# Patient Record
Sex: MALE | URBAN - METROPOLITAN AREA
[De-identification: names, ages, dates, MRNs, and addresses within clinical notes are randomized per-mention and may not be internally consistent; named-entity substitution may affect disease eponyms.]

---

## 2024-09-28 ENCOUNTER — NURSING HOME VISIT (OUTPATIENT)
Dept: POST ACUTE CARE | Facility: EXTERNAL LOCATION | Age: 79
End: 2024-09-28

## 2024-09-28 VITALS
WEIGHT: 163 LBS | HEIGHT: 71 IN | HEART RATE: 73 BPM | DIASTOLIC BLOOD PRESSURE: 84 MMHG | BODY MASS INDEX: 22.82 KG/M2 | RESPIRATION RATE: 18 BRPM | TEMPERATURE: 98.1 F | SYSTOLIC BLOOD PRESSURE: 148 MMHG

## 2024-09-28 DIAGNOSIS — R44.0 AUDITORY HALLUCINATIONS: ICD-10-CM

## 2024-09-28 DIAGNOSIS — I25.10 CORONARY ARTERY DISEASE INVOLVING NATIVE CORONARY ARTERY OF NATIVE HEART WITHOUT ANGINA PECTORIS: ICD-10-CM

## 2024-09-28 DIAGNOSIS — I25.5 ISCHEMIC CARDIOMYOPATHY: ICD-10-CM

## 2024-09-28 DIAGNOSIS — F01.B4 MODERATE VASCULAR DEMENTIA WITH ANXIETY: Primary | ICD-10-CM

## 2024-09-28 DIAGNOSIS — F44.9 CONVERSION REACTION: ICD-10-CM

## 2024-09-28 DIAGNOSIS — R04.0 EPISTAXIS, RECURRENT: ICD-10-CM

## 2024-09-28 RX ORDER — ISOSORBIDE MONONITRATE 30 MG/1
30 TABLET, EXTENDED RELEASE ORAL DAILY
COMMUNITY

## 2024-09-28 RX ORDER — METOPROLOL SUCCINATE 25 MG/1
25 TABLET, EXTENDED RELEASE ORAL DAILY
COMMUNITY

## 2024-09-28 RX ORDER — ASPIRIN 81 MG/1
81 TABLET ORAL DAILY
COMMUNITY

## 2024-09-28 RX ORDER — PANTOPRAZOLE SODIUM 40 MG/1
40 TABLET, DELAYED RELEASE ORAL
COMMUNITY

## 2024-09-28 RX ORDER — FERROUS SULFATE 325(65) MG
65 TABLET, DELAYED RELEASE (ENTERIC COATED) ORAL
COMMUNITY

## 2024-09-28 RX ORDER — DONEPEZIL HYDROCHLORIDE 5 MG/1
5 TABLET, FILM COATED ORAL NIGHTLY
COMMUNITY

## 2024-09-28 RX ORDER — LEVETIRACETAM 1000 MG/1
1000 TABLET ORAL 2 TIMES DAILY
COMMUNITY

## 2024-09-28 RX ORDER — PRASUGREL 10 MG/1
10 TABLET, FILM COATED ORAL DAILY
COMMUNITY

## 2024-09-28 RX ORDER — BRIMONIDINE TARTRATE 2 MG/ML
1 SOLUTION/ DROPS OPHTHALMIC 2 TIMES DAILY
COMMUNITY

## 2024-09-28 RX ORDER — PRAVASTATIN SODIUM 80 MG/1
80 TABLET ORAL DAILY
COMMUNITY

## 2024-09-29 NOTE — PROGRESS NOTES
Admission H&P - [facility]    78 y.o. male admitted 9/27/24  HPI    No medical history in EPIC.  Never .  No children.  DPOA is listed as a friend.  Per staff, patient is preoccupied with aliens  [employer] for brief time per patient.    Ambulatory without assistive device    Additional History provided by SARAH Cleaning, DPOA/HC and surrogate/Proxy   - patient was living alone in [place].  Moved to [place]   - prior to being admitted to [facility], patient was hospitalized for 1 week at [facility].  No records of this hospitalization are in [facility] Everywhere.     - h/o pacemaker and TIMBO   - no h/o cancer      Past Medical History:   Diagnosis Date    Auditory hallucinations 09/28/2024    Conversion reaction 09/28/2024    Coronary artery disease involving native coronary artery of native heart without angina pectoris 09/28/2024    Epistaxis, recurrent 09/28/2024    Ischemic cardiomyopathy 09/28/2024    Moderate vascular dementia with anxiety (Multi) 09/28/2024      Past Surgical History:   Procedure Laterality Date    CORONARY ANGIOPLASTY WITH STENT PLACEMENT      states he has 16 stents after the CABG    CORONARY ARTERY BYPASS GRAFT  2010     Family History   Family history unknown: Yes      Social History     Socioeconomic History    Marital status: Single     Spouse name: Not on file    Number of children: Not on file    Years of education: Not on file    Highest education level: Not on file   Occupational History    Not on file   Tobacco Use    Smoking status: Former     Types: Cigarettes    Smokeless tobacco: Never   Substance and Sexual Activity    Alcohol use: Not Currently    Drug use: Not Currently    Sexual activity: Not on file   Other Topics Concern    Not on file   Social History Narrative    Not on file     Social Determinants of Health     Financial Resource Strain: Not on file   Food Insecurity: Not on file   Transportation Needs: Not on file   Physical Activity: Not on file   Stress: Not  "on file   Social Connections: Not on file   Intimate Partner Violence: Not on file   Housing Stability: Not on file       Current Outpatient Medications on File Prior to Visit   Medication Sig Dispense Refill    aspirin 81 mg EC tablet Take 1 tablet (81 mg) by mouth once daily.      brimonidine (AlphaGAN) 0.2 % ophthalmic solution Administer 1 drop into both eyes 2 times a day. Alphagan P - glaucoma      donepezil (Aricept) 5 mg tablet Take 1 tablet (5 mg) by mouth once daily at bedtime.      ferrous sulfate 325 (65 Fe) MG EC tablet Take 65 mg by mouth once daily with breakfast. Do not crush, chew, or split.      isosorbide mononitrate ER (Imdur) 30 mg 24 hr tablet Take 1 tablet (30 mg) by mouth once daily. Do not crush or chew.      levETIRAcetam (Keppra) 1,000 mg tablet Take 1 tablet (1,000 mg) by mouth 2 times a day.      metoprolol succinate XL (Toprol-XL) 25 mg 24 hr tablet Take 1 tablet (25 mg) by mouth once daily. Do not crush or chew.      pantoprazole (ProtoNix) 40 mg EC tablet Take 1 tablet (40 mg) by mouth once daily in the morning. Take before meals. Do not crush, chew, or split.      prasugrel (Effient) 10 mg tablet Take 1 tablet (10 mg) by mouth once daily.      pravastatin (Pravachol) 80 mg tablet Take 1 tablet (80 mg) by mouth once daily.       No current facility-administered medications on file prior to visit.       No Known Allergies      ROS: Denies chest pain, SOB, Headache, GI problems     Visit Vitals  /84   Pulse 73   Temp 36.7 °C (98.1 °F)   Resp 18   Ht 1.803 m (5' 11\")   Wt 73.9 kg (163 lb)   BMI 22.73 kg/m²   Smoking Status Former   BSA 1.92 m²        PHYSICAL EXAM:  Alert and oriented to self.  Eyes: EOM grossly intact  Neck supple without lymph adenopathy or carotid bruit.  No masses or thyromegaly  Heart regular rate and rhythm without murmur.  Lungs clear to auscultation.  Abdomen soft, nontender with bowel sounds  Legs without edema.  Gait is non-antalgic  Speech clear.  " Hearing adequate.          DIAGNOSIS/PLAN:  Problem List Items Addressed This Visit       Ischemic cardiomyopathy    Relevant Medications    isosorbide mononitrate ER (Imdur) 30 mg 24 hr tablet    metoprolol succinate XL (Toprol-XL) 25 mg 24 hr tablet    prasugrel (Effient) 10 mg tablet    Moderate vascular dementia with anxiety (Multi) - Primary    Conversion reaction    Auditory hallucinations    Coronary artery disease involving native coronary artery of native heart without angina pectoris    Relevant Medications    isosorbide mononitrate ER (Imdur) 30 mg 24 hr tablet    metoprolol succinate XL (Toprol-XL) 25 mg 24 hr tablet    prasugrel (Effient) 10 mg tablet    Epistaxis, recurrent     Will order lab:  CMP, CBC, TSH, Lipid panel, PSA        Mikhail Galaviz DO, FACOFP

## 2024-09-30 ENCOUNTER — NURSING HOME VISIT (OUTPATIENT)
Dept: PRIMARY CARE | Facility: CLINIC | Age: 79
End: 2024-09-30
Payer: MEDICARE

## 2024-09-30 DIAGNOSIS — F01.B4 MODERATE VASCULAR DEMENTIA WITH ANXIETY: Primary | ICD-10-CM

## 2024-10-01 NOTE — PROGRESS NOTES
Recent hospitalization records from [facility]    Discharge Summaries  - documented in this encounter   Rick Torres MD - 09/27/2024 11:33 AM EDT  Formatting of this note is different from the original.  Images from the original note were not included.    DISCHARGE SUMMARY    PATIENT NAME: Wayne A Burkitt ADMISSION DATE: 9/21/2024  MRN: [id number] DISCHARGE DATE:  09/27/2024      ATTENDING PHYSICIAN: Rick Torres MD Code Status: Not on file  PCP: No primary care provider on file.    Highest Readmission Risk Score: 22  The 30 day readmissions risk score is derived from an internally validated risk model which evaluates patient level characteristics, utilization history, medication orders and lab results up until the day of discharge. Patients with a score of 40 or above are considered highest risk for readmission. Specific patient level drivers will be listed at the bottom of the summary.    TRANSITIONS OF CARE CRITICAL ISSUES:    KEY MEDICATION CHANGES:  Ranolazine discontinue  Lamotrigine discontinued (patient was not taking)  Imdur dose reduced to 30 mg daily  Seroquel dose increased to 50 mg nightly  Started on Aricept  Trazodone as needed    FOLLOW UP APPOINTMENTS: Primary care physician    LABS & PROCEDURES PENDING AT DISCHARGE:      REASON FOR HOSPITALIZATION/PRINCIPAL DIAGNOSES: Dementia with psychotic disturbance    HOSPITAL PROBLEMS:  Principal Problem:  Dementia with psychotic disturbance (HCC) (POA: Yes)  Active Problems:  Seizure disorder (HCC) (POA: Yes)  Hypertensive disorder (POA: Yes)  Coronary atherosclerosis (POA: Yes)  Chronic ischemic heart disease, unspecified (POA: Yes)  Angina pectoris (HCC) (POA: Yes)  Episodes of formed visual hallucinations (POA: Yes)  Frequent falls (POA: Yes)  Visual hallucinations (POA: Yes)  Auditory hallucination (POA: Yes)  Resolved Problems:  * No resolved hospital problems. *            HOSPITAL COURSE:  78 year old male with a past medical history CAD,  "seizures, HTN presented with visual and auditory hallucinations, recurrent falls, unable to take care of himself. This has been ongoing for 1 year and got worse after sister passed away as they lived together and helped each other with basic and instrumental activities.  After cognitive and mental evaluation diagnoses included:  Dementia with psychosis with associated: visual hallucinations, auditory hallucinations and Paranoid ideas  Improved notably with increased dose of seroquel to 50 mg qhs.  Started in donepezil and needs to be titrated up later over next 3 weeks.  Capacity testing done and not able to make decisions on his own benefit including health and financial related. POA in place.  Medical reconciliation done and all medications in current list are accurate.  Patient has been on Keppra and Lamictal for his seizures but both levels were low. Last seizure was about 4 years ago. During hospital stay both were discontinued but I resumed Keppra on discharge since I do not feel that is safe to discontinue both seizure medications abruptly even though the patient was not taking them regularly and both levels were subtherapeutic.  Patient discharged in stable condition to follow-up with his primary care physician on an outpatient basis that he needs to establish.    OPERATIONS/PROCEDURE DURING THIS HOSPITALIZATION:  * No surgery found * None  CT Scan    CONSULTS DURING HOSPITALIZATION:  Treatment Team:  Attending Provider: Rick Torres MD    PATIENT CONDITION AT DISCHARGE: Stable    DISCHARGE DISPOSITION:  Memory care unit    Discharge Physical Exam:  VITAL SIGNS: /68  Pulse 63  Temp 36.7 °C (98.1 °F) (Oral)  Resp 18  Ht 180.3 cm (5' 11\")  Wt 72.3 kg (159 lb 6.3 oz)  SpO2 95%  BMI 22.23 kg/m²    Physical Exam Performed:  General appearance: Well appearing, alert and orientedx3, in no acute distress.  Skin: color, texture, turgor normal  Head: Normocephalic, no masses, lesions  Eyes: " PERRLA.  Neck: Supple, no bruits  Lungs: Clear to auscultation. No wheezing, rhonchi, rales.  Cardiac: RRR, no murmur, gallop, or rubs  Abdomen: soft, non-tender. Bowel sounds normal  Extremities: No deformities, edema, skin discoloration  Musculoskeletal: No joint swelling, deformity, or tenderness  Neuro: No focal neurologic deficit    WOUND/SURGICAL SITE CARE:  None    SUPPLIES OR EQUIPMENT:  None    DIET:  Resume pre-hospital diet    ACTIVITY AND EXERCISE:  Resume pre-hospital activity    FOLLOW UP APPOINTMENTS: No future appointments.  Discharge Information    Row Name ED to Hosp-Admission (Current) from 9/21/2024 in [address] [facility]  [facility]  Phone# [phone]    Home Health Care  [facility]  Phone# [phone]              ALLERGIES  Allergen Reactions  Alprazolam Anaphylaxis  Amlodipine Hives  Atorvastatin Hives  Bepotastine Besilate Swelling  Cephalexin Hives  Ciprofloxacin Itching  Clopidogrel Swelling, Angioedema  Diphenhydramine Hcl Hives  Doxazosin Swelling  Guaifenesin Hives  Hydrochlorothiazide Intolerance  Hydrocodone Hives  Lisinopril Swelling  Methylprednisolone Intolerance  Metronidazole Swelling  Phenytoin Unknown  Sertraline Unknown  Telmisartan Hives  Topiramate Rash    DISCHARGE MEDICATION:    Medication List      START taking these medications    donepezil 5 mg tablet  Commonly known as: ARICEPT  Take 1 tablet by mouth once daily.  Start taking on: September 28, 2024    traZODone 50 mg tablet  Commonly known as: DESYREL  Take 0.5 tablets by mouth at bedtime as needed.          CHANGE how you take these medications    isosorbide mononitrate ER 30 mg 24 hr tablet  Commonly known as: IMDUR  Take 1 tablet by mouth once daily.  What changed:  medication strength  how much to take    QUEtiapine 50 mg tablet  Commonly known as: SEROquel  Take 1 tablet by mouth daily at bedtime.  What changed:  medication strength  how  much to take          CONTINUE taking these medications    aspirin 81 mg Cap    brimonidine 0.2 % ophthalmic solution  Commonly known as: ALPHAGAN    ferrous sulfate 325 mg (65 mg iron) EC tablet    levETIRAcetam 1,000 mg tablet  Commonly known as: KEPPRA    metoprolol succinate ER 25 mg 24 hr tablet  Commonly known as: TOPROL XL    pantoprazole DR 40 mg tablet  Commonly known as: PROTONIX    prasugrel 10 mg Tab  Commonly known as: EFFIENT    pravastatin 80 mg tablet  Commonly known as: PRAVACHOL    spironolactone 25 mg tablet  Commonly known as: ALDACTONE          STOP taking these medications    famotidine 40 mg tablet  Commonly known as: PEPCID    fexofenadine 180 mg tablet  Commonly known as: ALLEGRA    lamoTRIgine 25 mg tablet  Commonly known as: LaMICtal    methocarbamol 500 mg tablet  Commonly known as: ROBAXIN    ranolazine  mg 12 hr tablet  Commonly known as: RANEXA            Where to Get Your Medications      These medications were sent to e- [facility] [id number] [place] [address] - [phone] [address] [place], [place] [id number]    Phone: [phone]  donepezil 5 mg tablet  isosorbide mononitrate ER 30 mg 24 hr tablet  QUEtiapine 50 mg tablet  traZODone 50 mg tablet        The patient's risk for 30-day readmission is determined using the following contributing factors:  Pt variables contributing to increased readmission risk:    18 Most Recent BUN Result  14 Active Medication Orders  8.8 First Resulted Calcium During Admission  2 Number of Previous ED Visits (6 mos.)  1 Previous ED Visit (6 mos.)?  1 Insurance - Medicare  1 Barriers to Health Literacy Identified      Plan of care discussed with Provider, RN, Patient and Care Management    I have performed the face-to-face and relevant services for a total of >30 minutes.    SIGNATURE: Rick Torres MD  DATE: September 27, 2024  TIME: 11:33 AM          Electronically signed by Rick Torres MD at 09/27/2024 11:37 AM EDT    Medications at Time of Discharge  - documented as of this encounter   Medications at Time of Discharge  Medication Sig Dispensed Refills Start Date End Date   brimonidine (ALPHAGAN) 0.2 % ophthalmic solution  Use 1 Drop in both eyes two times a day.     07/11/2024     QUEtiapine (SEROQUEL) 50 mg tablet  Take 1 tablet by mouth daily at bedtime. 30 tablet    09/27/2024 10/27/2024   donepezil (ARICEPT) 5 mg tablet  Take 1 tablet by mouth once daily. 30 tablet    09/28/2024 10/28/2024   traZODone (DESYREL) 50 mg tablet  Take 0.5 tablets by mouth at bedtime as needed. 15 tablet    09/27/2024 10/27/2024   isosorbide mononitrate ER (IMDUR) 30 mg 24 hr tablet  Take 1 tablet by mouth once daily. 30 tablet    09/27/2024 10/27/2024   pravastatin (PRAVACHOL) 80 mg tablet  Take 80 mg by mouth once daily.           levETIRAcetam (KEPPRA) 1,000 mg tablet  Take 1,000 mg by mouth two times a day.           aspirin 81 mg cap  Take 81 mg by mouth once daily.           ferrous sulfate 325 mg (65 mg iron) EC tablet  Take 325 mg by mouth once daily.           prasugrel (EFFIENT) 10 mg tab  Take 10 mg by mouth once daily.           pantoprazole DR (PROTONIX) 40 mg tablet  Take 40 mg by mouth once daily.           spironolactone (ALDACTONE) 25 mg tablet  Take 25 mg by mouth once daily.           metoprolol succinate ER (TOPROL XL) 25 mg 24 hr tablet  Take 25 mg by mouth once daily.             Ordered Prescriptions  - documented in this encounter

## 2024-10-01 NOTE — PROGRESS NOTES
CEID from [facility]:  [id number]     Attempting to abstract records from recent hospitalization at [facility] Cathy.      Hospitalized 9/21/24    DISCHARGE SUMMARY    PATIENT NAME: Wayne A Burkitt ADMISSION DATE: 9/21/2024  MRN: [id number] DISCHARGE DATE:  09/27/2024      ATTENDING PHYSICIAN: Rick Torres MD Code Status: Not on file  PCP: No primary care provider on file.    Highest Readmission Risk Score: 22  The 30 day readmissions risk score is derived from an internally validated risk model which evaluates patient level characteristics, utilization history, medication orders and lab results up until the day of discharge. Patients with a score of 40 or above are considered highest risk for readmission. Specific patient level drivers will be listed at the bottom of the summary.    TRANSITIONS OF CARE CRITICAL ISSUES:    KEY MEDICATION CHANGES:  Ranolazine discontinue  Lamotrigine discontinued (patient was not taking)  Imdur dose reduced to 30 mg daily  Seroquel dose increased to 50 mg nightly  Started on Aricept  Trazodone as needed    FOLLOW UP APPOINTMENTS: Primary care physician    LABS & PROCEDURES PENDING AT DISCHARGE:      REASON FOR HOSPITALIZATION/PRINCIPAL DIAGNOSES: Dementia with psychotic disturbance    HOSPITAL PROBLEMS:  Principal Problem:  Dementia with psychotic disturbance (HCC) (POA: Yes)  Active Problems:  Seizure disorder (HCC) (POA: Yes)  Hypertensive disorder (POA: Yes)  Coronary atherosclerosis (POA: Yes)  Chronic ischemic heart disease, unspecified (POA: Yes)  Angina pectoris (HCC) (POA: Yes)  Episodes of formed visual hallucinations (POA: Yes)  Frequent falls (POA: Yes)  Visual hallucinations (POA: Yes)  Auditory hallucination (POA: Yes)  Resolved Problems:  * No resolved hospital problems. *            HOSPITAL COURSE:  78 year old male with a past medical history CAD, seizures, HTN presented with visual and auditory hallucinations, recurrent falls, unable to take care of himself. This has been  "ongoing for 1 year and got worse after sister passed away as they lived together and helped each other with basic and instrumental activities.  After cognitive and mental evaluation diagnoses included:  Dementia with psychosis with associated: visual hallucinations, auditory hallucinations and Paranoid ideas  Improved notably with increased dose of seroquel to 50 mg qhs.  Started in donepezil and needs to be titrated up later over next 3 weeks.  Capacity testing done and not able to make decisions on his own benefit including health and financial related. POA in place.  Medical reconciliation done and all medications in current list are accurate.  Patient has been on Keppra and Lamictal for his seizures but both levels were low. Last seizure was about 4 years ago. During hospital stay both were discontinued but I resumed Keppra on discharge since I do not feel that is safe to discontinue both seizure medications abruptly even though the patient was not taking them regularly and both levels were subtherapeutic.  Patient discharged in stable condition to follow-up with his primary care physician on an outpatient basis that he needs to establish.    OPERATIONS/PROCEDURE DURING THIS HOSPITALIZATION:  * No surgery found * None  CT Scan    CONSULTS DURING HOSPITALIZATION:  Treatment Team:  Attending Provider: Rick Torres MD    PATIENT CONDITION AT DISCHARGE: Stable    DISCHARGE DISPOSITION:  Memory care unit    Discharge Physical Exam:  VITAL SIGNS: /68  Pulse 63  Temp 36.7 °C (98.1 °F) (Oral)  Resp 18  Ht 180.3 cm (5' 11\")  Wt 72.3 kg (159 lb 6.3 oz)  SpO2 95%  BMI 22.23 kg/m²    Physical Exam Performed:  General appearance: Well appearing, alert and orientedx3, in no acute distress.  Skin: color, texture, turgor normal  Head: Normocephalic, no masses, lesions  Eyes: PERRLA.  Neck: Supple, no bruits  Lungs: Clear to auscultation. No wheezing, rhonchi, rales.  Cardiac: RRR, no murmur, gallop, or " rubs  Abdomen: soft, non-tender. Bowel sounds normal  Extremities: No deformities, edema, skin discoloration  Musculoskeletal: No joint swelling, deformity, or tenderness  Neuro: No focal neurologic deficit    WOUND/SURGICAL SITE CARE:  None    SUPPLIES OR EQUIPMENT:  None    DIET:  Resume pre-hospital diet    ACTIVITY AND EXERCISE:  Resume pre-hospital activity    FOLLOW UP APPOINTMENTS: No future appointments.  Discharge Information    Row Name ED to Hosp-Admission (Current) from 9/21/2024 in [address] [facility]  [facility]  Phone# [phone]    Home Health Care  [facility]  Phone# [phone]              ALLERGIES  Allergen Reactions  Alprazolam Anaphylaxis  Amlodipine Hives  Atorvastatin Hives  Bepotastine Besilate Swelling  Cephalexin Hives  Ciprofloxacin Itching  Clopidogrel Swelling, Angioedema  Diphenhydramine Hcl Hives  Doxazosin Swelling  Guaifenesin Hives  Hydrochlorothiazide Intolerance  Hydrocodone Hives  Lisinopril Swelling  Methylprednisolone Intolerance  Metronidazole Swelling  Phenytoin Unknown  Sertraline Unknown  Telmisartan Hives  Topiramate Rash    DISCHARGE MEDICATION:    Medication List      START taking these medications    donepezil 5 mg tablet  Commonly known as: ARICEPT  Take 1 tablet by mouth once daily.  Start taking on: September 28, 2024    traZODone 50 mg tablet  Commonly known as: DESYREL  Take 0.5 tablets by mouth at bedtime as needed.          CHANGE how you take these medications    isosorbide mononitrate ER 30 mg 24 hr tablet  Commonly known as: IMDUR  Take 1 tablet by mouth once daily.  What changed:  medication strength  how much to take    QUEtiapine 50 mg tablet  Commonly known as: SEROquel  Take 1 tablet by mouth daily at bedtime.  What changed:  medication strength  how much to take          CONTINUE taking these medications    aspirin 81 mg Cap    brimonidine 0.2 % ophthalmic solution  Commonly  known as: ALPHAGAN    ferrous sulfate 325 mg (65 mg iron) EC tablet    levETIRAcetam 1,000 mg tablet  Commonly known as: KEPPRA    metoprolol succinate ER 25 mg 24 hr tablet  Commonly known as: TOPROL XL    pantoprazole DR 40 mg tablet  Commonly known as: PROTONIX    prasugrel 10 mg Tab  Commonly known as: EFFIENT    pravastatin 80 mg tablet  Commonly known as: PRAVACHOL    spironolactone 25 mg tablet  Commonly known as: ALDACTONE          STOP taking these medications    famotidine 40 mg tablet  Commonly known as: PEPCID    fexofenadine 180 mg tablet  Commonly known as: ALLEGRA    lamoTRIgine 25 mg tablet  Commonly known as: LaMICtal    methocarbamol 500 mg tablet  Commonly known as: ROBAXIN    ranolazine  mg 12 hr tablet  Commonly known as: RANEXA            Where to Get Your Medications      These medications were sent to [place] [address] - [address] [facility] - [phone] [address] [facility] [address]    Phone: [phone]  donepezil 5 mg tablet  isosorbide mononitrate ER 30 mg 24 hr tablet  QUEtiapine 50 mg tablet  traZODone 50 mg tablet        The patient's risk for 30-day readmission is determined using the following contributing factors:  Pt variables contributing to increased readmission risk:    18 Most Recent BUN Result  14 Active Medication Orders  8.8 First Resulted Calcium During Admission  2 Number of Previous ED Visits (6 mos.)  1 Previous ED Visit (6 mos.)?  1 Insurance - Medicare  1 Barriers to Health Literacy Identified      Plan of care discussed with Provider, RN, Patient and Care Management    I have performed the face-to-face and relevant services for a total of >30 minutes.    SIGNATURE: Rick Torres MD  DATE: September 27, 2024  TIME: 11:33 AM          Electronically signed by Rick Torres MD at 09/27/2024 11:37 AM EDT   Medications at Time of Discharge  - documented as of this encounter   Medications at Time of Discharge  Medication Sig  Dispensed Refills Start Date End Date   brimonidine (ALPHAGAN) 0.2 % ophthalmic solution  Use 1 Drop in both eyes two times a day.     07/11/2024     QUEtiapine (SEROQUEL) 50 mg tablet  Take 1 tablet by mouth daily at bedtime. 30 tablet    09/27/2024 10/27/2024   donepezil (ARICEPT) 5 mg tablet  Take 1 tablet by mouth once daily. 30 tablet    09/28/2024 10/28/2024   traZODone (DESYREL) 50 mg tablet  Take 0.5 tablets by mouth at bedtime as needed. 15 tablet    09/27/2024 10/27/2024   isosorbide mononitrate ER (IMDUR) 30 mg 24 hr tablet  Take 1 tablet by mouth once daily. 30 tablet    09/27/2024 10/27/2024   pravastatin (PRAVACHOL) 80 mg tablet  Take 80 mg by mouth once daily.           levETIRAcetam (KEPPRA) 1,000 mg tablet  Take 1,000 mg by mouth two times a day.           aspirin 81 mg cap  Take 81 mg by mouth once daily.           ferrous sulfate 325 mg (65 mg iron) EC tablet  Take 325 mg by mouth once daily.           prasugrel (EFFIENT) 10 mg tab  Take 10 mg by mouth once daily.           pantoprazole DR (PROTONIX) 40 mg tablet  Take 40 mg by mouth once daily.           spironolactone (ALDACTONE) 25 mg tablet  Take 25 mg by mouth once daily.           metoprolol succinate ER (TOPROL XL) 25 mg 24 hr tablet  Take 25 mg by mouth once daily.             Ordered Prescriptions  - documented in this encounter

## 2024-10-22 DIAGNOSIS — R04.0 RECURRENT EPISTAXIS: Primary | ICD-10-CM

## 2024-11-15 PROBLEM — G40.909 SEIZURE DISORDER (MULTI): Status: ACTIVE | Noted: 2023-07-13

## 2024-11-15 PROBLEM — I95.1 ORTHOSTATIC HYPOTENSION: Status: ACTIVE | Noted: 2024-09-21

## 2024-11-15 PROBLEM — I20.9 ANGINA PECTORIS: Status: ACTIVE | Noted: 2023-06-07

## 2024-11-15 PROBLEM — F03.92 DEMENTIA WITH PSYCHOTIC DISTURBANCE: Status: ACTIVE | Noted: 2024-09-22

## 2024-11-15 PROBLEM — R29.6 FREQUENT FALLS: Status: ACTIVE | Noted: 2024-09-21

## 2024-11-15 PROBLEM — G47.33 OBSTRUCTIVE SLEEP APNEA SYNDROME: Status: ACTIVE | Noted: 2024-09-21

## 2024-11-15 PROBLEM — F41.0 PANIC DISORDER WITHOUT AGORAPHOBIA: Status: ACTIVE | Noted: 2024-09-21

## 2024-11-15 PROBLEM — I10 HYPERTENSIVE DISORDER: Status: ACTIVE | Noted: 2023-06-07

## 2024-11-15 PROBLEM — R44.1 EPISODES OF FORMED VISUAL HALLUCINATIONS: Status: ACTIVE | Noted: 2024-09-21

## 2024-11-15 RX ORDER — SPIRONOLACTONE 25 MG/1
25 TABLET ORAL
COMMUNITY

## 2024-11-19 ENCOUNTER — NURSING HOME VISIT (OUTPATIENT)
Dept: POST ACUTE CARE | Facility: EXTERNAL LOCATION | Age: 79
End: 2024-11-19
Payer: MEDICARE

## 2024-11-19 DIAGNOSIS — I25.5 ISCHEMIC CARDIOMYOPATHY: ICD-10-CM

## 2024-11-19 DIAGNOSIS — I10 HYPERTENSION, UNSPECIFIED TYPE: ICD-10-CM

## 2024-11-19 DIAGNOSIS — F01.B4 MODERATE VASCULAR DEMENTIA WITH ANXIETY: ICD-10-CM

## 2024-11-19 DIAGNOSIS — R04.0 EPISTAXIS, RECURRENT: Primary | ICD-10-CM

## 2024-11-19 PROCEDURE — 99347 HOME/RES VST EST SF MDM 20: CPT | Performed by: FAMILY MEDICINE

## 2024-11-25 ENCOUNTER — APPOINTMENT (OUTPATIENT)
Dept: CARDIOLOGY | Facility: CLINIC | Age: 79
End: 2024-11-25
Payer: MEDICARE

## 2024-11-25 VITALS
BODY MASS INDEX: 24.08 KG/M2 | SYSTOLIC BLOOD PRESSURE: 100 MMHG | HEIGHT: 71 IN | DIASTOLIC BLOOD PRESSURE: 60 MMHG | OXYGEN SATURATION: 99 % | HEART RATE: 60 BPM | WEIGHT: 172 LBS

## 2024-11-25 DIAGNOSIS — I20.0 ANGINA PECTORIS, UNSTABLE (MULTI): ICD-10-CM

## 2024-11-25 DIAGNOSIS — I25.10 CORONARY ARTERY DISEASE INVOLVING NATIVE CORONARY ARTERY OF NATIVE HEART WITHOUT ANGINA PECTORIS: Primary | ICD-10-CM

## 2024-11-25 PROCEDURE — 3074F SYST BP LT 130 MM HG: CPT | Performed by: INTERNAL MEDICINE

## 2024-11-25 PROCEDURE — 99205 OFFICE O/P NEW HI 60 MIN: CPT | Performed by: INTERNAL MEDICINE

## 2024-11-25 PROCEDURE — 1159F MED LIST DOCD IN RCRD: CPT | Performed by: INTERNAL MEDICINE

## 2024-11-25 PROCEDURE — 93000 ELECTROCARDIOGRAM COMPLETE: CPT | Performed by: INTERNAL MEDICINE

## 2024-11-25 PROCEDURE — 3078F DIAST BP <80 MM HG: CPT | Performed by: INTERNAL MEDICINE

## 2024-11-25 RX ORDER — RANOLAZINE 500 MG/1
500 TABLET, EXTENDED RELEASE ORAL 2 TIMES DAILY
Qty: 60 TABLET | Refills: 11 | Status: SHIPPED | OUTPATIENT
Start: 2024-11-25 | End: 2025-11-25

## 2024-11-25 RX ORDER — REGADENOSON 0.08 MG/ML
0.4 INJECTION, SOLUTION INTRAVENOUS
OUTPATIENT
Start: 2024-11-25

## 2024-11-25 RX ORDER — AMINOPHYLLINE 25 MG/ML
125 INJECTION, SOLUTION INTRAVENOUS ONCE AS NEEDED
OUTPATIENT
Start: 2024-11-25

## 2024-11-25 RX ORDER — ISOSORBIDE MONONITRATE 30 MG/1
60 TABLET, EXTENDED RELEASE ORAL DAILY
Qty: 30 TABLET | Refills: 3 | Status: SHIPPED | OUTPATIENT
Start: 2024-11-25

## 2024-11-25 NOTE — PROGRESS NOTES
[facility]  Problem List Items Addressed This Visit       Ischemic cardiomyopathy    Moderate vascular dementia with anxiety    Epistaxis, recurrent - Primary    Hypertensive disorder     ENT appointment  Sees cardiologist regularly  Walking with walker

## 2024-11-25 NOTE — PROGRESS NOTES
Patient presents for cardiovascular evaluation in the [place] office at the request of Mikhail Galaviz DO for the following:    Chief Complaint:  Chest Pain     HISTORY OF PRESENT ILLNESS:      Wayne Burkitt is a 79 y.o. male with prior cardiac history of CAD, CABG, HTN, ICM, angina.  Other pertinent medical history includes dementia (vascular).  Patient of Dr. Asencio.  Follows with a DrTamika In [place].    Patient reports symptoms of chest discomfort.    Patient decribes 6 months central and left sided sided chest pain with left arm radiation, lasting for minutes, aggravating factors are exertion, alleviating factors are rest.  The chest pain is associated with mild shortness of breath.  Took nitroglycerin a couple of time.    Patient reports experiencing no weight gain, with no change over the past year.  The patient also reports no dyspnea on exertion, no orthopnea, no nocturnal dyspnea, and no lower extremity edema.     No palpitations, syncope, or claudication.  No family history of CAD.  No tobacco smoking.    Past Medical History:  Past Medical History:   Diagnosis Date    Auditory hallucinations 09/28/2024    Conversion reaction 09/28/2024    Coronary artery disease involving native coronary artery of native heart without angina pectoris 09/28/2024    Epistaxis, recurrent 09/28/2024    Ischemic cardiomyopathy 09/28/2024    Moderate vascular dementia with anxiety (Multi) 09/28/2024        Past Surgical History:  Recent Surgeries in Cardiology            No cases to display              Social History:   reports that he has quit smoking. His smoking use included cigarettes. He has never used smokeless tobacco. He reports that he does not currently use alcohol. He reports that he does not currently use drugs.     Family History:  Family history is unknown by patient.     Allergies:  Alprazolam, Amlodipine, Atorvastatin, Bepotastine besilate, Cephalexin, Ciprofloxacin, Clopidogrel, Diphenhydramine hcl, Doxazosin,  "Guaifenesin, Hydrochlorothiazide, Hydrocodone, Lisinopril, Methylprednisolone, Metronidazole, Phenytoin, Sertraline, Telmisartan, and Topiramate    Outpatient Medications:  Current Outpatient Medications   Medication Instructions    aspirin 81 mg, Daily    brimonidine (AlphaGAN) 0.2 % ophthalmic solution 1 drop, 2 times daily    donepezil (ARICEPT) 5 mg, Nightly    ferrous sulfate 65 mg, Daily with breakfast    isosorbide mononitrate ER (IMDUR) 30 mg, Daily    levETIRAcetam (KEPPRA) 1,000 mg, 2 times daily    metoprolol succinate XL (TOPROL-XL) 25 mg, Daily    pantoprazole (PROTONIX) 40 mg, Daily before breakfast    prasugrel (EFFIENT) 10 mg, Daily    pravastatin (PRAVACHOL) 80 mg, Daily    spironolactone (ALDACTONE) 25 mg, Daily RT       ROS: 12 review of systems negative other than chief complaint    PHYSICAL EXAM    Vitals:    11/25/24 1047   BP: 100/60   BP Location: Right arm   Patient Position: Sitting   Pulse: 60   SpO2: 99%   Weight: 78 kg (172 lb)   Height: 1.803 m (5' 11\")     General: No acute distress, appears comfortable  Cardiac: Regular rate and rhythm with no murmurs rubs or gallops, normal S1-S2  Pulmonary: Clear to auscultation bilaterally with no rales or rhonchi, normal respiratory effort  Gastrointestinal: Soft abdomen with no tenderness or guarding, no rebound  Musculoskeletal: No lower extremity edema, normal  Neurological: Alert and oriented x 4, appropriate, moving all extremities well, normal affect  Psychiatric: Normal affect, mood appropriate to occasion  Skin: No rashes, hives or jaundice  HEENT: Normocephalic, atraumatic.  Pupils equal round and reactive.    Last Labs:    CBC -  No results found for: \"WBC\", \"HGB\", \"HCT\", \"MCV\", \"PLT\"    CMP -  No results found for: \"CALCIUM\", \"PHOS\", \"PROT\", \"ALBUMIN\", \"AST\", \"ALT\", \"ALKPHOS\", \"BILITOT\"    LIPID PANEL -   No results found for: \"CHOL\", \"HDL\", \"CHHDL\", \"LDL\", \"VLDL\", \"TRIG\", \"NHDL\"    RENAL FUNCTION PANEL -   No results found for: \"GLU\", " "\"K\", \"CHLOR\", \"PHOS\"    No results found for: \"BNP\", \"HGBA1C\"    Last Cardiology Tests: EKG: Atrial paced, LVH with repol, inferior Q waves      Assessment/Plan   Diagnoses and all orders for this visit:  Coronary artery disease involving native coronary artery of native heart without angina pectoris  -     ECG 12 lead (Clinic Performed)  -     Follow Up In Cardiology; Future  -     Nuclear Stress Test; Future  -     Transthoracic echo (TTE) complete; Future  -     isosorbide mononitrate ER (Imdur) 30 mg 24 hr tablet; Take 2 tablets (60 mg) by mouth once daily. Do not crush or chew.  -     ranolazine (Ranexa) 500 mg 12 hr tablet; Take 1 tablet (500 mg) by mouth 2 times a day. Do not crush, chew, or split.  Angina pectoris, unstable (Multi)  -     Follow Up In Cardiology; Future  -     Nuclear Stress Test; Future  -     Transthoracic echo (TTE) complete; Future  -     isosorbide mononitrate ER (Imdur) 30 mg 24 hr tablet; Take 2 tablets (60 mg) by mouth once daily. Do not crush or chew.  -     ranolazine (Ranexa) 500 mg 12 hr tablet; Take 1 tablet (500 mg) by mouth 2 times a day. Do not crush, chew, or split.      Assessment and plan (narrative):    Wayne Burkitt is a 79 y.o. male with CAD, CABG and chest pain, possibly angina, will check testing and followup    Followup: after testing    Gabino Giron MD  "

## 2024-11-25 NOTE — H&P (VIEW-ONLY)
Patient presents for cardiovascular evaluation in the [place] office at the request of Mikhail Galaviz DO for the following:    Chief Complaint:  Chest Pain     HISTORY OF PRESENT ILLNESS:      Wayne Burkitt is a 79 y.o. male with prior cardiac history of CAD, CABG, HTN, ICM, angina.  Other pertinent medical history includes dementia (vascular).  Patient of Dr. Asencio.  Follows with a DrTamika In [place].    Patient reports symptoms of chest discomfort.    Patient decribes 6 months central and left sided sided chest pain with left arm radiation, lasting for minutes, aggravating factors are exertion, alleviating factors are rest.  The chest pain is associated with mild shortness of breath.  Took nitroglycerin a couple of time.    Patient reports experiencing no weight gain, with no change over the past year.  The patient also reports no dyspnea on exertion, no orthopnea, no nocturnal dyspnea, and no lower extremity edema.     No palpitations, syncope, or claudication.  No family history of CAD.  No tobacco smoking.    Past Medical History:  Past Medical History:   Diagnosis Date    Auditory hallucinations 09/28/2024    Conversion reaction 09/28/2024    Coronary artery disease involving native coronary artery of native heart without angina pectoris 09/28/2024    Epistaxis, recurrent 09/28/2024    Ischemic cardiomyopathy 09/28/2024    Moderate vascular dementia with anxiety (Multi) 09/28/2024        Past Surgical History:  Recent Surgeries in Cardiology            No cases to display              Social History:   reports that he has quit smoking. His smoking use included cigarettes. He has never used smokeless tobacco. He reports that he does not currently use alcohol. He reports that he does not currently use drugs.     Family History:  Family history is unknown by patient.     Allergies:  Alprazolam, Amlodipine, Atorvastatin, Bepotastine besilate, Cephalexin, Ciprofloxacin, Clopidogrel, Diphenhydramine hcl, Doxazosin,  "Guaifenesin, Hydrochlorothiazide, Hydrocodone, Lisinopril, Methylprednisolone, Metronidazole, Phenytoin, Sertraline, Telmisartan, and Topiramate    Outpatient Medications:  Current Outpatient Medications   Medication Instructions    aspirin 81 mg, Daily    brimonidine (AlphaGAN) 0.2 % ophthalmic solution 1 drop, 2 times daily    donepezil (ARICEPT) 5 mg, Nightly    ferrous sulfate 65 mg, Daily with breakfast    isosorbide mononitrate ER (IMDUR) 30 mg, Daily    levETIRAcetam (KEPPRA) 1,000 mg, 2 times daily    metoprolol succinate XL (TOPROL-XL) 25 mg, Daily    pantoprazole (PROTONIX) 40 mg, Daily before breakfast    prasugrel (EFFIENT) 10 mg, Daily    pravastatin (PRAVACHOL) 80 mg, Daily    spironolactone (ALDACTONE) 25 mg, Daily RT       ROS: 12 review of systems negative other than chief complaint    PHYSICAL EXAM    Vitals:    11/25/24 1047   BP: 100/60   BP Location: Right arm   Patient Position: Sitting   Pulse: 60   SpO2: 99%   Weight: 78 kg (172 lb)   Height: 1.803 m (5' 11\")     General: No acute distress, appears comfortable  Cardiac: Regular rate and rhythm with no murmurs rubs or gallops, normal S1-S2  Pulmonary: Clear to auscultation bilaterally with no rales or rhonchi, normal respiratory effort  Gastrointestinal: Soft abdomen with no tenderness or guarding, no rebound  Musculoskeletal: No lower extremity edema, normal  Neurological: Alert and oriented x 4, appropriate, moving all extremities well, normal affect  Psychiatric: Normal affect, mood appropriate to occasion  Skin: No rashes, hives or jaundice  HEENT: Normocephalic, atraumatic.  Pupils equal round and reactive.    Last Labs:    CBC -  No results found for: \"WBC\", \"HGB\", \"HCT\", \"MCV\", \"PLT\"    CMP -  No results found for: \"CALCIUM\", \"PHOS\", \"PROT\", \"ALBUMIN\", \"AST\", \"ALT\", \"ALKPHOS\", \"BILITOT\"    LIPID PANEL -   No results found for: \"CHOL\", \"HDL\", \"CHHDL\", \"LDL\", \"VLDL\", \"TRIG\", \"NHDL\"    RENAL FUNCTION PANEL -   No results found for: \"GLU\", " "\"K\", \"CHLOR\", \"PHOS\"    No results found for: \"BNP\", \"HGBA1C\"    Last Cardiology Tests: EKG: Atrial paced, LVH with repol, inferior Q waves      Assessment/Plan   Diagnoses and all orders for this visit:  Coronary artery disease involving native coronary artery of native heart without angina pectoris  -     ECG 12 lead (Clinic Performed)  -     Follow Up In Cardiology; Future  -     Nuclear Stress Test; Future  -     Transthoracic echo (TTE) complete; Future  -     isosorbide mononitrate ER (Imdur) 30 mg 24 hr tablet; Take 2 tablets (60 mg) by mouth once daily. Do not crush or chew.  -     ranolazine (Ranexa) 500 mg 12 hr tablet; Take 1 tablet (500 mg) by mouth 2 times a day. Do not crush, chew, or split.  Angina pectoris, unstable (Multi)  -     Follow Up In Cardiology; Future  -     Nuclear Stress Test; Future  -     Transthoracic echo (TTE) complete; Future  -     isosorbide mononitrate ER (Imdur) 30 mg 24 hr tablet; Take 2 tablets (60 mg) by mouth once daily. Do not crush or chew.  -     ranolazine (Ranexa) 500 mg 12 hr tablet; Take 1 tablet (500 mg) by mouth 2 times a day. Do not crush, chew, or split.      Assessment and plan (narrative):    Wayne Burkitt is a 79 y.o. male with CAD, CABG and chest pain, possibly angina, will check testing and followup    Followup: after testing    Gabino Giron MD  "

## 2024-11-26 ENCOUNTER — APPOINTMENT (OUTPATIENT)
Dept: OTOLARYNGOLOGY | Facility: CLINIC | Age: 79
End: 2024-11-26
Payer: MEDICARE

## 2024-11-26 VITALS
DIASTOLIC BLOOD PRESSURE: 78 MMHG | HEIGHT: 71 IN | BODY MASS INDEX: 23.99 KG/M2 | TEMPERATURE: 97.2 F | SYSTOLIC BLOOD PRESSURE: 132 MMHG

## 2024-11-26 DIAGNOSIS — R04.0 RECURRENT EPISTAXIS: ICD-10-CM

## 2024-11-26 DIAGNOSIS — J34.89 NASAL MUCOSA DRY: Primary | ICD-10-CM

## 2024-11-26 PROCEDURE — 3075F SYST BP GE 130 - 139MM HG: CPT | Performed by: OTOLARYNGOLOGY

## 2024-11-26 PROCEDURE — 1036F TOBACCO NON-USER: CPT | Performed by: OTOLARYNGOLOGY

## 2024-11-26 PROCEDURE — 1159F MED LIST DOCD IN RCRD: CPT | Performed by: OTOLARYNGOLOGY

## 2024-11-26 PROCEDURE — 1160F RVW MEDS BY RX/DR IN RCRD: CPT | Performed by: OTOLARYNGOLOGY

## 2024-11-26 PROCEDURE — 3078F DIAST BP <80 MM HG: CPT | Performed by: OTOLARYNGOLOGY

## 2024-11-26 PROCEDURE — 99204 OFFICE O/P NEW MOD 45 MIN: CPT | Performed by: OTOLARYNGOLOGY

## 2024-11-26 NOTE — PROGRESS NOTES
Impression:  1. Nasal mucosa dry        2. Recurrent epistaxis  Referral to ENT           RECOMMENDATIONS/PLAN :  I explained to the patient that the lining of his nose is extremely dry.  He must start using saline spray in the nose several times daily and apply Vaseline along the anterior septum in the evenings.  I gave him a handout on how to stop bleeding with a cottonball and Afrin nasal spray.  I advised him not to use Afrin nasal spray every single day.  I also reassured the patient there is no evidence of any prominent blood vessels to cauterize today.      **This electronic medical record note was created with the use of voice recognition software.  Despite proofreading, typographical or grammatical errors may be present that could affect meaning of content **    Subjective   Patient ID:     Wayne Burkitt is a 79 y.o. male who presents to the office today with a history of recurrent epistaxis.  This seems to occur out of both nostrils.  He denies any recent active bleeding.  No trauma to the nose.  He has not been using any saline or ointment in the nose.  No infectious drainage fever or chills.    ROS:  A detailed 12 system review of systems is noted on the intake form has been reviewed with the patient with details noted in the HPI and scanned into the patient's medical record.    Objective     Past Medical History:   Diagnosis Date    Auditory hallucinations 09/28/2024    Conversion reaction 09/28/2024    Coronary artery disease involving native coronary artery of native heart without angina pectoris 09/28/2024    Epistaxis, recurrent 09/28/2024    Ischemic cardiomyopathy 09/28/2024    Moderate vascular dementia with anxiety 09/28/2024        Past Surgical History:   Procedure Laterality Date    CORONARY ANGIOPLASTY WITH STENT PLACEMENT      states he has 16 stents after the CABG    CORONARY ARTERY BYPASS GRAFT  2010    PACEMAKER PLACEMENT      TOTAL HIP ARTHROPLASTY          Allergies   Allergen  Reactions    Alprazolam Anaphylaxis    Amlodipine Hives    Atorvastatin Hives    Bepotastine Besilate Swelling    Cephalexin Hives    Ciprofloxacin Itching    Clopidogrel Swelling    Diphenhydramine Hcl Hives    Doxazosin Swelling    Guaifenesin Hives    Hydrochlorothiazide Unknown    Hydrocodone Hives    Lisinopril Swelling    Methylprednisolone Unknown    Metronidazole Swelling    Phenytoin Other    Sertraline Unknown    Telmisartan Hives    Topiramate Rash          Current Outpatient Medications:     brimonidine (AlphaGAN) 0.2 % ophthalmic solution, Administer 1 drop into both eyes 2 times a day. Alphagan P - glaucoma, Disp: , Rfl:     donepezil (Aricept) 5 mg tablet, Take 1 tablet (5 mg) by mouth once daily at bedtime., Disp: , Rfl:     ferrous sulfate 325 (65 Fe) MG EC tablet, Take 65 mg by mouth once daily with breakfast. Do not crush, chew, or split., Disp: , Rfl:     isosorbide mononitrate ER (Imdur) 30 mg 24 hr tablet, Take 2 tablets (60 mg) by mouth once daily. Do not crush or chew., Disp: 30 tablet, Rfl: 3    levETIRAcetam (Keppra) 1,000 mg tablet, Take 1 tablet (1,000 mg) by mouth 2 times a day., Disp: , Rfl:     metoprolol succinate XL (Toprol-XL) 25 mg 24 hr tablet, Take 1 tablet (25 mg) by mouth once daily. Do not crush or chew., Disp: , Rfl:     pantoprazole (ProtoNix) 40 mg EC tablet, Take 1 tablet (40 mg) by mouth once daily in the morning. Take before meals. Do not crush, chew, or split., Disp: , Rfl:     prasugrel (Effient) 10 mg tablet, Take 1 tablet (10 mg) by mouth once daily., Disp: , Rfl:     pravastatin (Pravachol) 80 mg tablet, Take 1 tablet (80 mg) by mouth once daily., Disp: , Rfl:     spironolactone (Aldactone) 25 mg tablet, Take 1 tablet (25 mg) by mouth once daily., Disp: , Rfl:     aspirin 81 mg EC tablet, Take 1 tablet (81 mg) by mouth once daily. (Patient not taking: Reported on 11/26/2024), Disp: , Rfl:     ranolazine (Ranexa) 500 mg 12 hr tablet, Take 1 tablet (500 mg) by mouth 2  "times a day. Do not crush, chew, or split., Disp: 60 tablet, Rfl: 11     Tobacco Use: Medium Risk (11/26/2024)    Patient History     Smoking Tobacco Use: Former     Smokeless Tobacco Use: Never     Passive Exposure: Not on file        Alcohol Use: Not on file        Social History     Substance and Sexual Activity   Drug Use Not Currently        Physical Exam:  Visit Vitals  /78   Temp 36.2 °C (97.2 °F) (Temporal)   Ht 1.803 m (5' 11\")   BMI 23.99 kg/m²   Smoking Status Former   BSA 1.98 m²      General: Patient is alert, oriented, cooperative in no apparent distress.  Head: Normocephalic, atraumatic.  Eyes: PERRL, EOMI, Conjunctiva is clear. No nystagmus.  Ears: Right Ear-- Pinna is normal.  External auditory canal is patent. Tympanic membrane is [intact, translucent and has good mobility with my pneumatic otoscope. No effusion].  Mastoid is nontender.  Left ear-- Pinna is normal.  External auditory canal is patent. Tympanic membrane is [intact, translucent and has good mobility with my pneumatic otoscope.  No effusion].  Mastoid is nontender.  Nose: Septum is relatively straight and the septum is extremely dry.  He does have old blood in the nose.  No prominent blood vessels to cauterize today.  Turbinates appear normal..  No septal perforation or lesions. No septal hematoma/ seroma.   No evidence of intranasal polyps.  No infectious drainage.  Throat:  Floor of mouth is clear, no masses.  Tongue appears normal, no lesions or masses. Gums, gingiva, buccal mucosa appear pink and moist, no lesions. Teeth are in fair repair.  No obvious dental infections.  Peritonsillar regions appear symmetric without swelling.  Hard and soft palate appear normal, no obvious cleft. Uvula is midline.  Oropharynx: No lesions. Retropharyngeal wall is flat.  No active postnasal drip.  Neck: Supple,  no lymphadenopathy.  No masses.  Salivary Glands: Symmetric bilaterally.  No palpable masses.  No evidence of acute infection or " salivary stones  Neurologic: Cranial Nerves 2-12 are grossly intact without focal deficits. Cerebellar function testing is normal.     Results:   []    Procedure:   []    Ashkan Garcia, DO

## 2024-12-05 ENCOUNTER — HOSPITAL ENCOUNTER (OUTPATIENT)
Dept: CARDIOLOGY | Facility: HOSPITAL | Age: 79
Discharge: HOME | End: 2024-12-05
Payer: MEDICARE

## 2024-12-05 ENCOUNTER — HOSPITAL ENCOUNTER (OUTPATIENT)
Dept: RADIOLOGY | Facility: HOSPITAL | Age: 79
Discharge: HOME | End: 2024-12-05
Payer: MEDICARE

## 2024-12-05 DIAGNOSIS — I25.10 CORONARY ARTERY DISEASE INVOLVING NATIVE CORONARY ARTERY OF NATIVE HEART WITHOUT ANGINA PECTORIS: ICD-10-CM

## 2024-12-05 DIAGNOSIS — I20.0 ANGINA PECTORIS, UNSTABLE (MULTI): ICD-10-CM

## 2024-12-05 PROCEDURE — 3430000001 HC RX 343 DIAGNOSTIC RADIOPHARMACEUTICALS: Performed by: INTERNAL MEDICINE

## 2024-12-05 PROCEDURE — 93017 CV STRESS TEST TRACING ONLY: CPT

## 2024-12-05 PROCEDURE — 93306 TTE W/DOPPLER COMPLETE: CPT

## 2024-12-05 PROCEDURE — 78452 HT MUSCLE IMAGE SPECT MULT: CPT

## 2024-12-05 PROCEDURE — 2500000004 HC RX 250 GENERAL PHARMACY W/ HCPCS (ALT 636 FOR OP/ED): Performed by: INTERNAL MEDICINE

## 2024-12-05 PROCEDURE — A9502 TC99M TETROFOSMIN: HCPCS | Performed by: INTERNAL MEDICINE

## 2024-12-05 RX ORDER — REGADENOSON 0.08 MG/ML
0.4 INJECTION, SOLUTION INTRAVENOUS
Status: COMPLETED | OUTPATIENT
Start: 2024-12-05 | End: 2024-12-05

## 2024-12-05 RX ORDER — AMINOPHYLLINE 25 MG/ML
125 INJECTION, SOLUTION INTRAVENOUS ONCE AS NEEDED
Status: DISCONTINUED | OUTPATIENT
Start: 2024-12-05 | End: 2024-12-06 | Stop reason: HOSPADM

## 2024-12-10 LAB
AORTIC VALVE MEAN GRADIENT: 6 MMHG
AORTIC VALVE PEAK VELOCITY: 1.54 M/S
AV PEAK GRADIENT: 9 MMHG
AVA (PEAK VEL): 2.18 CM2
AVA (VTI): 2.11 CM2
EJECTION FRACTION APICAL 4 CHAMBER: 52.3
EJECTION FRACTION: 58 %
LEFT ATRIUM VOLUME AREA LENGTH INDEX BSA: 24.7 ML/M2
LEFT VENTRICLE INTERNAL DIMENSION DIASTOLE: 4.2 CM (ref 3.5–6)
LEFT VENTRICULAR OUTFLOW TRACT DIAMETER: 1.8 CM
LV EJECTION FRACTION BIPLANE: 60 %
MITRAL VALVE E/A RATIO: 1.07
RIGHT VENTRICLE FREE WALL PEAK S': 7.18 CM/S
RIGHT VENTRICLE PEAK SYSTOLIC PRESSURE: 15 MMHG
TRICUSPID ANNULAR PLANE SYSTOLIC EXCURSION: 2 CM

## 2024-12-12 ENCOUNTER — APPOINTMENT (OUTPATIENT)
Dept: CARDIOLOGY | Facility: CLINIC | Age: 79
End: 2024-12-12
Payer: MEDICARE

## 2024-12-12 ENCOUNTER — LAB (OUTPATIENT)
Dept: LAB | Facility: LAB | Age: 79
End: 2024-12-12
Payer: MEDICARE

## 2024-12-12 VITALS
SYSTOLIC BLOOD PRESSURE: 140 MMHG | HEART RATE: 64 BPM | WEIGHT: 170 LBS | DIASTOLIC BLOOD PRESSURE: 72 MMHG | BODY MASS INDEX: 23.8 KG/M2 | HEIGHT: 71 IN

## 2024-12-12 DIAGNOSIS — I20.0 ANGINA PECTORIS, UNSTABLE (MULTI): ICD-10-CM

## 2024-12-12 DIAGNOSIS — I25.10 CORONARY ARTERY DISEASE INVOLVING NATIVE CORONARY ARTERY OF NATIVE HEART WITHOUT ANGINA PECTORIS: ICD-10-CM

## 2024-12-12 DIAGNOSIS — I50.30 DIASTOLIC CONGESTIVE HEART FAILURE, UNSPECIFIED HF CHRONICITY: Primary | ICD-10-CM

## 2024-12-12 DIAGNOSIS — I50.30 DIASTOLIC CONGESTIVE HEART FAILURE, UNSPECIFIED HF CHRONICITY: ICD-10-CM

## 2024-12-12 LAB
ANION GAP SERPL CALC-SCNC: 11 MMOL/L (ref 10–20)
BNP SERPL-MCNC: 157 PG/ML (ref 0–99)
BUN SERPL-MCNC: 25 MG/DL (ref 6–23)
CALCIUM SERPL-MCNC: 9.2 MG/DL (ref 8.6–10.3)
CHLORIDE SERPL-SCNC: 108 MMOL/L (ref 98–107)
CO2 SERPL-SCNC: 27 MMOL/L (ref 21–32)
CREAT SERPL-MCNC: 1.18 MG/DL (ref 0.5–1.3)
EGFRCR SERPLBLD CKD-EPI 2021: 64 ML/MIN/1.73M*2
ERYTHROCYTE [DISTWIDTH] IN BLOOD BY AUTOMATED COUNT: 14.4 % (ref 11.5–14.5)
GLUCOSE SERPL-MCNC: 112 MG/DL (ref 74–99)
HCT VFR BLD AUTO: 27.3 % (ref 41–52)
HGB BLD-MCNC: 8.4 G/DL (ref 13.5–17.5)
INR PPP: 1.1 (ref 0.9–1.1)
MCH RBC QN AUTO: 33.3 PG (ref 26–34)
MCHC RBC AUTO-ENTMCNC: 30.8 G/DL (ref 32–36)
MCV RBC AUTO: 108 FL (ref 80–100)
NRBC BLD-RTO: 0 /100 WBCS (ref 0–0)
PLATELET # BLD AUTO: 303 X10*3/UL (ref 150–450)
POTASSIUM SERPL-SCNC: 4.6 MMOL/L (ref 3.5–5.3)
PROTHROMBIN TIME: 12.9 SECONDS (ref 9.8–12.8)
RBC # BLD AUTO: 2.52 X10*6/UL (ref 4.5–5.9)
SODIUM SERPL-SCNC: 141 MMOL/L (ref 136–145)
WBC # BLD AUTO: 4.4 X10*3/UL (ref 4.4–11.3)

## 2024-12-12 PROCEDURE — 3078F DIAST BP <80 MM HG: CPT | Performed by: INTERNAL MEDICINE

## 2024-12-12 PROCEDURE — 1159F MED LIST DOCD IN RCRD: CPT | Performed by: INTERNAL MEDICINE

## 2024-12-12 PROCEDURE — 85610 PROTHROMBIN TIME: CPT

## 2024-12-12 PROCEDURE — 3077F SYST BP >= 140 MM HG: CPT | Performed by: INTERNAL MEDICINE

## 2024-12-12 PROCEDURE — 36415 COLL VENOUS BLD VENIPUNCTURE: CPT

## 2024-12-12 PROCEDURE — 1036F TOBACCO NON-USER: CPT | Performed by: INTERNAL MEDICINE

## 2024-12-12 PROCEDURE — 83880 ASSAY OF NATRIURETIC PEPTIDE: CPT

## 2024-12-12 PROCEDURE — 99214 OFFICE O/P EST MOD 30 MIN: CPT | Performed by: INTERNAL MEDICINE

## 2024-12-12 PROCEDURE — 85027 COMPLETE CBC AUTOMATED: CPT

## 2024-12-12 PROCEDURE — 80048 BASIC METABOLIC PNL TOTAL CA: CPT

## 2024-12-12 NOTE — PROGRESS NOTES
"[facility]/[place]     Cardiology Office Follow-up: Follow up from cardiac testing.    Patient previously seen for CAD, CABG, ICM, angina.    At the previous encounter, the patient was seen and evaluated for chest discomfort.    After the encounter the patient completed the following testing: stress test negative for ischemia, with fixed inferior defect possible infarct    There were no interval changes in medical history before this appointment.    Today the patient reports: chest pain after eating if works out.    Additional recent non-cardiac testing includes: none    ROS: Remainder of 12 review of systems is negative aside from chief complaint.    PHYSICAL EXAM  Vitals:    12/12/24 0900   BP: 140/72   BP Location: Left arm   Patient Position: Sitting   Pulse: 64   Weight: 77.1 kg (170 lb)   Height: 1.803 m (5' 11\")     General: No acute distress, appears comfortable  Cardiac: Regular rate and rhythm with no murmurs rubs or gallops, normal S1-S2  Pulmonary: Clear to auscultation bilaterally with no rales or rhonchi, normal respiratory effort  Gastrointestinal: Soft abdomen with no tenderness or guarding, no rebound  Musculoskeletal: No lower extremity edema, normal  Neurological: Alert and oriented x 4, appropriate, moving all extremities well, normal affect  Psychiatric: Normal affect, mood appropriate to occasion  Skin: No rashes, hives or jaundice  HEENT: Normocephalic, atraumatic.  Pupils equal round and reactive.    Assessment/Plan   Diagnoses and all orders for this visit:  Diastolic congestive heart failure, unspecified HF chronicity  -     Case Request Cath Lab: Left Heart Cath, With LV  -     Basic Metabolic Panel; Future  -     CBC; Future  -     Protime-INR; Future  -     B-Type Natriuretic Peptide; Future  Coronary artery disease involving native coronary artery of native heart without angina pectoris  -     Follow Up In Cardiology  -     Case Request Cath Lab: Left Heart Cath, With " LV  Angina pectoris, unstable (Multi)  -     Follow Up In Cardiology  -     Case Request Cath Lab: Left Heart Cath, With LV      Assessment and plan (narrative):    Wayne Burkitt is a 79 y.o. male with CAD, CABG, ICM and persistent symptoms despite maximal medical therapy.  Stress equivocal.  Patient desired definitive diagnosis.  Will schedule for cath poss PCI.    Followup: after cath.    Gabino Giron MD    Director of Interventional Cardiology  [facility] at [facility]

## 2024-12-20 ENCOUNTER — APPOINTMENT (OUTPATIENT)
Dept: RADIOLOGY | Facility: HOSPITAL | Age: 79
DRG: 322 | End: 2024-12-20
Payer: MEDICARE

## 2024-12-20 ENCOUNTER — HOSPITAL ENCOUNTER (INPATIENT)
Facility: HOSPITAL | Age: 79
Discharge: HOME | DRG: 322 | End: 2024-12-20
Attending: INTERNAL MEDICINE | Admitting: INTERNAL MEDICINE
Payer: MEDICARE

## 2024-12-20 DIAGNOSIS — R44.1 EPISODES OF FORMED VISUAL HALLUCINATIONS: ICD-10-CM

## 2024-12-20 DIAGNOSIS — I25.10 CORONARY ARTERY DISEASE INVOLVING NATIVE CORONARY ARTERY OF NATIVE HEART WITHOUT ANGINA PECTORIS: Primary | ICD-10-CM

## 2024-12-20 DIAGNOSIS — I20.9 ANGINA PECTORIS, UNSPECIFIED: ICD-10-CM

## 2024-12-20 DIAGNOSIS — I25.10 3-VESSEL CORONARY ARTERY DISEASE: ICD-10-CM

## 2024-12-20 DIAGNOSIS — I72.4 ANEURYSM OF ARTERY OF LOWER EXTREMITY (CMS-HCC): ICD-10-CM

## 2024-12-20 DIAGNOSIS — R29.6 FREQUENT FALLS: ICD-10-CM

## 2024-12-20 DIAGNOSIS — I50.30 DIASTOLIC CONGESTIVE HEART FAILURE, UNSPECIFIED HF CHRONICITY: ICD-10-CM

## 2024-12-20 DIAGNOSIS — H59.113: ICD-10-CM

## 2024-12-20 DIAGNOSIS — I20.0 ANGINA PECTORIS, UNSTABLE (MULTI): ICD-10-CM

## 2024-12-20 DIAGNOSIS — I25.728 ATHEROSCLEROSIS OF AUTOLOGOUS ARTERY CORONARY ARTERY BYPASS GRAFT(S) WITH OTHER FORMS OF ANGINA PECTORIS: ICD-10-CM

## 2024-12-20 LAB
ABO GROUP (TYPE) IN BLOOD: NORMAL
ACT BLD: 173 SEC (ref 83–199)
ACT BLD: 207 SEC (ref 83–199)
ACT BLD: 224 SEC (ref 83–199)
ACT BLD: 236 SEC (ref 83–199)
ACT BLD: 284 SEC (ref 83–199)
ACT BLD: NORMAL S
ALBUMIN SERPL BCP-MCNC: 3.2 G/DL (ref 3.4–5)
ALP SERPL-CCNC: 50 U/L (ref 33–136)
ALT SERPL W P-5'-P-CCNC: 17 U/L (ref 10–52)
ANION GAP SERPL CALC-SCNC: 13 MMOL/L (ref 10–20)
ANTIBODY SCREEN: NORMAL
APTT PPP: 35 SECONDS (ref 27–38)
AST SERPL W P-5'-P-CCNC: 35 U/L (ref 9–39)
BASOPHILS # BLD AUTO: 0.04 X10*3/UL (ref 0–0.1)
BASOPHILS NFR BLD AUTO: 0.5 %
BILIRUB SERPL-MCNC: 0.4 MG/DL (ref 0–1.2)
BUN SERPL-MCNC: 23 MG/DL (ref 6–23)
CALCIUM SERPL-MCNC: 7.7 MG/DL (ref 8.6–10.3)
CHLORIDE SERPL-SCNC: 110 MMOL/L (ref 98–107)
CO2 SERPL-SCNC: 20 MMOL/L (ref 21–32)
CREAT SERPL-MCNC: 1 MG/DL (ref 0.5–1.3)
D DIMER PPP FEU-MCNC: 1246 NG/ML FEU
EGFRCR SERPLBLD CKD-EPI 2021: 78 ML/MIN/1.73M*2
EOSINOPHIL # BLD AUTO: 0.02 X10*3/UL (ref 0–0.4)
EOSINOPHIL NFR BLD AUTO: 0.2 %
ERYTHROCYTE [DISTWIDTH] IN BLOOD BY AUTOMATED COUNT: 13.7 % (ref 11.5–14.5)
FIBRINOGEN PPP-MCNC: 270 MG/DL (ref 200–400)
GLUCOSE SERPL-MCNC: 168 MG/DL (ref 74–99)
HCT VFR BLD AUTO: 22.9 % (ref 41–52)
HGB BLD-MCNC: 6.8 G/DL (ref 13.5–17.5)
HOLD SPECIMEN: NORMAL
HOLD SPECIMEN: NORMAL
IMM GRANULOCYTES # BLD AUTO: 0.03 X10*3/UL (ref 0–0.5)
IMM GRANULOCYTES NFR BLD AUTO: 0.3 % (ref 0–0.9)
INR PPP: 1.2 (ref 0.9–1.1)
LACTATE SERPL-SCNC: 1.9 MMOL/L (ref 0.4–2)
LYMPHOCYTES # BLD AUTO: 0.5 X10*3/UL (ref 0.8–3)
LYMPHOCYTES NFR BLD AUTO: 5.8 %
MAGNESIUM SERPL-MCNC: 1.76 MG/DL (ref 1.6–2.4)
MCH RBC QN AUTO: 33 PG (ref 26–34)
MCHC RBC AUTO-ENTMCNC: 29.7 G/DL (ref 32–36)
MCV RBC AUTO: 111 FL (ref 80–100)
MONOCYTES # BLD AUTO: 0.4 X10*3/UL (ref 0.05–0.8)
MONOCYTES NFR BLD AUTO: 4.7 %
NEUTROPHILS # BLD AUTO: 7.61 X10*3/UL (ref 1.6–5.5)
NEUTROPHILS NFR BLD AUTO: 88.5 %
NRBC BLD-RTO: 0 /100 WBCS (ref 0–0)
PLATELET # BLD AUTO: 300 X10*3/UL (ref 150–450)
POTASSIUM SERPL-SCNC: 4.8 MMOL/L (ref 3.5–5.3)
PROT SERPL-MCNC: 5.5 G/DL (ref 6.4–8.2)
PROTHROMBIN TIME: 13.7 SECONDS (ref 9.8–12.8)
RBC # BLD AUTO: 2.06 X10*6/UL (ref 4.5–5.9)
RH FACTOR (ANTIGEN D): NORMAL
SODIUM SERPL-SCNC: 138 MMOL/L (ref 136–145)
WBC # BLD AUTO: 8.6 X10*3/UL (ref 4.4–11.3)

## 2024-12-20 PROCEDURE — 93458 L HRT ARTERY/VENTRICLE ANGIO: CPT | Performed by: INTERNAL MEDICINE

## 2024-12-20 PROCEDURE — C1769 GUIDE WIRE: HCPCS | Performed by: INTERNAL MEDICINE

## 2024-12-20 PROCEDURE — 92928 PRQ TCAT PLMT NTRAC ST 1 LES: CPT | Performed by: INTERNAL MEDICINE

## 2024-12-20 PROCEDURE — 99153 MOD SED SAME PHYS/QHP EA: CPT | Performed by: INTERNAL MEDICINE

## 2024-12-20 PROCEDURE — B2111ZZ FLUOROSCOPY OF MULTIPLE CORONARY ARTERIES USING LOW OSMOLAR CONTRAST: ICD-10-PCS | Performed by: INTERNAL MEDICINE

## 2024-12-20 PROCEDURE — 2500000004 HC RX 250 GENERAL PHARMACY W/ HCPCS (ALT 636 FOR OP/ED)

## 2024-12-20 PROCEDURE — 85347 COAGULATION TIME ACTIVATED: CPT

## 2024-12-20 PROCEDURE — 80053 COMPREHEN METABOLIC PANEL: CPT

## 2024-12-20 PROCEDURE — 85027 COMPLETE CBC AUTOMATED: CPT

## 2024-12-20 PROCEDURE — C1725 CATH, TRANSLUMIN NON-LASER: HCPCS | Performed by: INTERNAL MEDICINE

## 2024-12-20 PROCEDURE — 36415 COLL VENOUS BLD VENIPUNCTURE: CPT

## 2024-12-20 PROCEDURE — C1874 STENT, COATED/COV W/DEL SYS: HCPCS | Performed by: INTERNAL MEDICINE

## 2024-12-20 PROCEDURE — 85347 COAGULATION TIME ACTIVATED: CPT | Performed by: INTERNAL MEDICINE

## 2024-12-20 PROCEDURE — 027034Z DILATION OF CORONARY ARTERY, ONE ARTERY WITH DRUG-ELUTING INTRALUMINAL DEVICE, PERCUTANEOUS APPROACH: ICD-10-PCS | Performed by: INTERNAL MEDICINE

## 2024-12-20 PROCEDURE — 7100000009 HC PHASE TWO TIME - INITIAL BASE CHARGE: Performed by: INTERNAL MEDICINE

## 2024-12-20 PROCEDURE — 2720000007 HC OR 272 NO HCPCS: Performed by: INTERNAL MEDICINE

## 2024-12-20 PROCEDURE — B2181ZZ FLUOROSCOPY OF LEFT INTERNAL MAMMARY BYPASS GRAFT USING LOW OSMOLAR CONTRAST: ICD-10-PCS | Performed by: INTERNAL MEDICINE

## 2024-12-20 PROCEDURE — 2550000001 HC RX 255 CONTRASTS: Performed by: INTERNAL MEDICINE

## 2024-12-20 PROCEDURE — 85610 PROTHROMBIN TIME: CPT

## 2024-12-20 PROCEDURE — 83605 ASSAY OF LACTIC ACID: CPT

## 2024-12-20 PROCEDURE — B2151ZZ FLUOROSCOPY OF LEFT HEART USING LOW OSMOLAR CONTRAST: ICD-10-PCS | Performed by: INTERNAL MEDICINE

## 2024-12-20 PROCEDURE — 85384 FIBRINOGEN ACTIVITY: CPT

## 2024-12-20 PROCEDURE — 85379 FIBRIN DEGRADATION QUANT: CPT

## 2024-12-20 PROCEDURE — 74177 CT ABD & PELVIS W/CONTRAST: CPT

## 2024-12-20 PROCEDURE — 86923 COMPATIBILITY TEST ELECTRIC: CPT

## 2024-12-20 PROCEDURE — 2020000001 HC ICU ROOM DAILY

## 2024-12-20 PROCEDURE — 2500000004 HC RX 250 GENERAL PHARMACY W/ HCPCS (ALT 636 FOR OP/ED): Performed by: INTERNAL MEDICINE

## 2024-12-20 PROCEDURE — 86901 BLOOD TYPING SEROLOGIC RH(D): CPT

## 2024-12-20 PROCEDURE — C1887 CATHETER, GUIDING: HCPCS | Performed by: INTERNAL MEDICINE

## 2024-12-20 PROCEDURE — 99152 MOD SED SAME PHYS/QHP 5/>YRS: CPT | Performed by: INTERNAL MEDICINE

## 2024-12-20 PROCEDURE — C9600 PERC DRUG-EL COR STENT SING: HCPCS | Mod: RC | Performed by: INTERNAL MEDICINE

## 2024-12-20 PROCEDURE — 93459 L HRT ART/GRFT ANGIO: CPT | Performed by: INTERNAL MEDICINE

## 2024-12-20 PROCEDURE — C9602 PERC D-E COR STENT ATHER S: HCPCS | Mod: RC | Performed by: INTERNAL MEDICINE

## 2024-12-20 PROCEDURE — 83735 ASSAY OF MAGNESIUM: CPT

## 2024-12-20 PROCEDURE — 74177 CT ABD & PELVIS W/CONTRAST: CPT | Performed by: RADIOLOGY

## 2024-12-20 PROCEDURE — 2780000003 HC OR 278 NO HCPCS: Performed by: INTERNAL MEDICINE

## 2024-12-20 PROCEDURE — 7100000010 HC PHASE TWO TIME - EACH INCREMENTAL 1 MINUTE: Performed by: INTERNAL MEDICINE

## 2024-12-20 PROCEDURE — B2121ZZ FLUOROSCOPY OF SINGLE CORONARY ARTERY BYPASS GRAFT USING LOW OSMOLAR CONTRAST: ICD-10-PCS | Performed by: INTERNAL MEDICINE

## 2024-12-20 PROCEDURE — C1894 INTRO/SHEATH, NON-LASER: HCPCS | Performed by: INTERNAL MEDICINE

## 2024-12-20 PROCEDURE — 4A023N7 MEASUREMENT OF CARDIAC SAMPLING AND PRESSURE, LEFT HEART, PERCUTANEOUS APPROACH: ICD-10-PCS | Performed by: INTERNAL MEDICINE

## 2024-12-20 PROCEDURE — 85025 COMPLETE CBC W/AUTO DIFF WBC: CPT

## 2024-12-20 DEVICE — STENT ONYXNG25026UX ONYX 2.50X26RX
Type: IMPLANTABLE DEVICE | Site: CORONARY | Status: FUNCTIONAL
Brand: ONYX FRONTIER™

## 2024-12-20 RX ORDER — TRAZODONE HYDROCHLORIDE 50 MG/1
25 TABLET ORAL NIGHTLY PRN
Status: DISCONTINUED | OUTPATIENT
Start: 2024-12-20 | End: 2024-12-25 | Stop reason: HOSPADM

## 2024-12-20 RX ORDER — TRAZODONE HYDROCHLORIDE 50 MG/1
25 TABLET ORAL NIGHTLY PRN
COMMUNITY

## 2024-12-20 RX ORDER — ACETAMINOPHEN 325 MG/1
650 TABLET ORAL EVERY 4 HOURS PRN
COMMUNITY

## 2024-12-20 RX ORDER — QUETIAPINE FUMARATE 50 MG/1
50 TABLET, FILM COATED ORAL NIGHTLY
COMMUNITY

## 2024-12-20 RX ORDER — LIDOCAINE HYDROCHLORIDE 20 MG/ML
INJECTION, SOLUTION INFILTRATION; PERINEURAL AS NEEDED
Status: DISCONTINUED | OUTPATIENT
Start: 2024-12-20 | End: 2024-12-20 | Stop reason: HOSPADM

## 2024-12-20 RX ORDER — QUETIAPINE FUMARATE 50 MG/1
50 TABLET, FILM COATED ORAL NIGHTLY
Status: DISCONTINUED | OUTPATIENT
Start: 2024-12-21 | End: 2024-12-25 | Stop reason: HOSPADM

## 2024-12-20 RX ORDER — FENTANYL CITRATE 50 UG/ML
50 INJECTION, SOLUTION INTRAMUSCULAR; INTRAVENOUS ONCE
Status: DISCONTINUED | OUTPATIENT
Start: 2024-12-20 | End: 2024-12-20

## 2024-12-20 RX ORDER — HEPARIN SODIUM 1000 [USP'U]/ML
INJECTION, SOLUTION INTRAVENOUS; SUBCUTANEOUS AS NEEDED
Status: DISCONTINUED | OUTPATIENT
Start: 2024-12-20 | End: 2024-12-20 | Stop reason: HOSPADM

## 2024-12-20 RX ORDER — FENTANYL CITRATE 50 UG/ML
INJECTION, SOLUTION INTRAMUSCULAR; INTRAVENOUS AS NEEDED
Status: DISCONTINUED | OUTPATIENT
Start: 2024-12-20 | End: 2024-12-20 | Stop reason: HOSPADM

## 2024-12-20 RX ADMIN — SODIUM CHLORIDE, POTASSIUM CHLORIDE, SODIUM LACTATE AND CALCIUM CHLORIDE 1000 ML: 600; 310; 30; 20 INJECTION, SOLUTION INTRAVENOUS at 17:46

## 2024-12-20 RX ADMIN — IOHEXOL 75 ML: 350 INJECTION, SOLUTION INTRAVENOUS at 20:43

## 2024-12-20 SDOH — SOCIAL STABILITY: SOCIAL INSECURITY: HAVE YOU HAD THOUGHTS OF HARMING ANYONE ELSE?: NO

## 2024-12-20 SDOH — SOCIAL STABILITY: SOCIAL INSECURITY: WERE YOU ABLE TO COMPLETE ALL THE BEHAVIORAL HEALTH SCREENINGS?: YES

## 2024-12-20 SDOH — SOCIAL STABILITY: SOCIAL INSECURITY: ARE YOU OR HAVE YOU BEEN THREATENED OR ABUSED PHYSICALLY, EMOTIONALLY, OR SEXUALLY BY ANYONE?: NO

## 2024-12-20 SDOH — SOCIAL STABILITY: SOCIAL INSECURITY: ARE THERE ANY APPARENT SIGNS OF INJURIES/BEHAVIORS THAT COULD BE RELATED TO ABUSE/NEGLECT?: NO

## 2024-12-20 SDOH — SOCIAL STABILITY: SOCIAL INSECURITY: HAVE YOU HAD ANY THOUGHTS OF HARMING ANYONE ELSE?: NO

## 2024-12-20 SDOH — SOCIAL STABILITY: SOCIAL INSECURITY: DO YOU FEEL ANYONE HAS EXPLOITED OR TAKEN ADVANTAGE OF YOU FINANCIALLY OR OF YOUR PERSONAL PROPERTY?: NO

## 2024-12-20 SDOH — SOCIAL STABILITY: SOCIAL INSECURITY: DO YOU FEEL UNSAFE GOING BACK TO THE PLACE WHERE YOU ARE LIVING?: NO

## 2024-12-20 SDOH — SOCIAL STABILITY: SOCIAL INSECURITY: ABUSE: ADULT

## 2024-12-20 SDOH — SOCIAL STABILITY: SOCIAL INSECURITY: DOES ANYONE TRY TO KEEP YOU FROM HAVING/CONTACTING OTHER FRIENDS OR DOING THINGS OUTSIDE YOUR HOME?: NO

## 2024-12-20 SDOH — SOCIAL STABILITY: SOCIAL INSECURITY: HAS ANYONE EVER THREATENED TO HURT YOUR FAMILY OR YOUR PETS?: NO

## 2024-12-20 ASSESSMENT — ACTIVITIES OF DAILY LIVING (ADL)
PATIENT'S MEMORY ADEQUATE TO SAFELY COMPLETE DAILY ACTIVITIES?: YES
FEEDING YOURSELF: INDEPENDENT
HEARING - RIGHT EAR: FUNCTIONAL
WALKS IN HOME: INDEPENDENT
HEARING - LEFT EAR: FUNCTIONAL
DRESSING YOURSELF: INDEPENDENT
JUDGMENT_ADEQUATE_SAFELY_COMPLETE_DAILY_ACTIVITIES: YES
ASSISTIVE_DEVICE: EYEGLASSES
TOILETING: INDEPENDENT
GROOMING: INDEPENDENT
BATHING: INDEPENDENT
ADEQUATE_TO_COMPLETE_ADL: YES

## 2024-12-20 ASSESSMENT — COGNITIVE AND FUNCTIONAL STATUS - GENERAL
STANDING UP FROM CHAIR USING ARMS: A LITTLE
PATIENT BASELINE BEDBOUND: NO
WALKING IN HOSPITAL ROOM: A LITTLE
CLIMB 3 TO 5 STEPS WITH RAILING: A LITTLE
DAILY ACTIVITIY SCORE: 24
MOBILITY SCORE: 21

## 2024-12-20 ASSESSMENT — LIFESTYLE VARIABLES
HOW MANY STANDARD DRINKS CONTAINING ALCOHOL DO YOU HAVE ON A TYPICAL DAY: PATIENT DOES NOT DRINK
AUDIT-C TOTAL SCORE: 0
SKIP TO QUESTIONS 9-10: 1
AUDIT-C TOTAL SCORE: 0
HOW OFTEN DO YOU HAVE A DRINK CONTAINING ALCOHOL: NEVER
HOW OFTEN DO YOU HAVE 6 OR MORE DRINKS ON ONE OCCASION: NEVER

## 2024-12-20 ASSESSMENT — COLUMBIA-SUICIDE SEVERITY RATING SCALE - C-SSRS
1. IN THE PAST MONTH, HAVE YOU WISHED YOU WERE DEAD OR WISHED YOU COULD GO TO SLEEP AND NOT WAKE UP?: NO
6. HAVE YOU EVER DONE ANYTHING, STARTED TO DO ANYTHING, OR PREPARED TO DO ANYTHING TO END YOUR LIFE?: NO
2. HAVE YOU ACTUALLY HAD ANY THOUGHTS OF KILLING YOURSELF?: NO

## 2024-12-20 ASSESSMENT — PATIENT HEALTH QUESTIONNAIRE - PHQ9
2. FEELING DOWN, DEPRESSED OR HOPELESS: NOT AT ALL
1. LITTLE INTEREST OR PLEASURE IN DOING THINGS: NOT AT ALL
SUM OF ALL RESPONSES TO PHQ9 QUESTIONS 1 & 2: 0

## 2024-12-20 ASSESSMENT — PAIN - FUNCTIONAL ASSESSMENT: PAIN_FUNCTIONAL_ASSESSMENT: 0-10

## 2024-12-20 ASSESSMENT — PAIN SCALES - GENERAL: PAINLEVEL_OUTOF10: 0 - NO PAIN

## 2024-12-20 NOTE — NURSING NOTE
1510- 6 fr sheath pulled after .    1530  scrotum bruising and  swelling noted. Continued manual pressure held.   1535- Secure chat sent to Dr Giron.  Manager   1540- Marline Ortiz then called Dr Giron on phone, made aware of patients changes.    1545- Dr Giron bedside holding manual pressure, Vascular U/S bedside. 10 Norwegian douglass cath inserted, patient history of urethral strictures.  Draining clear yellow urine.    1600- Became nauseated and hypotensive, L NS up wide open and 2 nd IV est RFA #20.    1615- Dr Bailey continues to hold pressure  1625- Report called to [facility].

## 2024-12-20 NOTE — POST-PROCEDURE NOTE
Physician Transition of Care Summary  Invasive Cardiovascular Lab    Procedure Date: 12/20/2024  Attending:    * Gabino Giron - Primary  Resident/Fellow/Other Assistant: Surgeons and Role:  * No surgeons found with a matching role *    Indications:   Pre-op Diagnosis      * Coronary artery disease involving native coronary artery of native heart without angina pectoris [I25.10]     * Angina pectoris, unstable (Multi) [I20.0]     * Diastolic congestive heart failure, unspecified HF chronicity [I50.30]    Post-procedure diagnosis:   Post-op Diagnosis     * Coronary artery disease involving native coronary artery of native heart without angina pectoris [I25.10]     * Angina pectoris, unstable (Multi) [I20.0]     * Diastolic congestive heart failure, unspecified HF chronicity [I50.30]    Procedure(s):     * Left Heart Cath, With LV    * PCI YOGESH Stent- Coronary      Procedure Findings:   LMT mild, % mid, fills via patent LIMA to LAD.  Lcx 100% mid, fills via SVG to OM with 70% stenosis.  RCA 80% mid, PDA is  with collaterals R-L and L-L.  No other grafts patent.      Successful PCI RCA 80% mid with YOGESH X 1.  Eventual staged PCI SVG to OM.    Description of the Procedure:   R femoral 5 Fr upsized to 6 Fr, heparin.  JL4 for LMT, AR mod for RCA, SVG, IM for LIMA.  3DRC guide for intervention. Stent resolute kaya 2.5/26 post 2.5 NC.     Complications:   None    Stents/Implants:   Implants       Stent    Stent, Kaya [place] Yogesh, 2.50 X 26rx - [id number] - Implanted        Inventory item: STENT, KAYA FRONTIER YOGESH, 2.50 X 26RX Model/Cat number: [id number]    : [employer] Lot number: [id number]    Device identifier: [id number]        GUDID Information       Request status Successful        Brand name: Kaya [place]™ Version/Model: [id number]    Company name: [employer] MRI safety info as of 12/20/24: MR Conditional    Contains dry or latex rubber: No      GMDN P.T. name:  Drug-eluting coronary artery stent, non-bioabsorbable-polymer-coated                As of 12/20/2024       Status: Implanted                              Anticoagulation/Antiplatelet Plan:   ASA/effient    Estimated Blood Loss:   * No values recorded between 12/20/2024 10:09 AM and 12/20/2024 11:34 AM *    Anesthesia: Moderate Sedation Anesthesia Staff: No anesthesia staff entered.    Any Specimen(s) Removed:   No specimens collected during this procedure.    Disposition:   Probable discharge after bedrest today      Electronically signed by: Gabino Giron MD, 12/20/2024 11:34 AM

## 2024-12-20 NOTE — Clinical Note
Angioplasty of the proximal right coronary artery lesion. Inflation 1: Pressure = 16 bob; Duration = 10 sec. Inflation 2: Pressure = 18 bob; Duration = 10 sec. Inflation 3: Pressure = 20 bob; Duration = 10 sec. Inflation 4: Pressure = 20 bob; Duration = 8 sec.

## 2024-12-20 NOTE — Clinical Note
The ECG shows a paced rhythm, a sinus rhythm and the rhythm is unchanged. The patient has permanent pacemaker. ECG rate  = 62 bpm.

## 2024-12-20 NOTE — H&P
Critical Care Medicine History and Physical        Subjective   Patient is a 79 y.o. male admitted on 12/20/2024  8:05 AM to the ICU for postprocedure active bleeding into the scrotum.    HPI:  The patient is a 79-year-old male with a prior cardiac history of CAD, CABG, hypertension, ICM, exertional angina, vascular dementia, s/p 16 stents, s/p 3 R inguinal hernia repairs, and seizures. He came to the hospital for a left heart cath with LV.  Patient has been complaining of central and L sided chest pain for the past 6 months. He reports SOB on exertion and feels better after rest. He denies any recent URI or UTI. He complains of some abdominal pain that he attributes to gas. The patient denies constipation or diarrhea. He has  a history of 16 stents and easy bleeding. He was not diagnosed with hemophilia. His family history is significant for maternal history of CAD.    Past Medical History:   Diagnosis Date    Auditory hallucinations 09/28/2024    Conversion reaction 09/28/2024    Coronary artery disease involving native coronary artery of native heart without angina pectoris 09/28/2024    Epistaxis, recurrent 09/28/2024    Ischemic cardiomyopathy 09/28/2024    Moderate vascular dementia with anxiety 09/28/2024     Past Surgical History:   Procedure Laterality Date    CORONARY ANGIOPLASTY WITH STENT PLACEMENT      states he has 16 stents after the CABG    CORONARY ARTERY BYPASS GRAFT  2010    PACEMAKER PLACEMENT      TOTAL HIP ARTHROPLASTY       Medications Prior to Admission   Medication Sig Dispense Refill Last Dose/Taking    aspirin 81 mg EC tablet Take 1 tablet (81 mg) by mouth once daily.   12/20/2024 Morning    donepezil (Aricept) 5 mg tablet Take 1 tablet (5 mg) by mouth once daily at bedtime.   12/19/2024    ferrous sulfate 325 (65 Fe) MG EC tablet Take 65 mg by mouth once daily with breakfast. Do not crush, chew, or split.   12/19/2024    levETIRAcetam (Keppra) 1,000 mg tablet Take 1 tablet (1,000 mg) by  mouth 2 times a day.   2024 Morning    metoprolol succinate XL (Toprol-XL) 25 mg 24 hr tablet Take 1 tablet (25 mg) by mouth once daily. Do not crush or chew.   2024 Morning    prasugrel (Effient) 10 mg tablet Take 1 tablet (10 mg) by mouth once daily.   2024 Morning    brimonidine (AlphaGAN) 0.2 % ophthalmic solution Administer 1 drop into both eyes 2 times a day. Alphagan P - glaucoma       isosorbide mononitrate ER (Imdur) 30 mg 24 hr tablet Take 2 tablets (60 mg) by mouth once daily. Do not crush or chew. 30 tablet 3     pantoprazole (ProtoNix) 40 mg EC tablet Take 1 tablet (40 mg) by mouth once daily in the morning. Take before meals. Do not crush, chew, or split.       pravastatin (Pravachol) 80 mg tablet Take 1 tablet (80 mg) by mouth once daily.       ranolazine (Ranexa) 500 mg 12 hr tablet Take 1 tablet (500 mg) by mouth 2 times a day. Do not crush, chew, or split. 60 tablet 11     spironolactone (Aldactone) 25 mg tablet Take 1 tablet (25 mg) by mouth once daily.        Alprazolam, Amlodipine, Atorvastatin, Bepotastine besilate, Cephalexin, Ciprofloxacin, Clopidogrel, Diphenhydramine hcl, Doxazosin, Guaifenesin, Hydrochlorothiazide, Hydrocodone, Lisinopril, Methylprednisolone, Metronidazole, Phenytoin, Sertraline, Telmisartan, and Topiramate  Social History     Tobacco Use    Smoking status: Former     Current packs/day: 0.00     Types: Cigarettes     Quit date:      Years since quittin.9    Smokeless tobacco: Never   Substance Use Topics    Alcohol use: Not Currently     Comment: Stopped into .    Drug use: Never     Family History   Family history unknown: Yes       Scheduled Medications:   lactated Ringer's, 1,000 mL, intravenous, Once         Continuous Medications:    81 mg, Daily aspirin     brimonidine (AlphaGAN) 0.2 % ophthalmic solution 1 drop, 2 times daily    donepezil (ARICEPT) 5 mg, Nightly    ferrous sulfate 65 mg, Daily with breakfast    isosorbide mononitrate  ER (IMDUR) 30 mg, Daily    levETIRAcetam (KEPPRA) 1,000 mg, 2 times daily    metoprolol succinate XL (TOPROL-XL) 25 mg, Daily    pantoprazole (PROTONIX) 40 mg, Daily before breakfast    prasugrel (EFFIENT) 10 mg, Daily    pravastatin (PRAVACHOL) 80 mg, Daily    spironolactone (ALDACTONE)                Objective   Vitals:  24hr Min/Max:  Temp  Min: 36 °C (96.8 °F)  Max: 36 °C (96.8 °F)  Pulse  Min: 60  Max: 99  BP  Min: 77/40  Max: 174/77  Resp  Min: 14  Max: 16  SpO2  Min: 96 %  Max: 100 %    Physical exam:    Physical Exam  Constitutional:       Appearance: Normal appearance. He is normal weight.   HENT:      Head: Normocephalic and atraumatic.      Right Ear: External ear normal.      Left Ear: External ear normal.      Nose: Nose normal. No congestion.      Mouth/Throat:      Mouth: Mucous membranes are moist.   Eyes:      Comments: Pale conjuctiva   Cardiovascular:      Rate and Rhythm: Normal rate and regular rhythm.      Pulses:           Radial pulses are 1+ on the right side and 1+ on the left side.        Posterior tibial pulses are 1+ on the right side and 1+ on the left side.      Heart sounds: Murmur heard.   Abdominal:      General: Abdomen is flat.      Palpations: Abdomen is soft.      Tenderness: There is abdominal tenderness. There is guarding.   Genitourinary:     Penis: Normal.       Testes:         Right: Swelling present.      Comments: Hematoma measuring 18 cm in diameter in R testicle.   Femostop in place.   Scant blood at the tip of the meatus  Musculoskeletal:      Cervical back: Neck supple. No rigidity or tenderness.      Right lower leg: No edema.      Left lower leg: No edema.   Skin:     Capillary Refill: Capillary refill takes less than 2 seconds.      Coloration: Skin is pale.   Neurological:      General: No focal deficit present.      Mental Status: He is alert.         Assessment/Plan   Assessment/Plan   Overall assessment:  # acute anemia s/p cath  # hemorrhage into scrotum  #  "chronic anemia     Neuro: no acute issues   - History: seizures  - Home meds: Keppra  - Pain: abdominal pain  - Sedation: none  - Interventions: none  - CAM ICU  -q 1 hour neurochecks for the next 15 hours to look for limb ischemia or stroke    Cardiovascular:   - History: CAD, CABG, hypertension, ICM, angina   - Goals:  [MAP> 65, HR ]  - Home meds: Imdur, Ranexa, aspirin, metoprolol, Effient, pravastatin  - Last echo: 12/05/24  1. Left ventricular ejection fraction is normal, by visual estimate at 55-60%.   2. There is no evidence of left ventricular hypertrophy.   3. There is normal right ventricular global systolic function.   4. No signficant valve disease.   5. Normal estimated PA pressure.   6. Normal diastolic filling pattern.  - Interventions: heart catheterization      Pulmonary:       - History: none  - Home meds: none  Continuous pulse oximetry   O2 PRN to maintain SpO2 > 94%, wean as tolerate  - Imaging: none  - Interventions: none    Gastrointestinal:   No acute issues        No lab exists for component: \"BILIRUBIN\"    -History: none  -Home meds: none  - Diet: Cardiac  - Supplementation: None  - Prophylaxis: not indicated  - Bowel regimen: miralax  - Last BM:  unknown  - Additional interventions: none    Renal:         Net IO Since Admission: -5 mL [12/20/24 1743]  - Daily CMP,Mg,Phos  - History: none  - Home meds: none  - Strange with strict I/O   - Fluids: 1 L LR   - Electrolytes: Replete per protocol, goal K>4 Phos >3 Mg >2    Endocrine:   Not diabetic        No lab exists for component: \"HBA1C\", \"HDLCHOL\", \"NHDLCHOL\", \"TOTALCHOL\"   -History: none  -Home meds: none      Hematology:       - Daily CBC  -History: easy bleeding, no diagnosis of hemophilia, had blood transfusions before during his stent placements  -Home meds: none  -pending coag labs, CBC, dimer, type and screen  -transfuse if hemodynamically unstable  -CT ab and pelvis with contrast to look for retroperitoneal bleeding  -femostop in " place   - DVT Prophylaxis: none, actively bleeding    Infectious Disease:       Temp (24hrs), Av °C (96.8 °F), Min:36 °C (96.8 °F), Max:36 °C (96.8 °F)     -History: none  -Home meds: none      Musculoskeletal:   -History: Right hip replacement   -Home meds: none  - PT to see   - OT to see     Integumentary:   -History: no external wounds  -Home meds: none      -scrotal hematoma measuring 18 cm in diameter on right  -urology consulted, reached out via Epic chat, reassured that the testicle is a distensible compartment, unlikely to cause testicular ischemia  -pending US of the scrotum with doppler   -douglass in place, some blood at the meatus    LDA:    Peripheral IV in place    CODE STATUS: Full Code    Disposition: 1-2 days in the ICU        ABCDEF Checklist  Analgesia: Spontaneous awakening trial to be pursued if clinically appropriate. RASS goal reviewed  Breathing: Spontaneous breathing trial to be pursued if clinically appropriate. Mechanical power of assisted ventilation reviewed  Choice of analgesia/sedation: Analgesic and sedative agents adjusted per clinical context.   Delirium assessed by CAM, will avoid exacerbating factors  Early mobility and exercise: Physical and occupational therapy engaged  Family: Plan of care, overall trajectory of patient shared with family. Questions elicited and answered as appropriate.     Due to the high probability of life threatening clinical decompensation, the patient required critical care time evaluating and managing this patient.  Critical care time included obtaining a history, examining the patient, ordering and reviewing studies, discussing, developing, and implementing a management plan, evaluating the patient's response to treatment, and discussion with other care team providers. I saw and evaluated the patient myself.  Critical care time was performed exclusive of billable procedures.        Case discussed with attending , Dr. Shelley Lopez MD PGY-1  Emergency medicine     Attending Addendum:    I saw and evaluated the patient. I personally obtained the key and critical portions of the history and physical exam or was physically present for key and critical portions performed by the resident/fellow. I reviewed the resident/fellow's documentation and discussed the patient with the resident/fellow. I agree with the resident/fellow's medical decision making as documented in the note.

## 2024-12-20 NOTE — NURSING NOTE
Cardiac Rehab: Patient seen for qualifying diagnosis to cardiac rehab program. Described and discussed Phase II Cardiac Rehab program with patient, gave [facility] Handbook and pamphlet, and discussed enrollment into Phase II. Reviewed cardiac lifestyle modifications necessary for improved heart health. Encouraged cardiology follow up post hospitalization. Discussed the importance of medication compliance. Patient has done Cardiac Rehab in the past in [place]. Patient instructed to complete cardiac rehab again.

## 2024-12-20 NOTE — Clinical Note
Angioplasty of the right coronary artery lesion. Inflation 1: Pressure = 8 bob; Duration = 10 sec. Inflation 2: Pressure = 8 bob; Duration = 10 sec.

## 2024-12-21 ENCOUNTER — APPOINTMENT (OUTPATIENT)
Dept: CARDIOLOGY | Facility: HOSPITAL | Age: 79
DRG: 322 | End: 2024-12-21
Payer: MEDICARE

## 2024-12-21 ENCOUNTER — APPOINTMENT (OUTPATIENT)
Dept: RADIOLOGY | Facility: HOSPITAL | Age: 79
DRG: 322 | End: 2024-12-21
Payer: MEDICARE

## 2024-12-21 LAB
ALBUMIN SERPL BCP-MCNC: 3.1 G/DL (ref 3.4–5)
ANION GAP SERPL CALC-SCNC: 10 MMOL/L (ref 10–20)
ATRIAL RATE: 60 BPM
BUN SERPL-MCNC: 22 MG/DL (ref 6–23)
CALCIUM SERPL-MCNC: 8 MG/DL (ref 8.6–10.3)
CHLORIDE SERPL-SCNC: 110 MMOL/L (ref 98–107)
CO2 SERPL-SCNC: 24 MMOL/L (ref 21–32)
CREAT SERPL-MCNC: 0.94 MG/DL (ref 0.5–1.3)
EGFRCR SERPLBLD CKD-EPI 2021: 83 ML/MIN/1.73M*2
ERYTHROCYTE [DISTWIDTH] IN BLOOD BY AUTOMATED COUNT: 13.8 % (ref 11.5–14.5)
ERYTHROCYTE [DISTWIDTH] IN BLOOD BY AUTOMATED COUNT: 13.8 % (ref 11.5–14.5)
ERYTHROCYTE [DISTWIDTH] IN BLOOD BY AUTOMATED COUNT: 13.9 % (ref 11.5–14.5)
ERYTHROCYTE [DISTWIDTH] IN BLOOD BY AUTOMATED COUNT: 14 % (ref 11.5–14.5)
ERYTHROCYTE [DISTWIDTH] IN BLOOD BY AUTOMATED COUNT: 14.1 % (ref 11.5–14.5)
ERYTHROCYTE [DISTWIDTH] IN BLOOD BY AUTOMATED COUNT: 14.1 % (ref 11.5–14.5)
GLUCOSE BLD MANUAL STRIP-MCNC: 112 MG/DL (ref 74–99)
GLUCOSE BLD MANUAL STRIP-MCNC: 116 MG/DL (ref 74–99)
GLUCOSE BLD MANUAL STRIP-MCNC: 120 MG/DL (ref 74–99)
GLUCOSE BLD MANUAL STRIP-MCNC: 98 MG/DL (ref 74–99)
GLUCOSE SERPL-MCNC: 109 MG/DL (ref 74–99)
HCT VFR BLD AUTO: 19.1 % (ref 41–52)
HCT VFR BLD AUTO: 20.1 % (ref 41–52)
HCT VFR BLD AUTO: 20.1 % (ref 41–52)
HCT VFR BLD AUTO: 20.9 % (ref 41–52)
HCT VFR BLD AUTO: 21.9 % (ref 41–52)
HCT VFR BLD AUTO: 22.4 % (ref 41–52)
HGB BLD-MCNC: 5.8 G/DL (ref 13.5–17.5)
HGB BLD-MCNC: 6 G/DL (ref 13.5–17.5)
HGB BLD-MCNC: 6.2 G/DL (ref 13.5–17.5)
HGB BLD-MCNC: 6.4 G/DL (ref 13.5–17.5)
HGB BLD-MCNC: 6.6 G/DL (ref 13.5–17.5)
HGB BLD-MCNC: 6.7 G/DL (ref 13.5–17.5)
MAGNESIUM SERPL-MCNC: 1.66 MG/DL (ref 1.6–2.4)
MAGNESIUM SERPL-MCNC: 2.43 MG/DL (ref 1.6–2.4)
MCH RBC QN AUTO: 32.2 PG (ref 26–34)
MCH RBC QN AUTO: 32.6 PG (ref 26–34)
MCH RBC QN AUTO: 33 PG (ref 26–34)
MCHC RBC AUTO-ENTMCNC: 29.9 G/DL (ref 32–36)
MCHC RBC AUTO-ENTMCNC: 29.9 G/DL (ref 32–36)
MCHC RBC AUTO-ENTMCNC: 30.1 G/DL (ref 32–36)
MCHC RBC AUTO-ENTMCNC: 30.4 G/DL (ref 32–36)
MCHC RBC AUTO-ENTMCNC: 30.6 G/DL (ref 32–36)
MCHC RBC AUTO-ENTMCNC: 30.8 G/DL (ref 32–36)
MCV RBC AUTO: 107 FL (ref 80–100)
MCV RBC AUTO: 107 FL (ref 80–100)
MCV RBC AUTO: 108 FL (ref 80–100)
MCV RBC AUTO: 108 FL (ref 80–100)
MCV RBC AUTO: 110 FL (ref 80–100)
MCV RBC AUTO: 110 FL (ref 80–100)
NRBC BLD-RTO: 0 /100 WBCS (ref 0–0)
PHOSPHATE SERPL-MCNC: 4 MG/DL (ref 2.5–4.9)
PLATELET # BLD AUTO: 225 X10*3/UL (ref 150–450)
PLATELET # BLD AUTO: 236 X10*3/UL (ref 150–450)
PLATELET # BLD AUTO: 241 X10*3/UL (ref 150–450)
PLATELET # BLD AUTO: 264 X10*3/UL (ref 150–450)
PLATELET # BLD AUTO: 269 X10*3/UL (ref 150–450)
PLATELET # BLD AUTO: 297 X10*3/UL (ref 150–450)
POTASSIUM SERPL-SCNC: 4.1 MMOL/L (ref 3.5–5.3)
PR INTERVAL: 298 MS
Q ONSET: 197 MS
QRS COUNT: 10 BEATS
QRS DURATION: 166 MS
QT INTERVAL: 508 MS
QTC CALCULATION(BAZETT): 508 MS
QTC FREDERICIA: 508 MS
R AXIS: -82 DEGREES
RBC # BLD AUTO: 1.78 X10*6/UL (ref 4.5–5.9)
RBC # BLD AUTO: 1.82 X10*6/UL (ref 4.5–5.9)
RBC # BLD AUTO: 1.88 X10*6/UL (ref 4.5–5.9)
RBC # BLD AUTO: 1.94 X10*6/UL (ref 4.5–5.9)
RBC # BLD AUTO: 2 X10*6/UL (ref 4.5–5.9)
RBC # BLD AUTO: 2.08 X10*6/UL (ref 4.5–5.9)
SODIUM SERPL-SCNC: 140 MMOL/L (ref 136–145)
T AXIS: 90 DEGREES
T OFFSET: 451 MS
VENTRICULAR RATE: 60 BPM
WBC # BLD AUTO: 6.7 X10*3/UL (ref 4.4–11.3)
WBC # BLD AUTO: 7.1 X10*3/UL (ref 4.4–11.3)
WBC # BLD AUTO: 7.1 X10*3/UL (ref 4.4–11.3)
WBC # BLD AUTO: 7.9 X10*3/UL (ref 4.4–11.3)
WBC # BLD AUTO: 8.1 X10*3/UL (ref 4.4–11.3)
WBC # BLD AUTO: 8.2 X10*3/UL (ref 4.4–11.3)

## 2024-12-21 PROCEDURE — 93926 LOWER EXTREMITY STUDY: CPT | Performed by: INTERNAL MEDICINE

## 2024-12-21 PROCEDURE — 85027 COMPLETE CBC AUTOMATED: CPT

## 2024-12-21 PROCEDURE — 1200000002 HC GENERAL ROOM WITH TELEMETRY DAILY

## 2024-12-21 PROCEDURE — 76870 US EXAM SCROTUM: CPT | Performed by: RADIOLOGY

## 2024-12-21 PROCEDURE — 36415 COLL VENOUS BLD VENIPUNCTURE: CPT

## 2024-12-21 PROCEDURE — 76870 US EXAM SCROTUM: CPT

## 2024-12-21 PROCEDURE — 2500000001 HC RX 250 WO HCPCS SELF ADMINISTERED DRUGS (ALT 637 FOR MEDICARE OP)

## 2024-12-21 PROCEDURE — 2500000004 HC RX 250 GENERAL PHARMACY W/ HCPCS (ALT 636 FOR OP/ED)

## 2024-12-21 PROCEDURE — 93926 LOWER EXTREMITY STUDY: CPT

## 2024-12-21 PROCEDURE — 83735 ASSAY OF MAGNESIUM: CPT

## 2024-12-21 PROCEDURE — 99291 CRITICAL CARE FIRST HOUR: CPT | Performed by: INTERNAL MEDICINE

## 2024-12-21 PROCEDURE — 2500000002 HC RX 250 W HCPCS SELF ADMINISTERED DRUGS (ALT 637 FOR MEDICARE OP, ALT 636 FOR OP/ED)

## 2024-12-21 PROCEDURE — 82947 ASSAY GLUCOSE BLOOD QUANT: CPT

## 2024-12-21 PROCEDURE — 99232 SBSQ HOSP IP/OBS MODERATE 35: CPT | Performed by: INTERNAL MEDICINE

## 2024-12-21 PROCEDURE — 82565 ASSAY OF CREATININE: CPT

## 2024-12-21 RX ORDER — MAGNESIUM SULFATE HEPTAHYDRATE 40 MG/ML
2 INJECTION, SOLUTION INTRAVENOUS ONCE
Status: COMPLETED | OUTPATIENT
Start: 2024-12-21 | End: 2024-12-21

## 2024-12-21 RX ORDER — PRASUGREL 10 MG/1
10 TABLET, FILM COATED ORAL DAILY
Status: DISCONTINUED | OUTPATIENT
Start: 2024-12-21 | End: 2024-12-25 | Stop reason: HOSPADM

## 2024-12-21 RX ORDER — BRIMONIDINE TARTRATE 2 MG/ML
1 SOLUTION/ DROPS OPHTHALMIC 2 TIMES DAILY
Status: DISCONTINUED | OUTPATIENT
Start: 2024-12-21 | End: 2024-12-25 | Stop reason: HOSPADM

## 2024-12-21 RX ORDER — LEVETIRACETAM 500 MG/1
1000 TABLET ORAL 2 TIMES DAILY
Status: DISCONTINUED | OUTPATIENT
Start: 2024-12-21 | End: 2024-12-25 | Stop reason: HOSPADM

## 2024-12-21 RX ORDER — ACETAMINOPHEN 325 MG/1
650 TABLET ORAL EVERY 6 HOURS PRN
Status: DISCONTINUED | OUTPATIENT
Start: 2024-12-21 | End: 2024-12-25 | Stop reason: HOSPADM

## 2024-12-21 RX ORDER — PANTOPRAZOLE SODIUM 40 MG/1
40 TABLET, DELAYED RELEASE ORAL
Status: DISCONTINUED | OUTPATIENT
Start: 2024-12-21 | End: 2024-12-25 | Stop reason: HOSPADM

## 2024-12-21 RX ADMIN — BRIMONIDINE TARTRATE 1 DROP: 2 SOLUTION/ DROPS OPHTHALMIC at 09:27

## 2024-12-21 RX ADMIN — ACETAMINOPHEN 325MG 650 MG: 325 TABLET ORAL at 06:32

## 2024-12-21 RX ADMIN — PANTOPRAZOLE SODIUM 40 MG: 40 TABLET, DELAYED RELEASE ORAL at 06:07

## 2024-12-21 RX ADMIN — LEVETIRACETAM 1000 MG: 500 TABLET, FILM COATED ORAL at 09:27

## 2024-12-21 RX ADMIN — SODIUM CHLORIDE, POTASSIUM CHLORIDE, SODIUM LACTATE AND CALCIUM CHLORIDE 500 ML: 600; 310; 30; 20 INJECTION, SOLUTION INTRAVENOUS at 03:26

## 2024-12-21 RX ADMIN — PRASUGREL 10 MG: 10 TABLET, FILM COATED ORAL at 13:51

## 2024-12-21 RX ADMIN — BRIMONIDINE TARTRATE 1 DROP: 2 SOLUTION/ DROPS OPHTHALMIC at 20:30

## 2024-12-21 RX ADMIN — QUETIAPINE FUMARATE 50 MG: 50 TABLET ORAL at 20:30

## 2024-12-21 RX ADMIN — LEVETIRACETAM 1000 MG: 500 TABLET, FILM COATED ORAL at 20:30

## 2024-12-21 RX ADMIN — MAGNESIUM SULFATE HEPTAHYDRATE 2 G: 40 INJECTION, SOLUTION INTRAVENOUS at 06:07

## 2024-12-21 RX ADMIN — BRIMONIDINE TARTRATE 1 DROP: 2 SOLUTION/ DROPS OPHTHALMIC at 02:03

## 2024-12-21 SDOH — ECONOMIC STABILITY: FOOD INSECURITY: WITHIN THE PAST 12 MONTHS, THE FOOD YOU BOUGHT JUST DIDN'T LAST AND YOU DIDN'T HAVE MONEY TO GET MORE.: NEVER TRUE

## 2024-12-21 SDOH — SOCIAL STABILITY: SOCIAL INSECURITY
WITHIN THE LAST YEAR, HAVE YOU BEEN KICKED, HIT, SLAPPED, OR OTHERWISE PHYSICALLY HURT BY YOUR PARTNER OR EX-PARTNER?: NO

## 2024-12-21 SDOH — ECONOMIC STABILITY: FOOD INSECURITY: WITHIN THE PAST 12 MONTHS, YOU WORRIED THAT YOUR FOOD WOULD RUN OUT BEFORE YOU GOT THE MONEY TO BUY MORE.: NEVER TRUE

## 2024-12-21 SDOH — SOCIAL STABILITY: SOCIAL INSECURITY: WITHIN THE LAST YEAR, HAVE YOU BEEN AFRAID OF YOUR PARTNER OR EX-PARTNER?: NO

## 2024-12-21 SDOH — SOCIAL STABILITY: SOCIAL INSECURITY
WITHIN THE LAST YEAR, HAVE YOU BEEN RAPED OR FORCED TO HAVE ANY KIND OF SEXUAL ACTIVITY BY YOUR PARTNER OR EX-PARTNER?: NO

## 2024-12-21 SDOH — ECONOMIC STABILITY: INCOME INSECURITY: IN THE PAST 12 MONTHS HAS THE ELECTRIC, GAS, OIL, OR WATER COMPANY THREATENED TO SHUT OFF SERVICES IN YOUR HOME?: NO

## 2024-12-21 SDOH — SOCIAL STABILITY: SOCIAL INSECURITY: WITHIN THE LAST YEAR, HAVE YOU BEEN HUMILIATED OR EMOTIONALLY ABUSED IN OTHER WAYS BY YOUR PARTNER OR EX-PARTNER?: NO

## 2024-12-21 ASSESSMENT — PAIN SCALES - GENERAL
PAINLEVEL_OUTOF10: 0 - NO PAIN
PAINLEVEL_OUTOF10: 0 - NO PAIN
PAINLEVEL_OUTOF10: 8
PAINLEVEL_OUTOF10: 0 - NO PAIN
PAINLEVEL_OUTOF10: 0 - NO PAIN
PAINLEVEL_OUTOF10: 3

## 2024-12-21 ASSESSMENT — PAIN - FUNCTIONAL ASSESSMENT
PAIN_FUNCTIONAL_ASSESSMENT: 0-10

## 2024-12-21 ASSESSMENT — PAIN DESCRIPTION - LOCATION: LOCATION: HIP

## 2024-12-21 ASSESSMENT — ACTIVITIES OF DAILY LIVING (ADL): LACK_OF_TRANSPORTATION: NO

## 2024-12-21 ASSESSMENT — PAIN DESCRIPTION - ORIENTATION: ORIENTATION: RIGHT

## 2024-12-21 NOTE — PROGRESS NOTES
Subjective Data:  Does not report any chest discomfort pressure tightness heaviness lightheadedness presyncope or syncope.  Does not report any groin pain.  Has not gotten out of bed.  Hungry.    Overnight Events:    Hemodynamically stable.  Electrically stable.  Telemetry overnight AV sequential paced rhythm intermittent sinus with ventricular pacing  Objective Data:  Pallor  Alert and oriented x 3  Lungs are clear heart sounds are regular without murmur rub or gallop abdomen is soft and nontender right groin soft without hematoma bruit or evidence of AV fistula  Scrotum is soft, ecchymotic.  Nontender.  Neurologically grossly nonfocal.  Last Recorded Vitals:  Last Labs:  CBC - 2024:  8:11 AM  7.1 6.2 225    20.1      CMP - 2024:  3:16 AM  8.0 5.5 35 --- 0.4   4.0 3.1 17 50      PTT - 2024:  5:38 PM  1.2   13.7 35     BNP   Date/Time Value Ref Range Status   2024 10:18  0 - 99 pg/mL Final      Last I/O:  I/O last 3 completed shifts:  In: 1020 (13.1 mL/kg) [I.V.:20 (0.3 mL/kg); IV Piggyback:1000]  Out: 805 (10.3 mL/kg) [Urine:800 (0.3 mL/kg/hr); Blood:5]  Weight: 78.1 kg     Past Cardiology Tests (Last 3 Years):  [id number] sequential paced rhythm QTc about 500 ms  ECG 12 lead (Clinic Performed) 2024    Echo:  Transthoracic echo (TTE) complete 2024    Ejection Fractions:  EF   Date/Time Value Ref Range Status   2024 11:29 AM 58 %      Cath:  Cardiac Catheterization Procedure 2024  1. Left Main Coronary Artery: This artery is mildly diseased.   2. Left Anterior Descending Artery: is significantly obstructed and contains patent previously placed stents.   3. Mid LAD Lesion: The percent stenosis is 100%.   4. 1st Diag LAD Lesion: The percent stenosis is 50-70%.   5. Circumflex Coronary Artery: significantly obstructed and contains patent previously placed stents.   6. Mid CX Lesion: The percent stenosis is 100%.   7. Right Coronary Artery: significantly  obstructed and contains patent previously placed stents.   8. Mid RCA Lesion: The percent stenosis is 90%.   9. Mid RCA Lesion: pre-dilation, Resolute Carlos 2.5x26 post-dilation: <10% residual stenosis. RCA: pre-procedure XENA flow was 3(complete perfusion), post-procedure XENA flow was 3(complete perfusion) and the lesion risk category Non-high/Non-C.  10. PDA RCA Lesion: The percent stenosis is 100%.  11. LIMA to the Mid LAD: Percent stenosis is 0%.  12. SVG to the 2nd Marginal: Percent stenosis is 70%.  13. The Left Ventricular Ejection Fraction is 55%.  14. Left Ventricular End Diastolic Pressure: 15 mm Hg.  15. Aortic Stenosis: None.  16. Left Ventricular End Diastolic Pressure Dysfunction: None.  17. LV: No RWMA.  18. LV: normal left ventricular systolic function.  Stress Test:  Nuclear Stress Test 12/05/2024    Cardiac Imaging: ECHO 12/5/24:   1. Left ventricular ejection fraction is normal, by visual estimate at 55-60%.   2. There is no evidence of left ventricular hypertrophy.   3. There is normal right ventricular global systolic function.   4. No signficant valve disease.   5. Normal estimated PA pressure.   6. Normal diastolic filling pattern..  Right lower extremity vascular US 12/21/24 :  Right Lower Arterial: There is >50% stenosis documented at the external iliac artery. No evidence of hemodynamically significant stenosis found in the right lower extremity. There is a small right groin hematoma ~ .88 x .7 x 1.1 cm.  Pseudo Aneurysm: No evidence of pseudo aneurysm noted in the right groin.  Right Pop Aneurysm: There is no evidence of aneurysm noted in the right lower extremity.  Inpatient Medications:  Scheduled medications   Medication Dose Route Frequency    brimonidine  1 drop Both Eyes BID    lactated Ringer's  1,000 mL intravenous Once    lactated Ringer's  1,000 mL intravenous Once    levETIRAcetam  1,000 mg oral BID    pantoprazole  40 mg oral Daily before breakfast    QUEtiapine  50 mg oral  Nightly     PRN medications   Medication    acetaminophen    traZODone     Continuous Medications   Medication Dose Last Rate       Physical Exam:  See initial portion of the note     Assessment/Plan   1.  Patient status post PCI and drug-eluting stent to the mid RCA for crescendo angina pectoris  2.  Anemia at baseline with acute on chronic anemia postprocedure related to right groin bleed.  No ongoing bleed.    3.  Preserved LV systolic function  4.  Hemodynamically stable  5.  No angina or CHF symptoms at rest  6.  Antiplatelet therapy on hold  7.  Patient can be mobilized gradually,  8.  Initiate antiplatelet therapy-discussed with ICU attending  9.  Right groin ultrasound does not show pseudoaneurysm or AV fistula.    Peripheral IV 12/20/24 Left;Proximal Forearm (Active)   Site Assessment Clean;Dry;Intact 12/20/24 2000   Dressing Type Transparent 12/20/24 2000   Line Status Flushed 12/20/24 2000   Dressing Status Clean;Dry;Occlusive 12/20/24 2000   Number of days: 1       Peripheral IV 12/20/24 20 G Proximal;Right Forearm (Active)   Site Assessment Clean;Dry;Intact 12/20/24 2000   Dressing Type Transparent 12/20/24 2000   Line Status Flushed 12/20/24 2000   Dressing Status Dry;Clean;Occlusive 12/20/24 2000   Number of days: 1       Peripheral IV 12/20/24 18 G Distal;Right;Upper;Anterior Arm (Active)   Site Assessment Clean;Dry;Intact 12/20/24 2000   Dressing Type Transparent 12/20/24 2000   Line Status Flushed 12/20/24 2000   Dressing Status Clean;Dry;Occlusive 12/20/24 2000   Number of days: 1       Urethral Catheter 10 Fr. (Active)   Site Assessment Clean;Skin intact 12/21/24 0625   Number of days: 1       Code Status:  Full Code    I spent 30 minutes in the professional and overall care of this patient.        Ashley Roque MD

## 2024-12-21 NOTE — SIGNIFICANT EVENT
Wayne Burkitt is a 79-year-old male with a prior cardiac history of CAD, CABG, hypertension, ICM, exertional angina, vascular dementia, s/p 16 stents, s/p 3 R inguinal hernia repairs, and seizures. He came to the hospital for a left heart cath. After his cath, he had a large scrotal hematoma and his hg dropped to 5.8. He was admitted to the ICU with a femostop on and for us to do q15 min neurochecks for limb ischemia. Urology was consulted and said ntd emergently, low suspicion for testicular ischemia just from fluid. Cardiology did not want to transfuse immediately because the patient wasn't symptomatic. Dr. Giron requested to give the patient IVF to increase his blood pressure and keep him hemodynamically stable.  He did not want the patient to be transfused blood unless the patient was hemodynamically unstable.  We gave him IVF and his BP remained stable for the most part in the high 90s/40s. When we repeated his CBC in the morning, his anemia seemed more like dilutional because all of the cell lines were decreased. He felt better as well, the patient denies any dizziness on ambulation or pain. CT did not show any fluid extravasation or retroperitoneal bleeds. I did a fast exam last night and didn't see any free fluid. Currently we're pending a scrotal US to be done and read to look for any testicular injury or compartment syndrome to be definitive. His most recent Hg was 6.7 so he is improving. He's making urine appropriately.  The patient was downgraded to the floor for further observation.

## 2024-12-21 NOTE — PROGRESS NOTES
INTERNAL MEDICINE PROGRESS NOTE  TEACHING SERVICE - BLUE TEAM    Subjective   Wayne Burkitt is a 79 y.o. male CAD, CABG, hypertension, ICM, exertional angina, vascular dementia, s/p 16 stents, s/p 3 R inguinal hernia repairs, and seizures admitted on 12/20/2024  8:05 AM for left heart catheterization.    HPI:  Patient seen and examined this evening at bedside. He states that he has groin pain denies any other pain. He denies lightheadedness, weakness, headaches, shortness of breath, chest pain, leg swelling, fever, chills, n/v/d. He is eating and drinking well.    Review of Systems:  Review of Systems     Objective   Vitals:  Most Recent:  Vitals:    12/21/24 1600   BP: (!) 108/49   Pulse: 68   Resp: 21   Temp: 36.6 °C (97.9 °F)   SpO2: 100%       24hr Min/Max:  Temp  Min: 36.4 °C (97.5 °F)  Max: 36.6 °C (97.9 °F)  Pulse  Min: 60  Max: 96  BP  Min: 82/45  Max: 143/65  Resp  Min: 6  Max: 34  SpO2  Min: 95 %  Max: 100 %    Intake/Output:    Intake/Output Summary (Last 24 hours) at 12/21/2024 1654  Last data filed at 12/21/2024 1020  Gross per 24 hour   Intake 1038.75 ml   Output 1000 ml   Net 38.75 ml       Physical exam:    Physical Exam  Constitutional:       General: He is not in acute distress.     Appearance: Normal appearance.   HENT:      Head: Normocephalic and atraumatic.   Cardiovascular:      Rate and Rhythm: Normal rate and regular rhythm.      Heart sounds: No murmur heard.  Pulmonary:      Effort: Pulmonary effort is normal. No respiratory distress.      Breath sounds: Normal breath sounds. No wheezing.   Abdominal:      General: Abdomen is flat. There is no distension.      Palpations: Abdomen is soft.      Tenderness: There is no abdominal tenderness.   Musculoskeletal:      Right lower leg: No edema.      Left lower leg: No edema.   Skin:     General: Skin is warm and dry.      Comments: Pale   Neurological:      Mental Status: He is alert.          Lab/Radiology/Diagnostic Review:  Results for orders  placed or performed during the hospital encounter of 12/20/24 (from the past 24 hours)   Coagulation Screen   Result Value Ref Range    Protime 13.7 (H) 9.8 - 12.8 seconds    INR 1.2 (H) 0.9 - 1.1    aPTT 35 27 - 38 seconds   Magnesium   Result Value Ref Range    Magnesium 1.76 1.60 - 2.40 mg/dL   Comprehensive metabolic panel   Result Value Ref Range    Glucose 168 (H) 74 - 99 mg/dL    Sodium 138 136 - 145 mmol/L    Potassium 4.8 3.5 - 5.3 mmol/L    Chloride 110 (H) 98 - 107 mmol/L    Bicarbonate 20 (L) 21 - 32 mmol/L    Anion Gap 13 10 - 20 mmol/L    Urea Nitrogen 23 6 - 23 mg/dL    Creatinine 1.00 0.50 - 1.30 mg/dL    eGFR 78 >60 mL/min/1.73m*2    Calcium 7.7 (L) 8.6 - 10.3 mg/dL    Albumin 3.2 (L) 3.4 - 5.0 g/dL    Alkaline Phosphatase 50 33 - 136 U/L    Total Protein 5.5 (L) 6.4 - 8.2 g/dL    AST 35 9 - 39 U/L    Bilirubin, Total 0.4 0.0 - 1.2 mg/dL    ALT 17 10 - 52 U/L   Type and Screen   Result Value Ref Range    ABO TYPE B     Rh TYPE POS     ANTIBODY SCREEN NEG    D-Dimer, Non VTE   Result Value Ref Range    D-Dimer Non VTE, Quant (ng/mL FEU) 1,246 (H) <=500 ng/mL FEU   Fibrinogen   Result Value Ref Range    Fibrinogen 270 200 - 400 mg/dL   Lavender Top   Result Value Ref Range    Extra Tube Hold for add-ons.    SST TOP   Result Value Ref Range    Extra Tube Hold for add-ons.    CBC and Auto Differential   Result Value Ref Range    WBC 8.6 4.4 - 11.3 x10*3/uL    nRBC 0.0 0.0 - 0.0 /100 WBCs    RBC 2.06 (L) 4.50 - 5.90 x10*6/uL    Hemoglobin 6.8 (L) 13.5 - 17.5 g/dL    Hematocrit 22.9 (L) 41.0 - 52.0 %     (H) 80 - 100 fL    MCH 33.0 26.0 - 34.0 pg    MCHC 29.7 (L) 32.0 - 36.0 g/dL    RDW 13.7 11.5 - 14.5 %    Platelets 300 150 - 450 x10*3/uL    Neutrophils % 88.5 40.0 - 80.0 %    Immature Granulocytes %, Automated 0.3 0.0 - 0.9 %    Lymphocytes % 5.8 13.0 - 44.0 %    Monocytes % 4.7 2.0 - 10.0 %    Eosinophils % 0.2 0.0 - 6.0 %    Basophils % 0.5 0.0 - 2.0 %    Neutrophils Absolute 7.61 (H) 1.60 -  5.50 x10*3/uL    Immature Granulocytes Absolute, Automated 0.03 0.00 - 0.50 x10*3/uL    Lymphocytes Absolute 0.50 (L) 0.80 - 3.00 x10*3/uL    Monocytes Absolute 0.40 0.05 - 0.80 x10*3/uL    Eosinophils Absolute 0.02 0.00 - 0.40 x10*3/uL    Basophils Absolute 0.04 0.00 - 0.10 x10*3/uL   Lactate   Result Value Ref Range    Lactate 1.9 0.4 - 2.0 mmol/L   CBC   Result Value Ref Range    WBC 8.2 4.4 - 11.3 x10*3/uL    nRBC 0.0 0.0 - 0.0 /100 WBCs    RBC 1.94 (L) 4.50 - 5.90 x10*6/uL    Hemoglobin 6.4 (LL) 13.5 - 17.5 g/dL    Hematocrit 20.9 (L) 41.0 - 52.0 %     (H) 80 - 100 fL    MCH 33.0 26.0 - 34.0 pg    MCHC 30.6 (L) 32.0 - 36.0 g/dL    RDW 13.8 11.5 - 14.5 %    Platelets 264 150 - 450 x10*3/uL   Renal Function Panel   Result Value Ref Range    Glucose 109 (H) 74 - 99 mg/dL    Sodium 140 136 - 145 mmol/L    Potassium 4.1 3.5 - 5.3 mmol/L    Chloride 110 (H) 98 - 107 mmol/L    Bicarbonate 24 21 - 32 mmol/L    Anion Gap 10 10 - 20 mmol/L    Urea Nitrogen 22 6 - 23 mg/dL    Creatinine 0.94 0.50 - 1.30 mg/dL    eGFR 83 >60 mL/min/1.73m*2    Calcium 8.0 (L) 8.6 - 10.3 mg/dL    Phosphorus 4.0 2.5 - 4.9 mg/dL    Albumin 3.1 (L) 3.4 - 5.0 g/dL   Magnesium   Result Value Ref Range    Magnesium 1.66 1.60 - 2.40 mg/dL   CBC   Result Value Ref Range    WBC 7.1 4.4 - 11.3 x10*3/uL    nRBC 0.0 0.0 - 0.0 /100 WBCs    RBC 1.82 (L) 4.50 - 5.90 x10*6/uL    Hemoglobin 6.0 (LL) 13.5 - 17.5 g/dL    Hematocrit 20.1 (L) 41.0 - 52.0 %     (H) 80 - 100 fL    MCH 33.0 26.0 - 34.0 pg    MCHC 29.9 (L) 32.0 - 36.0 g/dL    RDW 13.8 11.5 - 14.5 %    Platelets 241 150 - 450 x10*3/uL   CBC   Result Value Ref Range    WBC 6.7 4.4 - 11.3 x10*3/uL    nRBC 0.0 0.0 - 0.0 /100 WBCs    RBC 1.78 (L) 4.50 - 5.90 x10*6/uL    Hemoglobin 5.8 (LL) 13.5 - 17.5 g/dL    Hematocrit 19.1 (L) 41.0 - 52.0 %     (H) 80 - 100 fL    MCH 32.6 26.0 - 34.0 pg    MCHC 30.4 (L) 32.0 - 36.0 g/dL    RDW 13.9 11.5 - 14.5 %    Platelets 236 150 - 450 x10*3/uL    Magnesium   Result Value Ref Range    Magnesium 2.43 (H) 1.60 - 2.40 mg/dL   CBC   Result Value Ref Range    WBC 7.1 4.4 - 11.3 x10*3/uL    nRBC 0.0 0.0 - 0.0 /100 WBCs    RBC 1.88 (L) 4.50 - 5.90 x10*6/uL    Hemoglobin 6.2 (LL) 13.5 - 17.5 g/dL    Hematocrit 20.1 (L) 41.0 - 52.0 %     (H) 80 - 100 fL    MCH 33.0 26.0 - 34.0 pg    MCHC 30.8 (L) 32.0 - 36.0 g/dL    RDW 14.1 11.5 - 14.5 %    Platelets 225 150 - 450 x10*3/uL   POCT GLUCOSE   Result Value Ref Range    POCT Glucose 98 74 - 99 mg/dL   POCT GLUCOSE   Result Value Ref Range    POCT Glucose 116 (H) 74 - 99 mg/dL   CBC   Result Value Ref Range    WBC 7.9 4.4 - 11.3 x10*3/uL    nRBC 0.0 0.0 - 0.0 /100 WBCs    RBC 2.08 (L) 4.50 - 5.90 x10*6/uL    Hemoglobin 6.7 (L) 13.5 - 17.5 g/dL    Hematocrit 22.4 (L) 41.0 - 52.0 %     (H) 80 - 100 fL    MCH 32.2 26.0 - 34.0 pg    MCHC 29.9 (L) 32.0 - 36.0 g/dL    RDW 14.0 11.5 - 14.5 %    Platelets 297 150 - 450 x10*3/uL   POCT GLUCOSE   Result Value Ref Range    POCT Glucose 120 (H) 74 - 99 mg/dL     US scrotum    Result Date: 12/21/2024  Interpreted By:  Kendall Brenabe, STUDY: US SCROTUM;  12/21/2024 11:45 am   INDICATION: Signs/Symptoms:Scrotal hematoma measuring 18 cm in diameter..     COMPARISON: CT abdomen pelvis 12/20/2024   ACCESSION NUMBER(S): [id number]   ORDERING CLINICIAN: MICHELLE CUENCA   TECHNIQUE: Multiple ultrasonographic images of scrotum and tested were obtained.   FINDINGS: RIGHT HEMISCROTUM:   RIGHT TESTICLE: The right testicle measures 1.4 cm x 1.5 cmx 2.5 cm. The right testicle demonstrates a homogeneous echotexture and normal contour. Normal vascularity and Doppler waveforms are observed in the right testicle. There is significant scrotal wall thickening and mild measuring up to 1.4 cm on the right.   RIGHT EPIDIDYMIS: The right epididymal head measures 0.6 cm x 0.5 cm x 0.7 cm. Tiny epididymal calcifications measuring up to 4 mm.   LEFT HEMISCROTUM:   LEFT TESTICLE: The left  testicle measures 2.0 cm x 2.3 cm x 3.6 cm. The left testicle demonstrates a homogeneous echotexture and normal contour. Normal vascularity and Doppler waveforms are observed in the left testicle. Significant scrotal wall thickening and edema measuring up to 1.5 cm on the left.   LEFT EPIDIDYMIS: The left epididymal head measures 0.7 cm x 0.8 cm x 0.9 cm and is within normal limits.       Significant bilateral scrotal wall edema and skin thickening.   No evidence of testicular torsion bilaterally.   MACRO: None   Signed by: Kendall Brenabe 12/21/2024 4:38 PM Dictation workstation:   [id number]    ECG 12 Lead    Result Date: 12/21/2024  AV dual-paced rhythm with prolonged AV conduction Abnormal ECG When compared with ECG of 11-SEP-2003 13:58, Electronic ventricular pacemaker has replaced Sinus rhythm    Vascular US lower extremity arterial duplex right    Result Date: 12/21/2024            [facility] [address] [place] [address]     Tel [phone] Fax [phone]  Vascular Lab Report  VASC US LOWER EXTREMITY ARTERIAL DUPLEX RIGHT Patient Name:      WAYNE BURKITT         Lancing Physician:  [id number] Sabina Peterson MD, RPVI Study Date:        12/21/2024            Ordering Provider:  [id number] MICHELLE CUENCA MRN/PID:           [id number]              Fellow: Accession#:        [id number]          Technologist:       Fazal LÓPEZ RVT Date of Birth/Age: 1945 / 79 years Technologist 2: Gender:            M                     Encounter#:         [id number] Admission Status:  Inpatient             Location Performed: 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 CHF.  CONCLUSIONS:  Right Lower Arterial: There is >50% stenosis documented at the external iliac artery. No evidence of hemodynamically significant stenosis found in the right lower extremity. Velocity shift noted at the EIA just meets criteria for > 50% stenosis. Remainder of arteries in the right leg are patent. There is a small right groin hematoma ~ 0.9 x 0.7 x 1.1 cm. Pseudo Aneurysm: No evidence of pseudo aneurysm noted in the right groin.  Right Pop Aneurysm: There is no evidence of aneurysm noted in the right lower extremity.  Imaging & Doppler Findings:  Right                     Left   PSV                       cm/s         cm/s        CFA 61 cm/s  Profunda Proximal 106 cm/s   SFA Proximal 108 cm/s      SFA Mid 71 cm/s     SFA Distal 59 cm/s      Popliteal 55 cm/s    JAELYN Proximal 27 cm/s       JAELYN Mid 27 cm/s     JAELYN Distal 66 cm/s  Peroneal Proximal 93 cm/s    Peroneal Mid 60 cm/s   Peroneal Distal 125 cm/s   PTA Proximal 99 cm/s       PTA Mid 93 cm/s     PTA Distal  [id number] Sabina Peterson MD, RPVI Electronically signed by [id number] Sabina Peterson MD, RPVI on 12/21/2024 at 11:04:39 AM  ** Final **     Cardiac Catheterization Procedure    Result Date: 12/21/2024   [facility], Cath Lab      [address] [address] Cardiovascular Catheterization Report Patient Name:      WAYNE BURKITT         Performing Physician:  Lina Giron MD Study Date:        12/20/2024            Verifying Physician:   Lina Giron MD MRN/PID:           [id number]              Cardiologist/Co-Scrub:  Accession#:        [id number]          Ordering Provider:     [id number] BRANDIN RAMEY Date of Birth/Age: 1945 / 79 years Cardiologist: Gender:            M                     Fellow: Encounter#:        [id number]            Surgeon:  Study:            Left Heart Cath with Grafts Additional Study: PCI - Percutaneous Coronary Intervention  Indications: WAYNE BURKITT is a 79 year old male who presents with hypertension, prior coronary artery bypass graft surgery, dyslipidemia and a chest pain assessment of typical angina. Stable angina. Stress test performed: No. CTA performed: No. Olimpia accessed: No. LVEF Assessed: No. Cardiac arrest: No. Cardiac surgical consult: No. Cardiovascular Instability: No Frailty status of patient entering lab: 6 = Moderately frail.  Procedure Description: After infiltration with 2% Lidocaine, the right femoral artery was cannulated with a modified Seldinger technique. Subsequently a 5 Nauruan sheath was placed retrograde in the right femoral artery. The arterial sheath was sized up to 6 Nauruan. Selective coronary catheterization was performed using a 5 Fr catheter(s) exchanged over a guide wire to cannulate the coronary arteries. A 4 tip catheter was used for left coronary injections. Additional catheter(s) used to visualize the coronary arteries were: 3DRC. Multiple injections of contrast were made into the left and right coronary arteries with angiograms recorded in multiple projections. Retrograde left heart catheterizion was accomplished with a 5 Fr. pigtail catheter. A single plane left ventriculogram was recorded in the 30 degree DUKE projection. The contrast dose was 10 ml injected at 5 ml/sec. The catheter was then withdrawn across the aortic valve under continuous pressure monitoring and removed.  Coronary Angiography: The coronary circulation is right dominant.  Left Main Coronary Artery: The left main  artery gives rise to the LAD and Circumflex. The left main coronary artery mildly diseased.  Left Anterior Descending Coronary Artery Distribution: The Left Anterior Descending artery is a large vessel. There is 100% stenosis in the mid left anterior descending artery. Hemodynamically significant obstruction is noted in this vessel and contains patent previously placed stents. There is 50-70%stenosis in the 1st Diag left anterior descending artery.  Circumflex Coronary Artery Distribution: The Left Circumflex artery is a large vessel. Hemodynamically significant obstruction is noted in this vessel and contains patent previously placed stents. There is 100% stenosis in the the mid Circumflex artery.  Right Coronary Artery Distribution: Hemodynamically significant obstruction is noted in this vessel and contains patent previously placed stents. There is 90% stenosis in the the mid Right Coronary Artery. The devices advanced to the the mid RCA lesion were: a balloon was inflated for pre-dilation, Resolute Carlos 2.5x26 stent was deployed in the lesion a balloon was inflated for post-dilation. Residual stenosis is <10%. PDA right coronary artery showed 100% stenosis. This segment is collaterals.  Coronary Grafts:  LIMA Graft: There is 0% stenosis noted in the left internal mammary graft to the Mid LAD. Saphaneous Vein Graft(s): There is 70% stenosis in the saphenous vein graft to the 2nd Marginal.  Left Ventriculography: The estimated left ventricular ejection fraction is normal at 55%. The left venticular end diastolic pressure measures 15 mm Hg. There is no end diastolic pressure dysfunction of the left ventricle. There is no regional wall motion abnormalities noted. There is normal left ventricular systolic function noted. The left ventricle is normal in size. There is no aortic stenosis noted.  Hemo Personnel: +-----------------------+---------+ Name                   Duty      +-----------------------+---------+  Gabino Giron MD 1 +-----------------------+---------+  Hemodynamic Pressures:  +----+---------------+----------+-------------+--------------+-------+---------+ Site   Date Time   Phase NameSystolic mmHgDiastolic mmHgED mmHgMean mmHg +----+---------------+----------+-------------+--------------+-------+---------+   AO     12/20/2024  AIR REST          139            58              87         10:53:17 AM                                                      +----+---------------+----------+-------------+--------------+-------+---------+   AO     12/20/2024  AIR REST          157            66             104         10:57:05 AM                                                      +----+---------------+----------+-------------+--------------+-------+---------+   AO     12/20/2024  AIR REST          162            56             100         11:03:46 AM                                                      +----+---------------+----------+-------------+--------------+-------+---------+   LV     12/20/2024  AIR REST          141             2     16                  11:07:05 AM                                                      +----+---------------+----------+-------------+--------------+-------+---------+   LV     12/20/2024  AIR REST          138             2     15                  11:07:14 AM                                                      +----+---------------+----------+-------------+--------------+-------+---------+  LVp     12/20/2024  AIR REST          143             1     16                  11:07:55 AM                                                      +----+---------------+----------+-------------+--------------+-------+---------+  AOp     12/20/2024  AIR REST          134            53              85         11:08:02 AM                                                       +----+---------------+----------+-------------+--------------+-------+---------+   AO     12/20/2024  AIR REST          120            50              77         11:12:51 AM                                                      +----+---------------+----------+-------------+--------------+-------+---------+   AO     12/20/2024  AIR REST          125            56              83         11:18:22 AM                                                      +----+---------------+----------+-------------+--------------+-------+---------+   AO     12/20/2024  AIR REST           92            44              63         11:21:00 AM                                                      +----+---------------+----------+-------------+--------------+-------+---------+   AO     12/20/2024  AIR REST          128            58              85         11:21:55 AM                                                      +----+---------------+----------+-------------+--------------+-------+---------+   AO     12/20/2024  AIR REST          122            54              80         11:27:15 AM                                                      +----+---------------+----------+-------------+--------------+-------+---------+  Cardiac Cath Post Procedure Notes: Post Procedure           Triple vessel disease. Diagnosis: Blood Loss:              Estimated blood loss during the procedure was trivial                          mls. Specimens Removed:       Number of specimen(s) removed: none. ____________________________________________________________________________________ CONCLUSIONS:  1. Left Main Coronary Artery: This artery is mildly diseased.  2. Left Anterior Descending Artery: is significantly obstructed and contains patent previously placed stents.  3. Mid LAD Lesion: The percent stenosis is 100%.  4. 1st Diag LAD Lesion: The percent stenosis is  50-70%.  5. Circumflex Coronary Artery: significantly obstructed and contains patent previously placed stents.  6. Mid CX Lesion: The percent stenosis is 100%.  7. Right Coronary Artery: significantly obstructed and contains patent previously placed stents.  8. Mid RCA Lesion: The percent stenosis is 90%.  9. Mid RCA Lesion: pre-dilation, Resolute [place] 2.5x26 post-dilation: <10% residual stenosis. RCA: pre-procedure XENA flow was 3(complete perfusion), post-procedure XENA flow was 3(complete perfusion) and the lesion risk category Non-high/Non-C. 10. PDA RCA Lesion: The percent stenosis is 100%. 11. LIMA to the Mid LAD: Percent stenosis is 0%. 12. SVG to the 2nd Marginal: Percent stenosis is 70%. 13. The Left Ventricular Ejection Fraction is 55%. 14. Left Ventricular End Diastolic Pressure: 15 mm Hg. 15. Aortic Stenosis: None. 16. Left Ventricular End Diastolic Pressure Dysfunction: None. 17. LV: No RWMA. 18. LV: normal left ventricular systolic function. ICD 10 Codes: Angina pectoris, unspecified-I20.9; 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-I25.728  CPT Codes: Left Heart Cath Bypass Graft w ventriculography and coronary angio(LHC)-44675; Stent w angio ather Right Coronary addl branch major Artery (PCI)-03153.  [id number] Gabino Giron MD Performing Physician Electronically signed by [id number] Gabino Giron MD on 12/21/2024 at 9:24:30 AM  ** Final **     CT abdomen pelvis w IV contrast    Result Date: 12/21/2024  Interpreted By:  Jw Bonilla, STUDY: CT ABDOMEN PELVIS W IV CONTRAST;  12/20/2024 8:52 pm   INDICATION: Signs/Symptoms:S/p left heart catheterization complicated by scrotal hematoma, rule out retroperitoneal hematoma..     COMPARISON: None.   ACCESSION NUMBER(S): [id number]   ORDERING CLINICIAN: MICHELLE CUENCA   TECHNIQUE: Contiguous axial images of the abdomen and pelvis were obtained after the intravenous administration of 75 mL  Omnipaque 350 contrast. Coronal and sagittal reformatted images were reconstructed from the axial data.   FINDINGS: LOWER CHEST: No acute abnormality.   LIVER: Hepatic steatosis.   BILE DUCTS: No significant intrahepatic or extrahepatic dilatation.   GALLBLADDER: Cholelithiasis without CT evidence of acute cholecystitis.   PANCREAS: No significant abnormality.   SPLEEN: No significant abnormality.   ADRENALS: No significant abnormality.   KIDNEYS, URETERS, BLADDER: Excreted contrast limits assessment for small nonobstructing calculi. No obstructing calculi identified. No hydronephrosis or hydroureter. Urinary bladder is partially distended with excreted contrast material. A Strange catheter is in place within the bladder. Nondependent air in the bladder likely related to catheter placement.   REPRODUCTIVE ORGANS: Prostate is partially obscured by streak artifact from right hip arthroplasty. Prostatomegaly measuring at least 6.7 cm. Correlate with PSA. Extensive edema and fluid noted within the scrotum compatible with reported scrotal hematoma. Stranding with lesser fluid and edema noted along the bilateral inguinal canals.   GI: No obstruction. No bowel wall thickening or adjacent inflammatory change. Normal appendix.   VESSELS: No aortic aneurysm. Mild aortic calcifications. Metallic streak artifact from right hip prosthesis limits assessment of the femoral artery in the region of the access site. The portal veins and IVC are patent.   PERITONEUM/RETROPERITONEUM: No intraperitoneal free air or fluid. No definite retroperitoneal hematoma. Scrotal edema and edema along the bilateral inguinal canals as described above.   LYMPH NODES: No enlarged lymph nodes.   ABDOMINAL WALL: Subcutaneous fat stranding within the right inguinal region likely related to access site.   OSSEOUS STRUCTURES: Right total hip arthroplasty. No apparent hardware complication. No acute osseous abnormality identified.       1. Extensive scrotal  edema and fluid within the scrotum consistent with reported scrotal hematoma. 2. There is mild fluid and fat stranding noted along the bilateral inguinal canals, right-greater-than-left. No appreciable retroperitoneal hematoma. 3. Subcutaneous fat stranding along the right inguinal region likely related to access site. 4. If there is concern for testicular compromise then scrotal ultrasound is recommended for further assessment.   MACRO: None   Signed by: Jw Bonilla 12/21/2024 2:27 AM Dictation workstation:   [id number]    Cardiology Interpretation Of Nuclear Stress - See Other Report For Nuclear Portion    Result Date: 12/11/2024            [facility] [address] [place] [address]     Tel [phone] Fax [phone] Nuclear Pharmacologic Stress Test Patient Name:      WAYNE BURKITT       Ordering Provider:     Lina RAMEY Study Date:        12/5/2024           Reading Physician:     Lina Ramey MD MRN/PID:           [id number]            Supervising Physician: Lina Ramey MD Accession#:        [id number]        Fellow: Date of Birth/Age: 1945 / 79     Fellow:                    years Gender:            M                   Nurse:                 Anni Louis RN Admit Date:        12/5/2024           Technician:            Kenia Singh                                                               CVT Admission Status:  Outpatient          Sonographer:           N/A Height:            180.30 cm           Technologist: Weight:            78.00 kg            Additional Staff: BSA:               1.98 m2             Encounter#:            [id number] BMI:               23.99 kg/m2         Patient Location:      [facility] Study Type:    CARDIOLOGY  INTERPRETATION OF NUCLEAR STRESS Diagnosis/ICD: Atherosclerotic heart disease-I25.10; Unstable angina-I20.0 Indication:    Angina Unstable and H/O CABG/ISCHEMIC CARDIOMYOPATHY CPT Codes:     Stress Test Interpretation-43481; Stress Test Supervision-[id number] Falls Risk: Moderate: Patient has moderate risk for sustaining a fall; a falls prevention plan has been implemented.  Study Details: Correct procedure and correct patient verified verbally.  Patient History: Allergies: Mx allergies; reviewed.  Patient Performance: Patient received a total of 0.4 mg of Regadenoson at 9:32:35 AM. Patient received a total of 36 mCi of Myoview at 9:32:56 AM. The patient did not exercise during infusion. The peak heart rate achieved was 88 bpm, which was 62 % of the age predicted target heart rate of 143 bpm. The resting blood pressure was 158/64 mmHg with a heart rate of 62 bpm. The patient developed STOMACH UPSET during the stress exam. The symptoms resolved with rest 3 minutes into recovery. The blood pressure response was normal. The test was terminated due to: completed lab protocol.  Baseline ECG: Resting ECG showed normal sinus rhythm with normal tracing.  Stress ECG: Stress ECG showed normal sinus rhythm, with OCC PVC'S and no abnormal findings. Since this was a pharmacologic stress test, functional capacity could not be assessed.  Stress Stage Data: +----------------+--+------+-------+----------------------------------+                 HRSys BPDias BPComments                           +----------------+--+------+-------+----------------------------------+ Baseline [id number]   66                                        +----------------+--+------+-------+----------------------------------+ Stage I         03565   60     NO CHEST PAIN. C/O STOMACHE UPSET. +----------------+--+------+-------+----------------------------------+  Recovery ECG: Recovery ECG showed normal sinus rhythm. The heart rate recovery was  normal.  +------------+--+------+-------+-------------------------+             HRSys BPDias BPComments                  +------------+--+------+-------+-------------------------+ Recovery I  [id number]   62     STOMACHE UPSET IMPROVING. +------------+--+------+-------+-------------------------+ Recovery II [id number]   66     NO CHEST PAIN.            +------------+--+------+-------+-------------------------+ Recovery [id number]   68     SYMPTOMS RESOLVED.        +------------+--+------+-------+-------------------------+  Summary:  1. Adequate level of stress achieved.  2. No clinical or electrocardiographic evidence for ischemia at maximal infusion.  3. Normal Stress Test.  4. Nuclear image results are reported separately. Lina Ramey MD Electronically signed on 12/11/2024 at 4:50:11 PM   ** Final **     Transthoracic echo (TTE) complete    Result Date: 12/10/2024            [facility] [id number] [id number]    Tel [phone] Fax [phone] TRANSTHORACIC ECHOCARDIOGRAM REPORT Patient Name:       WAYNE BURKITT       Dianelys Physician:    Lina Ramey MD Study Date:         12/5/2024           Ordering Provider:    Lina RAMEY MRN/PID:            [id number]            Fellow: Accession#:         [id number]        Nurse: Date of Birth/Age:  1945 / 79     Sonographer:          Amy chong Gender Assigned at  M                   Additional Staff: Birth: Height:             180.34 cm           Admit Date: Weight:             78.02 kg            Admission Status:     Outpatient BSA / BMI:          1.98 m2 / 23.99     Department Location:  [facility] Lab                     kg/m2 Blood Pressure: 132 /78 mmHg Study Type:    TRANSTHORACIC ECHO (TTE) COMPLETE Diagnosis/ICD: Unstable  angina-I20.0; Atherosclerotic heart disease of native                coronary artery without angina pectoris-I25.10 Indication:    CAD, Chest Pain CPT Codes:     Echo Complete w Full Doppler-[id number] Patient History: Pertinent History: Cardiomyopathy, CAD and HTN. DANIELA. Study Detail: The following Echo studies were performed: 2D, M-Mode, Doppler and               color flow.  PHYSICIAN INTERPRETATION: Left Ventricle: Left ventricular ejection fraction is normal, by visual estimate at 55-60%. There are no regional wall motion abnormalities. The left ventricular cavity size is normal. There is mild increased septal and normal posterior left ventricular wall thickness. There is no evidence of left ventricular hypertrophy. Spectral Doppler shows a normal pattern of left ventricular diastolic filling. LV Wall Scoring: All segments are normal. Left Atrium: The left atrium is normal in size. Right Ventricle: The right ventricle is normal in size. There is normal right ventricular global systolic function. Right Atrium: The right atrium is normal in size. Aortic Valve: The aortic valve is trileaflet. The aortic valve dimensionless index is 0.83. There is no evidence of aortic valve regurgitation. The peak instantaneous gradient of the aortic valve is 9 mmHg. The mean gradient of the aortic valve is 6 mmHg. Mitral Valve: The mitral valve is normal in structure. There is no evidence of mitral valve regurgitation. Tricuspid Valve: The tricuspid valve is structurally normal. No evidence of tricuspid regurgitation. Pulmonic Valve: The pulmonic valve is structurally normal. There is no indication of pulmonic valve regurgitation. Pericardium: No pericardial effusion noted. Aorta: The aortic root is normal.  CONCLUSIONS:  1. Left ventricular ejection fraction is normal, by visual estimate at 55-60%.  2. There is no evidence of left ventricular hypertrophy.  3. There is normal right ventricular global systolic function.  4. No  signficant valve disease.  5. Normal estimated PA pressure.  6. Normal diastolic filling pattern. QUANTITATIVE DATA SUMMARY:  2D MEASUREMENTS:           Normal Ranges: LAs:             2.90 cm   (2.7-4.0cm) IVSd:            1.10 cm   (0.6-1.1cm) LVPWd:           1.00 cm   (0.6-1.1cm) LVIDd:           4.20 cm   (3.9-5.9cm) LVIDs:           2.80 cm LV Mass Index:   74.3 g/m2 LV % FS          33.3 %  LA VOLUME:                    Normal Ranges: LA Vol A4C:        50.5 ml    (22+/-6mL/m2) LA Vol A2C:        44.0 ml LA Vol BP:         48.8 ml LA Vol Index A4C:  25.6ml/m2 LA Vol Index A2C:  22.3 ml/m2 LA Vol Index BP:   24.7 ml/m2 LA Area A4C:       17.5 cm2 LA Area A2C:       15.8 cm2 LA Major Axis A4C: 5.2 cm LA Major Axis A2C: 4.8 cm LA Volume Index:   22.7 ml/m2 LA Vol A4C:        44.1 ml LA Vol A2C:        42.9 ml LA Vol Index BSA:  22.0 ml/m2  RA VOLUME BY A/L METHOD:            Normal Ranges: RA Vol A4C:              29.0 ml    (8.3-19.5ml) RA Vol Index A4C:        14.7 ml/m2 RA Area A4C:             12.3 cm2 RA Major Axis A4C:       4.4 cm  M-MODE MEASUREMENTS:         Normal Ranges: Ao Root:             3.10 cm (2.0-3.7cm) AoV Exc:             1.90 cm (1.5-2.5cm)  AORTA MEASUREMENTS:         Normal Ranges: AoV Exc:            1.90 cm (1.5-2.5cm) Ao Sinus, d:        3.60 cm (2.1-3.5cm) Ao STJ, d:          3.10 cm (1.7-3.4cm) Asc Ao, d:          3.20 cm (2.1-3.4cm)  LV SYSTOLIC FUNCTION BY 2D PLANIMETRY (MOD):                      Normal Ranges: EF-A4C View:    52 % (>=55%) EF-A2C View:    63 % EF-Biplane:     60 % EF-Visual:      58 % LV EF Reported: 58 %  LV DIASTOLIC FUNCTION:            Normal Ranges: MV Peak E:             0.81 m/s   (0.7-1.2 m/s) MV Peak A:             0.76 m/s   (0.42-0.7 m/s) E/A Ratio:             1.07       (1.0-2.2) MV e'                  0.080 m/s  (>8.0) MV lateral e'          0.10 m/s MV medial e'           0.06 m/s E/e' Ratio:            10.19      (<8.0) PulmV Sys Jake:         37.70  cm/s PulmV Adair Jake:        34.70 cm/s PulmV S/D Jake:         1.10 PulmV A Revs Jake:      24.40 cm/s PulmV A Revs Dur:      66.00 msec  MITRAL VALVE:          Normal Ranges: MV DT:        201 msec (150-240msec)  AORTIC VALVE:                     Normal Ranges: AoV Vmax:                1.54 m/s (<=1.7m/s) AoV Peak P.5 mmHg (<20mmHg) AoV Mean P.0 mmHg (1.7-11.5mmHg) LVOT Max Jake:            1.32 m/s (<=1.1m/s) AoV VTI:                 36.20 cm (18-25cm) LVOT VTI:                30.00 cm LVOT Diameter:           1.80 cm  (1.8-2.4cm) AoV Area, VTI:           2.11 cm2 (2.5-5.5cm2) AoV Area,Vmax:           2.18 cm2 (2.5-4.5cm2) AoV Dimensionless Index: 0.83  RIGHT VENTRICLE: RV Basal 3.30 cm RV Mid   3.60 cm RV Major 6.3 cm TAPSE:   20.4 mm RV s'    0.07 m/s  TRICUSPID VALVE/RVSP:          Normal Ranges: Peak TR Velocity:     1.73 m/s Est. RA Pressure:     3 mmHg RV Syst Pressure:     15 mmHg  (< 30mmHg) IVC Diam:             1.40 cm  PULMONIC VALVE:          Normal Ranges: PV Accel Time:  124 msec (>120ms) PV Max Jake:     1.1 m/s  (0.6-0.9m/s) PV Max P.8 mmHg  Pulmonary Veins: PulmV A Revs Dur: 66.00 msec PulmV A Revs Jake: 24.40 cm/s PulmV Adair Jake:   34.70 cm/s PulmV S/D Jake:    1.10 PulmV Sys Jake:    37.70 cm/s  [id number] Gabino Ramey MD Electronically signed on 12/10/2024 at 9:15:29 AM  Wall Scoring  ** Final **     Nuclear Stress Test    Result Date: 2024  Interpreted By:  Armani Boogie and Hanreck James STUDY: NUCLEAR STRESS TEST;  2024 11:37 am   INDICATION: Signs/Symptoms:chest pain.   ,I25.10 Atherosclerotic heart disease of native coronary artery without angina pectoris,I20.0 Unstable angina (Multi)   COMPARISON: None.   ACCESSION NUMBER(S): [id number]   ORDERING CLINICIAN: GABINO RAMEY   TECHNIQUE: DIVISION OF NUCLEAR MEDICINE PHARMACOLOGIC STRESS MYOCARDIAL PERFUSION SCAN, ONE DAY PROTOCOL   The patient received an intravenous injection of 10.5  mCi of Tc-99m Myoview and resting emission tomographic (SPECT) images of the myocardium were acquired. The patient then received an intravenous infusion of 0.4 mg regadenoson (Lexiscan) followed by an additional injection of 36.0 mCi of Tc-99m Myoview. Stress phase SPECT images of the myocardium were then acquired. These included ECG-gated post-stress and rest images to assess and quantify ventricular function.  Low dose CT was acquired for attenuation correction.   FINDINGS: Decreased radiotracer activity throughout the inferior left ventricular wall on non attenuation corrected images which improves with attenuation correction and demonstrates normal wall motion and thickening in this region is consistent with attenuation artifact. Otherwise both stress and rest studies demonstrate grossly normal perfusion throughout the left ventricle.   The left ventricle is normal in size.   EKG-gated images demonstrate normal LV wall motion with a post-stress LV EF estimated at 64%.   Attenuation correction CT images demonstrate no gross anatomic abnormalities.       No evidence of inducible myocardial ischemia or prior infarction.   Likely diaphragmatic attenuation along the inferior wall.   The left ventricle is normal in size.   Normal LV wall motion with a post-stress LV EF estimated at 64%   I personally reviewed the images/study and I agree with the resident Forest Reid's findings as stated. This study was interpreted at [place], [place].   MACRO: None       Signed by: Armani Boogie 12/5/2024 11:49 AM Dictation workstation:   [id number]    ECG 12 lead (Clinic Performed)    Result Date: 11/25/2024  Atrial paced, LVH with repol, inferior Q waves       Assessment /Plan    Assessment & Plan  3-vessel coronary artery disease    Coronary artery disease involving native coronary artery of native heart without angina pectoris    Angina pectoris, unstable (Multi)    Diastolic  congestive heart failure    Wayne Burkitt is a 79-year-old male with a prior cardiac history of CAD, CABG, hypertension, ICM, exertional angina, vascular dementia, s/p 16 stents, s/p 3 R inguinal hernia repairs, and seizures admitted to the hospital for a left heart cath complicated by a large scrotal hematoma and hg of 5.8. He was admitted to the ICU. Urology was consulted and said ntd emergently, low suspicion for testicular ischemia just from fluid. Cardiology did not want to transfuse immediately because the patient wasn't symptomatic. Dr. Giron requested to give the patient IVF to increase his blood pressure and keep him hemodynamically stable.   His anemia seemed more like dilutional because all of the cell lines were decreased. CT did not show any fluid extravasation or retroperitoneal bleeds. His most recent Hg was 6.7 so he is improving. He's making urine appropriately.  The patient was downgraded to the floor for further observation.     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, no aneurysm noted  -Hgb of 6.7, most likely dilutional anemia, not transfused per cardiology unless he is symptomatic/hemodynamically unstable  -Has Hx of blood transfusions before/during his stent placements  Plan:  -PRN LR's  -Continue to monitor for any changes in pain and any signs of SOB/lightheadedness/hemodynamic instability  -Effient for DVT prophylaxis and for his stents   -Last echo: 12/05/24, LVEF 55-60%  -PT/OT consulted, pending recs    # Seizures  # HTN  - Continue home medications as indicated    Consultants: Urology, cardiology  Diet: Cardiac  mIVF: PRN  Code Status: Full code  DVT Prophylaxis: Effient  Disposition: Pending imaging and PT/OT    This assessment and plan will be discussed with the attending  physician Dr. Grady.    Allie Salinas, DO  Family Medicine, PGY-1    This note may have been transcribed using Dragon voice recognition system and there is a possibility of unintentional typing misprints.  Any information found to be copied from previous providers is done in the best interest of the patient to provide accurate, quality, and continuity of care.       I saw this patient with the above intern. The Assessment and Plan reflect my input. My Edits are included in the above documentation.    This assessment and plan to be discussed with the attending physician.    Nallely Islas, DO  Family Medicine, PGY-2

## 2024-12-21 NOTE — PROGRESS NOTES
Critical Care Daily Progress        Subjective      Patient is a 79 y.o. male admitted on 12/20/2024  8:05 AM with the following indication(s) for ICU care acute blood loss anemia status post heart catheterization.     The patient reports that he feels stronger than yesterday, he reports feeling well, he was able to sleep last night. The patient denies any headaches, dizziness, chest pain.  The patient complains of a sensation of fullness in his abdomen and feels like he needs to have a bowel movement.  On sitting up for the physical exam, the patient denies any dizziness.      Scheduled Medications:   brimonidine, 1 drop, Both Eyes, BID  lactated Ringer's, 1,000 mL, intravenous, Once  lactated Ringer's, 1,000 mL, intravenous, Once  levETIRAcetam, 1,000 mg, oral, BID  pantoprazole, 40 mg, oral, Daily before breakfast  prasugrel, 10 mg, oral, Daily  QUEtiapine, 50 mg, oral, Nightly         Continuous Medications:         PRN Medications:   PRN medications: acetaminophen, traZODone    Objective       Vitals:  Most Recent:  Vitals:    12/21/24 1000   BP: 111/78   Pulse: 60   Resp: 13   Temp:    SpO2: 99%       Physical Exam:   Physical Exam  Constitutional:       Appearance: Normal appearance. He is normal weight.   HENT:      Head: Normocephalic and atraumatic.      Nose: Nose normal. No congestion or rhinorrhea.      Mouth/Throat:      Mouth: Mucous membranes are moist.   Eyes:      Extraocular Movements: Extraocular movements intact.      Conjunctiva/sclera: Conjunctivae normal.   Cardiovascular:      Rate and Rhythm: Normal rate and regular rhythm.      Pulses: Normal pulses.      Heart sounds: Murmur heard.   Pulmonary:      Effort: Pulmonary effort is normal. No respiratory distress.      Breath sounds: No wheezing or rales.   Abdominal:      General: Abdomen is flat.      Palpations: Abdomen is soft.      Tenderness: There is no abdominal tenderness. There is no guarding.   Genitourinary:     Penis: Normal.        Comments: Hematoma measuring 16 cm in diameter on the right scrotum.  Musculoskeletal:         General: No swelling or tenderness.      Cervical back: Normal range of motion and neck supple. No rigidity or tenderness.      Right lower leg: No edema.      Left lower leg: No edema.   Skin:     General: Skin is warm.      Capillary Refill: Capillary refill takes less than 2 seconds.      Coloration: Skin is pale.   Neurological:      General: No focal deficit present.      Mental Status: He is alert.   Psychiatric:         Mood and Affect: Mood normal.         Behavior: Behavior normal.          24hr Min/Max:  Temp  Min: 36.4 °C (97.5 °F)  Max: 36.6 °C (97.9 °F)  Pulse  Min: 60  Max: 99  BP  Min: 77/40  Max: 174/77  Resp  Min: 6  Max: 25  SpO2  Min: 95 %  Max: 100 %    LDA:  Urethral Catheter 10 Fr. (Active)   Placement Date/Time: 12/20/24 (c) 1930   Tube Size (Fr.): 10 Fr.   Number of days: 0       Hemodynamic parameters for last 24 hours:       I/O:    Intake/Output Summary (Last 24 hours) at 12/21/2024 1156  Last data filed at 12/21/2024 1020  Gross per 24 hour   Intake 1038.75 ml   Output 1000 ml   Net 38.75 ml         Lab/Radiology/Diagnostic Review:  Results for orders placed or performed during the hospital encounter of 12/20/24 (from the past 24 hours)   ACTIVATED CLOTTING TIME LOW   Result Value Ref Range    POCT Activated Clotting Time Low Range 284 (H) 83 - 199 sec   ACTIVATED CLOTTING TIME LOW   Result Value Ref Range    POCT Activated Clotting Time Low Range 224 (H) 83 - 199 sec   ACTIVATED CLOTTING TIME LOW   Result Value Ref Range    POCT Activated Clotting Time Low Range 236 (H) 83 - 199 sec   ACTIVATED CLOTTING TIME LOW   Result Value Ref Range    POCT Activated Clotting Time Low Range 207 (H) 83 - 199 sec   ACTIVATED CLOTTING TIME LOW   Result Value Ref Range    POCT Activated Clotting Time Low Range 173 83 - 199 sec   Coagulation Screen   Result Value Ref Range    Protime 13.7 (H) 9.8 - 12.8 seconds     INR 1.2 (H) 0.9 - 1.1    aPTT 35 27 - 38 seconds   Magnesium   Result Value Ref Range    Magnesium 1.76 1.60 - 2.40 mg/dL   Comprehensive metabolic panel   Result Value Ref Range    Glucose 168 (H) 74 - 99 mg/dL    Sodium 138 136 - 145 mmol/L    Potassium 4.8 3.5 - 5.3 mmol/L    Chloride 110 (H) 98 - 107 mmol/L    Bicarbonate 20 (L) 21 - 32 mmol/L    Anion Gap 13 10 - 20 mmol/L    Urea Nitrogen 23 6 - 23 mg/dL    Creatinine 1.00 0.50 - 1.30 mg/dL    eGFR 78 >60 mL/min/1.73m*2    Calcium 7.7 (L) 8.6 - 10.3 mg/dL    Albumin 3.2 (L) 3.4 - 5.0 g/dL    Alkaline Phosphatase 50 33 - 136 U/L    Total Protein 5.5 (L) 6.4 - 8.2 g/dL    AST 35 9 - 39 U/L    Bilirubin, Total 0.4 0.0 - 1.2 mg/dL    ALT 17 10 - 52 U/L   Type and Screen   Result Value Ref Range    ABO TYPE B     Rh TYPE POS     ANTIBODY SCREEN NEG    D-Dimer, Non VTE   Result Value Ref Range    D-Dimer Non VTE, Quant (ng/mL FEU) 1,246 (H) <=500 ng/mL FEU   Fibrinogen   Result Value Ref Range    Fibrinogen 270 200 - 400 mg/dL   Lavender Top   Result Value Ref Range    Extra Tube Hold for add-ons.    SST TOP   Result Value Ref Range    Extra Tube Hold for add-ons.    CBC and Auto Differential   Result Value Ref Range    WBC 8.6 4.4 - 11.3 x10*3/uL    nRBC 0.0 0.0 - 0.0 /100 WBCs    RBC 2.06 (L) 4.50 - 5.90 x10*6/uL    Hemoglobin 6.8 (L) 13.5 - 17.5 g/dL    Hematocrit 22.9 (L) 41.0 - 52.0 %     (H) 80 - 100 fL    MCH 33.0 26.0 - 34.0 pg    MCHC 29.7 (L) 32.0 - 36.0 g/dL    RDW 13.7 11.5 - 14.5 %    Platelets 300 150 - 450 x10*3/uL    Neutrophils % 88.5 40.0 - 80.0 %    Immature Granulocytes %, Automated 0.3 0.0 - 0.9 %    Lymphocytes % 5.8 13.0 - 44.0 %    Monocytes % 4.7 2.0 - 10.0 %    Eosinophils % 0.2 0.0 - 6.0 %    Basophils % 0.5 0.0 - 2.0 %    Neutrophils Absolute 7.61 (H) 1.60 - 5.50 x10*3/uL    Immature Granulocytes Absolute, Automated 0.03 0.00 - 0.50 x10*3/uL    Lymphocytes Absolute 0.50 (L) 0.80 - 3.00 x10*3/uL    Monocytes Absolute 0.40 0.05 -  0.80 x10*3/uL    Eosinophils Absolute 0.02 0.00 - 0.40 x10*3/uL    Basophils Absolute 0.04 0.00 - 0.10 x10*3/uL   Lactate   Result Value Ref Range    Lactate 1.9 0.4 - 2.0 mmol/L   CBC   Result Value Ref Range    WBC 8.2 4.4 - 11.3 x10*3/uL    nRBC 0.0 0.0 - 0.0 /100 WBCs    RBC 1.94 (L) 4.50 - 5.90 x10*6/uL    Hemoglobin 6.4 (LL) 13.5 - 17.5 g/dL    Hematocrit 20.9 (L) 41.0 - 52.0 %     (H) 80 - 100 fL    MCH 33.0 26.0 - 34.0 pg    MCHC 30.6 (L) 32.0 - 36.0 g/dL    RDW 13.8 11.5 - 14.5 %    Platelets 264 150 - 450 x10*3/uL   Renal Function Panel   Result Value Ref Range    Glucose 109 (H) 74 - 99 mg/dL    Sodium 140 136 - 145 mmol/L    Potassium 4.1 3.5 - 5.3 mmol/L    Chloride 110 (H) 98 - 107 mmol/L    Bicarbonate 24 21 - 32 mmol/L    Anion Gap 10 10 - 20 mmol/L    Urea Nitrogen 22 6 - 23 mg/dL    Creatinine 0.94 0.50 - 1.30 mg/dL    eGFR 83 >60 mL/min/1.73m*2    Calcium 8.0 (L) 8.6 - 10.3 mg/dL    Phosphorus 4.0 2.5 - 4.9 mg/dL    Albumin 3.1 (L) 3.4 - 5.0 g/dL   Magnesium   Result Value Ref Range    Magnesium 1.66 1.60 - 2.40 mg/dL   CBC   Result Value Ref Range    WBC 7.1 4.4 - 11.3 x10*3/uL    nRBC 0.0 0.0 - 0.0 /100 WBCs    RBC 1.82 (L) 4.50 - 5.90 x10*6/uL    Hemoglobin 6.0 (LL) 13.5 - 17.5 g/dL    Hematocrit 20.1 (L) 41.0 - 52.0 %     (H) 80 - 100 fL    MCH 33.0 26.0 - 34.0 pg    MCHC 29.9 (L) 32.0 - 36.0 g/dL    RDW 13.8 11.5 - 14.5 %    Platelets 241 150 - 450 x10*3/uL   CBC   Result Value Ref Range    WBC 6.7 4.4 - 11.3 x10*3/uL    nRBC 0.0 0.0 - 0.0 /100 WBCs    RBC 1.78 (L) 4.50 - 5.90 x10*6/uL    Hemoglobin 5.8 (LL) 13.5 - 17.5 g/dL    Hematocrit 19.1 (L) 41.0 - 52.0 %     (H) 80 - 100 fL    MCH 32.6 26.0 - 34.0 pg    MCHC 30.4 (L) 32.0 - 36.0 g/dL    RDW 13.9 11.5 - 14.5 %    Platelets 236 150 - 450 x10*3/uL   Magnesium   Result Value Ref Range    Magnesium 2.43 (H) 1.60 - 2.40 mg/dL   CBC   Result Value Ref Range    WBC 7.1 4.4 - 11.3 x10*3/uL    nRBC 0.0 0.0 - 0.0 /100 WBCs     RBC 1.88 (L) 4.50 - 5.90 x10*6/uL    Hemoglobin 6.2 (LL) 13.5 - 17.5 g/dL    Hematocrit 20.1 (L) 41.0 - 52.0 %     (H) 80 - 100 fL    MCH 33.0 26.0 - 34.0 pg    MCHC 30.8 (L) 32.0 - 36.0 g/dL    RDW 14.1 11.5 - 14.5 %    Platelets 225 150 - 450 x10*3/uL   POCT GLUCOSE   Result Value Ref Range    POCT Glucose 98 74 - 99 mg/dL     Vascular US lower extremity arterial duplex right    Result Date: 12/21/2024            [facility] [address] [place] [address]     Tel [phone] Fax [phone]  Vascular Lab Report  VASC US LOWER EXTREMITY ARTERIAL DUPLEX RIGHT Patient Name:      WAYNE BURKITT Reading Physician:  [id number] Sabina Peterson MD, RPVI Study Date:        12/21/2024            Ordering Provider:  [id number] MICHELLE CUENCA MRN/PID:           [id number]              Fellow: Accession#:        [id number]          Technologist:       Fazal LÓPEZ,                                                              RVT Date of Birth/Age: 1945 / 79 years Technologist 2: Gender:            M                     Encounter#:         [id number] Admission Status:  Inpatient             Location Performed: [facility]  Diagnosis/ICD: 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 CHF.  CONCLUSIONS:  Right Lower Arterial: There is >50% stenosis documented at the external iliac artery. No evidence of hemodynamically significant stenosis found in the right lower extremity. Velocity shift noted at the EIA just meets criteria for > 50% stenosis. Remainder of arteries in the right leg are patent. There is a small right groin hematoma ~ 0.9 x 0.7 x 1.1 cm.  Pseudo Aneurysm: No evidence of pseudo aneurysm noted in the right groin.  Right Pop Aneurysm: There is no evidence of aneurysm noted in the right lower extremity.  Imaging & Doppler Findings:  Right                     Left   PSV                       cm/s         cm/s        CFA 61 cm/s  Profunda Proximal 106 cm/s   SFA Proximal 108 cm/s      SFA Mid 71 cm/s     SFA Distal 59 cm/s      Popliteal 55 cm/s    JAELYN Proximal 27 cm/s       JAELYN Mid 27 cm/s     JAELYN Distal 66 cm/s  Peroneal Proximal 93 cm/s    Peroneal Mid 60 cm/s   Peroneal Distal 125 cm/s   PTA Proximal 99 cm/s       PTA Mid 93 cm/s     PTA Distal  [id number] Sabina Peterson MD, RPVI Electronically signed by [id number] Sabina Peterson MD, RPVI on 12/21/2024 at 11:04:39 AM  ** Final **     Cardiac Catheterization Procedure    Result Date: 12/21/2024   [facility] [place]      [address] [address] Cardiovascular Catheterization Report Patient Name:      WAYNE BURKITT         Performing Physician:  Lina Ramey MD Study Date:        12/20/2024            Verifying Physician:   Lina Ramey MD MRN/PID:           [id number]              Cardiologist/Co-Scrub: Accession#:        [id number]          Ordering Provider:     Lina RAMEY Date of Birth/Age: 1945 / 79 years Cardiologist: Gender:            M                     Fellow: Encounter#:        [id number]            Surgeon:  Study:            Left Heart Cath with Grafts Additional Study: PCI - Percutaneous Coronary Intervention  Indications: WAYNE BURKITT is a 79 year old male who presents with  hypertension, prior coronary artery bypass graft surgery, dyslipidemia and a chest pain assessment of typical angina. Stable angina. Stress test performed: No. CTA performed: NoTamika Doan accessed: No. LVEF Assessed: No. Cardiac arrest: No. Cardiac surgical consult: No. Cardiovascular Instability: No Frailty status of patient entering lab: 6 = Moderately frail.  Procedure Description: After infiltration with 2% Lidocaine, the right femoral artery was cannulated with a modified Seldinger technique. Subsequently a 5 [place] sheath was placed retrograde in the right femoral artery. The arterial sheath was sized up to 6 [place]. Selective coronary catheterization was performed using a 5 Fr catheter(s) exchanged over a guide wire to cannulate the coronary arteries. A 4 tip catheter was used for left coronary injections. Additional catheter(s) used to visualize the coronary arteries were: 3DRC. Multiple injections of contrast were made into the left and right coronary arteries with angiograms recorded in multiple projections. Retrograde left heart catheterizion was accomplished with a 5 Fr. pigtail catheter. A single plane left ventriculogram was recorded in the 30 degree [place] projection. The contrast dose was 10 ml injected at 5 ml/sec. The catheter was then withdrawn across the aortic valve under continuous pressure monitoring and removed.  Coronary Angiography: The coronary circulation is right dominant.  Left Main Coronary Artery: The left main artery gives rise to the LAD and Circumflex. The left main coronary artery mildly diseased.  Left Anterior Descending Coronary Artery Distribution: The Left Anterior Descending artery is a large vessel. There is 100% stenosis in the mid left anterior descending artery. Hemodynamically significant obstruction is noted in this vessel and contains patent previously placed stents. There is 50-70%stenosis in the 1st Diag left anterior descending artery.  Circumflex Coronary Artery  Distribution: The Left Circumflex artery is a large vessel. Hemodynamically significant obstruction is noted in this vessel and contains patent previously placed stents. There is 100% stenosis in the the mid Circumflex artery.  Right Coronary Artery Distribution: Hemodynamically significant obstruction is noted in this vessel and contains patent previously placed stents. There is 90% stenosis in the the mid Right Coronary Artery. The devices advanced to the the mid RCA lesion were: a balloon was inflated for pre-dilation, Resolute [place] 2.5x26 stent was deployed in the lesion a balloon was inflated for post-dilation. Residual stenosis is <10%. PDA right coronary artery showed 100% stenosis. This segment is collaterals.  Coronary Grafts:  LIMA Graft: There is 0% stenosis noted in the left internal mammary graft to the Mid LAD. Saphaneous Vein Graft(s): There is 70% stenosis in the saphenous vein graft to the 2nd Marginal.  Left Ventriculography: The estimated left ventricular ejection fraction is normal at 55%. The left venticular end diastolic pressure measures 15 mm Hg. There is no end diastolic pressure dysfunction of the left ventricle. There is no regional wall motion abnormalities noted. There is normal left ventricular systolic function noted. The left ventricle is normal in size. There is no aortic stenosis noted.  Hemo Personnel: +-----------------------+---------+ Name                   Duty      +-----------------------+---------+ Gabino Giron MD 1 +-----------------------+---------+  Hemodynamic Pressures:  +----+---------------+----------+-------------+--------------+-------+---------+ Site   Date Time   Phase NameSystolic mmHgDiastolic mmHgED mmHgMean mmHg +----+---------------+----------+-------------+--------------+-------+---------+   AO     12/20/2024  [facility]          139            58              87         10:53:17 AM                                                       +----+---------------+----------+-------------+--------------+-------+---------+   AO     12/20/2024  [facility]          157            66             104         10:57:05 AM                                                      +----+---------------+----------+-------------+--------------+-------+---------+   AO     12/20/2024  [facility]          162            56             100         11:03:46 AM                                                      +----+---------------+----------+-------------+--------------+-------+---------+   LV     12/20/2024  [facility]          141             2     16                  11:07:05 AM                                                      +----+---------------+----------+-------------+--------------+-------+---------+   LV     12/20/2024  [facility]          138             2     15                  11:07:14 AM                                                      +----+---------------+----------+-------------+--------------+-------+---------+  LVp     12/20/2024  [facility]          143             1     16                  11:07:55 AM                                                      +----+---------------+----------+-------------+--------------+-------+---------+  AOp     12/20/2024  [facility]          134            53              85         11:08:02 AM                                                      +----+---------------+----------+-------------+--------------+-------+---------+   AO     12/20/2024  [facility]          120            50              77         11:12:51 AM                                                      +----+---------------+----------+-------------+--------------+-------+---------+   AO     12/20/2024  [facility]          125            56              83         11:18:22 AM                                                       +----+---------------+----------+-------------+--------------+-------+---------+   AO     12/20/2024  [facility]           92            44              63         11:21:00 AM                                                      +----+---------------+----------+-------------+--------------+-------+---------+   AO     12/20/2024  [facility]          128            58              85         11:21:55 AM                                                      +----+---------------+----------+-------------+--------------+-------+---------+   AO     12/20/2024  [facility]          122            54              80         11:27:15 AM                                                      +----+---------------+----------+-------------+--------------+-------+---------+  Cardiac Cath Post Procedure Notes: Post Procedure           Triple vessel disease. Diagnosis: Blood Loss:              Estimated blood loss during the procedure was trivial                          mls. Specimens Removed:       Number of specimen(s) removed: none. ____________________________________________________________________________________ CONCLUSIONS:  1. Left Main Coronary Artery: This artery is mildly diseased.  2. Left Anterior Descending Artery: is significantly obstructed and contains patent previously placed stents.  3. Mid LAD Lesion: The percent stenosis is 100%.  4. 1st Diag LAD Lesion: The percent stenosis is 50-70%.  5. Circumflex Coronary Artery: significantly obstructed and contains patent previously placed stents.  6. Mid CX Lesion: The percent stenosis is 100%.  7. Right Coronary Artery: significantly obstructed and contains patent previously placed stents.  8. Mid RCA Lesion: The percent stenosis is 90%.  9. Mid RCA Lesion: pre-dilation, Resolute [place] 2.5x26 post-dilation: <10% residual stenosis. RCA: pre-procedure XENA flow was 3(complete perfusion), post-procedure XENA flow was  3(complete perfusion) and the lesion risk category Non-high/Non-C. 10. PDA RCA Lesion: The percent stenosis is 100%. 11. LIMA to the Mid LAD: Percent stenosis is 0%. 12. SVG to the 2nd Marginal: Percent stenosis is 70%. 13. The Left Ventricular Ejection Fraction is 55%. 14. Left Ventricular End Diastolic Pressure: 15 mm Hg. 15. Aortic Stenosis: None. 16. Left Ventricular End Diastolic Pressure Dysfunction: None. 17. LV: No RWMA. 18. LV: normal left ventricular systolic function. ICD 10 Codes: Angina pectoris, unspecified-I20.9; 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-I25.728  CPT Codes: Left Heart Cath Bypass Graft w ventriculography and coronary angio(LHC)-76968; Stent w angio ather Right Coronary addl branch major Artery (PCI)-87350.  [id number] Gabino Giron MD Performing Physician Electronically signed by [id number] Gabino Giron MD on 12/21/2024 at 9:24:30 AM  ** Final **     CT abdomen pelvis w IV contrast    Result Date: 12/21/2024  Interpreted By:  Jw Bonilla, STUDY: CT ABDOMEN PELVIS W IV CONTRAST;  12/20/2024 8:52 pm   INDICATION: Signs/Symptoms:S/p left heart catheterization complicated by scrotal hematoma, rule out retroperitoneal hematoma..     COMPARISON: None.   ACCESSION NUMBER(S): [id number]   ORDERING CLINICIAN: MICHELLE CUENCA   TECHNIQUE: Contiguous axial images of the abdomen and pelvis were obtained after the intravenous administration of 75 mL Omnipaque 350 contrast. Coronal and sagittal reformatted images were reconstructed from the axial data.   FINDINGS: LOWER CHEST: No acute abnormality.   LIVER: Hepatic steatosis.   BILE DUCTS: No significant intrahepatic or extrahepatic dilatation.   GALLBLADDER: Cholelithiasis without CT evidence of acute cholecystitis.   PANCREAS: No significant abnormality.   SPLEEN: No significant abnormality.   ADRENALS: No significant abnormality.   KIDNEYS, URETERS, BLADDER: Excreted  contrast limits assessment for small nonobstructing calculi. No obstructing calculi identified. No hydronephrosis or hydroureter. Urinary bladder is partially distended with excreted contrast material. A Strange catheter is in place within the bladder. Nondependent air in the bladder likely related to catheter placement.   REPRODUCTIVE ORGANS: Prostate is partially obscured by streak artifact from right hip arthroplasty. Prostatomegaly measuring at least 6.7 cm. Correlate with PSA. Extensive edema and fluid noted within the scrotum compatible with reported scrotal hematoma. Stranding with lesser fluid and edema noted along the bilateral inguinal canals.   GI: No obstruction. No bowel wall thickening or adjacent inflammatory change. Normal appendix.   VESSELS: No aortic aneurysm. Mild aortic calcifications. Metallic streak artifact from right hip prosthesis limits assessment of the femoral artery in the region of the access site. The portal veins and IVC are patent.   PERITONEUM/RETROPERITONEUM: No intraperitoneal free air or fluid. No definite retroperitoneal hematoma. Scrotal edema and edema along the bilateral inguinal canals as described above.   LYMPH NODES: No enlarged lymph nodes.   ABDOMINAL WALL: Subcutaneous fat stranding within the right inguinal region likely related to access site.   OSSEOUS STRUCTURES: Right total hip arthroplasty. No apparent hardware complication. No acute osseous abnormality identified.       1. Extensive scrotal edema and fluid within the scrotum consistent with reported scrotal hematoma. 2. There is mild fluid and fat stranding noted along the bilateral inguinal canals, right-greater-than-left. No appreciable retroperitoneal hematoma. 3. Subcutaneous fat stranding along the right inguinal region likely related to access site. 4. If there is concern for testicular compromise then scrotal ultrasound is recommended for further assessment.   MACRO: None   Signed by: Jw Bonilla  12/21/2024 2:27 AM Dictation workstation:   [id number]        This patient has a urinary catheter   Reason for the urinary catheter remaining today? critically ill patient who need accurate urinary output measurements               Assessment/Plan   Assessment & Plan  3-vessel coronary artery disease    Coronary artery disease involving native coronary artery of native heart without angina pectoris    Angina pectoris, unstable (Multi)    Diastolic congestive heart failure      ASSESSMENT   # acute anemia s/p cath  # hemorrhage into scrotum  # chronic anemia     PLAN       Neuro: no acute issues   - History: seizures  - Home meds: Keppra  - Pain: abdominal pain  - Sedation: none  - Interventions: none  - CAM ICU  -q4 hour neurochecks for the next 24 hours to look for limb ischemia or stroke    Cardiovascular:   - History: CAD, CABG, hypertension, ICM, angina   - Goals:  [MAP> 65, HR ]  - Home meds: Imdur, Ranexa, aspirin, metoprolol, Effient, pravastatin  - Last echo: 12/05/24  1. Left ventricular ejection fraction is normal, by visual estimate at 55-60%.   2. There is no evidence of left ventricular hypertrophy.   3. There is normal right ventricular global systolic function.   4. No signficant valve disease.   5. Normal estimated PA pressure.   6. Normal diastolic filling pattern.  - Interventions: heart catheterization  -Right groin ultrasound does not show pseudoaneurysm or AV fistula.   -begin Effient for DVT prophylaxis and. for his stents       Pulmonary:   - History: none  - Home meds: none  Continuous pulse oximetry   O2 PRN to maintain SpO2 > 94%, wean as tolerate  - Imaging: none  - Interventions: none     -OOB to chair, when appropriate        Gastrointestinal:   No acute issues  -History: none  -Home meds: none  -Diet: Cardiac  -Supplementation: None  -Prophylaxis: not indicated  -Bowel regimen: Miralax  -Last BM:  2 days ago    Renal:   - Daily CMP,Mg,Phos  - History: none  - Home meds: none  -  Douglass with strict I/O   - Fluids: 1 L LR   - Electrolytes: Replete per protocol, goal K>4 Phos >3 Mg >2  Net IO Since Admission: 33.75 mL [24 1156]  Results from last 72 hours   Lab Units 24  0316 24  1738   BUN mg/dL 22 23   CREATININE mg/dL 0.94 1.00         Endocrine:   -no hx of being diabetic  -Follow hypoglycemic protocol    Results from last 7 days   Lab Units 24  0820 24  0316 24  1738   POCT GLUCOSE mg/dL 98  --   --    GLUCOSE mg/dL  --  109* 168*        Hematology:   - Daily CBC  -History: easy bleeding, no diagnosis of hemophilia, had blood transfusions before during his stent placements  -Home meds: none  -pending coag labs, dimer  -CBC shows dilutional anemia, previous CBC before IVF showed the patient at his baseline anemia.  -transfuse if hemodynamically unstable  -CT ab and pelvis negative for retroperitoneal bleeds  -femostop removed, no worsening hematoma  - DVT Prophylaxis: none, actively bleeding  - DVT Prophylaxis: resumed Effient  Results from last 7 days   Lab Units 24  0811 24  0728   HEMOGLOBIN g/dL 6.2* 5.8*   HEMATOCRIT % 20.1* 19.1*   PLATELETS AUTO x10*3/uL 225 236       Infectious Disease:   -No acute issues  Results from last 7 days   Lab Units 24  0811 24  0728   WBC AUTO x10*3/uL 7.1 6.7     Temp (24hrs), Av.4 °C (97.6 °F), Min:36.4 °C (97.5 °F), Max:36.6 °C (97.9 °F)       Musculoskeletal:   -History: Right hip replacement   -Home meds: none  - PT to see   - OT to see     Integumentary:   -History: no external wounds  -Home meds: none      -scrotal hematoma measuring 16 cm in diameter on right today  -urology consulted, reached out via Epic chat, reassured that the testicle is a distensible compartment, unlikely to cause testicular ischemia  -pending US of the scrotum with doppler   -douglass in place, no jeffery blood in the urine.    Lines/Tubes/Drains:   Peripheral IV in place      Code status: Full Code     Dispo:  Patient to remain in ICU    ABCDEF Checklist  Analgesia: Spontaneous awakening trial to be pursued if clinically appropriate. RASS goal reviewed  Breathing: Spontaneous breathing trial to be pursued if clinically appropriate. Mechanical power of assisted ventilation reviewed  Choice of analgesia/sedation: Analgesic and sedative agents adjusted per clinical context.   Delirium assessed by CAM, will avoid exacerbating factors  Early mobility and exercise: Physical and occupational therapy engaged  Family: Plan of care, overall trajectory of patient shared with family. Questions elicited and answered as appropriate.      Due to the high probability of life threatening clinical decompensation, the patient required critical care time evaluating and managing this patient.  Critical care time included obtaining a history, examining the patient, ordering and reviewing studies, discussing, developing, and implementing a management plan, evaluating the patient's response to treatment, and discussion with other care team providers. I saw and evaluated the patient myself.  Critical care time was performed exclusive of billable procedures.  Patient discussed with attending physician.     Catrina Lopez MD   Emergency medicine PGY-1 Resident     Attending Addendum:    I saw and evaluated the patient. I personally obtained the key and critical portions of the history and physical exam or was physically present for key and critical portions performed by the resident/fellow. I reviewed the resident/fellow's documentation and discussed the patient with the resident/fellow. I agree with the resident/fellow's medical decision making as documented in the note.     Critical care time is 35 minutes.

## 2024-12-21 NOTE — PROGRESS NOTES
Patient seen and examined for groin/scrotal hematoma.    During manual compression the patient developed enlarged scrotum with hematoma.  Ultrasound showed femoral artery still with patent neck into the hematoma.   Additionally patient had vagal reaction to manual pressure and syncope.      Transferred to the ICU and femstop placed.  Vital stable.  Discussed with residents.

## 2024-12-21 NOTE — HOSPITAL COURSE
Wayne Burkitt is a 79-year-old male with a prior cardiac history of CAD, CABG, hypertension, ICM, exertional angina, vascular dementia, s/p 16 stents, s/p 3 R inguinal hernia repairs, and seizures. He came to the hospital for a left heart cath. After his cath, he had a large scrotal hematoma and his hg dropped to 5.8. He was admitted to the ICU with a femostop on and for us to do q15 min neurochecks for limb ischemia. Urology was consulted and said ntd emergently, low suspicion for testicular ischemia just from fluid. Cardiology did not want to transfuse immediately because the patient wasn't symptomatic. Dr. Giron requested to give the patient IVF to increase his blood pressure and keep him hemodynamically stable.  He did not want the patient to be transfused blood unless the patient was hemodynamically unstable.  We gave him IVF and his BP remained stable for the most part in the high 90s/40s. When we repeated his CBC in the morning, his anemia seemed more like dilutional because all of the cell lines were decreased. He felt better as well, the patient denies any dizziness on ambulation or pain. CT did not show any fluid extravasation or retroperitoneal bleeds. I did a fast exam last night and didn't see any free fluid. Currently we're pending a scrotal US to be done and read to look for any testicular injury or compartment syndrome to be definitive. His most recent Hg was 6.7 so he is improving. He's making urine appropriately.  The patient was downgraded to the floor for further observation.     Patient had Hg of 6 on the floor and was transfused appropriately. He continue to feel well without any signs of hemodynamic instability.

## 2024-12-21 NOTE — PROGRESS NOTES
"Nutrition Initial Assessment:   Nutrition Assessment    Reason for Assessment: Provider consult order    Patient History: Wayne Burkitt is a 79 y.o. male admitted with acute anemia s/p cath on 12/20/24 and scrotal hematoma.  Urology on consult due to hematoma and concern for compartment syndrome.    Admitted to [facility] in September for worsening hallucinations, dizziness, and increased falls.    Past Medical History: CAD, CABG, HTN, ICM, angina, vascular dementia  Surgical History   has a past surgical history that includes Coronary artery bypass graft (2010); Coronary angioplasty with stent; pacemaker placement; and Total hip arthroplasty.    Nutrition History:  Energy Intake: Good > 75 %  Food and Nutrient History: Pt states good appetite.  Pt seen by RD at [facility] recently and he noted stopping drinking regular pop earlier this year.  He states that he lived in [place] prior to 4 months ago when he moved into an AL facility.  He states his goal is to live on his own outside of the AL facility.  Pt notes that in [place] he would typically eat 2 meals daily and since living at AL facility he has been eating 3 and gained 9lbs.  He likes [employer]BR[employer] and Candido Sanchez and wonders how often he can eat these.   Food Allergies/Intolerances:  None  GI Symptoms: None  Oral Problems: None       Current Diet: Adult diet Cardiac; 70 gm fat; 2 - 3 grams Sodium    Anthropometrics:  Height: 180.3 cm (5' 11\")   Weight: 78.1 kg (172 lb 1.6 oz)   BMI (Calculated): 24.01             Weight Change %:  Significant Weight Gain: Non-fluid related  Wt Readings from Last 10 Encounters:   12/21/24 78.1 kg (172 lb 1.6 oz)   12/12/24 77.1 kg (170 lb)   11/25/24 78 kg (172 lb)   09/28/24 73.9 kg (163 lb)        Pain Assessment: 0-10  0-10 (Numeric) Pain Score: 0 - No pain  Pain Type: Chronic pain  Pain Location: Hip  Pain Orientation: Right      Nutrition Significant Labs:  BMP Trend:   Results from last 7 days   Lab Units 12/21/24  0316 " 12/20/24  1738   GLUCOSE mg/dL 109* 168*   CALCIUM mg/dL 8.0* 7.7*   SODIUM mmol/L 140 138   POTASSIUM mmol/L 4.1 4.8   CO2 mmol/L 24 20*   CHLORIDE mmol/L 110* 110*   BUN mg/dL 22 23   CREATININE mg/dL 0.94 1.00     Nutrition Specific Medications:  Scheduled medications  brimonidine, 1 drop, Both Eyes, BID  lactated Ringer's, 1,000 mL, intravenous, Once  lactated Ringer's, 1,000 mL, intravenous, Once  levETIRAcetam, 1,000 mg, oral, BID  pantoprazole, 40 mg, oral, Daily before breakfast  prasugrel, 10 mg, oral, Daily  QUEtiapine, 50 mg, oral, Nightly      Nutrition Focused Physical Exam Findings:  defer: appears well nourished     Estimated Needs:   Total Energy Estimated Needs (kCal):  (1950-2340kcal or 25-30kcal/kg)  Total Protein Estimated Needs (g):  (78-94g or 1.0-1.2g/kg)  Total Fluid Estimated Needs (mL):  (1ml/kcal or per DO recs)          Nutrition Diagnosis   Malnutrition Diagnosis  Patient has Malnutrition Diagnosis: No    Nutrition Diagnosis  Patient has Nutrition Diagnosis: Yes  Diagnosis Status (1): New  Nutrition Diagnosis 1: Decreased nutrient needs  Related to (1): sodium  As Evidenced by (1): cardiac history with CAD and CABG.       Nutrition Interventions/Recommendations         Nutrition Prescription:  Individualized Nutrition Prescription Provided for : Continue cardiac 70g fat 2-3g sodium diet        Nutrition Interventions:   Food and/or Nutrient Delivery Interventions  Additional Interventions: Briefly discussed simple ways to avoid high sodium content foods and encouraged pt to eat out sparingly and avoid adding extra salt to any foods.    Coordination of Nutrition Care by a Nutrition Professional  Collaboration and Referral of Nutrition Care: Collaboration by nutrition professional with other providers  Goal: Maintain communication with IDT as appropriate       Nutrition Monitoring and Evaluation   Food/Nutrient Related History Monitoring  Monitoring and Evaluation Plan: Fluid intake,  Amount of food  Fluid Intake: Estimated fluid intake  Criteria: Goal to consume >75% of fluids provided  Amount of Food: Estimated amout of food  Criteria: Goal to consume >50% of meals provided    Body Composition/Growth/Weight History  Monitoring and Evaluation Plan: Weight  Weight: Measured weight  Criteria: maintain stable weight    Biochemical Data, Medical Tests and Procedures  Monitoring and Evaluation Plan: Electrolyte/renal panel  Electrolyte and Renal Panel: Sodium, Chloride  Criteria: maintain WNR            Time Spent/Follow-up Reminder:   Time Spent (min): 60 minutes  Last Date of Nutrition Visit: 12/21/24  Nutrition Follow-Up Needed?: 5-7 days  Follow up Comment: BK

## 2024-12-22 ENCOUNTER — APPOINTMENT (OUTPATIENT)
Dept: CARDIOLOGY | Facility: HOSPITAL | Age: 79
DRG: 322 | End: 2024-12-22
Payer: MEDICARE

## 2024-12-22 LAB
ABO GROUP (TYPE) IN BLOOD: NORMAL
ALBUMIN SERPL BCP-MCNC: 3.4 G/DL (ref 3.4–5)
ANION GAP SERPL CALC-SCNC: 10 MMOL/L (ref 10–20)
BLOOD EXPIRATION DATE: NORMAL
BLOOD EXPIRATION DATE: NORMAL
BUN SERPL-MCNC: 25 MG/DL (ref 6–23)
CALCIUM SERPL-MCNC: 8.2 MG/DL (ref 8.6–10.3)
CARDIAC TROPONIN I PNL SERPL HS: 2479 NG/L (ref 0–20)
CHLORIDE SERPL-SCNC: 108 MMOL/L (ref 98–107)
CO2 SERPL-SCNC: 26 MMOL/L (ref 21–32)
CREAT SERPL-MCNC: 1.01 MG/DL (ref 0.5–1.3)
DISPENSE STATUS: NORMAL
DISPENSE STATUS: NORMAL
EGFRCR SERPLBLD CKD-EPI 2021: 77 ML/MIN/1.73M*2
ERYTHROCYTE [DISTWIDTH] IN BLOOD BY AUTOMATED COUNT: 13.9 % (ref 11.5–14.5)
GLUCOSE BLD MANUAL STRIP-MCNC: 103 MG/DL (ref 74–99)
GLUCOSE BLD MANUAL STRIP-MCNC: 115 MG/DL (ref 74–99)
GLUCOSE BLD MANUAL STRIP-MCNC: 118 MG/DL (ref 74–99)
GLUCOSE BLD MANUAL STRIP-MCNC: 119 MG/DL (ref 74–99)
GLUCOSE BLD MANUAL STRIP-MCNC: 120 MG/DL (ref 74–99)
GLUCOSE SERPL-MCNC: 124 MG/DL (ref 74–99)
HCT VFR BLD AUTO: 20.1 % (ref 41–52)
HGB BLD-MCNC: 6 G/DL (ref 13.5–17.5)
MAGNESIUM SERPL-MCNC: 1.81 MG/DL (ref 1.6–2.4)
MCH RBC QN AUTO: 32.4 PG (ref 26–34)
MCHC RBC AUTO-ENTMCNC: 29.9 G/DL (ref 32–36)
MCV RBC AUTO: 109 FL (ref 80–100)
NRBC BLD-RTO: 0 /100 WBCS (ref 0–0)
PHOSPHATE SERPL-MCNC: 2.6 MG/DL (ref 2.5–4.9)
PLATELET # BLD AUTO: 217 X10*3/UL (ref 150–450)
POTASSIUM SERPL-SCNC: 4.1 MMOL/L (ref 3.5–5.3)
PRODUCT BLOOD TYPE: 1700
PRODUCT BLOOD TYPE: 5100
PRODUCT CODE: NORMAL
PRODUCT CODE: NORMAL
RBC # BLD AUTO: 1.85 X10*6/UL (ref 4.5–5.9)
RH FACTOR (ANTIGEN D): NORMAL
SODIUM SERPL-SCNC: 140 MMOL/L (ref 136–145)
UNIT ABO: NORMAL
UNIT ABO: NORMAL
UNIT NUMBER: NORMAL
UNIT NUMBER: NORMAL
UNIT RH: NORMAL
UNIT RH: NORMAL
UNIT VOLUME: 322
UNIT VOLUME: 500
WBC # BLD AUTO: 6.5 X10*3/UL (ref 4.4–11.3)
XM INTEP: NORMAL
XM INTEP: NORMAL

## 2024-12-22 PROCEDURE — 2500000002 HC RX 250 W HCPCS SELF ADMINISTERED DRUGS (ALT 637 FOR MEDICARE OP, ALT 636 FOR OP/ED)

## 2024-12-22 PROCEDURE — 2500000004 HC RX 250 GENERAL PHARMACY W/ HCPCS (ALT 636 FOR OP/ED)

## 2024-12-22 PROCEDURE — P9016 RBC LEUKOCYTES REDUCED: HCPCS

## 2024-12-22 PROCEDURE — 2500000001 HC RX 250 WO HCPCS SELF ADMINISTERED DRUGS (ALT 637 FOR MEDICARE OP)

## 2024-12-22 PROCEDURE — 82947 ASSAY GLUCOSE BLOOD QUANT: CPT

## 2024-12-22 PROCEDURE — 99233 SBSQ HOSP IP/OBS HIGH 50: CPT

## 2024-12-22 PROCEDURE — 2060000001 HC INTERMEDIATE ICU ROOM DAILY

## 2024-12-22 PROCEDURE — 2500000004 HC RX 250 GENERAL PHARMACY W/ HCPCS (ALT 636 FOR OP/ED): Performed by: INTERNAL MEDICINE

## 2024-12-22 PROCEDURE — 85027 COMPLETE CBC AUTOMATED: CPT

## 2024-12-22 PROCEDURE — 80069 RENAL FUNCTION PANEL: CPT

## 2024-12-22 PROCEDURE — 99233 SBSQ HOSP IP/OBS HIGH 50: CPT | Performed by: INTERNAL MEDICINE

## 2024-12-22 PROCEDURE — 36415 COLL VENOUS BLD VENIPUNCTURE: CPT

## 2024-12-22 PROCEDURE — 36430 TRANSFUSION BLD/BLD COMPNT: CPT

## 2024-12-22 PROCEDURE — 83735 ASSAY OF MAGNESIUM: CPT

## 2024-12-22 PROCEDURE — 84484 ASSAY OF TROPONIN QUANT: CPT

## 2024-12-22 PROCEDURE — 93005 ELECTROCARDIOGRAM TRACING: CPT

## 2024-12-22 RX ORDER — NITROGLYCERIN 0.4 MG/1
0.4 TABLET SUBLINGUAL EVERY 5 MIN PRN
Status: DISCONTINUED | OUTPATIENT
Start: 2024-12-22 | End: 2024-12-25 | Stop reason: HOSPADM

## 2024-12-22 RX ORDER — MAGNESIUM SULFATE HEPTAHYDRATE 40 MG/ML
2 INJECTION, SOLUTION INTRAVENOUS ONCE
Status: COMPLETED | OUTPATIENT
Start: 2024-12-22 | End: 2024-12-22

## 2024-12-22 RX ORDER — MAGNESIUM SULFATE 1 G/100ML
1 INJECTION INTRAVENOUS ONCE
Status: DISCONTINUED | OUTPATIENT
Start: 2024-12-22 | End: 2024-12-25 | Stop reason: HOSPADM

## 2024-12-22 RX ORDER — POLYETHYLENE GLYCOL 3350 17 G/17G
17 POWDER, FOR SOLUTION ORAL DAILY
Status: DISCONTINUED | OUTPATIENT
Start: 2024-12-22 | End: 2024-12-25 | Stop reason: HOSPADM

## 2024-12-22 RX ORDER — MAGNESIUM SULFATE HEPTAHYDRATE 40 MG/ML
INJECTION, SOLUTION INTRAVENOUS
Status: COMPLETED
Start: 2024-12-22 | End: 2024-12-22

## 2024-12-22 RX ORDER — NITROGLYCERIN 0.4 MG/1
TABLET SUBLINGUAL
Status: COMPLETED
Start: 2024-12-22 | End: 2024-12-22

## 2024-12-22 RX ADMIN — MAGNESIUM SULFATE HEPTAHYDRATE 2 G: 40 INJECTION, SOLUTION INTRAVENOUS at 18:05

## 2024-12-22 RX ADMIN — POLYETHYLENE GLYCOL 3350 17 G: 17 POWDER, FOR SOLUTION ORAL at 09:24

## 2024-12-22 RX ADMIN — PANTOPRAZOLE SODIUM 40 MG: 40 TABLET, DELAYED RELEASE ORAL at 06:55

## 2024-12-22 RX ADMIN — LEVETIRACETAM 1000 MG: 500 TABLET, FILM COATED ORAL at 09:24

## 2024-12-22 RX ADMIN — BRIMONIDINE TARTRATE 1 DROP: 2 SOLUTION/ DROPS OPHTHALMIC at 20:35

## 2024-12-22 RX ADMIN — NITROGLYCERIN 0.4 MG: 0.4 TABLET SUBLINGUAL at 18:03

## 2024-12-22 RX ADMIN — QUETIAPINE FUMARATE 50 MG: 50 TABLET ORAL at 20:35

## 2024-12-22 RX ADMIN — PRASUGREL 10 MG: 10 TABLET, FILM COATED ORAL at 09:24

## 2024-12-22 RX ADMIN — BRIMONIDINE TARTRATE 1 DROP: 2 SOLUTION/ DROPS OPHTHALMIC at 09:24

## 2024-12-22 RX ADMIN — IRON SUCROSE 100 MG: 20 INJECTION, SOLUTION INTRAVENOUS at 16:53

## 2024-12-22 RX ADMIN — LEVETIRACETAM 1000 MG: 500 TABLET, FILM COATED ORAL at 20:35

## 2024-12-22 RX ADMIN — ACETAMINOPHEN 325MG 650 MG: 325 TABLET ORAL at 04:19

## 2024-12-22 ASSESSMENT — PAIN - FUNCTIONAL ASSESSMENT
PAIN_FUNCTIONAL_ASSESSMENT: 0-10

## 2024-12-22 ASSESSMENT — PAIN DESCRIPTION - LOCATION: LOCATION: HIP

## 2024-12-22 ASSESSMENT — PAIN SCALES - GENERAL
PAINLEVEL_OUTOF10: 0 - NO PAIN
PAINLEVEL_OUTOF10: 2
PAINLEVEL_OUTOF10: 0 - NO PAIN
PAINLEVEL_OUTOF10: 2
PAINLEVEL_OUTOF10: 3
PAINLEVEL_OUTOF10: 3
PAINLEVEL_OUTOF10: 9

## 2024-12-22 ASSESSMENT — PAIN DESCRIPTION - DESCRIPTORS
DESCRIPTORS: HEAVINESS

## 2024-12-22 NOTE — SIGNIFICANT EVENT
Rapid response called for chest pain that started 5 minutes prior to arrival.  At the bedside vitals are heart rate 82, respiratory rate 17, satting 99% on room air with /63.  He reports that the sensation is around his chest more so on the center left, feels like a heaviness, alleviated by breathing slowly, alleviated by nitro, exacerbated by walking, radiating down the left arm, initially severity was 8/10 with improvement to 3/10 after nitroglycerin administration.  On exam male is comfortable appearing, in mild distress, cardiac exam RRR with reproducible chest pain on the left center, lungs CTAB,  exam shows ecchymotic scrotum with Strange in place.    EKG did show normal sinus rhythm, with ventricular rate 76, UT interval 188, QRS duration 138, QTc 479 with pacing present.    Patient was given nitroglycerin tablet 0.4 mg, 2 g mag sulfate and will be administered 2 units PRBC.  Serial troponins were ordered and post blood transfusion CBC.  Rapid response ended with patient stabilized and to stay at same room.    Aleksey Rider D.O.  PGY-2 Internal medicine resident

## 2024-12-22 NOTE — NURSING NOTE
The patient will keep his urinary catheter today due to swelling of the scrotum that encompasses the penis including the head.

## 2024-12-22 NOTE — CARE PLAN
Problem: Pain - Adult  Goal: Verbalizes/displays adequate comfort level or baseline comfort level  Outcome: Progressing     Problem: Safety - Adult  Goal: Free from fall injury  Outcome: Progressing     Problem: Discharge Planning  Goal: Discharge to home or other facility with appropriate resources  Outcome: Progressing     Problem: Chronic Conditions and Co-morbidities  Goal: Patient's chronic conditions and co-morbidity symptoms are monitored and maintained or improved  Outcome: Progressing     Problem: Skin  Goal: Decreased wound size/increased tissue granulation at next dressing change  12/22/2024 1132 by Lissette Frank RN  Outcome: Progressing  12/22/2024 1132 by Lissette Frank RN  Flowsheets (Taken 12/22/2024 1132)  Decreased wound size/increased tissue granulation at next dressing change:   Promote sleep for wound healing   Protective dressings over bony prominences   Utilize specialty bed per algorithm  Goal: Participates in plan/prevention/treatment measures  12/22/2024 1132 by Lissette Frank RN  Outcome: Progressing  12/22/2024 1132 by Lissette Frank RN  Flowsheets (Taken 12/22/2024 1132)  Participates in plan/prevention/treatment measures:   Discuss with provider PT/OT consult   Elevate heels   Increase activity/out of bed for meals  Goal: Prevent/manage excess moisture  12/22/2024 1132 by Lissette Frank RN  Outcome: Progressing  12/22/2024 1132 by Lissette Frank RN  Flowsheets (Taken 12/22/2024 1132)  Prevent/manage excess moisture:   Cleanse incontinence/protect with barrier cream   Monitor for/manage infection if present   Follow provider orders for dressing changes   Use wicking fabric (obtain order)   Moisturize dry skin  Goal: Prevent/minimize sheer/friction injuries  12/22/2024 1132 by Lissette Frank RN  Outcome: Progressing  12/22/2024 1132 by Lissette Frank RN  Flowsheets (Taken 12/22/2024 1132)  Prevent/minimize sheer/friction injuries:   Complete micro-shifts as needed if patient unable.  Adjust patient position to relieve pressure points, not a full turn   Increase activity/out of bed for meals   Use pull sheet   HOB 30 degrees or less   Turn/reposition every 2 hours/use positioning/transfer devices   Utilize specialty bed per algorithm  Goal: Promote/optimize nutrition  12/22/2024 1132 by Lissette Frank RN  Outcome: Progressing  12/22/2024 1132 by Lissette Frank RN  Flowsheets (Taken 12/22/2024 1132)  Promote/optimize nutrition:   Assist with feeding   Monitor/record intake including meals   Consume > 50% meals/supplements   Offer water/supplements/favorite foods   Discuss with provider if NPO > 2 days   Reassess MST if dietician not consulted  Goal: Promote skin healing  12/22/2024 1132 by Lissette Frank RN  Outcome: Progressing  12/22/2024 1132 by Lissette Frank RN  Flowsheets (Taken 12/22/2024 1132)  Promote skin healing:   Assess skin/pad under line(s)/device(s)   Protective dressings over bony prominences   Turn/reposition every 2 hours/use positioning/transfer devices   Ensure correct size (line/device) and apply per  instructions   Rotate device position/do not position patient on device     Problem: Heart Failure  Goal: Improved gas exchange this shift  Outcome: Progressing  Goal: Improved urinary output this shift  Outcome: Progressing  Goal: Reduction in peripheral edema within 24 hours  Outcome: Progressing  Goal: Report improvement of dyspnea/breathlessness this shift  Outcome: Progressing  Goal: Weight from fluid excess reduced over 2-3 days, then stabilize  Outcome: Progressing  Goal: Increase self care and/or family involvement in 24 hours  Outcome: Progressing     Problem: Pain  Goal: Takes deep breaths with improved pain control throughout the shift  Outcome: Progressing  Goal: Turns in bed with improved pain control throughout the shift  Outcome: Progressing  Goal: Walks with improved pain control throughout the shift  Outcome: Progressing  Goal: Performs ADL's with  improved pain control throughout shift  Outcome: Progressing  Goal: Participates in PT with improved pain control throughout the shift  Outcome: Progressing  Goal: Free from opioid side effects throughout the shift  Outcome: Progressing  Goal: Free from acute confusion related to pain meds throughout the shift  Outcome: Progressing

## 2024-12-22 NOTE — NURSING NOTE
Patient c/o 10/10 mid cp radiating to left arm.  Rapid response called, 12 lead ecg completed, Rapid team and Dr Giron to BS. Patient was given 1 SL nitroglycerin with decrease of pain to 3/10.  Patient was given 2 gm MAG, level 1.8.  Patient is having PVC with NSR and intermittent pacing on tele monitor. Decision to transfuse patient with 2 units rbc is now made.  Trop sent to lab.  Awaiting Blood Bank red with blood.

## 2024-12-22 NOTE — PROGRESS NOTES
Subjective Data:  Personally discussed with Dr. Giron.  He has already seen and evaluated the patient.  He will put in his progress note.    Overnight Events:         Objective Data:  Last Recorded Vitals:  Vitals:    12/22/24 1200 12/22/24 1300 12/22/24 1400 12/22/24 1500   BP: 116/58      Pulse: 70 90 66 66   Resp: 21 (!) 39 18 13   Temp: 36.6 °C (97.9 °F)      TempSrc: Temporal      SpO2: 96%      Weight:       Height:           Last Labs:  CBC - 12/22/2024:  3:21 AM  6.5 6.0 217    20.1      CMP - 12/22/2024:  3:21 AM  8.2 5.5 35 --- 0.4   2.6 3.4 17 50      PTT - 12/20/2024:  5:38 PM  1.2   13.7 35     BNP   Date/Time Value Ref Range Status   12/12/2024 10:18  0 - 99 pg/mL Final      Last I/O:  I/O last 3 completed shifts:  In: 1638.8 (21 mL/kg) [P.O.:600; I.V.:38.8 (0.5 mL/kg); IV Piggyback:1000]  Out: 2175 (27.9 mL/kg) [Urine:2175 (0.8 mL/kg/hr)]  Weight: 78.1 kg     Past Cardiology Tests (Last 3 Years):  EKG:  ECG 12 Lead  (Preliminary)      ECG 12 lead (Clinic Performed) 11/25/2024    Echo:  Transthoracic echo (TTE) complete 12/05/2024    Ejection Fractions:  EF   Date/Time Value Ref Range Status   12/05/2024 11:29 AM 58 %      Cath:  Cardiac Catheterization Procedure 12/20/2024    Stress Test:  Nuclear Stress Test 12/05/2024    Cardiac Imaging:  No results found for this or any previous visit from the past 1095 days.      Inpatient Medications:  Scheduled medications   Medication Dose Route Frequency    brimonidine  1 drop Both Eyes BID    iron sucrose  100 mg intravenous Once    levETIRAcetam  1,000 mg oral BID    pantoprazole  40 mg oral Daily before breakfast    polyethylene glycol  17 g oral Daily    prasugrel  10 mg oral Daily    QUEtiapine  50 mg oral Nightly     PRN medications   Medication    acetaminophen    traZODone     Continuous Medications   Medication Dose Last Rate         Peripheral IV 12/20/24 Left;Proximal Forearm (Active)   Site Assessment Clean;Dry;Intact 12/22/24 1100    Dressing Type Transparent 12/22/24 1100   Line Status Flushed;Capped 12/22/24 1100   Dressing Status Clean;Dry 12/22/24 1100   Number of days: 2       Peripheral IV 12/20/24 20 G Proximal;Right Forearm (Active)   Site Assessment Clean;Dry;Intact 12/22/24 1100   Dressing Type Transparent 12/22/24 1100   Line Status Flushed 12/22/24 1100   Dressing Status Clean;Dry 12/22/24 1100   Number of days: 2       Peripheral IV 12/20/24 18 G Distal;Right;Upper;Anterior Arm (Active)   Site Assessment Clean;Dry 12/22/24 1100   Dressing Type Transparent 12/22/24 1100   Line Status Flushed 12/22/24 1100   Dressing Status Clean;Dry 12/22/24 1100   Number of days: 2       Urethral Catheter 10 Fr. (Active)   Output (mL) 700 mL 12/22/24 1400   Number of days: 2       Code Status:  Full Code            Ashley Roque MD

## 2024-12-22 NOTE — SIGNIFICANT EVENT
Called to see patient for chest pain.  Rapid response called.      Patient complained of chest heaviness.  Was mostly relieved by a single nitroglycerin.      EKG was v-paced.  No clear STEMI criteria.    Exam patient mild distress but non-labored breathing, appears comfortable.      Tele is a-v paced, with occasional PCI.      Will transfuse 2 units PRBC.  Checking troponin.  May need nitroglycerin gtt if does not resolve.      Continue to remain in ICU overnight.

## 2024-12-22 NOTE — PROGRESS NOTES
Subjective Data:  Patient denies any chest pain, shortness of breath, lightheadedness or dizziness.  Has not been up.    Overnight Events:    None, Hgb remains stable in the 6 range.  No changes in groin.     Objective Data:  Last Recorded Vitals:  Vitals:    12/22/24 1400 12/22/24 1500 12/22/24 1600 12/22/24 1700   BP:   128/54    Pulse: 66 66 74 76   Resp: 18 13 12 16   Temp:   36.6 °C (97.9 °F)    TempSrc:   Temporal    SpO2:   98%    Weight:       Height:           Last Labs:  CBC - 12/22/2024:  3:21 AM  6.5 6.0 217    20.1      CMP - 12/22/2024:  3:21 AM  8.2 5.5 35 --- 0.4   2.6 3.4 17 50      PTT - 12/20/2024:  5:38 PM  1.2   13.7 35     BNP   Date/Time Value Ref Range Status   12/12/2024 10:18  0 - 99 pg/mL Final      Last I/O:  I/O last 3 completed shifts:  In: 1638.8 (21 mL/kg) [P.O.:600; I.V.:38.8 (0.5 mL/kg); IV Piggyback:1000]  Out: 2175 (27.9 mL/kg) [Urine:2175 (0.8 mL/kg/hr)]  Weight: 78.1 kg     Past Cardiology Tests (Last 3 Years):  EKG:  ECG 12 Lead  (Preliminary)      ECG 12 lead (Clinic Performed) 11/25/2024    Echo:  Transthoracic echo (TTE) complete 12/05/2024    Ejection Fractions:  EF   Date/Time Value Ref Range Status   12/05/2024 11:29 AM 58 %      Cath:  Cardiac Catheterization Procedure 12/20/2024    Stress Test:  Nuclear Stress Test 12/05/2024    Cardiac Imaging:  No results found for this or any previous visit from the past 1095 days.      Inpatient Medications:  Scheduled medications   Medication Dose Route Frequency    brimonidine  1 drop Both Eyes BID    levETIRAcetam  1,000 mg oral BID    pantoprazole  40 mg oral Daily before breakfast    polyethylene glycol  17 g oral Daily    prasugrel  10 mg oral Daily    QUEtiapine  50 mg oral Nightly     PRN medications   Medication    acetaminophen    traZODone     Continuous Medications   Medication Dose Last Rate       Physical Exam:  NAD  RRR  CTA  Soft  Scrotom soft, ecchymotic.  Strange in place.  Penis still able to be visualized in  spite of swelling.  Groin soft, no hematoma, no thrill, no bruit.  Nonfocal     Assessment/Plan   Patient with significant groin bleed post PCI.  Stable hemoglobin.  Literature shows patient with transfusion do worse in outcomes after PCI, have held off on blood.  Would not recommend transfusion simply for PT eval.  Needs to have clinical indications.  Will continue to monitor.  If patient has ischemic symptoms, or is orthostatic or dyspneic, will consider.  Needs PT eval, will discuss tomorrow.  Peripheral IV 12/20/24 Left;Proximal Forearm (Active)   Site Assessment Clean;Dry;Intact 12/22/24 1100   Dressing Type Transparent 12/22/24 1100   Line Status Flushed;Capped 12/22/24 1100   Dressing Status Clean;Dry 12/22/24 1100   Number of days: 2       Peripheral IV 12/20/24 20 G Proximal;Right Forearm (Active)   Site Assessment Clean;Dry;Intact 12/22/24 1100   Dressing Type Transparent 12/22/24 1100   Line Status Flushed 12/22/24 1100   Dressing Status Clean;Dry 12/22/24 1100   Number of days: 2       Peripheral IV 12/20/24 18 G Distal;Right;Upper;Anterior Arm (Active)   Site Assessment Clean;Dry 12/22/24 1100   Dressing Type Transparent 12/22/24 1100   Line Status Flushed 12/22/24 1100   Dressing Status Clean;Dry 12/22/24 1100   Number of days: 2       Urethral Catheter 10 Fr. (Active)   Site Assessment Clean;Skin intact 12/22/24 1400   Collection Container Standard drainage bag 12/22/24 1400   Securement Method Securing device (Describe) 12/22/24 1400   Reason for Continuing Urinary Catheterization surgical procedures: urological/gynecological, pelvic oncology, anal, prolonged surgical procedure 12/22/24 1400   Output (mL) 700 mL 12/22/24 1400   Number of days: 2       Code Status:  Full Code    I spent 45 minutes in the professional and overall care of this patient.        Gabino Giron MD

## 2024-12-23 VITALS
WEIGHT: 172.1 LBS | BODY MASS INDEX: 24.09 KG/M2 | SYSTOLIC BLOOD PRESSURE: 106 MMHG | HEIGHT: 71 IN | RESPIRATION RATE: 15 BRPM | HEART RATE: 60 BPM | OXYGEN SATURATION: 97 % | TEMPERATURE: 98.6 F | DIASTOLIC BLOOD PRESSURE: 53 MMHG

## 2024-12-23 LAB
ALBUMIN SERPL BCP-MCNC: 3.5 G/DL (ref 3.4–5)
ANION GAP SERPL CALC-SCNC: 10 MMOL/L (ref 10–20)
BLOOD EXPIRATION DATE: NORMAL
BLOOD EXPIRATION DATE: NORMAL
BUN SERPL-MCNC: 18 MG/DL (ref 6–23)
CALCIUM SERPL-MCNC: 8.1 MG/DL (ref 8.6–10.3)
CARDIAC TROPONIN I PNL SERPL HS: 3116 NG/L (ref 0–20)
CHLORIDE SERPL-SCNC: 108 MMOL/L (ref 98–107)
CO2 SERPL-SCNC: 27 MMOL/L (ref 21–32)
CREAT SERPL-MCNC: 0.99 MG/DL (ref 0.5–1.3)
DISPENSE STATUS: NORMAL
DISPENSE STATUS: NORMAL
EGFRCR SERPLBLD CKD-EPI 2021: 78 ML/MIN/1.73M*2
ERYTHROCYTE [DISTWIDTH] IN BLOOD BY AUTOMATED COUNT: 18.7 % (ref 11.5–14.5)
ERYTHROCYTE [DISTWIDTH] IN BLOOD BY AUTOMATED COUNT: 18.8 % (ref 11.5–14.5)
GLUCOSE BLD MANUAL STRIP-MCNC: 104 MG/DL (ref 74–99)
GLUCOSE BLD MANUAL STRIP-MCNC: 109 MG/DL (ref 74–99)
GLUCOSE SERPL-MCNC: 105 MG/DL (ref 74–99)
HCT VFR BLD AUTO: 28.6 % (ref 41–52)
HCT VFR BLD AUTO: 29 % (ref 41–52)
HGB BLD-MCNC: 9 G/DL (ref 13.5–17.5)
HGB BLD-MCNC: 9.3 G/DL (ref 13.5–17.5)
MAGNESIUM SERPL-MCNC: 1.9 MG/DL (ref 1.6–2.4)
MCH RBC QN AUTO: 31.2 PG (ref 26–34)
MCH RBC QN AUTO: 31.5 PG (ref 26–34)
MCHC RBC AUTO-ENTMCNC: 31.5 G/DL (ref 32–36)
MCHC RBC AUTO-ENTMCNC: 32.1 G/DL (ref 32–36)
MCV RBC AUTO: 100 FL (ref 80–100)
MCV RBC AUTO: 97 FL (ref 80–100)
NRBC BLD-RTO: 0 /100 WBCS (ref 0–0)
NRBC BLD-RTO: 0 /100 WBCS (ref 0–0)
PHOSPHATE SERPL-MCNC: 3 MG/DL (ref 2.5–4.9)
PLATELET # BLD AUTO: 196 X10*3/UL (ref 150–450)
PLATELET # BLD AUTO: 198 X10*3/UL (ref 150–450)
POTASSIUM SERPL-SCNC: 4.1 MMOL/L (ref 3.5–5.3)
PRODUCT BLOOD TYPE: 1700
PRODUCT BLOOD TYPE: 5100
PRODUCT CODE: NORMAL
PRODUCT CODE: NORMAL
RBC # BLD AUTO: 2.86 X10*6/UL (ref 4.5–5.9)
RBC # BLD AUTO: 2.98 X10*6/UL (ref 4.5–5.9)
SODIUM SERPL-SCNC: 141 MMOL/L (ref 136–145)
UNIT ABO: NORMAL
UNIT ABO: NORMAL
UNIT NUMBER: NORMAL
UNIT NUMBER: NORMAL
UNIT RH: NORMAL
UNIT RH: NORMAL
UNIT VOLUME: 322
UNIT VOLUME: 500
WBC # BLD AUTO: 5.8 X10*3/UL (ref 4.4–11.3)
WBC # BLD AUTO: 6 X10*3/UL (ref 4.4–11.3)
XM INTEP: NORMAL
XM INTEP: NORMAL

## 2024-12-23 PROCEDURE — 99221 1ST HOSP IP/OBS SF/LOW 40: CPT | Performed by: UROLOGY

## 2024-12-23 PROCEDURE — 99232 SBSQ HOSP IP/OBS MODERATE 35: CPT | Performed by: INTERNAL MEDICINE

## 2024-12-23 PROCEDURE — 84484 ASSAY OF TROPONIN QUANT: CPT

## 2024-12-23 PROCEDURE — 36415 COLL VENOUS BLD VENIPUNCTURE: CPT

## 2024-12-23 PROCEDURE — 85027 COMPLETE CBC AUTOMATED: CPT

## 2024-12-23 PROCEDURE — 2500000002 HC RX 250 W HCPCS SELF ADMINISTERED DRUGS (ALT 637 FOR MEDICARE OP, ALT 636 FOR OP/ED)

## 2024-12-23 PROCEDURE — 80069 RENAL FUNCTION PANEL: CPT

## 2024-12-23 PROCEDURE — 82947 ASSAY GLUCOSE BLOOD QUANT: CPT

## 2024-12-23 PROCEDURE — 83735 ASSAY OF MAGNESIUM: CPT

## 2024-12-23 PROCEDURE — 2060000001 HC INTERMEDIATE ICU ROOM DAILY

## 2024-12-23 PROCEDURE — 97112 NEUROMUSCULAR REEDUCATION: CPT | Mod: GO

## 2024-12-23 PROCEDURE — 97110 THERAPEUTIC EXERCISES: CPT | Mod: GP

## 2024-12-23 PROCEDURE — 97165 OT EVAL LOW COMPLEX 30 MIN: CPT | Mod: GO

## 2024-12-23 PROCEDURE — 2500000001 HC RX 250 WO HCPCS SELF ADMINISTERED DRUGS (ALT 637 FOR MEDICARE OP)

## 2024-12-23 PROCEDURE — 97162 PT EVAL MOD COMPLEX 30 MIN: CPT | Mod: GP

## 2024-12-23 RX ADMIN — PANTOPRAZOLE SODIUM 40 MG: 40 TABLET, DELAYED RELEASE ORAL at 06:52

## 2024-12-23 RX ADMIN — QUETIAPINE FUMARATE 50 MG: 50 TABLET ORAL at 21:40

## 2024-12-23 RX ADMIN — BRIMONIDINE TARTRATE 1 DROP: 2 SOLUTION/ DROPS OPHTHALMIC at 09:55

## 2024-12-23 RX ADMIN — ACETAMINOPHEN 325MG 650 MG: 325 TABLET ORAL at 10:13

## 2024-12-23 RX ADMIN — LEVETIRACETAM 1000 MG: 500 TABLET, FILM COATED ORAL at 21:40

## 2024-12-23 RX ADMIN — BRIMONIDINE TARTRATE 1 DROP: 2 SOLUTION/ DROPS OPHTHALMIC at 21:40

## 2024-12-23 RX ADMIN — PRASUGREL 10 MG: 10 TABLET, FILM COATED ORAL at 09:55

## 2024-12-23 RX ADMIN — LEVETIRACETAM 1000 MG: 500 TABLET, FILM COATED ORAL at 09:48

## 2024-12-23 ASSESSMENT — COGNITIVE AND FUNCTIONAL STATUS - GENERAL
STANDING UP FROM CHAIR USING ARMS: TOTAL
TURNING FROM BACK TO SIDE WHILE IN FLAT BAD: TOTAL
CLIMB 3 TO 5 STEPS WITH RAILING: A LITTLE
DAILY ACTIVITIY SCORE: 14
DRESSING REGULAR UPPER BODY CLOTHING: A LITTLE
MOVING TO AND FROM BED TO CHAIR: A LITTLE
MOVING TO AND FROM BED TO CHAIR: TOTAL
WALKING IN HOSPITAL ROOM: TOTAL
TOILETING: A LOT
MOVING TO AND FROM BED TO CHAIR: A LITTLE
DAILY ACTIVITIY SCORE: 23
STANDING UP FROM CHAIR USING ARMS: A LITTLE
EATING MEALS: A LITTLE
DRESSING REGULAR UPPER BODY CLOTHING: A LITTLE
HELP NEEDED FOR BATHING: A LOT
DRESSING REGULAR UPPER BODY CLOTHING: A LOT
CLIMB 3 TO 5 STEPS WITH RAILING: A LITTLE
CLIMB 3 TO 5 STEPS WITH RAILING: TOTAL
PERSONAL GROOMING: A LITTLE
DAILY ACTIVITIY SCORE: 23
STANDING UP FROM CHAIR USING ARMS: A LITTLE
WALKING IN HOSPITAL ROOM: A LITTLE
MOBILITY SCORE: 7
MOBILITY SCORE: 20
DRESSING REGULAR LOWER BODY CLOTHING: A LOT
WALKING IN HOSPITAL ROOM: A LITTLE
MOVING FROM LYING ON BACK TO SITTING ON SIDE OF FLAT BED WITH BEDRAILS: A LOT
MOBILITY SCORE: 20

## 2024-12-23 ASSESSMENT — PAIN - FUNCTIONAL ASSESSMENT
PAIN_FUNCTIONAL_ASSESSMENT: 0-10

## 2024-12-23 ASSESSMENT — ACTIVITIES OF DAILY LIVING (ADL)
LACK_OF_TRANSPORTATION: NO
BATHING_ASSISTANCE: MINIMAL

## 2024-12-23 ASSESSMENT — PAIN SCALES - GENERAL
PAINLEVEL_OUTOF10: 3
PAINLEVEL_OUTOF10: 0 - NO PAIN

## 2024-12-23 ASSESSMENT — PAIN DESCRIPTION - DESCRIPTORS: DESCRIPTORS: ACHING

## 2024-12-23 NOTE — PROGRESS NOTES
Occupational Therapy    Occupational Therapy    Evaluation    Patient Name: Wayne Burkitt  MRN: [id number]  Today's Date: 12/23/2024  Time Calculation  Start Time: 1237  Stop Time: 1309  Time Calculation (min): 32 min  2111/2111-A    Assessment  IP OT Assessment  OT Assessment: Pt pleasantly confused. Pt with decrease safety and independence when completing ADL's and mobility. Recommend SNF to increase safety and independence with ADL's  Prognosis: Fair  Barriers to Discharge Home: Cognition needs  End of Session Communication: Bedside nurse  End of Session Patient Position: Bed, 3 rail up, Alarm on    Plan:  Treatment Interventions: ADL retraining, Functional transfer training, UE strengthening/ROM, Endurance training, Neuromuscular reeducation  OT Frequency: 3 times per week  OT Discharge Recommendations: Moderate intensity level of continued care (SNF)  Equipment Recommended upon Discharge: Wheelchair  OT Recommended Transfer Status: Maximum assist, Assist of 2  OT - OK to Discharge: Yes (to next level of care)    Subjective     Current Problem:  1. Coronary artery disease involving native coronary artery of native heart without angina pectoris  Cardiac Catheterization Procedure    Cardiac Catheterization Procedure      2. Angina pectoris, unstable (Multi)  Cardiac Catheterization Procedure    Cardiac Catheterization Procedure      3. Diastolic congestive heart failure, unspecified HF chronicity  Cardiac Catheterization Procedure    Cardiac Catheterization Procedure      4. Episodes of formed visual hallucinations  Vascular US lower extremity arterial duplex right    Vascular US lower extremity arterial duplex right      5. Frequent falls  Vascular US lower extremity arterial duplex right    Vascular US lower extremity arterial duplex right      6. Intraoperative hemorrhage and hematoma of eye and adnexa complicating an ophthalmic procedure, bilateral  Vascular US lower extremity arterial duplex right    Vascular  US lower extremity arterial duplex right      7. 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 Angina pectoris, unspecified  Cardiac Catheterization Procedure      9. 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 male admitted on 12/20/2024  8:05 AM to the ICU for postprocedure active bleeding into the scrotum.     HPI:  The patient is a 79-year-old male with a prior cardiac history of CAD, CABG, hypertension, ICM, exertional angina, vascular dementia, s/p 16 stents, s/p 3 R inguinal hernia repairs, and seizures. He came to the hospital for a left heart cath with LV.  Patient has been complaining of central and L sided chest pain for the past 6 months. He reports SOB on exertion and feels better after rest. He denies any recent URI or UTI. He complains of some abdominal pain that he attributes to gas. The patient denies constipation or diarrhea. He has  a history of 16 stents and easy bleeding. He was not diagnosed with hemophilia. His family history is significant for maternal history of CAD.    Precautions:  Medical Precautions: Fall precautions  Precautions Comment: bed/chair alarm    Pain:  Pain Assessment  Pain Assessment: 0-10  0-10 (Numeric) Pain Score:  (Pt with complaints of R hip and scrotal pain with bed mobility; pt did not rate pain)    Objective     Cognition:  Overall Cognitive Status: Impaired  Orientation Level: Disoriented to situation, Disoriented to place (Pt with  confusion and a poor historian.)  Impulsive:  (Pt impulsive with decreased safety)    Home Living:  Per EMR, pt has resided in [facility] since 9/2024. Pt talked of living in [facility] and then staying with friend, NOÉ, in [place].     Prior Function:  Pt was able to clarify prior level of function.     ADL:  Eating Assistance: Stand by  Grooming Assistance: Minimal  Bathing Assistance: Minimal  UE Dressing Assistance: Minimal  LE Dressing Assistance: Minimal  Toileting Assistance with Device: Minimal    Activity Tolerance:  Endurance: Tolerates less than 10 min exercise with changes in vital signs    Bed Mobility/Transfers:   Supine-sit: Max A of 2  Sit-supine: Max A of 2  Sit-stand: not attempted; significant scrotal pain     Pt with significant discomfort and was not able to attempt standing      Sitting Balance:  Static Sitting Balance  Static Sitting-Comment/Number of Minutes: fair  Dynamic Sitting Balance  Dynamic Sitting-Comments: fair- (Pt with scrotal pain, and not sitting still on EOB)    Strength:  Strength Comments: decreased strength B UE's      Extremities: RUE   RUE :  (limited AROM shoulder flexion (about 90 degrees)) and LUE   LUE: Within Functional Limits    Outcome Measures: [facility] Daily Activity  Putting on and taking off regular lower body clothing: A lot  Bathing (including washing, rinsing, drying): A lot  Putting on and taking off regular upper body clothing: A lot  Toileting, which includes using toilet, bedpan or urinal: A lot  Taking care of personal grooming such as brushing teeth: A little  Eating Meals: A little  Daily Activity - Total Score: 14                       EDUCATION:  Education  Individual(s) Educated: Patient  Education Provided:  (safety)  Education Documentation  Body Mechanics, taught by Hermelinda Vega OT at 12/23/2024  4:44 PM.  Learner: Patient  Readiness: Acceptance  Method: Explanation  Response: Verbalizes Understanding, Needs  Reinforcement    Precautions, taught by Hermelinda Vega OT at 12/23/2024  4:44 PM.  Learner: Patient  Readiness: Acceptance  Method: Explanation  Response: Verbalizes Understanding, Needs Reinforcement    Education Comments  No comments found.        Goals:   Encounter Problems       Encounter Problems (Active)       OT Goals       OT Goal 1 (Progressing)       Start:  12/23/24    Expected End:  01/06/25       Pt with complete all transfers safely with supervision            OT Goal 2 (Progressing)       Start:  12/23/24    Expected End:  01/06/25       Pt will complete LB dressing with SBA using adaptive device as needed           OT Goal 3 (Progressing)       Start:  12/23/24    Expected End:  01/06/25       Pt will complete grooming ADL's with SBA and fair+ stand balance                  Treatment: Pt required Max A of 2 to attempt supine-sit and to scoot hips to EOB. Pt with increased scrotal discomfort once sitting and flailing around in sitting. Pt was not able to tolerate the pain, and required Max A of 2 to return to supine in bed. Pt remained in bed with bed alarm on and call light within reach.

## 2024-12-23 NOTE — CARE PLAN
Problem: Pain - Adult  Goal: Verbalizes/displays adequate comfort level or baseline comfort level  Outcome: Progressing     Problem: Safety - Adult  Goal: Free from fall injury  Outcome: Progressing     Problem: Discharge Planning  Goal: Discharge to home or other facility with appropriate resources  Outcome: Progressing   The patient's goals for the shift include      The clinical goals for the shift include Pt will remain HDS throughout shift

## 2024-12-23 NOTE — NURSING NOTE
Dr Parada to examine patient.  Instilled 100cc ns into bladder then discontinued douglass catheter.  Patient stood and attempted to void per urinal without results.  Will try later.

## 2024-12-23 NOTE — PROGRESS NOTES
"Wayne Burkitt is a 79 y.o. male on day 3 of admission presenting with 3-vessel coronary artery disease.    Subjective   This is a 79-year-old male patient is admitted to the CCU as a transfer from the ICU following PCI and drug-eluting stent placement to mid RCA.  Patient had a hematoma with scrotal edema and hematoma secondary to bleeding from the right femoral.  Postoperative evaluation showed no evidence of pseudoaneurysm.  Patient had a 10 Italian indwelling Strange catheter for accurate intake output during his ICU stay.  Urology was consulted today for catheter management.  Patient clinically denies history of voiding dysfunction.  He is not taking any alpha blockers.  He reports voiding 5 times during the day and none at night.  He reports to have a good stream.       Objective     Physical Exam  Constitutional:       Appearance: Normal appearance.   Eyes:      Extraocular Movements: Extraocular movements intact.      Pupils: Pupils are equal, round, and reactive to light.   Cardiovascular:      Rate and Rhythm: Normal rate and regular rhythm.   Pulmonary:      Effort: Pulmonary effort is normal.      Breath sounds: Normal breath sounds.   Abdominal:      General: Abdomen is flat.      Palpations: Abdomen is soft.   Genitourinary:     Penis: Normal.       Comments: There is evidence of bluish discoloration of the scrotum with no evidence of hematoma.  The penile shaft is buried over the soft tissue swelling however retraction of the tissues allowed visualization of the penile urethral meatus.  Neurological:      Mental Status: He is alert.         Last Recorded Vitals  Blood pressure 105/53, pulse 60, temperature 36.2 °C (97.2 °F), temperature source Temporal, resp. rate 18, height 1.803 m (5' 11\"), weight 78.1 kg (172 lb 1.6 oz), SpO2 100%.  Intake/Output last 3 Shifts:  I/O last 3 completed shifts:  In: 1093.7 (14 mL/kg) [P.O.:480; Blood:613.7]  Out: 2475 (31.7 mL/kg) [Urine:2475 (0.9 mL/kg/hr)]  Weight: 78.1 " kg     Relevant Results  Scheduled medications  brimonidine, 1 drop, Both Eyes, BID  levETIRAcetam, 1,000 mg, oral, BID  magnesium sulfate, 1 g, intravenous, Once  pantoprazole, 40 mg, oral, Daily before breakfast  polyethylene glycol, 17 g, oral, Daily  prasugrel, 10 mg, oral, Daily  QUEtiapine, 50 mg, oral, Nightly      Continuous medications     PRN medications  PRN medications: acetaminophen, nitroglycerin, traZODone    Results for orders placed or performed during the hospital encounter of 12/20/24 (from the past 24 hours)   Electrocardiogram, 12-lead PRN ACS symptoms   Result Value Ref Range    Ventricular Rate 76 BPM    Atrial Rate 76 BPM    AR Interval 188 ms    QRS Duration 138 ms    QT Interval 426 ms    QTC Calculation(Bazett) 479 ms    P Axis 33 degrees    R Axis -9 degrees    T Axis 146 degrees    QRS Count 12 beats    Q Onset 217 ms    P Onset 123 ms    P Offset 185 ms    T Offset 430 ms    QTC Fredericia 460 ms   Prepare RBC: 2 Units   Result Value Ref Range    PRODUCT CODE R6869H70     Unit Number [id number]     Unit ABO B     Unit RH NEG     XM INTEP COMP     Dispense Status TR     Blood Expiration Date 1/18/2025 11:59:00 PM EST     PRODUCT BLOOD TYPE 1700     UNIT VOLUME 322     PRODUCT CODE B0216U52     Unit Number [id number]     Unit ABO O     Unit RH POS     XM INTEP COMP     Dispense Status TR     Blood Expiration Date 1/8/2025 11:59:00 PM EST     PRODUCT BLOOD TYPE 5100     UNIT VOLUME 500    Troponin I, High Sensitivity, Initial   Result Value Ref Range    Troponin I, High Sensitivity 2,479 (HH) 0 - 20 ng/L   POCT GLUCOSE   Result Value Ref Range    POCT Glucose 118 (H) 74 - 99 mg/dL   POCT GLUCOSE   Result Value Ref Range    POCT Glucose 119 (H) 74 - 99 mg/dL   Troponin, High Sensitivity, 1 Hour   Result Value Ref Range    Troponin I, High Sensitivity 3,116 (HH) 0 - 20 ng/L   CBC   Result Value Ref Range    WBC 5.8 4.4 - 11.3 x10*3/uL    nRBC 0.0 0.0 - 0.0 /100 WBCs    RBC 2.86 (L)  4.50 - 5.90 x10*6/uL    Hemoglobin 9.0 (L) 13.5 - 17.5 g/dL    Hematocrit 28.6 (L) 41.0 - 52.0 %     80 - 100 fL    MCH 31.5 26.0 - 34.0 pg    MCHC 31.5 (L) 32.0 - 36.0 g/dL    RDW 18.7 (H) 11.5 - 14.5 %    Platelets 198 150 - 450 x10*3/uL   POCT GLUCOSE   Result Value Ref Range    POCT Glucose 104 (H) 74 - 99 mg/dL   CBC   Result Value Ref Range    WBC 6.0 4.4 - 11.3 x10*3/uL    nRBC 0.0 0.0 - 0.0 /100 WBCs    RBC 2.98 (L) 4.50 - 5.90 x10*6/uL    Hemoglobin 9.3 (L) 13.5 - 17.5 g/dL    Hematocrit 29.0 (L) 41.0 - 52.0 %    MCV 97 80 - 100 fL    MCH 31.2 26.0 - 34.0 pg    MCHC 32.1 32.0 - 36.0 g/dL    RDW 18.8 (H) 11.5 - 14.5 %    Platelets 196 150 - 450 x10*3/uL   Magnesium   Result Value Ref Range    Magnesium 1.90 1.60 - 2.40 mg/dL   Renal Function Panel   Result Value Ref Range    Glucose 105 (H) 74 - 99 mg/dL    Sodium 141 136 - 145 mmol/L    Potassium 4.1 3.5 - 5.3 mmol/L    Chloride 108 (H) 98 - 107 mmol/L    Bicarbonate 27 21 - 32 mmol/L    Anion Gap 10 10 - 20 mmol/L    Urea Nitrogen 18 6 - 23 mg/dL    Creatinine 0.99 0.50 - 1.30 mg/dL    eGFR 78 >60 mL/min/1.73m*2    Calcium 8.1 (L) 8.6 - 10.3 mg/dL    Phosphorus 3.0 2.5 - 4.9 mg/dL    Albumin 3.5 3.4 - 5.0 g/dL   POCT GLUCOSE   Result Value Ref Range    POCT Glucose 109 (H) 74 - 99 mg/dL       Electrocardiogram, 12-lead PRN ACS symptoms    Result Date: 12/23/2024  Normal sinus rhythm with sinus arrhythmia Left bundle branch block Abnormal ECG When compared with ECG of 20-DEC-2024 11:58, (unconfirmed) Sinus rhythm has replaced Electronic ventricular pacemaker    US scrotum    Result Date: 12/21/2024  Interpreted By:  Kendall Bernabe, STUDY: US SCROTUM;  12/21/2024 11:45 am   INDICATION: Signs/Symptoms:Scrotal hematoma measuring 18 cm in diameter..     COMPARISON: CT abdomen pelvis 12/20/2024   ACCESSION NUMBER(S): [id number]   ORDERING CLINICIAN: MICHELLE CUENCA   TECHNIQUE: Multiple ultrasonographic images of scrotum and tested were obtained.    FINDINGS: RIGHT HEMISCROTUM:   RIGHT TESTICLE: The right testicle measures 1.4 cm x 1.5 cmx 2.5 cm. The right testicle demonstrates a homogeneous echotexture and normal contour. Normal vascularity and Doppler waveforms are observed in the right testicle. There is significant scrotal wall thickening and mild measuring up to 1.4 cm on the right.   RIGHT EPIDIDYMIS: The right epididymal head measures 0.6 cm x 0.5 cm x 0.7 cm. Tiny epididymal calcifications measuring up to 4 mm.   LEFT HEMISCROTUM:   LEFT TESTICLE: The left testicle measures 2.0 cm x 2.3 cm x 3.6 cm. The left testicle demonstrates a homogeneous echotexture and normal contour. Normal vascularity and Doppler waveforms are observed in the left testicle. Significant scrotal wall thickening and edema measuring up to 1.5 cm on the left.   LEFT EPIDIDYMIS: The left epididymal head measures 0.7 cm x 0.8 cm x 0.9 cm and is within normal limits.       Significant bilateral scrotal wall edema and skin thickening.   No evidence of testicular torsion bilaterally.   MACRO: None   Signed by: Kendall Bernabe 12/21/2024 4:38 PM Dictation workstation:   [id number]    ECG 12 Lead    Result Date: 12/21/2024  AV dual-paced rhythm with prolonged AV conduction Abnormal ECG When compared with ECG of 11-SEP-2003 13:58, Electronic ventricular pacemaker has replaced Sinus rhythm    Vascular US lower extremity arterial duplex right    Result Date: 12/21/2024            [facility] [address] [place] [address]     Tel [phone] Fax [phone]  Vascular Lab Report  VASC US LOWER EXTREMITY ARTERIAL DUPLEX RIGHT Patient Name:      LA NENA GAMBINOSALLY Ivory Physician:  [id number] Sabina Peterson MD, RPVI Study Date:        12/21/2024            Ordering Provider:  [id number] MICHELLE CUENCA MRN/PID:           [id number]              Fellow:  Accession#:        [id number]          Technologist:       Fazal LÓPEZ RVT Date of Birth/Age: 1945 / 79 years Technologist 2: Gender:            M                     Encounter#:         [id number] Admission Status:  Inpatient             Location Performed: [facility]  Diagnosis/ICD: 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 CHF.  CONCLUSIONS:  Right Lower Arterial: There is >50% stenosis documented at the external iliac artery. No evidence of hemodynamically significant stenosis found in the right lower extremity. Velocity shift noted at the EIA just meets criteria for > 50% stenosis. Remainder of arteries in the right leg are patent. There is a small right groin hematoma ~ 0.9 x 0.7 x 1.1 cm. Pseudo Aneurysm: No evidence of pseudo aneurysm noted in the right groin.  Right Pop Aneurysm: There is no evidence of aneurysm noted in the right lower extremity.  Imaging & Doppler Findings:  Right                     Left   PSV                       cm/s         cm/s        CFA 61 cm/s  Profunda Proximal 106 cm/s   SFA Proximal 108 cm/s      SFA Mid 71 cm/s     SFA Distal 59 cm/s      Popliteal 55 cm/s    JAELYN Proximal 27 cm/s       JAELYN Mid 27 cm/s     JAELYN Distal 66 cm/s  Peroneal Proximal 93 cm/s    Peroneal Mid 60 cm/s   Peroneal Distal 125 cm/s   PTA Proximal 99 cm/s       PTA Mid 93 cm/s     PTA Distal  [id number] Sabina Peterson MD, SHAUNA Electronically signed by [id number] Sabina Peterson MD, SHAUNA on 12/21/2024 at 11:04:39 AM  ** Final **     Cardiac Catheterization Procedure    Result Date: 12/21/2024   [facility], Cath Lab      [address] [address] Cardiovascular  Catheterization Report Patient Name:      WAYNE BURKITT         Performing Physician:  [id number] Ramey MD Study Date:        12/20/2024            Verifying Physician:   [id number] Gabino Ramey MD MRN/PID:           [id number]              Cardiologist/Co-Scrub: Accession#:        [id number]          Ordering Provider:     Lina RAMEY Date of Birth/Age: 1945 / 79 years Cardiologist: Gender:            M                     Fellow: Encounter#:        [id number]            Surgeon:  Study:            Left Heart Cath with Grafts Additional Study: PCI - Percutaneous Coronary Intervention  Indications: WAYNE BURKITT is a 79 year old male who presents with hypertension, prior coronary artery bypass graft surgery, dyslipidemia and a chest pain assessment of typical angina. Stable angina. Stress test performed: No. CTA performed: NoTamika Doan accessed: No. LVEF Assessed: No. Cardiac arrest: No. Cardiac surgical consult: No. Cardiovascular Instability: No Frailty status of patient entering lab: 6 = Moderately frail.  Procedure Description: After infiltration with 2% Lidocaine, the right femoral artery was cannulated with a modified Seldinger technique. Subsequently a 5 Thai sheath was placed retrograde in the right femoral artery. The arterial sheath was sized up to 6 Thai. Selective coronary catheterization was performed using a 5 Fr catheter(s) exchanged over a guide wire to cannulate the coronary arteries. A 4 tip catheter was used for left coronary injections. Additional catheter(s) used to visualize the coronary arteries were: 3DRC. Multiple injections of contrast were  made into the left and right coronary arteries with angiograms recorded in multiple projections. Retrograde left heart catheterizion was accomplished with a 5 Fr. pigtail catheter. A single plane left ventriculogram was recorded in the 30 degree DUKE projection. The contrast dose was 10 ml injected at 5 ml/sec. The catheter was then withdrawn across the aortic valve under continuous pressure monitoring and removed.  Coronary Angiography: The coronary circulation is right dominant.  Left Main Coronary Artery: The left main artery gives rise to the LAD and Circumflex. The left main coronary artery mildly diseased.  Left Anterior Descending Coronary Artery Distribution: The Left Anterior Descending artery is a large vessel. There is 100% stenosis in the mid left anterior descending artery. Hemodynamically significant obstruction is noted in this vessel and contains patent previously placed stents. There is 50-70%stenosis in the 1st Diag left anterior descending artery.  Circumflex Coronary Artery Distribution: The Left Circumflex artery is a large vessel. Hemodynamically significant obstruction is noted in this vessel and contains patent previously placed stents. There is 100% stenosis in the the mid Circumflex artery.  Right Coronary Artery Distribution: Hemodynamically significant obstruction is noted in this vessel and contains patent previously placed stents. There is 90% stenosis in the the mid Right Coronary Artery. The devices advanced to the the mid RCA lesion were: a balloon was inflated for pre-dilation, Resolute [place] 2.5x26 stent was deployed in the lesion a balloon was inflated for post-dilation. Residual stenosis is <10%. PDA right coronary artery showed 100% stenosis. This segment is collaterals.  Coronary Grafts:  LIMA Graft: There is 0% stenosis noted in the left internal mammary graft to the Mid LAD. Saphaneous Vein Graft(s): There is 70% stenosis in the saphenous vein graft to the 2nd Marginal.  Left  Ventriculography: The estimated left ventricular ejection fraction is normal at 55%. The left venticular end diastolic pressure measures 15 mm Hg. There is no end diastolic pressure dysfunction of the left ventricle. There is no regional wall motion abnormalities noted. There is normal left ventricular systolic function noted. The left ventricle is normal in size. There is no aortic stenosis noted.  Hemo Personnel: +-----------------------+---------+ Name                   Duty      +-----------------------+---------+ Gabino Giron MD 1 +-----------------------+---------+  Hemodynamic Pressures:  +----+---------------+----------+-------------+--------------+-------+---------+ Site   Date Time   Phase NameSystolic mmHgDiastolic mmHgED mmHgMean mmHg +----+---------------+----------+-------------+--------------+-------+---------+   AO     12/20/2024  AIR REST          139            58              87         10:53:17 AM                                                      +----+---------------+----------+-------------+--------------+-------+---------+   AO     12/20/2024  AIR REST          157            66             104         10:57:05 AM                                                      +----+---------------+----------+-------------+--------------+-------+---------+   AO     12/20/2024  AIR REST          162            56             100         11:03:46 AM                                                      +----+---------------+----------+-------------+--------------+-------+---------+   LV     12/20/2024  AIR REST          141             2     16                  11:07:05 AM                                                      +----+---------------+----------+-------------+--------------+-------+---------+   LV     12/20/2024  AIR REST          138             2     15                  11:07:14 AM                                                       +----+---------------+----------+-------------+--------------+-------+---------+  LVp     12/20/2024  AIR REST          143             1     16                  11:07:55 AM                                                      +----+---------------+----------+-------------+--------------+-------+---------+  AOp     12/20/2024  AIR REST          134            53              85         11:08:02 AM                                                      +----+---------------+----------+-------------+--------------+-------+---------+   AO     12/20/2024  AIR REST          120            50              77         11:12:51 AM                                                      +----+---------------+----------+-------------+--------------+-------+---------+   AO     12/20/2024  AIR REST          125            56              83         11:18:22 AM                                                      +----+---------------+----------+-------------+--------------+-------+---------+   AO     12/20/2024  AIR REST           92            44              63         11:21:00 AM                                                      +----+---------------+----------+-------------+--------------+-------+---------+   AO     12/20/2024  AIR REST          128            58              85         11:21:55 AM                                                      +----+---------------+----------+-------------+--------------+-------+---------+   AO     12/20/2024  AIR REST          122            54              80         11:27:15 AM                                                      +----+---------------+----------+-------------+--------------+-------+---------+  Cardiac Cath Post Procedure Notes: Post Procedure           Triple vessel disease. Diagnosis: Blood Loss:               Estimated blood loss during the procedure was trivial                          mls. Specimens Removed:       Number of specimen(s) removed: none. ____________________________________________________________________________________ CONCLUSIONS:  1. Left Main Coronary Artery: This artery is mildly diseased.  2. Left Anterior Descending Artery: is significantly obstructed and contains patent previously placed stents.  3. Mid LAD Lesion: The percent stenosis is 100%.  4. 1st Diag LAD Lesion: The percent stenosis is 50-70%.  5. Circumflex Coronary Artery: significantly obstructed and contains patent previously placed stents.  6. Mid CX Lesion: The percent stenosis is 100%.  7. Right Coronary Artery: significantly obstructed and contains patent previously placed stents.  8. Mid RCA Lesion: The percent stenosis is 90%.  9. Mid RCA Lesion: pre-dilation, Resolute [place] 2.5x26 post-dilation: <10% residual stenosis. RCA: pre-procedure XENA flow was 3(complete perfusion), post-procedure XENA flow was 3(complete perfusion) and the lesion risk category Non-high/Non-C. 10. PDA RCA Lesion: The percent stenosis is 100%. 11. LIMA to the Mid LAD: Percent stenosis is 0%. 12. SVG to the 2nd Marginal: Percent stenosis is 70%. 13. The Left Ventricular Ejection Fraction is 55%. 14. Left Ventricular End Diastolic Pressure: 15 mm Hg. 15. Aortic Stenosis: None. 16. Left Ventricular End Diastolic Pressure Dysfunction: None. 17. LV: No RWMA. 18. LV: normal left ventricular systolic function. ICD 10 Codes: Angina pectoris, unspecified-I20.9; 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-I25.728  CPT Codes: Left Heart Cath Bypass Graft w ventriculography and coronary angio(LHC)-77377; Stent w angio ather Right Coronary addl branch major Artery (PCI)-42328.  [id number] Gabino Giron MD Performing Physician Electronically signed by [id number] Gabino Giron MD on 12/21/2024 at 9:24:30  AM  ** Final **     CT abdomen pelvis w IV contrast    Result Date: 12/21/2024  Interpreted By:  Jw Bonilla, STUDY: CT ABDOMEN PELVIS W IV CONTRAST;  12/20/2024 8:52 pm   INDICATION: Signs/Symptoms:S/p left heart catheterization complicated by scrotal hematoma, rule out retroperitoneal hematoma..     COMPARISON: None.   ACCESSION NUMBER(S): [id number]   ORDERING CLINICIAN: MICHELLE CUENCA   TECHNIQUE: Contiguous axial images of the abdomen and pelvis were obtained after the intravenous administration of 75 mL Omnipaque 350 contrast. Coronal and sagittal reformatted images were reconstructed from the axial data.   FINDINGS: LOWER CHEST: No acute abnormality.   LIVER: Hepatic steatosis.   BILE DUCTS: No significant intrahepatic or extrahepatic dilatation.   GALLBLADDER: Cholelithiasis without CT evidence of acute cholecystitis.   PANCREAS: No significant abnormality.   SPLEEN: No significant abnormality.   ADRENALS: No significant abnormality.   KIDNEYS, URETERS, BLADDER: Excreted contrast limits assessment for small nonobstructing calculi. No obstructing calculi identified. No hydronephrosis or hydroureter. Urinary bladder is partially distended with excreted contrast material. A Strange catheter is in place within the bladder. Nondependent air in the bladder likely related to catheter placement.   REPRODUCTIVE ORGANS: Prostate is partially obscured by streak artifact from right hip arthroplasty. Prostatomegaly measuring at least 6.7 cm. Correlate with PSA. Extensive edema and fluid noted within the scrotum compatible with reported scrotal hematoma. Stranding with lesser fluid and edema noted along the bilateral inguinal canals.   GI: No obstruction. No bowel wall thickening or adjacent inflammatory change. Normal appendix.   VESSELS: No aortic aneurysm. Mild aortic calcifications. Metallic streak artifact from right hip prosthesis limits assessment of the femoral artery in the region of the access site. The  portal veins and IVC are patent.   PERITONEUM/RETROPERITONEUM: No intraperitoneal free air or fluid. No definite retroperitoneal hematoma. Scrotal edema and edema along the bilateral inguinal canals as described above.   LYMPH NODES: No enlarged lymph nodes.   ABDOMINAL WALL: Subcutaneous fat stranding within the right inguinal region likely related to access site.   OSSEOUS STRUCTURES: Right total hip arthroplasty. No apparent hardware complication. No acute osseous abnormality identified.       1. Extensive scrotal edema and fluid within the scrotum consistent with reported scrotal hematoma. 2. There is mild fluid and fat stranding noted along the bilateral inguinal canals, right-greater-than-left. No appreciable retroperitoneal hematoma. 3. Subcutaneous fat stranding along the right inguinal region likely related to access site. 4. If there is concern for testicular compromise then scrotal ultrasound is recommended for further assessment.   MACRO: None   Signed by: Jw Bonilla 12/21/2024 2:27 AM Dictation workstation:   [id number]    Cardiology Interpretation Of Nuclear Stress - See Other Report For Nuclear Portion    Result Date: 12/11/2024            [facility] [address] [id number]     Tel [phone] Fax [phone] Nuclear Pharmacologic Stress Test Patient Name:      WAYNE BURKITT       Ordering Provider:     Lina RAMEY Study Date:        12/5/2024           Reading Physician:     Lina Ramey MD MRN/PID:           [id number]            Supervising Physician: Lina Ramey MD Accession#:        [id number]        Fellow: Date of Birth/Age: 1945 / 79     Fellow:                    years Gender:            M                   Nurse:                  Anni Louis RN Admit Date:        12/5/2024           Technician:            Kenia Singh                                                               CVT Admission Status:  Outpatient          Sonographer:           N/A Height:            180.30 cm           Technologist: Weight:            78.00 kg            Additional Staff: BSA:               1.98 m2             Encounter#:            [id number] BMI:               23.99 kg/m2         Patient Location:      [facility] Stress Lab Study Type:    CARDIOLOGY INTERPRETATION OF NUCLEAR STRESS Diagnosis/ICD: Atherosclerotic heart disease-I25.10; Unstable angina-I20.0 Indication:    Angina Unstable and H/O CABG/ISCHEMIC CARDIOMYOPATHY CPT Codes:     Stress Test Interpretation-93031; Stress Test Supervision-63687 Falls Risk: Moderate: Patient has moderate risk for sustaining a fall; a falls prevention plan has been implemented.  Study Details: Correct procedure and correct patient verified verbally.  Patient History: Allergies: Mx allergies; reviewed.  Patient Performance: Patient received a total of 0.4 mg of Regadenoson at 9:32:35 AM. Patient received a total of 36 mCi of Myoview at 9:32:56 AM. The patient did not exercise during infusion. The peak heart rate achieved was 88 bpm, which was 62 % of the age predicted target heart rate of 143 bpm. The resting blood pressure was 158/64 mmHg with a heart rate of 62 bpm. The patient developed STOMACH UPSET during the stress exam. The symptoms resolved with rest 3 minutes into recovery. The blood pressure response was normal. The test was terminated due to: completed lab protocol.  Baseline ECG: Resting ECG showed normal sinus rhythm with normal tracing.  Stress ECG: Stress ECG showed normal sinus rhythm, with OCC PVC'S and no abnormal findings. Since this was a pharmacologic stress test, functional capacity could not be assessed.  Stress Stage Data: +----------------+--+------+-------+----------------------------------+                  HRSys BPDias BPComments                           +----------------+--+------+-------+----------------------------------+ Baseline [id number]   66                                        +----------------+--+------+-------+----------------------------------+ Stage I         [id number]   60     NO CHEST PAIN. C/O STOMACHE UPSET. +----------------+--+------+-------+----------------------------------+  Recovery ECG: Recovery ECG showed normal sinus rhythm. The heart rate recovery was normal.  +------------+--+------+-------+-------------------------+             HRSys BPDias BPComments                  +------------+--+------+-------+-------------------------+ Recovery I  [id number]   64     STOMACHE UPSET IMPROVING. +------------+--+------+-------+-------------------------+ Recovery II [id number]   66     NO CHEST PAIN.            +------------+--+------+-------+-------------------------+ Recovery [id number]   68     SYMPTOMS RESOLVED.        +------------+--+------+-------+-------------------------+  Summary:  1. Adequate level of stress achieved.  2. No clinical or electrocardiographic evidence for ischemia at maximal infusion.  3. Normal Stress Test.  4. Nuclear image results are reported separately. [id number] Ramey MD Electronically signed on 12/11/2024 at 4:50:11 PM   ** Final **     Transthoracic echo (TTE) complete    Result Date: 12/10/2024            [facility] [place] [address] [address]    Tel [phone] Fax [phone] TRANSTHORACIC ECHOCARDIOGRAM REPORT Patient Name:       LA NENA BURKITT Reading Physician:    [id number] Ramey MD Study Date:         12/5/2024           Ordering Provider:    [id number] BRANDIN RAMEY MRN/PID:            [id number]            Fellow: Accession#:          [id number]        Nurse: Date of Birth/Age:  1945 / 79     Sonographer:          Amy chong Gender Assigned at  M                   Additional Staff: Birth: Height:             180.34 cm           Admit Date: Weight:             78.02 kg            Admission Status:     Outpatient BSA / BMI:          1.98 m2 / 23.99     Department Location:  [facility] Echo Lab                     kg/m2 Blood Pressure: 132 /78 mmHg Study Type:    TRANSTHORACIC ECHO (TTE) COMPLETE Diagnosis/ICD: Unstable angina-I20.0; Atherosclerotic heart disease of native                coronary artery without angina pectoris-I25.10 Indication:    CAD, Chest Pain CPT Codes:     Echo Complete w Full Doppler-[id number] Patient History: Pertinent History: Cardiomyopathy, CAD and HTN. DANIELA. Study Detail: The following Echo studies were performed: 2D, M-Mode, Doppler and               color flow.  PHYSICIAN INTERPRETATION: Left Ventricle: Left ventricular ejection fraction is normal, by visual estimate at 55-60%. There are no regional wall motion abnormalities. The left ventricular cavity size is normal. There is mild increased septal and normal posterior left ventricular wall thickness. There is no evidence of left ventricular hypertrophy. Spectral Doppler shows a normal pattern of left ventricular diastolic filling. LV Wall Scoring: All segments are normal. Left Atrium: The left atrium is normal in size. Right Ventricle: The right ventricle is normal in size. There is normal right ventricular global systolic function. Right Atrium: The right atrium is normal in size. Aortic Valve: The aortic valve is trileaflet. The aortic valve dimensionless index is 0.83. There is no evidence of aortic valve regurgitation. The peak instantaneous gradient of the aortic valve is 9 mmHg. The mean gradient of the aortic valve is 6 mmHg. Mitral Valve: The mitral valve is normal in structure. There is no evidence of mitral valve regurgitation.  Tricuspid Valve: The tricuspid valve is structurally normal. No evidence of tricuspid regurgitation. Pulmonic Valve: The pulmonic valve is structurally normal. There is no indication of pulmonic valve regurgitation. Pericardium: No pericardial effusion noted. Aorta: The aortic root is normal.  CONCLUSIONS:  1. Left ventricular ejection fraction is normal, by visual estimate at 55-60%.  2. There is no evidence of left ventricular hypertrophy.  3. There is normal right ventricular global systolic function.  4. No signficant valve disease.  5. Normal estimated PA pressure.  6. Normal diastolic filling pattern. QUANTITATIVE DATA SUMMARY:  2D MEASUREMENTS:           Normal Ranges: LAs:             2.90 cm   (2.7-4.0cm) IVSd:            1.10 cm   (0.6-1.1cm) LVPWd:           1.00 cm   (0.6-1.1cm) LVIDd:           4.20 cm   (3.9-5.9cm) LVIDs:           2.80 cm LV Mass Index:   74.3 g/m2 LV % FS          33.3 %  LA VOLUME:                    Normal Ranges: LA Vol A4C:        50.5 ml    (22+/-6mL/m2) LA Vol A2C:        44.0 ml LA Vol BP:         48.8 ml LA Vol Index A4C:  25.6ml/m2 LA Vol Index A2C:  22.3 ml/m2 LA Vol Index BP:   24.7 ml/m2 LA Area A4C:       17.5 cm2 LA Area A2C:       15.8 cm2 LA Major Axis A4C: 5.2 cm LA Major Axis A2C: 4.8 cm LA Volume Index:   22.7 ml/m2 LA Vol A4C:        44.1 ml LA Vol A2C:        42.9 ml LA Vol Index BSA:  22.0 ml/m2  RA VOLUME BY A/L METHOD:            Normal Ranges: RA Vol A4C:              29.0 ml    (8.3-19.5ml) RA Vol Index A4C:        14.7 ml/m2 RA Area A4C:             12.3 cm2 RA Major Axis A4C:       4.4 cm  M-MODE MEASUREMENTS:         Normal Ranges: Ao Root:             3.10 cm (2.0-3.7cm) AoV Exc:             1.90 cm (1.5-2.5cm)  AORTA MEASUREMENTS:         Normal Ranges: AoV Exc:            1.90 cm (1.5-2.5cm) Ao Sinus, d:        3.60 cm (2.1-3.5cm) Ao STJ, d:          3.10 cm (1.7-3.4cm) Asc Ao, d:          3.20 cm (2.1-3.4cm)  LV SYSTOLIC FUNCTION BY 2D PLANIMETRY  (MOD):                      Normal Ranges: EF-A4C View:    52 % (>=55%) EF-A2C View:    63 % EF-Biplane:     60 % EF-Visual:      58 % LV EF Reported: 58 %  LV DIASTOLIC FUNCTION:            Normal Ranges: MV Peak E:             0.81 m/s   (0.7-1.2 m/s) MV Peak A:             0.76 m/s   (0.42-0.7 m/s) E/A Ratio:             1.07       (1.0-2.2) MV e'                  0.080 m/s  (>8.0) MV lateral e'          0.10 m/s MV medial e'           0.06 m/s E/e' Ratio:            10.19      (<8.0) PulmV Sys Jake:         37.70 cm/s PulmV Adair Jake:        34.70 cm/s PulmV S/D Jake:         1.10 PulmV A Revs Jake:      24.40 cm/s PulmV A Revs Dur:      66.00 msec  MITRAL VALVE:          Normal Ranges: MV DT:        201 msec (150-240msec)  AORTIC VALVE:                     Normal Ranges: AoV Vmax:                1.54 m/s (<=1.7m/s) AoV Peak P.5 mmHg (<20mmHg) AoV Mean P.0 mmHg (1.7-11.5mmHg) LVOT Max Jake:            1.32 m/s (<=1.1m/s) AoV VTI:                 36.20 cm (18-25cm) LVOT VTI:                30.00 cm LVOT Diameter:           1.80 cm  (1.8-2.4cm) AoV Area, VTI:           2.11 cm2 (2.5-5.5cm2) AoV Area,Vmax:           2.18 cm2 (2.5-4.5cm2) AoV Dimensionless Index: 0.83  RIGHT VENTRICLE: RV Basal 3.30 cm RV Mid   3.60 cm RV Major 6.3 cm TAPSE:   20.4 mm RV s'    0.07 m/s  TRICUSPID VALVE/RVSP:          Normal Ranges: Peak TR Velocity:     1.73 m/s Est. RA Pressure:     3 mmHg RV Syst Pressure:     15 mmHg  (< 30mmHg) IVC Diam:             1.40 cm  PULMONIC VALVE:          Normal Ranges: PV Accel Time:  124 msec (>120ms) PV Max Jake:     1.1 m/s  (0.6-0.9m/s) PV Max P.8 mmHg  Pulmonary Veins: PulmV A Revs Dur: 66.00 msec PulmV A Revs Jake: 24.40 cm/s PulmV Adair Jake:   34.70 cm/s PulmV S/D Jake:    1.10 PulmV Sys Jake:    37.70 cm/s  [id number] Gabino Giron MD Electronically signed on 12/10/2024 at 9:15:29 AM  Wall Scoring  ** Final **     Nuclear Stress Test    Result Date:  12/5/2024  Interpreted By:  Armani Boogie and Hanreck James STUDY: NUCLEAR STRESS TEST;  12/5/2024 11:37 am   INDICATION: Signs/Symptoms:chest pain.   ,I25.10 Atherosclerotic heart disease of native coronary artery without angina pectoris,I20.0 Unstable angina (Multi)   COMPARISON: None.   ACCESSION NUMBER(S): [id number]   ORDERING CLINICIAN: BRANDIN RAMEY   TECHNIQUE: DIVISION OF NUCLEAR MEDICINE PHARMACOLOGIC STRESS MYOCARDIAL PERFUSION SCAN, ONE DAY PROTOCOL   The patient received an intravenous injection of 10.5 mCi of Tc-99m Myoview and resting emission tomographic (SPECT) images of the myocardium were acquired. The patient then received an intravenous infusion of 0.4 mg regadenoson (Lexiscan) followed by an additional injection of 36.0 mCi of Tc-99m Myoview. Stress phase SPECT images of the myocardium were then acquired. These included ECG-gated post-stress and rest images to assess and quantify ventricular function.  Low dose CT was acquired for attenuation correction.   FINDINGS: Decreased radiotracer activity throughout the inferior left ventricular wall on non attenuation corrected images which improves with attenuation correction and demonstrates normal wall motion and thickening in this region is consistent with attenuation artifact. Otherwise both stress and rest studies demonstrate grossly normal perfusion throughout the left ventricle.   The left ventricle is normal in size.   EKG-gated images demonstrate normal LV wall motion with a post-stress LV EF estimated at 64%.   Attenuation correction CT images demonstrate no gross anatomic abnormalities.       No evidence of inducible myocardial ischemia or prior infarction.   Likely diaphragmatic attenuation along the inferior wall.   The left ventricle is normal in size.   Normal LV wall motion with a post-stress LV EF estimated at 64%   I personally reviewed the images/study and I agree with the resident Forest Reid's findings as stated.  This study was interpreted at [facility], [place], [place].   MACRO: None       Signed by: Armani Boogie 12/5/2024 11:49 AM Dictation workstation:   [id number]    ECG 12 lead (Clinic Performed)    Result Date: 11/25/2024  Atrial paced, LVH with repol, inferior Q waves           This patient currently has cardiac telemetry ordered; if you would like to modify or discontinue the telemetry order, click here to go to the orders activity to modify/discontinue the order.    This patient has a urinary catheter   Reason for the urinary catheter remaining today? Urine catheter unnecessary, will be removed today               Assessment/Plan   Assessment & Plan  3-vessel coronary artery disease    Coronary artery disease involving native coronary artery of native heart without angina pectoris    Angina pectoris, unstable (Multi)    Diastolic congestive heart failure    The urethral catheter was removed with no difficulty.  We instilled 100 cc of saline inside the bladder prior to removal of the catheter.  Patient initially felt that he was full however once we stood him up he lost the sensation of urge.  We are going to allow him time to fill his bladder naturally.  I do not suspect problem voiding and should be able to void into urinal.  If he could not void because of weakness or inability to stand and ambulate then a 16 Jama[place] Strange catheter could be inserted to the bladder and Drainage.       I spent 40 minutes in the professional and overall care of this patient.      Jaylen Parada MD

## 2024-12-23 NOTE — PROGRESS NOTES
Physical Therapy                 Therapy Communication Note    Patient Name: Wayne Burkitt  MRN: [id number]  Department: [facility]  Room: 2111/2111-A  Today's Date: 12/23/2024     Discipline: Physical Therapy          Missed Visit Reason:      Missed Time: Attempt    Comment: Hold d/t drop in sp02% on 6L high flow - CPAP required.

## 2024-12-23 NOTE — PROGRESS NOTES
Physical Therapy    Physical Therapy Evaluation    Patient Name: Wayne Burkitt  MRN: [id number]  Today's Date: 12/23/2024   Time Calculation  Start Time: 1236  Stop Time: 1305  Time Calculation (min): 29 min  2111/2111-A    Subjective:  Pt supine in bed on arrival. Pt is a poor historian. Pt agreeable to therapy. Alarm on. Pt educated in use of call light and requiring assist from hospital staff for mobility while in personal hospital room. Pt acknowledges.      Assessment/Plan   PT Assessment  PT Assessment Results: Decreased strength, Decreased range of motion, Decreased endurance, Impaired balance, Decreased mobility, Decreased coordination, Decreased cognition, Impaired judgement, Decreased safety awareness, Pain  Rehab Prognosis: Fair  Barriers to Discharge Home: Cognition needs, Physical needs  Cognition Needs: 24hr supervision for safety awareness needed  Physical Needs: Stair navigation into home limited by function/safety, Stair navigation to access bed limited by function/safety, Stair navigation to access bath limited by function/safety, In-home setup navigation limited by function/safety, Ambulating household distances limited by function/safety, 24hr mobility assistance needed  Evaluation/Treatment Tolerance: Patient limited by pain  Medical Staff Made Aware: Yes  Strengths: Support of Caregivers  Barriers to Participation: Housing layout, Ability to acquire knowledge, Access to adaptive/assistive products, Premorbid level of function  End of Session Communication: Bedside nurse  Assessment Comment: Pt may benefit from continued skilled PT services to address listed impairments and improve overall functional mobility.  End of Session Patient Position: Bed, 3 rail up, Alarm on  IP OR SWING BED PT PLAN  Inpatient or Swing Bed: Inpatient  PT Plan  Treatment/Interventions: Bed mobility, Transfer training, Gait training, Stair training, Balance training, Neuromuscular re-education, Neurodevelopmental  intervention, Strengthening, Endurance training, Range of motion, Therapeutic exercise, Therapeutic activity, Home exercise program, Positioning, Postural re-education  PT Plan: Ongoing PT  PT Frequency: 4 times per week  PT Discharge Recommendations: Moderate intensity level of continued care (SNF)  Equipment Recommended upon Discharge: Wheelchair  PT Recommended Transfer Status: Total assist  PT - OK to Discharge: Yes    Subjective     Current Problem:  1. Coronary artery disease involving native coronary artery of native heart without angina pectoris  Cardiac Catheterization Procedure    Cardiac Catheterization Procedure      2. Angina pectoris, unstable (Multi)  Cardiac Catheterization Procedure    Cardiac Catheterization Procedure      3. Diastolic congestive heart failure, unspecified HF chronicity  Cardiac Catheterization Procedure    Cardiac Catheterization Procedure      4. Episodes of formed visual hallucinations  Vascular US lower extremity arterial duplex right    Vascular US lower extremity arterial duplex right      5. Frequent falls  Vascular US lower extremity arterial duplex right    Vascular US lower extremity arterial duplex right      6. Intraoperative hemorrhage and hematoma of eye and adnexa complicating an ophthalmic procedure, bilateral  Vascular US lower extremity arterial duplex right    Vascular US lower extremity arterial duplex right      7. 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 Angina pectoris, unspecified  Cardiac Catheterization Procedure      9. 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  Cardiac Catheterization Procedure        Patient Active Problem List   Diagnosis    Ischemic cardiomyopathy    Moderate vascular dementia with anxiety    Conversion reaction    Auditory hallucinations    Coronary artery disease involving native coronary artery of native  heart without angina pectoris    Epistaxis, recurrent    Angina pectoris    Dementia with psychotic disturbance    Episodes of formed visual hallucinations    Frequent falls    Hypertensive disorder    Obstructive sleep apnea syndrome    Orthostatic hypotension    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    Seizure disorder (Multi)    Angina pectoris, unstable (Multi)    Diastolic congestive heart failure    3-vessel coronary artery disease       General Visit Information:  General  Reason for Referral: 3-vessel coronary artery disease  Referred By: Gabino Giron MD  Past Medical History Relevant to Rehab: Hx: hallucinations, scrotal edema, chest pain  Co-Treatment: OT  Co-Treatment Reason: Pt requires x2 skilled therapists due to current medical impairments and to maximize pt oucomes with x2 skilled therapists present  Prior to Session Communication: Bedside nurse  Patient Position Received: Bed, 3 rail up, Alarm on    Home Living:  Home Living  Type of Home:  (Pt lives in memory care currently)  Home Living Comments: Currently in Memory Care. Otherwise, lives with friend and his family in a split level home, where he has stairs to enter with HR.    Prior Level of Function:  Prior Function Per Pt/Caregiver Report  Prior Function Comments: Does not use device at PLOF - pt is poor historian.    Precautions:  Precautions  UE Weight Bearing Status: Weight Bearing as Tolerated  LE Weight Bearing Status: Weight Bearing as Tolerated  Medical Precautions: Fall precautions    Vital Signs:     Objective     Pain:  Pain Assessment  Pain Assessment: 0-10  0-10 (Numeric) Pain Score: 3  Pain Type: Acute pain  Pain Location:  (Scrotum and R groin)  Pain Descriptors: Aching  Pain Frequency: Intermittent    Cognition:  Cognition  Orientation Level: Disoriented to time, Disoriented to situation, Disoriented to person    General Assessments:  General Observation  General Observation: Pain in R hip and scrotal swelling/redness with  hematoma.   Activity Tolerance  Endurance: Tolerates less than 10 min exercise with changes in vital signs  Sensation  Light Touch: No apparent deficits  Strength  Strength Comments: Decreased strength grossly     Coordination  Movements are Fluid and Coordinated: No  Lower Body Coordination: Decreased coordination in BLEs  Postural Control  Postural Control: Impaired  Head Control: Forward head, rounded shoulders  Static Sitting Balance  Static Sitting-Balance Support: Bilateral upper extremity supported, Feet supported  Static Sitting-Level of Assistance: Contact guard       Functional Assessments:     Bed Mobility  Bed Mobility: Yes  Bed Mobility 1  Bed Mobility Comments 1: modA x2 for BLEs and trunk. Supine<>sit.         Pt was able to tolerate sitting EOB for ~2 minutes prior to requesting to lie back down due to scrotal pain.     Pt performed bridging x2 reps in supine position.             Extremity/Trunk Assessments:  RUE   RUE : Exceptions to WFL  LUE   LUE:  (See OT note for details for BUE ROM.)  RLE   RLE : Within Functional Limits  LLE   LLE : Within Functional Limits    Outcome Measures:     [facility] Basic Mobility  Turning from your back to your side while in a flat bed without using bedrails: A lot  Moving from lying on your back to sitting on the side of a flat bed without using bedrails: Total  Moving to and from bed to chair (including a wheelchair): Total  Standing up from a chair using your arms (e.g. wheelchair or bedside chair): Total  To walk in hospital room: Total  Climbing 3-5 steps with railing: Total  Basic Mobility - Total Score: 7                                                             Goals:  Encounter Problems       Encounter Problems (Active)       Balance       Goal 1       Start:  12/23/24            Goal 2       Start:  12/23/24               Pain - Adult            Education Documentation  No documentation found.  Education Comments  No comments found.

## 2024-12-23 NOTE — PROGRESS NOTES
12/23/24 1430   Discharge Planning   Living Arrangements Alone   Support Systems Friends/neighbors   Assistance Needed Lives at [facility]   Type of Residence Assisted living   Care Facility Name [facility]   Home or Post Acute Services In home services   Type of Home Care Services Home PT;Home OT   Expected Discharge Disposition Home Health   Does the patient need discharge transport arranged? Yes   RoundTrip coordination needed? Yes   Has discharge transport been arranged? No   Financial Resource Strain   How hard is it for you to pay for the very basics like food, housing, medical care, and heating? Not hard   Housing Stability   In the last 12 months, was there a time when you were not able to pay the mortgage or rent on time? N   In the past 12 months, how many times have you moved where you were living? 1   At any time in the past 12 months, were you homeless or living in a shelter (including now)? N   Transportation Needs   In the past 12 months, has lack of transportation kept you from medical appointments or from getting medications? no   In the past 12 months, has lack of transportation kept you from meetings, work, or from getting things needed for daily living? No   Patient Choice   Patient / Family choosing to utilize agency / facility established prior to hospitalization Yes   Stroke Family Assessment   Stroke Family Assessment Needed No   Intensity of Service   Intensity of Service 0-30 min     Met with patient at the bedside. Confirmed address. Patient lives at [facility]. He states he has been there for 4 months--prior was living in his home in [place] alone and was hallucinating. He came here to [facility] and states his neighbors are watching and caring for his home in [place] and he plans to return when able. His friend named NOÉ lives in [place]. He states that he plans to live with NOÉ and his family for a little while in the future then fly to [place]. Currently, he is active  with PT/OT at [facility]. Called [facility]---state they only need orders for PT/OT evaluations at [place]. Dr Giron states he will write those upon [place].   1500 Called and spoke to patient's friend and POA, NOÉ. He confirms what patient states and does state patient's memory is significantly improved since moving him here from [place] in September. Discussed SNF. NOÉ requested a referral to [facility] --patient is agreeable to this as well. This worker discussed with him. SNF list sent to NOÉ via email at [email or web address]

## 2024-12-23 NOTE — PROGRESS NOTES
Subjective Data:  79 year old male with CAD, CABG, HTN, HPL and angina class III.  Cath performed, PCI of the RCA.  Complicated by post-procedure groin hematoma, with syncope and hypotension.  Also complicated by chest pain yesterday, got 2 U PRBC.      Overnight Events:    Patient did well, no chest pain or shortness of breath.     Objective Data:  Last Recorded Vitals:  Vitals:    12/23/24 0600 12/23/24 0700 12/23/24 0800 12/23/24 1200   BP: 102/51 121/61 106/57    Pulse: 60 70 60 60   Resp: 14 20 12    Temp:       TempSrc:       SpO2: 97% 98% 96%    Weight:       Height:           Last Labs:  CBC - 12/23/2024:  6:11 AM  6.0 9.3 196    29.0      CMP - 12/23/2024:  6:11 AM  8.1 5.5 35 --- 0.4   3.0 3.5 17 50      PTT - 12/20/2024:  5:38 PM  1.2   13.7 35     TROPHS   Date/Time Value Ref Range Status   12/23/2024 01:57 AM 3,116 0 - 20 ng/L Final     Comment:     Previous result verified on 12/22/2024 1853 on specimen/case [id number] called with component [facility] for procedure Troponin I, High Sensitivity, Initial with value 2,479 ng/L.   12/22/2024 06:09 PM 2,479 0 - 20 ng/L Final     BNP   Date/Time Value Ref Range Status   12/12/2024 10:18  0 - 99 pg/mL Final      Last I/O:  I/O last 3 completed shifts:  In: 1093.7 (14 mL/kg) [P.O.:480; Blood:613.7]  Out: 2475 (31.7 mL/kg) [Urine:2475 (0.9 mL/kg/hr)]  Weight: 78.1 kg     Past Cardiology Tests (Last 3 Years):  EKG:  ECG 12 Lead  (Preliminary)      ECG 12 lead (Clinic Performed) 11/25/2024    Echo:  Transthoracic echo (TTE) complete 12/05/2024    Ejection Fractions:  EF   Date/Time Value Ref Range Status   12/05/2024 11:29 AM 58 %      Cath:  Cardiac Catheterization Procedure 12/20/2024    Stress Test:  Nuclear Stress Test 12/05/2024    Cardiac Imaging:  No results found for this or any previous visit from the past 1095 days.      Inpatient Medications:  Scheduled medications   Medication Dose Route Frequency    brimonidine  1 drop Both Eyes BID     levETIRAcetam  1,000 mg oral BID    magnesium sulfate  1 g intravenous Once    pantoprazole  40 mg oral Daily before breakfast    polyethylene glycol  17 g oral Daily    prasugrel  10 mg oral Daily    QUEtiapine  50 mg oral Nightly     PRN medications   Medication    acetaminophen    nitroglycerin    traZODone     Continuous Medications   Medication Dose Last Rate       Physical Exam:  NAD  RRR  CTA B  Groin hematoma stable no changes.  Nonfocal  No edema     Assessment/Plan   CAD: Recent PCI, recurrent angina and positive enzymes, now CP free after blood transfusions.  Continue effient monotherapy.  No further procedures at this time, risk very high for further complications.  Treat angina medically.  Groin hematoma: stable, no evidence for futher bleeding  Strange: DC  Deconditioning: PT/OT      Peripheral IV 20 G Distal;Left;Upper Arm (Active)   Site Assessment Clean;Dry;Intact 12/23/24 0900   Dressing Status Clean;Dry;Occlusive 12/23/24 0900   Number of days: 0       Urethral Catheter 10 Fr. (Active)   Output (mL) 400 mL 12/23/24 1200   Number of days: 3       Code Status:  Full Code    I spent 30 minutes in the professional and overall care of this patient.        Gabino Giron MD

## 2024-12-24 LAB
ALBUMIN SERPL BCP-MCNC: 3.4 G/DL (ref 3.4–5)
ANION GAP SERPL CALC-SCNC: 12 MMOL/L (ref 10–20)
BUN SERPL-MCNC: 17 MG/DL (ref 6–23)
CALCIUM SERPL-MCNC: 8.5 MG/DL (ref 8.6–10.3)
CHLORIDE SERPL-SCNC: 108 MMOL/L (ref 98–107)
CO2 SERPL-SCNC: 24 MMOL/L (ref 21–32)
CREAT SERPL-MCNC: 0.87 MG/DL (ref 0.5–1.3)
EGFRCR SERPLBLD CKD-EPI 2021: 89 ML/MIN/1.73M*2
ERYTHROCYTE [DISTWIDTH] IN BLOOD BY AUTOMATED COUNT: 17.7 % (ref 11.5–14.5)
GLUCOSE SERPL-MCNC: 101 MG/DL (ref 74–99)
HCT VFR BLD AUTO: 28.7 % (ref 41–52)
HGB BLD-MCNC: 9.2 G/DL (ref 13.5–17.5)
MAGNESIUM SERPL-MCNC: 1.76 MG/DL (ref 1.6–2.4)
MCH RBC QN AUTO: 31.2 PG (ref 26–34)
MCHC RBC AUTO-ENTMCNC: 32.1 G/DL (ref 32–36)
MCV RBC AUTO: 97 FL (ref 80–100)
NRBC BLD-RTO: 0 /100 WBCS (ref 0–0)
PHOSPHATE SERPL-MCNC: 3.4 MG/DL (ref 2.5–4.9)
PLATELET # BLD AUTO: 220 X10*3/UL (ref 150–450)
POTASSIUM SERPL-SCNC: 3.5 MMOL/L (ref 3.5–5.3)
RBC # BLD AUTO: 2.95 X10*6/UL (ref 4.5–5.9)
SODIUM SERPL-SCNC: 140 MMOL/L (ref 136–145)
WBC # BLD AUTO: 5.4 X10*3/UL (ref 4.4–11.3)

## 2024-12-24 PROCEDURE — 83735 ASSAY OF MAGNESIUM: CPT

## 2024-12-24 PROCEDURE — 2500000002 HC RX 250 W HCPCS SELF ADMINISTERED DRUGS (ALT 637 FOR MEDICARE OP, ALT 636 FOR OP/ED)

## 2024-12-24 PROCEDURE — 2060000001 HC INTERMEDIATE ICU ROOM DAILY

## 2024-12-24 PROCEDURE — 36415 COLL VENOUS BLD VENIPUNCTURE: CPT

## 2024-12-24 PROCEDURE — 2500000001 HC RX 250 WO HCPCS SELF ADMINISTERED DRUGS (ALT 637 FOR MEDICARE OP)

## 2024-12-24 PROCEDURE — 80069 RENAL FUNCTION PANEL: CPT

## 2024-12-24 PROCEDURE — 85027 COMPLETE CBC AUTOMATED: CPT

## 2024-12-24 RX ORDER — NITROGLYCERIN 0.4 MG/1
0.4 TABLET SUBLINGUAL EVERY 5 MIN PRN
Qty: 90 TABLET | Refills: 12 | Status: SHIPPED | OUTPATIENT
Start: 2024-12-24

## 2024-12-24 RX ADMIN — ACETAMINOPHEN 325MG 650 MG: 325 TABLET ORAL at 17:25

## 2024-12-24 RX ADMIN — PANTOPRAZOLE SODIUM 40 MG: 40 TABLET, DELAYED RELEASE ORAL at 06:19

## 2024-12-24 RX ADMIN — BRIMONIDINE TARTRATE 1 DROP: 2 SOLUTION/ DROPS OPHTHALMIC at 09:21

## 2024-12-24 RX ADMIN — LEVETIRACETAM 1000 MG: 500 TABLET, FILM COATED ORAL at 21:07

## 2024-12-24 RX ADMIN — PRASUGREL 10 MG: 10 TABLET, FILM COATED ORAL at 09:21

## 2024-12-24 RX ADMIN — BRIMONIDINE TARTRATE 1 DROP: 2 SOLUTION/ DROPS OPHTHALMIC at 21:07

## 2024-12-24 RX ADMIN — LEVETIRACETAM 1000 MG: 500 TABLET, FILM COATED ORAL at 09:21

## 2024-12-24 RX ADMIN — QUETIAPINE FUMARATE 50 MG: 50 TABLET ORAL at 21:07

## 2024-12-24 ASSESSMENT — PAIN DESCRIPTION - ORIENTATION: ORIENTATION: RIGHT

## 2024-12-24 ASSESSMENT — COGNITIVE AND FUNCTIONAL STATUS - GENERAL
STANDING UP FROM CHAIR USING ARMS: A LITTLE
MOBILITY SCORE: 20
DRESSING REGULAR UPPER BODY CLOTHING: A LITTLE
DAILY ACTIVITIY SCORE: 23
WALKING IN HOSPITAL ROOM: A LITTLE
CLIMB 3 TO 5 STEPS WITH RAILING: A LITTLE
DAILY ACTIVITIY SCORE: 23
CLIMB 3 TO 5 STEPS WITH RAILING: A LITTLE
MOVING TO AND FROM BED TO CHAIR: A LITTLE
MOBILITY SCORE: 20
STANDING UP FROM CHAIR USING ARMS: A LITTLE
WALKING IN HOSPITAL ROOM: A LITTLE
DRESSING REGULAR UPPER BODY CLOTHING: A LITTLE
MOVING TO AND FROM BED TO CHAIR: A LITTLE

## 2024-12-24 ASSESSMENT — PAIN SCALES - GENERAL
PAINLEVEL_OUTOF10: 4
PAINLEVEL_OUTOF10: 0 - NO PAIN

## 2024-12-24 ASSESSMENT — PAIN DESCRIPTION - LOCATION: LOCATION: LEG

## 2024-12-24 ASSESSMENT — PAIN - FUNCTIONAL ASSESSMENT
PAIN_FUNCTIONAL_ASSESSMENT: 0-10

## 2024-12-24 NOTE — PROGRESS NOTES
Spiritual Care Visit  Spiritual Care Request    Reason for Visit:  Routine Visit: Introduction     Request Received From:       Focus of Care:  Visited With: Patient         Refer to :          Spiritual Care Assessment    Spiritual Assessment:                      Care Provided:  Intended Effects: Promote sense of peace, Preserve dignity and respect, Meaning-making  Methods: Offer spiritual/Pentecostal support  Interventions: Share words of hope and inspiration, [place]    Sense of Community and or Caodaism Affiliation:  Natasha         Addressed Needs/Concerns and/or Sina Through:          Outcome:        Advance Directives:         Spiritual Care Annotation    Annotation:  Patient said he has advance directive paperwork that was done in a different state.   shared if he comes back again to bring the paperwork so the hospital can have a copy.  Patient talked about a friend he has.   prayed at his request.

## 2024-12-24 NOTE — CARE PLAN
The patient's goals for the shift include      The clinical goals for the shift include Pt will be safe from falls and injury throughout this shift      Problem: Pain - Adult  Goal: Verbalizes/displays adequate comfort level or baseline comfort level  Outcome: Progressing     Problem: Safety - Adult  Goal: Free from fall injury  Outcome: Progressing     Problem: Discharge Planning  Goal: Discharge to home or other facility with appropriate resources  Outcome: Progressing     Problem: Chronic Conditions and Co-morbidities  Goal: Patient's chronic conditions and co-morbidity symptoms are monitored and maintained or improved  Outcome: Progressing     Problem: Skin  Goal: Decreased wound size/increased tissue granulation at next dressing change  Outcome: Progressing  Goal: Participates in plan/prevention/treatment measures  Outcome: Progressing  Goal: Prevent/manage excess moisture  Outcome: Progressing  Goal: Prevent/minimize sheer/friction injuries  Outcome: Progressing  Goal: Promote/optimize nutrition  Outcome: Progressing  Goal: Promote skin healing  Outcome: Progressing     Problem: Heart Failure  Goal: Improved gas exchange this shift  Outcome: Progressing  Goal: Improved urinary output this shift  Outcome: Progressing  Goal: Reduction in peripheral edema within 24 hours  Outcome: Progressing  Goal: Report improvement of dyspnea/breathlessness this shift  Outcome: Progressing  Goal: Weight from fluid excess reduced over 2-3 days, then stabilize  Outcome: Progressing  Goal: Increase self care and/or family involvement in 24 hours  Outcome: Progressing     Problem: Pain  Goal: Takes deep breaths with improved pain control throughout the shift  Outcome: Progressing  Goal: Turns in bed with improved pain control throughout the shift  Outcome: Progressing  Goal: Walks with improved pain control throughout the shift  Outcome: Progressing  Goal: Performs ADL's with improved pain control throughout shift  Outcome:  Progressing  Goal: Participates in PT with improved pain control throughout the shift  Outcome: Progressing  Goal: Free from opioid side effects throughout the shift  Outcome: Progressing  Goal: Free from acute confusion related to pain meds throughout the shift  Outcome: Progressing

## 2024-12-24 NOTE — DISCHARGE INSTRUCTIONS

## 2024-12-24 NOTE — DISCHARGE SUMMARY
Discharge Diagnosis  3-vessel coronary artery disease    Issues Requiring Follow-Up  CAD     Test Results Pending At Discharge  Pending Labs       No current pending labs.            Hospital Course  A 79 yrs old male with hx of CAD s/p CABG and s/p PCI ,HTN.DM.s/p  pacemaker .angina on exertion ,dementia who comes in for elective left heart cath by Dr Giron on 12/20/24 .The pt had a successful PCI to mid of RCA ,postop,complicated by groin hematoma with syncope /hypotension .The pt received 2 units PRBC  second to Hgb down to 6 and sx of chest pain on 12/22/24 . St discharge, he has no sx of chest pain , the right groin soft with some bruises along to the scrotum ,The pt has no any difficult for urination .  Hgb at 9.2 on 12/24  VS: bp 110/58 mmhg     The pt will discharge to skill nursing giving baseline dementia and multiple medical issues ,will require for PT/OT and nursing service  Follow up in 2 weeks by Dr Giron       Discharge med;s continue ASA 81 mg, effient 10 mg every day, Pravachol 80 mg   -Giving bp is remained in lower at 110/58 mmhg, will continue to hold   metoprolol,spironolactone, imdur and Ranexa ,DR Giron will reassess as outpt in follow up .  New; Rx;nitroglycerin 0.4 mg PRN as instructed        Cardiac cath with pCI:CONCLUSIONS:   1. Left Main Coronary Artery: This artery is mildly diseased.   2. Left Anterior Descending Artery: is significantly obstructed and contains patent previously placed stents.   3. Mid LAD Lesion: The percent stenosis is 100%.   4. 1st Diag LAD Lesion: The percent stenosis is 50-70%.   5. Circumflex Coronary Artery: significantly obstructed and contains patent previously placed stents.   6. Mid CX Lesion: The percent stenosis is 100%.   7. Right Coronary Artery: significantly obstructed and contains patent previously placed stents.   8. Mid RCA Lesion: The percent stenosis is 90%.   9. Mid RCA Lesion: pre-dilation, Resolute Carlos 2.5x26  post-dilation: <10% residual stenosis. RCA: pre-procedure XENA flow was 3(complete perfusion), post-procedure XENA flow was 3(complete perfusion) and the lesion risk category Non-high/Non-C.  10. PDA RCA Lesion: The percent stenosis is 100%.  11. LIMA to the Mid LAD: Percent stenosis is 0%.  12. SVG to the 2nd Marginal: Percent stenosis is 70%.  13. The Left Ventricular Ejection Fraction is 55%.  14. Left Ventricular End Diastolic Pressure: 15 mm Hg.  15. Aortic Stenosis: None.  16. Left Ventricular End Diastolic Pressure Dysfunction: None.  17. LV: No RWMA.  18. LV: normal left ventricular systolic function.     Physical Exam At Time of Discharge  Physical Exam  Constitutional:       Appearance: Normal appearance.   HENT:      Head: Normocephalic.   Cardiovascular:      Rate and Rhythm: Normal rate.   Abdominal:      General: Abdomen is flat.   Genitourinary:     Comments: Bruise ,soft  enlarged scrotum   Musculoskeletal:         General: Normal range of motion.      Cervical back: Normal range of motion.   Skin:     Coloration: Skin is pale.   Neurological:      Mental Status: He is oriented to person, place, and time. Mental status is at baseline.   Psychiatric:         Behavior: Behavior normal.         Home Medications     Medication List      START taking these medications     nitroglycerin 0.4 mg SL tablet; Commonly known as: Nitrostat; Place 1   tablet (0.4 mg) under the tongue every 5 minutes if needed for chest pain.     CONTINUE taking these medications     acetaminophen 325 mg tablet; Commonly known as: Tylenol   aspirin 81 mg EC tablet   brimonidine 0.2 % ophthalmic solution; Commonly known as: AlphaGAN   donepezil 5 mg tablet; Commonly known as: Aricept   ferrous sulfate 325 (65 Fe) MG EC tablet   levETIRAcetam 1,000 mg tablet; Commonly known as: Keppra   pantoprazole 40 mg EC tablet; Commonly known as: ProtoNix   prasugrel 10 mg tablet; Commonly known as: Effient   pravastatin 80 mg tablet; Commonly  known as: Pravachol   QUEtiapine 50 mg tablet; Commonly known as: SEROquel   traZODone 50 mg tablet; Commonly known as: Desyrel     STOP taking these medications     isosorbide mononitrate ER 30 mg 24 hr tablet; Commonly known as: Imdur   metoprolol succinate XL 25 mg 24 hr tablet; Commonly known as: Toprol-XL   ranolazine 500 mg 12 hr tablet; Commonly known as: Ranexa   spironolactone 25 mg tablet; Commonly known as: Aldactone       Outpatient Follow-Up  Future Appointments   Date Time Provider Department Center   1/9/2025 10:45 AM Gabino Giron MD [id number]       RACHID Barcenas-CNP

## 2024-12-24 NOTE — PROGRESS NOTES
[facility] not have any beds available. Placed call to NOÉ hughes, to [facility]. He provided additional choice of the [facility]. Asked [facility] to send referral.   POA also requested referral to [facility].     1428  [facility] can accept. [facility] does not have a bed available.POA updated and agreeable to  [facility]. Transport arranged for 1730, poa aware.

## 2024-12-25 VITALS
DIASTOLIC BLOOD PRESSURE: 60 MMHG | TEMPERATURE: 97.7 F | HEART RATE: 62 BPM | HEIGHT: 71 IN | SYSTOLIC BLOOD PRESSURE: 116 MMHG | OXYGEN SATURATION: 98 % | RESPIRATION RATE: 18 BRPM | BODY MASS INDEX: 24.09 KG/M2 | WEIGHT: 172.1 LBS

## 2024-12-25 PROCEDURE — 2500000001 HC RX 250 WO HCPCS SELF ADMINISTERED DRUGS (ALT 637 FOR MEDICARE OP)

## 2024-12-25 PROCEDURE — 2500000002 HC RX 250 W HCPCS SELF ADMINISTERED DRUGS (ALT 637 FOR MEDICARE OP, ALT 636 FOR OP/ED)

## 2024-12-25 RX ADMIN — LEVETIRACETAM 1000 MG: 500 TABLET, FILM COATED ORAL at 08:39

## 2024-12-25 RX ADMIN — PRASUGREL 10 MG: 10 TABLET, FILM COATED ORAL at 08:39

## 2024-12-25 RX ADMIN — PANTOPRAZOLE SODIUM 40 MG: 40 TABLET, DELAYED RELEASE ORAL at 06:51

## 2024-12-25 RX ADMIN — ACETAMINOPHEN 325MG 650 MG: 325 TABLET ORAL at 02:29

## 2024-12-25 RX ADMIN — BRIMONIDINE TARTRATE 1 DROP: 2 SOLUTION/ DROPS OPHTHALMIC at 08:39

## 2024-12-25 ASSESSMENT — COGNITIVE AND FUNCTIONAL STATUS - GENERAL
MOVING TO AND FROM BED TO CHAIR: A LOT
PERSONAL GROOMING: A LITTLE
DRESSING REGULAR LOWER BODY CLOTHING: A LITTLE
WALKING IN HOSPITAL ROOM: A LOT
TURNING FROM BACK TO SIDE WHILE IN FLAT BAD: A LITTLE
HELP NEEDED FOR BATHING: A LITTLE
MOBILITY SCORE: 14
DRESSING REGULAR UPPER BODY CLOTHING: A LITTLE
CLIMB 3 TO 5 STEPS WITH RAILING: A LOT
DAILY ACTIVITIY SCORE: 19
MOVING FROM LYING ON BACK TO SITTING ON SIDE OF FLAT BED WITH BEDRAILS: A LITTLE
TOILETING: A LITTLE
STANDING UP FROM CHAIR USING ARMS: A LOT

## 2024-12-25 ASSESSMENT — PAIN DESCRIPTION - ORIENTATION: ORIENTATION: RIGHT

## 2024-12-25 ASSESSMENT — PAIN - FUNCTIONAL ASSESSMENT
PAIN_FUNCTIONAL_ASSESSMENT: 0-10

## 2024-12-25 ASSESSMENT — PAIN SCALES - GENERAL
PAINLEVEL_OUTOF10: 0 - NO PAIN
PAINLEVEL_OUTOF10: 0 - NO PAIN
PAINLEVEL_OUTOF10: 3
PAINLEVEL_OUTOF10: 0 - NO PAIN

## 2024-12-25 ASSESSMENT — PAIN DESCRIPTION - LOCATION: LOCATION: LEG

## 2024-12-25 NOTE — CARE PLAN
The patient's goals for the shift include      The clinical goals for the shift include Pt will be safe from falls and injury throughout this shift      Problem: Pain - Adult  Goal: Verbalizes/displays adequate comfort level or baseline comfort level  Outcome: Progressing     Problem: Safety - Adult  Goal: Free from fall injury  Outcome: Progressing     Problem: Discharge Planning  Goal: Discharge to home or other facility with appropriate resources  Outcome: Progressing     Problem: Chronic Conditions and Co-morbidities  Goal: Patient's chronic conditions and co-morbidity symptoms are monitored and maintained or improved  Outcome: Progressing

## 2024-12-25 NOTE — NURSING NOTE
This RN informed that [facility]  time had been moved back by another 4-5 hours (placing time around 1-2am). Katie called by , they asked us to reschedule  time for later in the morning. New scheduled  is 0800 12/25/24.   Yes

## 2024-12-25 NOTE — NURSING NOTE
1115: [facility] to transport patient. This RN called [facility] to verify that report had been received. Pt transported via wheelchair to the [facility]. Safety maintained.

## 2024-12-26 ENCOUNTER — NURSING HOME VISIT (OUTPATIENT)
Dept: POST ACUTE CARE | Facility: EXTERNAL LOCATION | Age: 79
End: 2024-12-26
Payer: MEDICARE

## 2024-12-26 VITALS
OXYGEN SATURATION: 97 % | RESPIRATION RATE: 18 BRPM | SYSTOLIC BLOOD PRESSURE: 128 MMHG | HEART RATE: 74 BPM | DIASTOLIC BLOOD PRESSURE: 72 MMHG | BODY MASS INDEX: 23.99 KG/M2 | WEIGHT: 172 LBS | TEMPERATURE: 97.6 F

## 2024-12-26 DIAGNOSIS — I25.10 3-VESSEL CORONARY ARTERY DISEASE: Primary | ICD-10-CM

## 2024-12-26 DIAGNOSIS — F01.B2 MODERATE VASCULAR DEMENTIA WITH PSYCHOTIC DISTURBANCE: ICD-10-CM

## 2024-12-26 DIAGNOSIS — G40.909 SEIZURE DISORDER (MULTI): ICD-10-CM

## 2024-12-26 DIAGNOSIS — I25.5 ISCHEMIC CARDIOMYOPATHY: ICD-10-CM

## 2024-12-26 PROBLEM — S30.22XA SCROTAL HEMATOMA: Status: ACTIVE | Noted: 2024-12-26

## 2024-12-26 PROCEDURE — 99310 SBSQ NF CARE HIGH MDM 45: CPT | Performed by: NURSE PRACTITIONER

## 2024-12-26 ASSESSMENT — ENCOUNTER SYMPTOMS
ABDOMINAL PAIN: 0
CONSTIPATION: 0
DIARRHEA: 0
COUGH: 0
FEVER: 0
CHILLS: 0
NAUSEA: 0
PALPITATIONS: 0
FATIGUE: 0
DIFFICULTY URINATING: 0
VOMITING: 0
SHORTNESS OF BREATH: 0

## 2024-12-26 NOTE — LETTER
Patient: Wayne Burkitt  : 1945    Encounter Date: 2024    Subjective  Wayne Burkitt is a 79 y.o. male Here for skilled admission following hospitalization due to cardiac cath and hematoma.      HPI Past medical hx includes vascular dementia with hallucinations and is unable to make his own decisions, CAD s/p CABG and subseuqent 16 stents, HTN, seizures.  He had outpatient scheduled cath,  PCI to RCA and developed scrotal and groin hematoma post procedure, hgb dropped to 5.8 and was given 2 UPRBC.  He was evaluated by urology and voiding without difficulty.   He is discharged here for continued skilled care.  He had hospitalization  for hallucinations was residing with a friend/POA and was discharged to a secured care home due to dementia with hallucinations, seroquel was increased at that time.  He is a poor historian but oriented times 3.  He is a retired [employer] , was discharged due to seizures and was employed by [employer] after his service.  He was residing in [place] until his sister passed away sometime in the last few years.   Hospital chart and health problems reviewed.    No concerns per staff.   Lab Results   Component Value Date    WBC 5.4 2024    HGB 9.2 (L) 2024    HCT 28.7 (L) 2024    MCV 97 2024     2024     Lab Results   Component Value Date    GLUCOSE 101 (H) 2024    CALCIUM 8.5 (L) 2024     2024    K 3.5 2024    CO2 24 2024     (H) 2024    BUN 17 2024    CREATININE 0.87 2024     Lab Results   Component Value Date    ALT 17 2024    AST 35 2024    ALKPHOS 50 2024    BILITOT 0.4 2024     MEDS:  Asa  Brimodine eye gtts  Donepezil  Ferrous sulfate  Keppra  Pantoprazole  Prasugel  Pravastatin  Quetiapine  Trazodone    MEDS THAT WERE Dc'd: metoprolol, isosorbide,ranolazine, spironolactone,  These were dc'd due to hypotension and will be reassessed at cardiology appt  1/9/25.       Review of Systems   Constitutional:  Negative for chills, fatigue and fever.   Respiratory:  Negative for cough and shortness of breath.    Cardiovascular:  Negative for chest pain and palpitations.   Gastrointestinal:  Negative for abdominal pain, constipation, diarrhea, nausea and vomiting.   Genitourinary:  Negative for difficulty urinating.       Objective  /72   Pulse 74   Temp 36.4 °C (97.6 °F)   Resp 18   Wt 78 kg (172 lb)   SpO2 97%   BMI 23.99 kg/m²     Physical Exam  Constitutional:       General: He is not in acute distress.  HENT:      Head: Normocephalic and atraumatic.   Eyes:      Conjunctiva/sclera: Conjunctivae normal.   Cardiovascular:      Rate and Rhythm: Normal rate and regular rhythm.   Pulmonary:      Effort: Pulmonary effort is normal. No respiratory distress.      Breath sounds: Normal breath sounds.   Abdominal:      General: Bowel sounds are normal. There is no distension.      Palpations: Abdomen is soft.      Tenderness: There is no abdominal tenderness.   Genitourinary:     Comments: Large hematoma to left groin, into scrotum and testicles and left hip.  .  Ecchymosis also to right hip.  Edges of hematoma starting to change colors, scrotum and testicles firm.   Musculoskeletal:         General: Normal range of motion.      Right lower leg: No edema.      Left lower leg: No edema.   Skin:     General: Skin is warm and dry.   Neurological:      General: No focal deficit present.      Mental Status: He is alert.      Comments: Oreinted times 3 but poor historian and inconsistent hx on events and timing over the last few years.  Reports was most recently living with friend, no recollection of being at [facility] for the last 4 months.  No reported hallucinations.    Psychiatric:         Mood and Affect: Mood normal.         Behavior: Behavior normal.         Assessment & Plan  3-vessel coronary artery disease  S/p CABG and then subsequent 16 stents.  Most recent stent  12/20/24 to RCA.  He developed groin/scrotal hematoma post procedure, required 2 UPRBC.   He is to follow up with cardiology, isosorbide, metoprolol. Ranolazine and aldactone discontinued by cardiology and will follow up with Dr Giron for further adjustment of meds.  BP acceptable.   As asa, statin and effient.    Ischemic cardiomyopathy  Follow up with cardiology as scheduled. EF 55-60% on echo 12/24.    Moderate vascular dementia with psychotic disturbance  Has recurrent hallucinations, seroquel was increased 9/24 and was deemed unable to make his own decisions at that time.  Friend is POA.  He is oriented times 3 at present but very poor historian and will change subjects frequently.  He was previously residing at [facility].  He has a friend who is his POA.   On donepezil, seroquel and trazodone.    Seizure disorder (Multi)  no known recent seizures.  Staff to monitor and notify for any seizure activity, on keppra.     labs/meds/orders reviewed  staff to monitor and notify for any changes. d  Hospital records reviewed.  continue with therapy as able.  Follow up with cardiology as scheduled 1/9. Metoprolol, ranexa, isosorbide and aldactone discontinued due to hypotension during hospital stay.    Ss for discharge planning, he has been deemed unable to make his decisions, was previously residing at a memory care [facility], he has a POA (friend) who makes decisions.    Time for coordination of care was greater than 35 minutes with hospital chart review, visit and exam, discussion of treatment plan with patient and also discussion of case with staff.      Electronically Signed By: ERICK Card   12/26/24 12:54 PM

## 2024-12-26 NOTE — ASSESSMENT & PLAN NOTE
S/p CABG and then subsequent 16 stents.  Most recent stent 12/20/24 to RCA.  He developed groin/scrotal hematoma post procedure, required 2 UPRBC.   He is to follow up with cardiology, isosorbide, metoprolol. Ranolazine and aldactone discontinued by cardiology and will follow up with Dr Giron for further adjustment of meds.  BP acceptable.   As asa, statin and effient.

## 2024-12-26 NOTE — PROGRESS NOTES
Subjective   Wayne Burkitt is a 79 y.o. male Here for skilled admission following hospitalization due to cardiac cath and hematoma.      HPI Past medical hx includes vascular dementia with hallucinations and is unable to make his own decisions, CAD s/p CABG and subseuqent 16 stents, HTN, seizures.  He had outpatient scheduled cath,  PCI to RCA and developed scrotal and groin hematoma post procedure, hgb dropped to 5.8 and was given 2 UPRBC.  He was evaluated by urology and voiding without difficulty.   He is discharged here for continued skilled care.  He had hospitalization 9/24 for hallucinations was residing with a friend/POA and was discharged to a secured group home due to dementia with hallucinations, seroquel was increased at that time.  He is a poor historian but oriented times 3.  He is a retired [employer] , was discharged due to seizures and was employed by [employer] after his service.  He was residing in [place] until his sister passed away sometime in the last few years.   Hospital chart and health problems reviewed.    No concerns per staff.   Lab Results   Component Value Date    WBC 5.4 12/24/2024    HGB 9.2 (L) 12/24/2024    HCT 28.7 (L) 12/24/2024    MCV 97 12/24/2024     12/24/2024     Lab Results   Component Value Date    GLUCOSE 101 (H) 12/24/2024    CALCIUM 8.5 (L) 12/24/2024     12/24/2024    K 3.5 12/24/2024    CO2 24 12/24/2024     (H) 12/24/2024    BUN 17 12/24/2024    CREATININE 0.87 12/24/2024     Lab Results   Component Value Date    ALT 17 12/20/2024    AST 35 12/20/2024    ALKPHOS 50 12/20/2024    BILITOT 0.4 12/20/2024     MEDS:  Asa  Brimodine eye gtts  Donepezil  Ferrous sulfate  Keppra  Pantoprazole  Prasugel  Pravastatin  Quetiapine  Trazodone    MEDS THAT WERE Dc'd: metoprolol, isosorbide,ranolazine, spironolactone,  These were dc'd due to hypotension and will be reassessed at cardiology appt 1/9/25.       Review of Systems   Constitutional:  Negative for chills,  fatigue and fever.   Respiratory:  Negative for cough and shortness of breath.    Cardiovascular:  Negative for chest pain and palpitations.   Gastrointestinal:  Negative for abdominal pain, constipation, diarrhea, nausea and vomiting.   Genitourinary:  Negative for difficulty urinating.       Objective   /72   Pulse 74   Temp 36.4 °C (97.6 °F)   Resp 18   Wt 78 kg (172 lb)   SpO2 97%   BMI 23.99 kg/m²     Physical Exam  Constitutional:       General: He is not in acute distress.  HENT:      Head: Normocephalic and atraumatic.   Eyes:      Conjunctiva/sclera: Conjunctivae normal.   Cardiovascular:      Rate and Rhythm: Normal rate and regular rhythm.   Pulmonary:      Effort: Pulmonary effort is normal. No respiratory distress.      Breath sounds: Normal breath sounds.   Abdominal:      General: Bowel sounds are normal. There is no distension.      Palpations: Abdomen is soft.      Tenderness: There is no abdominal tenderness.   Genitourinary:     Comments: Large hematoma to left groin, into scrotum and testicles and left hip.  .  Ecchymosis also to right hip.  Edges of hematoma starting to change colors, scrotum and testicles firm.   Musculoskeletal:         General: Normal range of motion.      Right lower leg: No edema.      Left lower leg: No edema.   Skin:     General: Skin is warm and dry.   Neurological:      General: No focal deficit present.      Mental Status: He is alert.      Comments: Oreinted times 3 but poor historian and inconsistent hx on events and timing over the last few years.  Reports was most recently living with friend, no recollection of being at [facility] for the last 4 months.  No reported hallucinations.    Psychiatric:         Mood and Affect: Mood normal.         Behavior: Behavior normal.         Assessment & Plan  3-vessel coronary artery disease  S/p CABG and then subsequent 16 stents.  Most recent stent 12/20/24 to RCA.  He developed groin/scrotal hematoma post procedure,  required 2 UPRBC.   He is to follow up with cardiology, isosorbide, metoprolol. Ranolazine and aldactone discontinued by cardiology and will follow up with Dr Giron for further adjustment of meds.  BP acceptable.   As asa, statin and effient.    Ischemic cardiomyopathy  Follow up with cardiology as scheduled. EF 55-60% on echo 12/24.    Moderate vascular dementia with psychotic disturbance  Has recurrent hallucinations, seroquel was increased 9/24 and was deemed unable to make his own decisions at that time.  Friend is POA.  He is oriented times 3 at present but very poor historian and will change subjects frequently.  He was previously residing at [facility].  He has a friend who is his POA.   On donepezil, seroquel and trazodone.    Seizure disorder (Multi)  no known recent seizures.  Staff to monitor and notify for any seizure activity, on keppra.     labs/meds/orders reviewed  staff to monitor and notify for any changes. d  Hospital records reviewed.  continue with therapy as able.  Follow up with cardiology as scheduled 1/9. Metoprolol, ranexa, isosorbide and aldactone discontinued due to hypotension during hospital stay.    Ss for discharge planning, he has been deemed unable to make his decisions, was previously residing at a memory care [facility], he has a POA (friend) who makes decisions.    Time for coordination of care was greater than 35 minutes with hospital chart review, visit and exam, discussion of treatment plan with patient and also discussion of case with staff.

## 2024-12-26 NOTE — ASSESSMENT & PLAN NOTE
Has recurrent hallucinations, seroquel was increased 9/24 and was deemed unable to make his own decisions at that time.  Friend is POA.  He is oriented times 3 at present but very poor historian and will change subjects frequently.  He was previously residing at [facility].  He has a friend who is his POA.   On donepezil, seroquel and trazodone.

## 2024-12-27 ENCOUNTER — NURSING HOME VISIT (OUTPATIENT)
Dept: POST ACUTE CARE | Facility: EXTERNAL LOCATION | Age: 79
End: 2024-12-27
Payer: MEDICARE

## 2024-12-27 DIAGNOSIS — S30.22XA SCROTAL HEMATOMA: Primary | ICD-10-CM

## 2024-12-27 DIAGNOSIS — F01.B2 MODERATE VASCULAR DEMENTIA WITH PSYCHOTIC DISTURBANCE: ICD-10-CM

## 2024-12-27 DIAGNOSIS — I25.10 CORONARY ARTERY DISEASE INVOLVING NATIVE CORONARY ARTERY OF NATIVE HEART WITHOUT ANGINA PECTORIS: ICD-10-CM

## 2024-12-27 DIAGNOSIS — G40.909 SEIZURE DISORDER (MULTI): ICD-10-CM

## 2024-12-27 DIAGNOSIS — R29.6 FREQUENT FALLS: ICD-10-CM

## 2024-12-27 PROCEDURE — 99306 1ST NF CARE HIGH MDM 50: CPT | Performed by: INTERNAL MEDICINE

## 2024-12-27 NOTE — LETTER
Patient: Wayne Burkitt  : 1945    Encounter Date: 2024    Subjective  Patient ID: Wayne Burkitt is a 79 y.o. male who is acute skilled care being seen and evaluated for multiple medical problems.    HPI   Wayne Burkitt is a 79 y.o. male Here for skilled admission following hospitalization due to cardiac cath and hematoma.      HPI Past medical hx includes vascular dementia with hallucinations and is unable to make his own decisions, CAD s/p CABG and subseuqent 16 stents, HTN, seizures.  He had outpatient scheduled cath,  PCI to RCA and developed scrotal and groin hematoma post procedure, hgb dropped to 5.8 and was given 2 UPRBC.  He was evaluated by urology and voiding without difficulty.   He is discharged here for continued skilled care.  He had hospitalization  for hallucinations was residing with a friend/POA and was discharged to a [facility] due to dementia with hallucinations, seroquel was increased at that time.  He is a poor historian but oriented times 3.  He is a retired [employer] , was discharged due to seizures and was employed by [employer] after his service.  He was residing in [place] until his sister passed away sometime in the last few years.   Hospital chart and health problems reviewed.    No concerns per staff.     This patient is resting comfortably in bed in no distress today.  He reports the pressure in his scrotum is improving.  He expresses desire to ultimately go back to [employer] and then eventually get back to [place] to sell his property.  Patient tells me he has been in the [facility] for 4 months now.  Reports no difficulty with urination at this time.        Lab Results   Component Value Date     WBC 5.4 2024     HGB 9.2 (L) 2024     HCT 28.7 (L) 2024     MCV 97 2024      2024            Lab Results   Component Value Date     GLUCOSE 101 (H) 2024     CALCIUM 8.5 (L) 2024      2024     K 3.5  12/24/2024     CO2 24 12/24/2024      (H) 12/24/2024     BUN 17 12/24/2024     CREATININE 0.87 12/24/2024            Lab Results   Component Value Date     ALT 17 12/20/2024     AST 35 12/20/2024     ALKPHOS 50 12/20/2024     BILITOT 0.4 12/20/2024       Echocardiogram from December 5, 2024 reveals normal left ventricular ejection fraction with normal pulmonary arterial pressures and normal diastolic filling.    MEDS:  Asa  Brimodine eye gtts  Donepezil  Ferrous sulfate  Keppra  Pantoprazole  Prasugel  Pravastatin  Quetiapine  Trazodone     MEDS THAT WERE Dc'd: metoprolol, isosorbide,ranolazine, spironolactone,  These were dc'd due to hypotension and will be reassessed at cardiology appt 1/9/25.      Review of Systems   Constitutional:  Negative for chills, fatigue and fever.   Respiratory:  Negative for cough and shortness of breath.    Cardiovascular:  Negative for chest pain and palpitations.   Gastrointestinal:  Negative for abdominal pain, constipation, diarrhea, nausea and vomiting.   Genitourinary:  Negative for difficulty urinating.       Objective  Pulse 72   Resp 18     Physical Exam  Constitutional:       General: He is not in acute distress.  HENT:      Head: Normocephalic and atraumatic.      Mouth/Throat:      Mouth: Mucous membranes are moist.   Eyes:      Conjunctiva/sclera: Conjunctivae normal.   Cardiovascular:      Rate and Rhythm: Normal rate and regular rhythm.   Pulmonary:      Effort: Pulmonary effort is normal. No respiratory distress.      Breath sounds: Normal breath sounds.   Abdominal:      General: Bowel sounds are normal. There is no distension.      Palpations: Abdomen is soft.      Tenderness: There is no abdominal tenderness.   Genitourinary:     Comments: Large hematoma to left groin, into scrotum and testicles and left hip.  .  Ecchymosis also to right hip.  Edges of hematoma starting to change colors, scrotum and testicles firm.   Musculoskeletal:         General: Normal  range of motion.      Right lower leg: No edema.      Left lower leg: No edema.   Skin:     General: Skin is warm and dry.   Neurological:      General: No focal deficit present.      Mental Status: He is alert.      Comments: Oreinted times 3 but poor historian and inconsistent hx on events and timing over the last few years.  Reports was most recently living with friend, no recollection of being at [facility] for the last 4 months.  No reported hallucinations.    Psychiatric:         Mood and Affect: Mood normal.         Behavior: Behavior normal.         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  We will continue with rehabilitative restorative and supportive care as the patient tolerates    B.  As above the patient had an unfortunate complication of PCI with drug-eluting stent placement.  His hematoma appears to be very slowly resolving and he should have close outpatient follow-up with his cardiologist Dr. Lima following stent placement.    C.  Laboratory examinations will continue to be monitored on an ongoing as-needed basis should he remain here long-term care    D.  The patient's prognosis is guarded.      Electronically Signed By: Dante Monahan MD   1/8/25  3:47 PM

## 2024-12-29 LAB
ATRIAL RATE: 76 BPM
P AXIS: 33 DEGREES
P OFFSET: 185 MS
P ONSET: 123 MS
PR INTERVAL: 188 MS
Q ONSET: 217 MS
QRS COUNT: 12 BEATS
QRS DURATION: 138 MS
QT INTERVAL: 426 MS
QTC CALCULATION(BAZETT): 479 MS
QTC FREDERICIA: 460 MS
R AXIS: -9 DEGREES
T AXIS: 146 DEGREES
T OFFSET: 430 MS
VENTRICULAR RATE: 76 BPM

## 2025-01-03 ENCOUNTER — NURSING HOME VISIT (OUTPATIENT)
Dept: POST ACUTE CARE | Facility: EXTERNAL LOCATION | Age: 80
End: 2025-01-03
Payer: MEDICARE

## 2025-01-03 DIAGNOSIS — S30.22XA SCROTAL HEMATOMA: ICD-10-CM

## 2025-01-03 DIAGNOSIS — F01.B2 MODERATE VASCULAR DEMENTIA WITH PSYCHOTIC DISTURBANCE: ICD-10-CM

## 2025-01-03 DIAGNOSIS — I25.10 CORONARY ARTERY DISEASE INVOLVING NATIVE CORONARY ARTERY OF NATIVE HEART WITHOUT ANGINA PECTORIS: ICD-10-CM

## 2025-01-03 DIAGNOSIS — G40.909 SEIZURE DISORDER (MULTI): ICD-10-CM

## 2025-01-03 DIAGNOSIS — R29.6 FREQUENT FALLS: ICD-10-CM

## 2025-01-03 DIAGNOSIS — I10 HYPERTENSION, UNSPECIFIED TYPE: Primary | ICD-10-CM

## 2025-01-03 PROCEDURE — 99308 SBSQ NF CARE LOW MDM 20: CPT | Performed by: INTERNAL MEDICINE

## 2025-01-03 NOTE — LETTER
Patient: Wayne Burkitt  : 1945    Encounter Date: 2025    Subjective  Patient ID: Wayne Burkitt is a 79 y.o. male who is acute skilled care being seen and evaluated for multiple medical problems.    HPI   79-year-old male patient is resting comfortably in his room in no distress.  He has no new complaints for me at this time.  Nursing has no new adverse events reported to me.    Current high risk medication:  Donepezil  Iron  Keppra  Prasugrel  Seroquel    There are no new laboratory examinations on the chart for review at this time.    Review of Systems   Constitutional:  Negative for chills, fatigue and fever.   Respiratory:  Negative for cough and shortness of breath.    Cardiovascular:  Negative for chest pain and palpitations.   Gastrointestinal:  Negative for abdominal pain, constipation, diarrhea, nausea and vomiting.   Genitourinary:  Negative for difficulty urinating.       Objective  /54   Pulse 74   Resp 17   Wt 77.1 kg (170 lb)   SpO2 97%   BMI 23.71 kg/m²     Physical Exam  Constitutional:       General: He is not in acute distress.  HENT:      Head: Normocephalic and atraumatic.      Mouth/Throat:      Mouth: Mucous membranes are moist.   Eyes:      Conjunctiva/sclera: Conjunctivae normal.   Cardiovascular:      Rate and Rhythm: Normal rate and regular rhythm.   Pulmonary:      Effort: Pulmonary effort is normal. No respiratory distress.      Breath sounds: Normal breath sounds.   Abdominal:      General: Bowel sounds are normal. There is no distension.      Palpations: Abdomen is soft.      Tenderness: There is no abdominal tenderness.   Genitourinary:     Comments: Large hematoma to left groin, into scrotum and testicles and left hip.  .  Ecchymosis also to right hip.  Edges of hematoma starting to change colors, scrotum and testicles firm.   Musculoskeletal:         General: Normal range of motion.      Right lower leg: No edema.      Left lower leg: No edema.   Skin:      General: Skin is warm and dry.   Neurological:      General: No focal deficit present.      Mental Status: He is alert.      Comments: Oreinted times 3 but poor historian and inconsistent hx on events and timing over the last few years.  Reports was most recently living with friend, no recollection of being at [facility] for the last 4 months.  No reported hallucinations.    Psychiatric:         Mood and Affect: Mood normal.         Behavior: Behavior normal.         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  We will continue with rehabilitative restorative and supportive care as the patient tolerates    B.  Laboratory examinations will continue to be monitored on an ongoing as needed basis especially should the patient remain here longer term care    C.  Disposition will be pending response to rehabilitation overall assessment of patient safety awareness    D.  The patient's prognosis is guarded.        Electronically Signed By: Dante Monahan MD   1/8/25  4:14 PM

## 2025-01-07 VITALS
WEIGHT: 170 LBS | SYSTOLIC BLOOD PRESSURE: 102 MMHG | DIASTOLIC BLOOD PRESSURE: 54 MMHG | HEART RATE: 74 BPM | BODY MASS INDEX: 23.71 KG/M2 | RESPIRATION RATE: 17 BRPM | OXYGEN SATURATION: 97 %

## 2025-01-07 VITALS — HEART RATE: 72 BPM | RESPIRATION RATE: 18 BRPM

## 2025-01-07 NOTE — PROGRESS NOTES
Subjective   Patient ID: Wayne Burkitt is a 79 y.o. male who is acute skilled care being seen and evaluated for multiple medical problems.    HPI   Wayne Burkitt is a 79 y.o. male Here for skilled admission following hospitalization due to cardiac cath and hematoma.      HPI Past medical hx includes vascular dementia with hallucinations and is unable to make his own decisions, CAD s/p CABG and subseuqent 16 stents, HTN, seizures.  He had outpatient scheduled cath,  PCI to RCA and developed scrotal and groin hematoma post procedure, hgb dropped to 5.8 and was given 2 UPRBC.  He was evaluated by urology and voiding without difficulty.   He is discharged here for continued skilled care.  He had hospitalization 9/24 for hallucinations was residing with a friend/POA and was discharged to a [facility] due to dementia with hallucinations, seroquel was increased at that time.  He is a poor historian but oriented times 3.  He is a retired [employer] , was discharged due to seizures and was employed by [employer] after his service.  He was residing in [place] until his sister passed away sometime in the last few years.   Hospital chart and health problems reviewed.    No concerns per staff.     This patient is resting comfortably in bed in no distress today.  He reports the pressure in his scrotum is improving.  He expresses desire to ultimately go back to [employer] and then eventually get back to [place] to sell his property.  Patient tells me he has been in the [facility] for 4 months now.  Reports no difficulty with urination at this time.        Lab Results   Component Value Date     WBC 5.4 12/24/2024     HGB 9.2 (L) 12/24/2024     HCT 28.7 (L) 12/24/2024     MCV 97 12/24/2024      12/24/2024            Lab Results   Component Value Date     GLUCOSE 101 (H) 12/24/2024     CALCIUM 8.5 (L) 12/24/2024      12/24/2024     K 3.5 12/24/2024     CO2 24 12/24/2024      (H) 12/24/2024     BUN 17  12/24/2024     CREATININE 0.87 12/24/2024            Lab Results   Component Value Date     ALT 17 12/20/2024     AST 35 12/20/2024     ALKPHOS 50 12/20/2024     BILITOT 0.4 12/20/2024       Echocardiogram from December 5, 2024 reveals normal left ventricular ejection fraction with normal pulmonary arterial pressures and normal diastolic filling.    MEDS:  Asa  Brimodine eye gtts  Donepezil  Ferrous sulfate  Keppra  Pantoprazole  Prasugel  Pravastatin  Quetiapine  Trazodone     MEDS THAT WERE Dc'd: metoprolol, isosorbide,ranolazine, spironolactone,  These were dc'd due to hypotension and will be reassessed at cardiology appt 1/9/25.      Review of Systems   Constitutional:  Negative for chills, fatigue and fever.   Respiratory:  Negative for cough and shortness of breath.    Cardiovascular:  Negative for chest pain and palpitations.   Gastrointestinal:  Negative for abdominal pain, constipation, diarrhea, nausea and vomiting.   Genitourinary:  Negative for difficulty urinating.       Objective   Pulse 72   Resp 18     Physical Exam  Constitutional:       General: He is not in acute distress.  HENT:      Head: Normocephalic and atraumatic.      Mouth/Throat:      Mouth: Mucous membranes are moist.   Eyes:      Conjunctiva/sclera: Conjunctivae normal.   Cardiovascular:      Rate and Rhythm: Normal rate and regular rhythm.   Pulmonary:      Effort: Pulmonary effort is normal. No respiratory distress.      Breath sounds: Normal breath sounds.   Abdominal:      General: Bowel sounds are normal. There is no distension.      Palpations: Abdomen is soft.      Tenderness: There is no abdominal tenderness.   Genitourinary:     Comments: Large hematoma to left groin, into scrotum and testicles and left hip.  .  Ecchymosis also to right hip.  Edges of hematoma starting to change colors, scrotum and testicles firm.   Musculoskeletal:         General: Normal range of motion.      Right lower leg: No edema.      Left lower leg:  No edema.   Skin:     General: Skin is warm and dry.   Neurological:      General: No focal deficit present.      Mental Status: He is alert.      Comments: Oreinted times 3 but poor historian and inconsistent hx on events and timing over the last few years.  Reports was most recently living with friend, no recollection of being at [facility] for the last 4 months.  No reported hallucinations.    Psychiatric:         Mood and Affect: Mood normal.         Behavior: Behavior normal.         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  We will continue with rehabilitative restorative and supportive care as the patient tolerates    B.  As above the patient had an unfortunate complication of PCI with drug-eluting stent placement.  His hematoma appears to be very slowly resolving and he should have close outpatient follow-up with his cardiologist Dr. Lima following stent placement.    C.  Laboratory examinations will continue to be monitored on an ongoing as-needed basis should he remain here long-term care    D.  The patient's prognosis is guarded.

## 2025-01-07 NOTE — PROGRESS NOTES
Subjective   Patient ID: Wayne Burkitt is a 79 y.o. male who is acute skilled care being seen and evaluated for multiple medical problems.    HPI   79-year-old male patient is resting comfortably in his room in no distress.  He has no new complaints for me at this time.  Nursing has no new adverse events reported to me.    Current high risk medication:  Donepezil  Iron  Keppra  Prasugrel  Seroquel    There are no new laboratory examinations on the chart for review at this time.    Review of Systems   Constitutional:  Negative for chills, fatigue and fever.   Respiratory:  Negative for cough and shortness of breath.    Cardiovascular:  Negative for chest pain and palpitations.   Gastrointestinal:  Negative for abdominal pain, constipation, diarrhea, nausea and vomiting.   Genitourinary:  Negative for difficulty urinating.       Objective   /54   Pulse 74   Resp 17   Wt 77.1 kg (170 lb)   SpO2 97%   BMI 23.71 kg/m²     Physical Exam  Constitutional:       General: He is not in acute distress.  HENT:      Head: Normocephalic and atraumatic.      Mouth/Throat:      Mouth: Mucous membranes are moist.   Eyes:      Conjunctiva/sclera: Conjunctivae normal.   Cardiovascular:      Rate and Rhythm: Normal rate and regular rhythm.   Pulmonary:      Effort: Pulmonary effort is normal. No respiratory distress.      Breath sounds: Normal breath sounds.   Abdominal:      General: Bowel sounds are normal. There is no distension.      Palpations: Abdomen is soft.      Tenderness: There is no abdominal tenderness.   Genitourinary:     Comments: Large hematoma to left groin, into scrotum and testicles and left hip.  .  Ecchymosis also to right hip.  Edges of hematoma starting to change colors, scrotum and testicles firm.   Musculoskeletal:         General: Normal range of motion.      Right lower leg: No edema.      Left lower leg: No edema.   Skin:     General: Skin is warm and dry.   Neurological:      General: No focal  deficit present.      Mental Status: He is alert.      Comments: Oreinted times 3 but poor historian and inconsistent hx on events and timing over the last few years.  Reports was most recently living with friend, no recollection of being at [facility] for the last 4 months.  No reported hallucinations.    Psychiatric:         Mood and Affect: Mood normal.         Behavior: Behavior normal.         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  We will continue with rehabilitative restorative and supportive care as the patient tolerates    B.  Laboratory examinations will continue to be monitored on an ongoing as needed basis especially should the patient remain here longer term care    C.  Disposition will be pending response to rehabilitation overall assessment of patient safety awareness    D.  The patient's prognosis is guarded.

## 2025-01-08 ENCOUNTER — NURSING HOME VISIT (OUTPATIENT)
Dept: POST ACUTE CARE | Facility: EXTERNAL LOCATION | Age: 80
End: 2025-01-08
Payer: MEDICARE

## 2025-01-08 VITALS
BODY MASS INDEX: 23.99 KG/M2 | TEMPERATURE: 98.1 F | SYSTOLIC BLOOD PRESSURE: 114 MMHG | DIASTOLIC BLOOD PRESSURE: 56 MMHG | WEIGHT: 172 LBS | RESPIRATION RATE: 19 BRPM | OXYGEN SATURATION: 95 % | HEART RATE: 76 BPM

## 2025-01-08 DIAGNOSIS — I25.10 3-VESSEL CORONARY ARTERY DISEASE: ICD-10-CM

## 2025-01-08 DIAGNOSIS — F01.B4 MODERATE VASCULAR DEMENTIA WITH ANXIETY: ICD-10-CM

## 2025-01-08 DIAGNOSIS — S30.22XA SCROTAL HEMATOMA: Primary | ICD-10-CM

## 2025-01-08 DIAGNOSIS — I50.32 CHRONIC DIASTOLIC CONGESTIVE HEART FAILURE: ICD-10-CM

## 2025-01-08 PROCEDURE — 99309 SBSQ NF CARE MODERATE MDM 30: CPT | Performed by: NURSE PRACTITIONER

## 2025-01-08 ASSESSMENT — ENCOUNTER SYMPTOMS
DIARRHEA: 0
FEVER: 0
CONSTIPATION: 0
VOMITING: 0
SHORTNESS OF BREATH: 0
FATIGUE: 0
CHILLS: 0
CONSTIPATION: 0
FEVER: 0
PALPITATIONS: 0
COUGH: 0
ABDOMINAL PAIN: 0
CHILLS: 0
FATIGUE: 0
NAUSEA: 0
DIFFICULTY URINATING: 0
NAUSEA: 0
ABDOMINAL PAIN: 0
PALPITATIONS: 0
NAUSEA: 0
DIFFICULTY URINATING: 0
VOMITING: 0
SHORTNESS OF BREATH: 0
ABDOMINAL PAIN: 0
DIARRHEA: 0
CONSTIPATION: 0
COUGH: 0
VOMITING: 0
CHILLS: 0
PALPITATIONS: 0
DIARRHEA: 0
FATIGUE: 0
SHORTNESS OF BREATH: 0
DIFFICULTY URINATING: 0
COUGH: 0
FEVER: 0

## 2025-01-08 NOTE — PROGRESS NOTES
Subjective   Wayne Burkitt is a 79 y.o. male Here for skilled visit     HPI  Here for weekly skilled visit.  Health problems reviewed.  He tested positive for covid on routine outbreak testing, he states he has mild congestion, denies any sob or cough.    Participating in therapy and feeling stronger.  Eating and drinking well.  Denies any complaints of pain.  No concerns per staff.  Case discussed with multiple disciplines within the facility.   Scrotal and groin hematoma slowly resolving          Review of Systems   Constitutional:  Negative for chills, fatigue and fever.   Respiratory:  Negative for cough and shortness of breath.    Cardiovascular:  Negative for chest pain and palpitations.   Gastrointestinal:  Negative for abdominal pain, constipation, diarrhea, nausea and vomiting.   Genitourinary:  Negative for difficulty urinating.       Objective   /56   Pulse 76   Temp 36.7 °C (98.1 °F)   Resp 19   Wt 78 kg (172 lb)   SpO2 95%   BMI 23.99 kg/m²     Physical Exam  Constitutional:       General: He is not in acute distress.  HENT:      Head: Normocephalic and atraumatic.   Eyes:      Conjunctiva/sclera: Conjunctivae normal.   Cardiovascular:      Rate and Rhythm: Normal rate and regular rhythm.   Pulmonary:      Effort: Pulmonary effort is normal. No respiratory distress.      Breath sounds: Normal breath sounds.   Abdominal:      General: Bowel sounds are normal. There is no distension.      Palpations: Abdomen is soft.      Tenderness: There is no abdominal tenderness.   Genitourinary:     Comments: Large hematoma to left groin, into scrotum and testicles and left hip.  .  Ecchymosis also to right hip.   Appears to be resolving when compared to previous visit.   Musculoskeletal:         General: Normal range of motion.      Right lower leg: No edema.      Left lower leg: No edema.   Skin:     General: Skin is warm and dry.   Neurological:      General: No focal deficit present.      Mental  Status: He is alert.      Comments: Oreinted times 3 but poor historian and inconsistent hx on events and timing over the last few years.  Reports was most recently living with friend, no recollection of being at [facility] for the last 4 months.  No reported hallucinations.    Psychiatric:         Mood and Affect: Mood normal.         Behavior: Behavior normal.         Assessment & Plan  Scrotal hematoma  Slowly resolving.  Post cardiac cath.   3-vessel coronary artery disease  S/p CABG and then subsequent 16 stents.  Most recent stent 12/20/24 to RCA.  He developed groin/scrotal hematoma post procedure, required 2 UPRBC.   He is to follow up with cardiology, isosorbide, metoprolol. Ranolazine and aldactone discontinued by cardiology and will follow up with Dr Giron for further adjustment of meds.  BP acceptable.   On asa, statin and effient.    Moderate vascular dementia with anxiety  Was residing at [facility] prior to hospitalization  Chronic diastolic congestive heart failure  Follow up with cardiology as scheduled.    labs/meds/orders reviewed  staff to monitor and notify for any changes. d  continue with therapy as able.  Follow up with cardiology as scheduled   Metoprolol, ranexa, isosorbide and aldactone discontinued due to hypotension during hospital stay.    Ss for discharge planning, he has been deemed unable to make his decisions, was previously residing at a memory care [facility], he has a POA (friend) who makes decisions.    Continue with covid isolation per facility protocol, staff to [facility] for any worsening of sx

## 2025-01-08 NOTE — LETTER
Patient: Wayne Burkitt  : 1945    Encounter Date: 2025    Subjective  Wayne Burkitt is a 79 y.o. male Here for skilled visit     HPI  Here for weekly skilled visit.  Health problems reviewed.  He tested positive for covid on routine outbreak testing, he states he has mild congestion, denies any sob or cough.    Participating in therapy and feeling stronger.  Eating and drinking well.  Denies any complaints of pain.  No concerns per staff.  Case discussed with multiple disciplines within the facility.   Scrotal and groin hematoma slowly resolving          Review of Systems   Constitutional:  Negative for chills, fatigue and fever.   Respiratory:  Negative for cough and shortness of breath.    Cardiovascular:  Negative for chest pain and palpitations.   Gastrointestinal:  Negative for abdominal pain, constipation, diarrhea, nausea and vomiting.   Genitourinary:  Negative for difficulty urinating.       Objective  /56   Pulse 76   Temp 36.7 °C (98.1 °F)   Resp 19   Wt 78 kg (172 lb)   SpO2 95%   BMI 23.99 kg/m²     Physical Exam  Constitutional:       General: He is not in acute distress.  HENT:      Head: Normocephalic and atraumatic.   Eyes:      Conjunctiva/sclera: Conjunctivae normal.   Cardiovascular:      Rate and Rhythm: Normal rate and regular rhythm.   Pulmonary:      Effort: Pulmonary effort is normal. No respiratory distress.      Breath sounds: Normal breath sounds.   Abdominal:      General: Bowel sounds are normal. There is no distension.      Palpations: Abdomen is soft.      Tenderness: There is no abdominal tenderness.   Genitourinary:     Comments: Large hematoma to left groin, into scrotum and testicles and left hip.  .  Ecchymosis also to right hip.   Appears to be resolving when compared to previous visit.   Musculoskeletal:         General: Normal range of motion.      Right lower leg: No edema.      Left lower leg: No edema.   Skin:     General: Skin is warm and dry.    Neurological:      General: No focal deficit present.      Mental Status: He is alert.      Comments: Oreinted times 3 but poor historian and inconsistent hx on events and timing over the last few years.  Reports was most recently living with friend, no recollection of being at [facility] for the last 4 months.  No reported hallucinations.    Psychiatric:         Mood and Affect: Mood normal.         Behavior: Behavior normal.         Assessment & Plan  Scrotal hematoma  Slowly resolving.  Post cardiac cath.   3-vessel coronary artery disease  S/p CABG and then subsequent 16 stents.  Most recent stent 12/20/24 to RCA.  He developed groin/scrotal hematoma post procedure, required 2 UPRBC.   He is to follow up with cardiology, isosorbide, metoprolol. Ranolazine and aldactone discontinued by cardiology and will follow up with Dr Giron for further adjustment of meds.  BP acceptable.   On asa, statin and effient.    Moderate vascular dementia with anxiety  [facility]  Chronic diastolic congestive heart failure  Follow up with cardiology as scheduled.    labs/meds/orders reviewed  staff to monitor and notify for any changes. d  continue with therapy as able.  Follow up with cardiology as scheduled   Metoprolol, ranexa, isosorbide and aldactone discontinued due to hypotension during hospital stay.    Ss for discharge planning, he has been deemed unable to make his decisions, was previously residing at a memory care [facility], he has a POA (friend) who makes decisions.    Continue with covid isolation per facility protocol, staff to [facility] for any worsening of sx      Electronically Signed By: ERICK Card   1/8/25  4:26 PM

## 2025-01-08 NOTE — ASSESSMENT & PLAN NOTE
S/p CABG and then subsequent 16 stents.  Most recent stent 12/20/24 to RCA.  He developed groin/scrotal hematoma post procedure, required 2 UPRBC.   He is to follow up with cardiology, isosorbide, metoprolol. Ranolazine and aldactone discontinued by cardiology and will follow up with Dr Giron for further adjustment of meds.  BP acceptable.   On asa, statin and effient.

## 2025-01-09 ENCOUNTER — APPOINTMENT (OUTPATIENT)
Dept: CARDIOLOGY | Facility: CLINIC | Age: 80
End: 2025-01-09
Payer: MEDICARE

## 2025-01-15 ENCOUNTER — NURSING HOME VISIT (OUTPATIENT)
Dept: POST ACUTE CARE | Facility: EXTERNAL LOCATION | Age: 80
End: 2025-01-15
Payer: MEDICARE

## 2025-01-15 VITALS
DIASTOLIC BLOOD PRESSURE: 70 MMHG | SYSTOLIC BLOOD PRESSURE: 123 MMHG | WEIGHT: 172 LBS | HEART RATE: 79 BPM | RESPIRATION RATE: 18 BRPM | TEMPERATURE: 97.6 F | OXYGEN SATURATION: 99 % | BODY MASS INDEX: 23.99 KG/M2

## 2025-01-15 DIAGNOSIS — S30.22XA SCROTAL HEMATOMA: ICD-10-CM

## 2025-01-15 DIAGNOSIS — I25.10 3-VESSEL CORONARY ARTERY DISEASE: Primary | ICD-10-CM

## 2025-01-15 DIAGNOSIS — I25.5 ISCHEMIC CARDIOMYOPATHY: ICD-10-CM

## 2025-01-15 DIAGNOSIS — G40.909 SEIZURE DISORDER (MULTI): ICD-10-CM

## 2025-01-15 DIAGNOSIS — F01.B4 MODERATE VASCULAR DEMENTIA WITH ANXIETY: ICD-10-CM

## 2025-01-15 PROCEDURE — 99309 SBSQ NF CARE MODERATE MDM 30: CPT | Performed by: NURSE PRACTITIONER

## 2025-01-15 ASSESSMENT — ENCOUNTER SYMPTOMS
PALPITATIONS: 0
DIARRHEA: 0
COUGH: 0
ABDOMINAL PAIN: 0
SHORTNESS OF BREATH: 0
VOMITING: 0
FEVER: 0
FATIGUE: 0
DIFFICULTY URINATING: 0
CONSTIPATION: 0
CHILLS: 0
NAUSEA: 0

## 2025-01-15 NOTE — PROGRESS NOTES
Subjective   Wayne Burkitt is a 79 y.o. male Here for skilled visit     HPI  Here for weekly skilled visit.  Health problems reviewed.  He tested positive for covid on routine outbreak testing and is completing isolation.  No sx at present.      Participating in therapy and feeling stronger.  Eating and drinking well.  Denies any complaints of pain.  No concerns per staff.  Case discussed with multiple disciplines within the facility.   Scrotal and groin hematoma slowly resolving          Review of Systems   Constitutional:  Negative for chills, fatigue and fever.   Respiratory:  Negative for cough and shortness of breath.    Cardiovascular:  Negative for chest pain and palpitations.   Gastrointestinal:  Negative for abdominal pain, constipation, diarrhea, nausea and vomiting.   Genitourinary:  Negative for difficulty urinating.       Objective   /70   Pulse 79   Temp 36.4 °C (97.6 °F)   Resp 18   Wt 78 kg (172 lb)   SpO2 99%   BMI 23.99 kg/m²     Physical Exam  Constitutional:       General: He is not in acute distress.  HENT:      Head: Normocephalic and atraumatic.   Eyes:      Conjunctiva/sclera: Conjunctivae normal.   Cardiovascular:      Rate and Rhythm: Normal rate and regular rhythm.   Pulmonary:      Effort: Pulmonary effort is normal. No respiratory distress.      Breath sounds: Normal breath sounds.   Abdominal:      General: Bowel sounds are normal. There is no distension.      Palpations: Abdomen is soft.      Tenderness: There is no abdominal tenderness.   Genitourinary:     Comments: Large hematoma to left groin, into scrotum and testicles and left hip.  .  Ecchymosis also to right hip.   Appears to be resolving when compared to previous visit.   Musculoskeletal:         General: Normal range of motion.      Right lower leg: No edema.      Left lower leg: No edema.   Skin:     General: Skin is warm and dry.   Neurological:      General: No focal deficit present.      Mental Status: He is  alert.      Comments: Oreinted times 3 but poor historian and inconsistent hx on events and timing over the last few years.  Reports was most recently living with friend, no recollection of being at [facility] for the last 4 months.  No reported hallucinations.    Psychiatric:         Mood and Affect: Mood normal.         Behavior: Behavior normal.         Assessment & Plan  3-vessel coronary artery disease  S/p CABG and then subsequent 16 stents.  Most recent stent 12/20/24 to RCA.  He developed groin/scrotal hematoma post procedure, required 2 UPRBC.   He is to follow up with cardiology, isosorbide, metoprolol. Ranolazine and aldactone discontinued by cardiology and will follow up with Dr Giron for further adjustment of meds.  BP acceptable.   On asa, statin and effient.    Ischemic cardiomyopathy  Follow up with cardiology as scheduled. EF 55-60% on echo 12/24.    Scrotal hematoma  Slowly resolving.  Post cardiac cath.   Seizure disorder (Multi)  no known recent seizures.  Staff to monitor and notify for any seizure activity, on keppra.     Moderate vascular dementia with anxiety  Was residing at [facility] prior to hospitalization  labs/meds/orders reviewed  staff to monitor and notify for any changes. d  continue with therapy as able.  Follow up with cardiology as scheduled   Metoprolol, ranexa, isosorbide and aldactone discontinued due to hypotension during hospital stay.    Ss for discharge planning, he has been deemed unable to make his decisions, was previously residing at a memory care [facility], he has a POA (friend) who makes decisions.    Continue with covid isolation per facility protocol, [facility]

## 2025-01-15 NOTE — LETTER
Patient: Wayne Burkitt  : 1945    Encounter Date: 01/15/2025    Subjective  Wayne Burkitt is a 79 y.o. male Here for skilled visit     HPI  Here for weekly skilled visit.  Health problems reviewed.  He tested positive for covid on routine outbreak testing and is completing isolation.  No sx at present.      Participating in therapy and feeling stronger.  Eating and drinking well.  Denies any complaints of pain.  No concerns per staff.  Case discussed with multiple disciplines within the facility.   Scrotal and groin hematoma slowly resolving          Review of Systems   Constitutional:  Negative for chills, fatigue and fever.   Respiratory:  Negative for cough and shortness of breath.    Cardiovascular:  Negative for chest pain and palpitations.   Gastrointestinal:  Negative for abdominal pain, constipation, diarrhea, nausea and vomiting.   Genitourinary:  Negative for difficulty urinating.       Objective  /70   Pulse 79   Temp 36.4 °C (97.6 °F)   Resp 18   Wt 78 kg (172 lb)   SpO2 99%   BMI 23.99 kg/m²     Physical Exam  Constitutional:       General: He is not in acute distress.  HENT:      Head: Normocephalic and atraumatic.   Eyes:      Conjunctiva/sclera: Conjunctivae normal.   Cardiovascular:      Rate and Rhythm: Normal rate and regular rhythm.   Pulmonary:      Effort: Pulmonary effort is normal. No respiratory distress.      Breath sounds: Normal breath sounds.   Abdominal:      General: Bowel sounds are normal. There is no distension.      Palpations: Abdomen is soft.      Tenderness: There is no abdominal tenderness.   Genitourinary:     Comments: Large hematoma to left groin, into scrotum and testicles and left hip.  .  Ecchymosis also to right hip.   Appears to be resolving when compared to previous visit.   Musculoskeletal:         General: Normal range of motion.      Right lower leg: No edema.      Left lower leg: No edema.   Skin:     General: Skin is warm and dry.    Neurological:      General: No focal deficit present.      Mental Status: He is alert.      Comments: Oreinted times 3 but poor historian and inconsistent hx on events and timing over the last few years.  Reports was most recently living with friend, no recollection of being at [facility] for the last 4 months.  No reported hallucinations.    Psychiatric:         Mood and Affect: Mood normal.         Behavior: Behavior normal.         Assessment & Plan  3-vessel coronary artery disease  S/p CABG and then subsequent 16 stents.  Most recent stent 12/20/24 to RCA.  He developed groin/scrotal hematoma post procedure, required 2 UPRBC.   He is to follow up with cardiology, isosorbide, metoprolol. Ranolazine and aldactone discontinued by cardiology and will follow up with Dr Giron for further adjustment of meds.  BP acceptable.   On asa, statin and effient.    Ischemic cardiomyopathy  Follow up with cardiology as scheduled. EF 55-60% on echo 12/24.    Scrotal hematoma  Slowly resolving.  Post cardiac cath.   Seizure disorder (Multi)  no known recent seizures.  Staff to monitor and notify for any seizure activity, on keppra.     Moderate vascular dementia with anxiety  Was residing at [facility] prior to hospitalization  labs/meds/orders reviewed  staff to monitor and notify for any changes. d  continue with therapy as able.  Follow up with cardiology as scheduled   Metoprolol, ranexa, isosorbide and aldactone discontinued due to hypotension during hospital stay.    Ss for discharge planning, he has been deemed unable to make his decisions, was previously residing at a memory care [facility], he has a POA (friend) who makes decisions.    Continue with covid isolation per facility protocol, staff to [facility] for any worsening of sx      Electronically Signed By: ERICK Card   1/15/25  3:15 PM

## 2025-01-18 ENCOUNTER — NURSING HOME VISIT (OUTPATIENT)
Dept: POST ACUTE CARE | Facility: EXTERNAL LOCATION | Age: 80
End: 2025-01-18
Payer: MEDICARE

## 2025-01-18 DIAGNOSIS — I50.32 CHRONIC DIASTOLIC CONGESTIVE HEART FAILURE: ICD-10-CM

## 2025-01-18 DIAGNOSIS — F01.B4 MODERATE VASCULAR DEMENTIA WITH ANXIETY: ICD-10-CM

## 2025-01-18 DIAGNOSIS — I25.10 3-VESSEL CORONARY ARTERY DISEASE: Primary | ICD-10-CM

## 2025-01-18 DIAGNOSIS — I25.10 CORONARY ARTERY DISEASE INVOLVING NATIVE CORONARY ARTERY OF NATIVE HEART WITHOUT ANGINA PECTORIS: ICD-10-CM

## 2025-01-18 DIAGNOSIS — I10 HYPERTENSION, UNSPECIFIED TYPE: ICD-10-CM

## 2025-01-18 DIAGNOSIS — S30.22XA SCROTAL HEMATOMA: ICD-10-CM

## 2025-01-18 DIAGNOSIS — G40.909 SEIZURE DISORDER (MULTI): ICD-10-CM

## 2025-01-18 DIAGNOSIS — I25.5 ISCHEMIC CARDIOMYOPATHY: ICD-10-CM

## 2025-01-18 PROCEDURE — 99350 HOME/RES VST EST HIGH MDM 60: CPT | Performed by: FAMILY MEDICINE

## 2025-01-20 ENCOUNTER — APPOINTMENT (OUTPATIENT)
Dept: CARDIOLOGY | Facility: CLINIC | Age: 80
End: 2025-01-20
Payer: MEDICARE

## 2025-01-20 VITALS
SYSTOLIC BLOOD PRESSURE: 124 MMHG | OXYGEN SATURATION: 99 % | BODY MASS INDEX: 23.52 KG/M2 | DIASTOLIC BLOOD PRESSURE: 60 MMHG | WEIGHT: 168 LBS | HEART RATE: 78 BPM | HEIGHT: 71 IN

## 2025-01-20 DIAGNOSIS — E78.00 PURE HYPERCHOLESTEROLEMIA: ICD-10-CM

## 2025-01-20 DIAGNOSIS — I10 PRIMARY HYPERTENSION: ICD-10-CM

## 2025-01-20 DIAGNOSIS — I25.10 CORONARY ARTERY DISEASE INVOLVING NATIVE CORONARY ARTERY OF NATIVE HEART WITHOUT ANGINA PECTORIS: Primary | ICD-10-CM

## 2025-01-20 LAB
ATRIAL RATE: 60 BPM
PR INTERVAL: 298 MS
Q ONSET: 197 MS
QRS COUNT: 10 BEATS
QRS DURATION: 166 MS
QT INTERVAL: 508 MS
QTC CALCULATION(BAZETT): 508 MS
QTC FREDERICIA: 508 MS
R AXIS: -82 DEGREES
T AXIS: 90 DEGREES
T OFFSET: 451 MS
VENTRICULAR RATE: 60 BPM

## 2025-01-20 PROCEDURE — 99214 OFFICE O/P EST MOD 30 MIN: CPT | Performed by: INTERNAL MEDICINE

## 2025-01-20 PROCEDURE — 1111F DSCHRG MED/CURRENT MED MERGE: CPT | Performed by: INTERNAL MEDICINE

## 2025-01-20 PROCEDURE — 3078F DIAST BP <80 MM HG: CPT | Performed by: INTERNAL MEDICINE

## 2025-01-20 PROCEDURE — 3074F SYST BP LT 130 MM HG: CPT | Performed by: INTERNAL MEDICINE

## 2025-01-20 PROCEDURE — 1159F MED LIST DOCD IN RCRD: CPT | Performed by: INTERNAL MEDICINE

## 2025-01-20 NOTE — PROGRESS NOTES
"[facility]    Cardiology Office Follow-up: [facility] hospital    Patient previously seen for diastolic dysfunction, normal EF, CAD, prior CABG, HTN, HPL, chronic stable angina, normal stress testing but persistent angina.    At the previous encounter, the patient testing was reviewed, decision made for cath due to angina in spite of normal stress.    After the encounter the patient completed the following testing: cath was done and had groin complication, requiring transfusion and hospital stay.      There were no interval changes in medical history before this appointment.      Today the patient reports: occasional angina, mild.  Lasts for about one minute.  Is still able to do exercise.      Additional recent non-cardiac testing includes: none    ROS: Remainder of 12 review of systems is negative aside from chief complaint.    PHYSICAL EXAM  Vitals:    01/20/25 0954   BP: 124/60   BP Location: Left arm   Patient Position: Sitting   Pulse: 78   SpO2: 99%   Weight: 76.2 kg (168 lb)   Height: 1.803 m (5' 11\")     General: No acute distress, appears comfortable  Cardiac: Regular rate and rhythm with no murmurs rubs or gallops, normal S1-S2  Pulmonary: Clear to auscultation bilaterally with no rales or rhonchi, normal respiratory effort  Gastrointestinal: Soft abdomen with no tenderness or guarding, no rebound  Musculoskeletal: No lower extremity edema, normal  Neurological: Alert and oriented x 4, appropriate, moving all extremities well, normal affect  Psychiatric: Normal affect, mood appropriate to occasion  Skin: No rashes, hives or jaundice  HEENT: Normocephalic, atraumatic.  Pupils equal round and reactive.    Assessment/Plan   Diagnoses and all orders for this visit:  Coronary artery disease involving native coronary artery of native heart without angina pectoris  -     Follow Up In Cardiology; Future      Assessment and plan (narrative):    Wayne Burkitt is a 79 y.o. male with CAD, prior CABG, recent PCI " of the RCA with TATYANA complicated by groin hematoma.  Doing much better at this time, no angina and feeling good.  Will leave the SVG to OM 70% for medical therapy at this time due to high risk from anemia and frailty.  Continue current meds otherwise.    CV plan by problem:     CAD: Patient has chronic stable angina, class II.  Current GDMT includes ASA, effient, which I will continue.  No medications added/increased this visit.  Trend of condition is improving after PCI.    HPL: Patient has pure hyperlipidemia.  Patient is not statin intolerant.  Current GDMT includes pravastatin, which I will continue.  Trend of condition is stable.    HTN: Patient has primary HTN, well controlled.  Current GDMT include no medications due to recent hospitalization with low BP, which I will continue.  Goal BP is <130/90. no medications added/increased this visit.  Trend of condition is stable.  Will continue to watch closely.    CHF: Patient has chronic diastolic CHF, class III.  Current GDMT includes no meds after recent hospital stay with low BP which I will continue.  BP/HR/edema is well controlled.  no medications added/increased this visit.  Trend of condition is stable, continue to monitor.    Followup: 6 months    Gabino Giron MD    Director of Interventional Cardiology  [facility] [place] [facility]

## 2025-01-24 NOTE — PROGRESS NOTES
Readmit to [facility]    79 y.o. male hospitalized 12/20/24.  Discharged to a SNF and has returned to [facility]    HPI  A 79 yrs old male with hx of CAD s/p CABG and s/p PCI ,HTN.DM.s/p pacemaker .angina on exertion ,dementia who comes in for elective left heart cath by Dr Giron on 12/20/24 .The pt had a successful PCI to mid of RCA ,postop,complicated by groin hematoma with syncope /hypotension .The pt received 2 units PRBC second to Hgb down to 6 and sx of chest pain on 12/22/24 . St discharge, he has no sx of chest pain , the right groin soft with some bruises along to the scrotum ,The pt has no any difficult for urination . Hgb at 9.2 on 12/24 VS: bp 110/58 mmhg     Past Medical History:   Diagnosis Date    Auditory hallucinations 09/28/2024    Conversion reaction 09/28/2024    Coronary artery disease involving native coronary artery of native heart without angina pectoris 09/28/2024    Epistaxis, recurrent 09/28/2024    Ischemic cardiomyopathy 09/28/2024    Moderate vascular dementia with anxiety 09/28/2024      Past Surgical History:   Procedure Laterality Date    CARDIAC CATHETERIZATION N/A 12/20/2024    Procedure: Left Heart Cath, With LV;  Surgeon: Gabino Giron MD;  Location: [facility] Cardiac [facility] Lab;  Service: Cardiovascular;  Laterality: N/A;    CARDIAC CATHETERIZATION N/A 12/20/2024    Procedure: PCI TATYANA Stent- Coronary;  Surgeon: Gabino Giron MD;  Location: [facility] Cardiac Cath Lab;  Service: Cardiovascular;  Laterality: N/A;    CORONARY ANGIOPLASTY WITH STENT PLACEMENT      states he has 16 stents after the CABG    CORONARY ARTERY BYPASS GRAFT  2010    PACEMAKER PLACEMENT      TOTAL HIP ARTHROPLASTY       Family History   Family history unknown: Yes      Social History     Socioeconomic History    Marital status: Single     Spouse name: Not on file    Number of children: Not on file    Years of education: Not on file    Highest education level: Not on file   Occupational  History    Not on file   Tobacco Use    Smoking status: Former     Current packs/day: 0.00     Types: Cigarettes     Quit date:      Years since quittin.0    Smokeless tobacco: Never   Substance and Sexual Activity    Alcohol use: Not Currently     Comment: Stopped into .    Drug use: Never    Sexual activity: Defer   Other Topics Concern    Not on file   Social History Narrative    Not on file     Social Drivers of Health     Financial Resource Strain: Low Risk  (2024)    Overall Financial Resource Strain (CARDIA)     Difficulty of Paying Living Expenses: Not hard at all   Food Insecurity: No Food Insecurity (2024)    Hunger Vital Sign     Worried About Running Out of Food in the Last Year: Never true     Ran Out of Food in the Last Year: Never true   Transportation Needs: No Transportation Needs (2024)    PRAPARE - Transportation     Lack of Transportation (Medical): No     Lack of Transportation (Non-Medical): No   Physical Activity: Not on file   Stress: Not on file   Social Connections: Not on file   Intimate Partner Violence: Not At Risk (2024)    Humiliation, Afraid, Rape, and Kick questionnaire     Fear of Current or Ex-Partner: No     Emotionally Abused: No     Physically Abused: No     Sexually Abused: No   Housing Stability: Low Risk  (2024)    Housing Stability Vital Sign     Unable to Pay for Housing in the Last Year: No     Number of Times Moved in the Last Year: 1     Homeless in the Last Year: No       Current Outpatient Medications on File Prior to Visit   Medication Sig Dispense Refill    acetaminophen (Tylenol) 325 mg tablet Take 2 tablets (650 mg) by mouth every 4 hours if needed for mild pain (1 - 3) or fever (temp greater than 38.0 C).      aspirin 81 mg EC tablet Take 1 tablet (81 mg) by mouth once daily.      brimonidine (AlphaGAN) 0.2 % ophthalmic solution Administer 1 drop into both eyes 2 times a day. Alphagan P - glaucoma      donepezil (Aricept)  5 mg tablet Take 1 tablet (5 mg) by mouth once daily at bedtime.      ferrous sulfate 325 (65 Fe) MG EC tablet Take 65 mg by mouth once daily with breakfast. Do not crush, chew, or split.      levETIRAcetam (Keppra) 1,000 mg tablet Take 1 tablet (1,000 mg) by mouth 2 times a day.      nitroglycerin (Nitrostat) 0.4 mg SL tablet Place 1 tablet (0.4 mg) under the tongue every 5 minutes if needed for chest pain. 90 tablet 12    pantoprazole (ProtoNix) 40 mg EC tablet Take 1 tablet (40 mg) by mouth once daily in the morning. Take before meals. Do not crush, chew, or split.      prasugrel (Effient) 10 mg tablet Take 1 tablet (10 mg) by mouth once daily.      pravastatin (Pravachol) 80 mg tablet Take 1 tablet (80 mg) by mouth once daily at bedtime.      QUEtiapine (SEROquel) 50 mg tablet Take 1 tablet (50 mg) by mouth once daily at bedtime.      traZODone (Desyrel) 50 mg tablet Take 0.5 tablets (25 mg) by mouth as needed at bedtime for sleep.       No current facility-administered medications on file prior to visit.       Allergies   Allergen Reactions    Alprazolam Anaphylaxis    Bepotastine Besilate Swelling    Clopidogrel Swelling    Doxazosin Swelling    Lisinopril Swelling    Metronidazole Swelling    Hydrochlorothiazide Unknown    Methylprednisolone Unknown    Phenytoin Other    Sertraline Unknown    Amlodipine Hives    Atorvastatin Hives    Cephalexin Hives    Ciprofloxacin Itching    Diphenhydramine Hcl Hives    Guaifenesin Hives    Hydrocodone Hives    Telmisartan Hives    Topiramate Rash         ROS: Denies chest pain, SOB, Headache, GI problems     Visit Vitals  Smoking Status Former      There were no vitals filed for this visit.    PHYSICAL EXAM:  Alert and oriented x3.  Eyes: EOM grossly intact  Neck supple without lymph adenopathy or carotid bruit.  No masses or thyromegaly  Heart regular rate and rhythm without murmur.  Lungs clear to auscultation.  Legs without edema.  Gait- ambulates independently  Speech  clear.  Hearing adequate.      Hospital records reviewed  Medications reviewed at [facility]    DIAGNOSIS/PLAN:  1. 3-vessel coronary artery disease (Primary)    2. Coronary artery disease involving native coronary artery of native heart without angina pectoris    3. Ischemic cardiomyopathy    4. Moderate vascular dementia with anxiety    5. Hypertension, unspecified type    6. Chronic diastolic congestive heart failure    7. Seizure disorder (Multi)  No recent seizure    8. Scrotal hematoma  resolving      Periodic lab;  maintain activity  Spoke with patient's POA.  Friend.  Who says patient still talks about returning to [place].      Mikhail Galaviz DO, CHASITY

## 2025-02-08 ENCOUNTER — NURSING HOME VISIT (OUTPATIENT)
Dept: POST ACUTE CARE | Facility: EXTERNAL LOCATION | Age: 80
End: 2025-02-08
Payer: MEDICARE

## 2025-02-08 DIAGNOSIS — R10.9 ABDOMINAL PAIN, UNSPECIFIED ABDOMINAL LOCATION: Primary | ICD-10-CM

## 2025-02-08 DIAGNOSIS — K59.1 FUNCTIONAL DIARRHEA: ICD-10-CM

## 2025-02-08 DIAGNOSIS — R63.5 WEIGHT GAIN, ABNORMAL: ICD-10-CM

## 2025-02-08 DIAGNOSIS — Z79.02 LONG TERM (CURRENT) USE OF ANTITHROMBOTICS/ANTIPLATELETS: ICD-10-CM

## 2025-02-08 PROCEDURE — 99349 HOME/RES VST EST MOD MDM 40: CPT | Performed by: FAMILY MEDICINE

## 2025-02-09 ENCOUNTER — HOSPITAL ENCOUNTER (INPATIENT)
Facility: HOSPITAL | Age: 80
LOS: 2 days | Discharge: HOME | End: 2025-02-12
Attending: EMERGENCY MEDICINE | Admitting: STUDENT IN AN ORGANIZED HEALTH CARE EDUCATION/TRAINING PROGRAM
Payer: MEDICARE

## 2025-02-09 ENCOUNTER — APPOINTMENT (OUTPATIENT)
Dept: RADIOLOGY | Facility: HOSPITAL | Age: 80
End: 2025-02-09
Payer: MEDICARE

## 2025-02-09 DIAGNOSIS — R29.6 FREQUENT FALLS: ICD-10-CM

## 2025-02-09 DIAGNOSIS — D64.9 ANEMIA: Primary | ICD-10-CM

## 2025-02-09 DIAGNOSIS — I25.10 3-VESSEL CORONARY ARTERY DISEASE: ICD-10-CM

## 2025-02-09 DIAGNOSIS — K92.1 MELENA: ICD-10-CM

## 2025-02-09 LAB
ABO GROUP (TYPE) IN BLOOD: NORMAL
ALBUMIN SERPL BCP-MCNC: 3.7 G/DL (ref 3.4–5)
ALP SERPL-CCNC: 70 U/L (ref 33–136)
ALT SERPL W P-5'-P-CCNC: 20 U/L (ref 10–52)
ANION GAP SERPL CALC-SCNC: 12 MMOL/L (ref 10–20)
ANTIBODY SCREEN: NORMAL
AST SERPL W P-5'-P-CCNC: 61 U/L (ref 9–39)
BASOPHILS # BLD AUTO: 0.05 X10*3/UL (ref 0–0.1)
BASOPHILS NFR BLD AUTO: 0.9 %
BILIRUB SERPL-MCNC: 0.3 MG/DL (ref 0–1.2)
BUN SERPL-MCNC: 22 MG/DL (ref 6–23)
CALCIUM SERPL-MCNC: 8.6 MG/DL (ref 8.6–10.3)
CHLORIDE SERPL-SCNC: 110 MMOL/L (ref 98–107)
CO2 SERPL-SCNC: 22 MMOL/L (ref 21–32)
CREAT SERPL-MCNC: 0.93 MG/DL (ref 0.5–1.3)
EGFRCR SERPLBLD CKD-EPI 2021: 85 ML/MIN/1.73M*2
EOSINOPHIL # BLD AUTO: 0.17 X10*3/UL (ref 0–0.4)
EOSINOPHIL NFR BLD AUTO: 3.2 %
ERYTHROCYTE [DISTWIDTH] IN BLOOD BY AUTOMATED COUNT: 15.9 % (ref 11.5–14.5)
GLUCOSE SERPL-MCNC: 125 MG/DL (ref 74–99)
HCT VFR BLD AUTO: 24.1 % (ref 41–52)
HGB BLD-MCNC: 6.5 G/DL (ref 13.5–17.5)
IMM GRANULOCYTES # BLD AUTO: 0.01 X10*3/UL (ref 0–0.5)
IMM GRANULOCYTES NFR BLD AUTO: 0.2 % (ref 0–0.9)
LYMPHOCYTES # BLD AUTO: 1.15 X10*3/UL (ref 0.8–3)
LYMPHOCYTES NFR BLD AUTO: 21.6 %
MAGNESIUM SERPL-MCNC: 1.97 MG/DL (ref 1.6–2.4)
MCH RBC QN AUTO: 28.6 PG (ref 26–34)
MCHC RBC AUTO-ENTMCNC: 27 G/DL (ref 32–36)
MCV RBC AUTO: 106 FL (ref 80–100)
MONOCYTES # BLD AUTO: 0.43 X10*3/UL (ref 0.05–0.8)
MONOCYTES NFR BLD AUTO: 8.1 %
NEUTROPHILS # BLD AUTO: 3.51 X10*3/UL (ref 1.6–5.5)
NEUTROPHILS NFR BLD AUTO: 66 %
NRBC BLD-RTO: 0 /100 WBCS (ref 0–0)
PLATELET # BLD AUTO: 402 X10*3/UL (ref 150–450)
POTASSIUM SERPL-SCNC: 4.8 MMOL/L (ref 3.5–5.3)
PROT SERPL-MCNC: 6.4 G/DL (ref 6.4–8.2)
RBC # BLD AUTO: 2.27 X10*6/UL (ref 4.5–5.9)
RH FACTOR (ANTIGEN D): NORMAL
SODIUM SERPL-SCNC: 139 MMOL/L (ref 136–145)
WBC # BLD AUTO: 5.3 X10*3/UL (ref 4.4–11.3)

## 2025-02-09 PROCEDURE — 36415 COLL VENOUS BLD VENIPUNCTURE: CPT

## 2025-02-09 PROCEDURE — 96374 THER/PROPH/DIAG INJ IV PUSH: CPT

## 2025-02-09 PROCEDURE — 36430 TRANSFUSION BLD/BLD COMPNT: CPT

## 2025-02-09 PROCEDURE — 86922 COMPATIBILITY TEST ANTIGLOB: CPT

## 2025-02-09 PROCEDURE — 86901 BLOOD TYPING SEROLOGIC RH(D): CPT

## 2025-02-09 PROCEDURE — 74174 CTA ABD&PLVS W/CONTRAST: CPT | Mod: FOREIGN READ | Performed by: RADIOLOGY

## 2025-02-09 PROCEDURE — 99285 EMERGENCY DEPT VISIT HI MDM: CPT | Mod: 25 | Performed by: EMERGENCY MEDICINE

## 2025-02-09 PROCEDURE — 2550000001 HC RX 255 CONTRASTS: Performed by: EMERGENCY MEDICINE

## 2025-02-09 PROCEDURE — 2Y41X5Z PACKING OF NASAL REGION USING PACKING MATERIAL: ICD-10-PCS

## 2025-02-09 PROCEDURE — 85025 COMPLETE CBC W/AUTO DIFF WBC: CPT

## 2025-02-09 PROCEDURE — 80053 COMPREHEN METABOLIC PANEL: CPT

## 2025-02-09 PROCEDURE — 30903 CONTROL OF NOSEBLEED: CPT | Performed by: EMERGENCY MEDICINE

## 2025-02-09 PROCEDURE — 74174 CTA ABD&PLVS W/CONTRAST: CPT

## 2025-02-09 PROCEDURE — 2500000005 HC RX 250 GENERAL PHARMACY W/O HCPCS

## 2025-02-09 PROCEDURE — 30901 CONTROL OF NOSEBLEED: CPT | Mod: LT

## 2025-02-09 PROCEDURE — 99223 1ST HOSP IP/OBS HIGH 75: CPT

## 2025-02-09 PROCEDURE — 2500000004 HC RX 250 GENERAL PHARMACY W/ HCPCS (ALT 636 FOR OP/ED)

## 2025-02-09 PROCEDURE — 2500000001 HC RX 250 WO HCPCS SELF ADMINISTERED DRUGS (ALT 637 FOR MEDICARE OP)

## 2025-02-09 PROCEDURE — P9016 RBC LEUKOCYTES REDUCED: HCPCS

## 2025-02-09 PROCEDURE — 83735 ASSAY OF MAGNESIUM: CPT

## 2025-02-09 PROCEDURE — 99285 EMERGENCY DEPT VISIT HI MDM: CPT | Performed by: EMERGENCY MEDICINE

## 2025-02-09 RX ORDER — OXYMETAZOLINE HCL 0.05 %
2 SPRAY, NON-AEROSOL (ML) NASAL EVERY 12 HOURS PRN
Status: DISCONTINUED | OUTPATIENT
Start: 2025-02-09 | End: 2025-02-12 | Stop reason: HOSPADM

## 2025-02-09 RX ORDER — LIDOCAINE HYDROCHLORIDE 10 MG/ML
10 INJECTION, SOLUTION INFILTRATION; PERINEURAL ONCE
Status: COMPLETED | OUTPATIENT
Start: 2025-02-09 | End: 2025-02-09

## 2025-02-09 RX ORDER — SILVER NITRATE 38.21; 12.74 MG/1; MG/1
STICK TOPICAL ONCE
Status: COMPLETED | OUTPATIENT
Start: 2025-02-09 | End: 2025-02-09

## 2025-02-09 RX ORDER — TRANEXAMIC ACID 100 MG/ML
1000 INJECTION, SOLUTION INTRAVENOUS ONCE
Status: COMPLETED | OUTPATIENT
Start: 2025-02-09 | End: 2025-02-09

## 2025-02-09 RX ADMIN — TRANEXAMIC ACID 1000 MG: 1 INJECTION, SOLUTION INTRAVENOUS at 19:57

## 2025-02-09 RX ADMIN — IOHEXOL 90 ML: 350 INJECTION, SOLUTION INTRAVENOUS at 21:06

## 2025-02-09 RX ADMIN — SILVER NITRATE APPLICATORS 1 APPLICATION: 25; 75 STICK TOPICAL at 20:20

## 2025-02-09 RX ADMIN — NASAL DECONGESTANT 2 SPRAY: 0.05 SPRAY NASAL at 20:04

## 2025-02-09 RX ADMIN — LIDOCAINE HYDROCHLORIDE 10 ML: 10 INJECTION, SOLUTION INFILTRATION; PERINEURAL at 19:57

## 2025-02-09 ASSESSMENT — PAIN - FUNCTIONAL ASSESSMENT
PAIN_FUNCTIONAL_ASSESSMENT: 0-10
PAIN_FUNCTIONAL_ASSESSMENT: 0-10

## 2025-02-09 ASSESSMENT — COLUMBIA-SUICIDE SEVERITY RATING SCALE - C-SSRS
6. HAVE YOU EVER DONE ANYTHING, STARTED TO DO ANYTHING, OR PREPARED TO DO ANYTHING TO END YOUR LIFE?: NO
1. IN THE PAST MONTH, HAVE YOU WISHED YOU WERE DEAD OR WISHED YOU COULD GO TO SLEEP AND NOT WAKE UP?: NO
2. HAVE YOU ACTUALLY HAD ANY THOUGHTS OF KILLING YOURSELF?: NO

## 2025-02-09 ASSESSMENT — PAIN SCALES - GENERAL
PAINLEVEL_OUTOF10: 2
PAINLEVEL_OUTOF10: 0 - NO PAIN

## 2025-02-09 ASSESSMENT — LIFESTYLE VARIABLES
HAVE YOU EVER FELT YOU SHOULD CUT DOWN ON YOUR DRINKING: NO
EVER FELT BAD OR GUILTY ABOUT YOUR DRINKING: NO
TOTAL SCORE: 0
EVER HAD A DRINK FIRST THING IN THE MORNING TO STEADY YOUR NERVES TO GET RID OF A HANGOVER: NO
HAVE PEOPLE ANNOYED YOU BY CRITICIZING YOUR DRINKING: NO

## 2025-02-09 NOTE — PROGRESS NOTES
"Subjective   [facility]    Patient ID: [id number]     Wayne Burkitt is a 79 y.o. male who has been c/o intermittent stomach pain.  Staff has noticed intermittent abdominal distension.  Diarrhea has been \"black\" since admission.  However, he has gained 8 lb since 9/2024.    Meds reviewed.  Has been taking Miralax and iron supplements daily.    Effient has potential adverse effect of diarrhea    Objective   Vital signs reviewed in PCC  Physical Exam:   Non-ill appearing male, ambulating in hallway.  Conversive.  No significant abdominal distension grossly.    Assessment/Plan   Problem List Items Addressed This Visit    None  Visit Diagnoses       Abdominal pain, unspecified abdominal location    -  Primary    Functional diarrhea        Long term (current) use of antithrombotics/antiplatelets        Weight gain, abnormal              PLAN:  DC Miralax and Iron.   Lab tests      Mikhail Galaviz, DO   "

## 2025-02-10 ENCOUNTER — TELEPHONE (OUTPATIENT)
Facility: CLINIC | Age: 80
End: 2025-02-10
Payer: MEDICARE

## 2025-02-10 LAB
BLOOD EXPIRATION DATE: NORMAL
CANCER AG19-9 SERPL-ACNC: 12.95 U/ML
DISPENSE STATUS: NORMAL
ERYTHROCYTE [DISTWIDTH] IN BLOOD BY AUTOMATED COUNT: 17.4 % (ref 11.5–14.5)
FERRITIN SERPL-MCNC: 36 NG/ML (ref 20–300)
HCT VFR BLD AUTO: 25.8 % (ref 41–52)
HCT VFR BLD AUTO: 26.3 % (ref 41–52)
HEMOCCULT SP1 STL QL: POSITIVE
HGB BLD-MCNC: 7.7 G/DL (ref 13.5–17.5)
HGB BLD-MCNC: 8 G/DL (ref 13.5–17.5)
HOLD SPECIMEN: NORMAL
IRON SATN MFR SERPL: 8 % (ref 25–45)
IRON SERPL-MCNC: 27 UG/DL (ref 35–150)
MAGNESIUM SERPL-MCNC: 2.02 MG/DL (ref 1.6–2.4)
MCH RBC QN AUTO: 29 PG (ref 26–34)
MCHC RBC AUTO-ENTMCNC: 30.4 G/DL (ref 32–36)
MCV RBC AUTO: 95 FL (ref 80–100)
NRBC BLD-RTO: 0 /100 WBCS (ref 0–0)
PLATELET # BLD AUTO: 307 X10*3/UL (ref 150–450)
POTASSIUM SERPL-SCNC: 4.3 MMOL/L (ref 3.5–5.3)
PRODUCT BLOOD TYPE: 1700
PRODUCT CODE: NORMAL
RBC # BLD AUTO: 2.76 X10*6/UL (ref 4.5–5.9)
TIBC SERPL-MCNC: 333 UG/DL (ref 240–445)
UIBC SERPL-MCNC: 306 UG/DL (ref 110–370)
UNIT ABO: NORMAL
UNIT NUMBER: NORMAL
UNIT RH: NORMAL
UNIT VOLUME: 350
WBC # BLD AUTO: 4.9 X10*3/UL (ref 4.4–11.3)
XM INTEP: NORMAL

## 2025-02-10 PROCEDURE — 99233 SBSQ HOSP IP/OBS HIGH 50: CPT | Performed by: STUDENT IN AN ORGANIZED HEALTH CARE EDUCATION/TRAINING PROGRAM

## 2025-02-10 PROCEDURE — 2500000001 HC RX 250 WO HCPCS SELF ADMINISTERED DRUGS (ALT 637 FOR MEDICARE OP)

## 2025-02-10 PROCEDURE — 36415 COLL VENOUS BLD VENIPUNCTURE: CPT | Performed by: STUDENT IN AN ORGANIZED HEALTH CARE EDUCATION/TRAINING PROGRAM

## 2025-02-10 PROCEDURE — 82941 ASSAY OF GASTRIN: CPT

## 2025-02-10 PROCEDURE — 82728 ASSAY OF FERRITIN: CPT | Performed by: STUDENT IN AN ORGANIZED HEALTH CARE EDUCATION/TRAINING PROGRAM

## 2025-02-10 PROCEDURE — 84307 ASSAY OF SOMATOSTATIN: CPT

## 2025-02-10 PROCEDURE — 85014 HEMATOCRIT: CPT | Performed by: STUDENT IN AN ORGANIZED HEALTH CARE EDUCATION/TRAINING PROGRAM

## 2025-02-10 PROCEDURE — 36415 COLL VENOUS BLD VENIPUNCTURE: CPT

## 2025-02-10 PROCEDURE — 85027 COMPLETE CBC AUTOMATED: CPT

## 2025-02-10 PROCEDURE — 99222 1ST HOSP IP/OBS MODERATE 55: CPT | Performed by: INTERNAL MEDICINE

## 2025-02-10 PROCEDURE — 86316 IMMUNOASSAY TUMOR OTHER: CPT

## 2025-02-10 PROCEDURE — 36430 TRANSFUSION BLD/BLD COMPNT: CPT

## 2025-02-10 PROCEDURE — 2500000002 HC RX 250 W HCPCS SELF ADMINISTERED DRUGS (ALT 637 FOR MEDICARE OP, ALT 636 FOR OP/ED): Performed by: STUDENT IN AN ORGANIZED HEALTH CARE EDUCATION/TRAINING PROGRAM

## 2025-02-10 PROCEDURE — 1200000002 HC GENERAL ROOM WITH TELEMETRY DAILY

## 2025-02-10 PROCEDURE — 83540 ASSAY OF IRON: CPT | Performed by: STUDENT IN AN ORGANIZED HEALTH CARE EDUCATION/TRAINING PROGRAM

## 2025-02-10 PROCEDURE — 82943 ASSAY OF GLUCAGON: CPT

## 2025-02-10 PROCEDURE — 83735 ASSAY OF MAGNESIUM: CPT | Performed by: STUDENT IN AN ORGANIZED HEALTH CARE EDUCATION/TRAINING PROGRAM

## 2025-02-10 PROCEDURE — P9016 RBC LEUKOCYTES REDUCED: HCPCS

## 2025-02-10 PROCEDURE — 82270 OCCULT BLOOD FECES: CPT

## 2025-02-10 PROCEDURE — 86301 IMMUNOASSAY TUMOR CA 19-9: CPT | Mod: STJLAB

## 2025-02-10 PROCEDURE — 84132 ASSAY OF SERUM POTASSIUM: CPT | Performed by: STUDENT IN AN ORGANIZED HEALTH CARE EDUCATION/TRAINING PROGRAM

## 2025-02-10 PROCEDURE — 2500000001 HC RX 250 WO HCPCS SELF ADMINISTERED DRUGS (ALT 637 FOR MEDICARE OP): Performed by: STUDENT IN AN ORGANIZED HEALTH CARE EDUCATION/TRAINING PROGRAM

## 2025-02-10 PROCEDURE — 84586 ASSAY OF VIP: CPT

## 2025-02-10 PROCEDURE — 84260 ASSAY OF SEROTONIN: CPT

## 2025-02-10 PROCEDURE — 2500000004 HC RX 250 GENERAL PHARMACY W/ HCPCS (ALT 636 FOR OP/ED): Performed by: STUDENT IN AN ORGANIZED HEALTH CARE EDUCATION/TRAINING PROGRAM

## 2025-02-10 RX ORDER — PANTOPRAZOLE SODIUM 40 MG/10ML
40 INJECTION, POWDER, LYOPHILIZED, FOR SOLUTION INTRAVENOUS 2 TIMES DAILY
Status: DISCONTINUED | OUTPATIENT
Start: 2025-02-10 | End: 2025-02-12 | Stop reason: HOSPADM

## 2025-02-10 RX ORDER — ASPIRIN 81 MG/1
81 TABLET ORAL DAILY
Status: DISCONTINUED | OUTPATIENT
Start: 2025-02-11 | End: 2025-02-11

## 2025-02-10 RX ORDER — AMOXICILLIN AND CLAVULANATE POTASSIUM 875; 125 MG/1; MG/1
1 TABLET, FILM COATED ORAL EVERY 12 HOURS SCHEDULED
Status: DISCONTINUED | OUTPATIENT
Start: 2025-02-10 | End: 2025-02-12 | Stop reason: HOSPADM

## 2025-02-10 RX ORDER — DONEPEZIL HYDROCHLORIDE 5 MG/1
5 TABLET, FILM COATED ORAL NIGHTLY
Status: DISCONTINUED | OUTPATIENT
Start: 2025-02-10 | End: 2025-02-12 | Stop reason: HOSPADM

## 2025-02-10 RX ORDER — LEVETIRACETAM 500 MG/1
1000 TABLET ORAL 2 TIMES DAILY
Status: DISCONTINUED | OUTPATIENT
Start: 2025-02-10 | End: 2025-02-12 | Stop reason: HOSPADM

## 2025-02-10 RX ORDER — PRAVASTATIN SODIUM 20 MG/1
80 TABLET ORAL NIGHTLY
Status: DISCONTINUED | OUTPATIENT
Start: 2025-02-10 | End: 2025-02-12 | Stop reason: HOSPADM

## 2025-02-10 RX ORDER — ACETAMINOPHEN 325 MG/1
650 TABLET ORAL EVERY 6 HOURS PRN
Status: DISCONTINUED | OUTPATIENT
Start: 2025-02-10 | End: 2025-02-12 | Stop reason: HOSPADM

## 2025-02-10 RX ORDER — BRIMONIDINE TARTRATE 2 MG/ML
1 SOLUTION/ DROPS OPHTHALMIC 2 TIMES DAILY
Status: DISCONTINUED | OUTPATIENT
Start: 2025-02-10 | End: 2025-02-10

## 2025-02-10 RX ORDER — QUETIAPINE FUMARATE 50 MG/1
50 TABLET, FILM COATED ORAL NIGHTLY
Status: DISCONTINUED | OUTPATIENT
Start: 2025-02-10 | End: 2025-02-12 | Stop reason: HOSPADM

## 2025-02-10 RX ORDER — TRAZODONE HYDROCHLORIDE 50 MG/1
25 TABLET ORAL NIGHTLY PRN
Status: DISCONTINUED | OUTPATIENT
Start: 2025-02-10 | End: 2025-02-12 | Stop reason: HOSPADM

## 2025-02-10 RX ORDER — ONDANSETRON 4 MG/1
4 TABLET, FILM COATED ORAL EVERY 8 HOURS PRN
Status: DISCONTINUED | OUTPATIENT
Start: 2025-02-10 | End: 2025-02-12 | Stop reason: HOSPADM

## 2025-02-10 RX ORDER — BRIMONIDINE TARTRATE 2 MG/ML
1 SOLUTION/ DROPS OPHTHALMIC 2 TIMES DAILY
Status: DISCONTINUED | OUTPATIENT
Start: 2025-02-10 | End: 2025-02-12 | Stop reason: HOSPADM

## 2025-02-10 RX ORDER — DOCUSATE SODIUM 100 MG/1
100 CAPSULE, LIQUID FILLED ORAL 2 TIMES DAILY PRN
COMMUNITY

## 2025-02-10 RX ORDER — LEVETIRACETAM 500 MG/1
1000 TABLET ORAL ONCE
Status: COMPLETED | OUTPATIENT
Start: 2025-02-10 | End: 2025-02-10

## 2025-02-10 RX ADMIN — AMOXICILLIN AND CLAVULANATE POTASSIUM 1 TABLET: 875; 125 TABLET, FILM COATED ORAL at 21:20

## 2025-02-10 RX ADMIN — PANTOPRAZOLE SODIUM 40 MG: 40 INJECTION, POWDER, FOR SOLUTION INTRAVENOUS at 21:19

## 2025-02-10 RX ADMIN — LEVETIRACETAM 1000 MG: 500 TABLET, FILM COATED ORAL at 00:30

## 2025-02-10 RX ADMIN — PRAVASTATIN SODIUM 80 MG: 20 TABLET ORAL at 21:20

## 2025-02-10 RX ADMIN — BRIMONIDINE TARTRATE 1 DROP: 2 SOLUTION/ DROPS OPHTHALMIC at 21:24

## 2025-02-10 RX ADMIN — DONEPEZIL HYDROCHLORIDE 5 MG: 5 TABLET ORAL at 21:20

## 2025-02-10 RX ADMIN — QUETIAPINE FUMARATE 50 MG: 50 TABLET ORAL at 21:20

## 2025-02-10 RX ADMIN — LEVETIRACETAM 1000 MG: 500 TABLET, FILM COATED ORAL at 21:20

## 2025-02-10 RX ADMIN — BRIMONIDINE TARTRATE 1 DROP: 2 SOLUTION/ DROPS OPHTHALMIC at 09:40

## 2025-02-10 SDOH — ECONOMIC STABILITY: HOUSING INSECURITY: AT ANY TIME IN THE PAST 12 MONTHS, WERE YOU HOMELESS OR LIVING IN A SHELTER (INCLUDING NOW)?: NO

## 2025-02-10 SDOH — ECONOMIC STABILITY: HOUSING INSECURITY: IN THE PAST 12 MONTHS, HOW MANY TIMES HAVE YOU MOVED WHERE YOU WERE LIVING?: 1

## 2025-02-10 SDOH — ECONOMIC STABILITY: FOOD INSECURITY: WITHIN THE PAST 12 MONTHS, YOU WORRIED THAT YOUR FOOD WOULD RUN OUT BEFORE YOU GOT THE MONEY TO BUY MORE.: NEVER TRUE

## 2025-02-10 SDOH — SOCIAL STABILITY: SOCIAL INSECURITY: DOES ANYONE TRY TO KEEP YOU FROM HAVING/CONTACTING OTHER FRIENDS OR DOING THINGS OUTSIDE YOUR HOME?: NO

## 2025-02-10 SDOH — ECONOMIC STABILITY: TRANSPORTATION INSECURITY: IN THE PAST 12 MONTHS, HAS LACK OF TRANSPORTATION KEPT YOU FROM MEDICAL APPOINTMENTS OR FROM GETTING MEDICATIONS?: NO

## 2025-02-10 SDOH — SOCIAL STABILITY: SOCIAL INSECURITY: ABUSE: ADULT

## 2025-02-10 SDOH — ECONOMIC STABILITY: INCOME INSECURITY: IN THE PAST 12 MONTHS HAS THE ELECTRIC, GAS, OIL, OR WATER COMPANY THREATENED TO SHUT OFF SERVICES IN YOUR HOME?: NO

## 2025-02-10 SDOH — ECONOMIC STABILITY: FOOD INSECURITY: WITHIN THE PAST 12 MONTHS, THE FOOD YOU BOUGHT JUST DIDN'T LAST AND YOU DIDN'T HAVE MONEY TO GET MORE.: NEVER TRUE

## 2025-02-10 SDOH — ECONOMIC STABILITY: FOOD INSECURITY: HOW HARD IS IT FOR YOU TO PAY FOR THE VERY BASICS LIKE FOOD, HOUSING, MEDICAL CARE, AND HEATING?: VERY HARD

## 2025-02-10 SDOH — SOCIAL STABILITY: SOCIAL INSECURITY: WITHIN THE LAST YEAR, HAVE YOU BEEN AFRAID OF YOUR PARTNER OR EX-PARTNER?: NO

## 2025-02-10 SDOH — SOCIAL STABILITY: SOCIAL INSECURITY: WITHIN THE LAST YEAR, HAVE YOU BEEN HUMILIATED OR EMOTIONALLY ABUSED IN OTHER WAYS BY YOUR PARTNER OR EX-PARTNER?: NO

## 2025-02-10 SDOH — ECONOMIC STABILITY: HOUSING INSECURITY: IN THE LAST 12 MONTHS, WAS THERE A TIME WHEN YOU WERE NOT ABLE TO PAY THE MORTGAGE OR RENT ON TIME?: NO

## 2025-02-10 SDOH — ECONOMIC STABILITY: FOOD INSECURITY: HOW HARD IS IT FOR YOU TO PAY FOR THE VERY BASICS LIKE FOOD, HOUSING, MEDICAL CARE, AND HEATING?: NOT VERY HARD

## 2025-02-10 SDOH — SOCIAL STABILITY: SOCIAL INSECURITY: ARE THERE ANY APPARENT SIGNS OF INJURIES/BEHAVIORS THAT COULD BE RELATED TO ABUSE/NEGLECT?: NO

## 2025-02-10 SDOH — SOCIAL STABILITY: SOCIAL INSECURITY: DO YOU FEEL ANYONE HAS EXPLOITED OR TAKEN ADVANTAGE OF YOU FINANCIALLY OR OF YOUR PERSONAL PROPERTY?: NO

## 2025-02-10 SDOH — SOCIAL STABILITY: SOCIAL INSECURITY: HAS ANYONE EVER THREATENED TO HURT YOUR FAMILY OR YOUR PETS?: NO

## 2025-02-10 SDOH — SOCIAL STABILITY: SOCIAL INSECURITY: ARE YOU OR HAVE YOU BEEN THREATENED OR ABUSED PHYSICALLY, EMOTIONALLY, OR SEXUALLY BY ANYONE?: NO

## 2025-02-10 SDOH — SOCIAL STABILITY: SOCIAL INSECURITY: DO YOU FEEL UNSAFE GOING BACK TO THE PLACE WHERE YOU ARE LIVING?: NO

## 2025-02-10 SDOH — SOCIAL STABILITY: SOCIAL INSECURITY: HAVE YOU HAD THOUGHTS OF HARMING ANYONE ELSE?: NO

## 2025-02-10 SDOH — SOCIAL STABILITY: SOCIAL INSECURITY: WERE YOU ABLE TO COMPLETE ALL THE BEHAVIORAL HEALTH SCREENINGS?: YES

## 2025-02-10 ASSESSMENT — LIFESTYLE VARIABLES
AUDIT-C TOTAL SCORE: 0
HOW OFTEN DO YOU HAVE 6 OR MORE DRINKS ON ONE OCCASION: NEVER
SKIP TO QUESTIONS 9-10: 1
HOW OFTEN DO YOU HAVE A DRINK CONTAINING ALCOHOL: NEVER
HOW MANY STANDARD DRINKS CONTAINING ALCOHOL DO YOU HAVE ON A TYPICAL DAY: PATIENT DOES NOT DRINK
AUDIT-C TOTAL SCORE: 0

## 2025-02-10 ASSESSMENT — PATIENT HEALTH QUESTIONNAIRE - PHQ9
2. FEELING DOWN, DEPRESSED OR HOPELESS: NOT AT ALL
SUM OF ALL RESPONSES TO PHQ9 QUESTIONS 1 & 2: 0
1. LITTLE INTEREST OR PLEASURE IN DOING THINGS: NOT AT ALL

## 2025-02-10 ASSESSMENT — ACTIVITIES OF DAILY LIVING (ADL)
ADEQUATE_TO_COMPLETE_ADL: YES
WALKS IN HOME: INDEPENDENT
DRESSING YOURSELF: INDEPENDENT
GROOMING: INDEPENDENT
TOILETING: INDEPENDENT
FEEDING YOURSELF: INDEPENDENT
HEARING - RIGHT EAR: FUNCTIONAL
LACK_OF_TRANSPORTATION: NO
BATHING: INDEPENDENT
PATIENT'S MEMORY ADEQUATE TO SAFELY COMPLETE DAILY ACTIVITIES?: YES
HEARING - LEFT EAR: FUNCTIONAL
LACK_OF_TRANSPORTATION: NO
JUDGMENT_ADEQUATE_SAFELY_COMPLETE_DAILY_ACTIVITIES: YES

## 2025-02-10 ASSESSMENT — COGNITIVE AND FUNCTIONAL STATUS - GENERAL
TURNING FROM BACK TO SIDE WHILE IN FLAT BAD: A LITTLE
HELP NEEDED FOR BATHING: A LITTLE
CLIMB 3 TO 5 STEPS WITH RAILING: A LOT
DAILY ACTIVITIY SCORE: 20
WALKING IN HOSPITAL ROOM: A LITTLE
DRESSING REGULAR UPPER BODY CLOTHING: A LITTLE
DRESSING REGULAR LOWER BODY CLOTHING: A LITTLE
MOVING TO AND FROM BED TO CHAIR: A LITTLE
STANDING UP FROM CHAIR USING ARMS: A LITTLE
TOILETING: A LITTLE
MOBILITY SCORE: 18
PATIENT BASELINE BEDBOUND: NO

## 2025-02-10 ASSESSMENT — PAIN SCALES - GENERAL: PAINLEVEL_OUTOF10: 0 - NO PAIN

## 2025-02-10 NOTE — CARE PLAN
The patient's goals for the shift include      The clinical goals for the shift include Monitor patient to ensure he remains hemodynamically stable    Over the shift, the patient did not make progress toward the following goals. Barriers to progression include episode of vtach on telemetry. Recommendations to address these barriers include initiate blood transfusion    1520 - Pt had large run of tach. Asymptomatic, patient stated he was adjusting the blankets. Dr. Salazar notified. Rechecking mag/potassium. 1u of blood ordered to maintain hgb >8 due to cardiac history. Patient is understanding of plan.    1800 - Initiating blood transfusion at this time    EOS note: Pt admitted to 3N room 3022. No reports of pain. Pt had large dark BM's, stool sample sent down. VSS. Pt comes from [employer]. Plan is to be NPO at midnight.    Pt resting at this time. Bed is in lowest position and locked with alarm on. Call light and personal possesions are within reach.

## 2025-02-10 NOTE — TELEPHONE ENCOUNTER
Dr. Frankel spoke to the floor per this consult and his rhino rocket can be removed Thursday and Friday with Mati in office.   
23F no pmh presents with a cc of R face pain 2/2 knee vs face after jumping on trampoline, struck face with knee, hit eye, no loss of vision, no LOC recalls entire event, no floaters or changes in vision, no pain with EOM only c/o bruising and mild below eye pain to R face. Denies n/v/f/c/cp/sob. Denies headache, syncope, lightheadedness, dizziness. Denies chest palpitations, abdominal pain. Denies dysuria, hematuria, hematochezia, BRBPR, tarry stools, diarrhea, constipation. Denies numbness, tingling or loss of sensation. Denies loss of motor function.

## 2025-02-10 NOTE — PROGRESS NOTES
"   02/10/25 1545   Discharge Planning   Living Arrangements Other (Comment)  (AL)   Support Systems Home care staff;Friends/neighbors   Type of Residence Assisted living   Care Facility Name [facility]   Expected Discharge Disposition Home   Intensity of Service   Intensity of Service 0-30 min     Met with patient at bedside. Patient confirms that he is currently living at [facility] where he moved to in September of 2024. He states that prior to that, he was living in his home in [place] alone (states his sister who used to live there passed away 3 years ago). He states his [place] neighbors are watching and caring for his home there, and he plans to return when able. Patient states he is independent with ADLs and that he has a walker but has not needed to use it lately since he has regained his strength. PCP is Mikhail Galaviz. Patient states his friend/DALIAALMA Cleaning lives locally and drives patient to appointments since pt's car is in [place]. Patient states he is active with therapy at his AL. Therapy evals are pending at this time. Care transitions team to follow for d/c planning needs. Patient confirms that his plan is to return to [facility] \"for the time being\", but he states again that he \"has to get back to [place]\".   "

## 2025-02-10 NOTE — PROGRESS NOTES
Wayne Burkitt is a 79 y.o. male on day 0 of admission presenting with Anemia.      Subjective   Examined in ED. Rhinorocket in place, bleeding appears to have stopped. Will keep in place for today. Personally reviewed CTA AP from admit and discussed findings with patient.     Continued home Keppra, which is a high risk medication.        Objective     Last Recorded Vitals  /72   Pulse 60   Temp 36.1 °C (97 °F) (Tympanic)   Resp 11   Wt 78 kg (172 lb)   SpO2 100%   Intake/Output last 3 Shifts:    Intake/Output Summary (Last 24 hours) at 2/10/2025 1304  Last data filed at 2/10/2025 0735  Gross per 24 hour   Intake 3580 ml   Output 500 ml   Net 3080 ml       Admission Weight  Weight: 78 kg (172 lb) (02/09/25 1924)    Daily Weight  02/09/25 : 78 kg (172 lb)    Image Results  CT angio abdomen pelvis w and or wo IV IV contrast  Narrative: STUDY:  CT Angiogram of the Abdomen and Pelvis; 2/9/2025 at 11:11 p.m.  INDICATION:  GI bleed.  Abdominal pain.  COMPARISON:  US scrotum 12/21/2024.  CT AP 12/20/2024.  ACCESSION NUMBER(S):  [id number]  ORDERING CLINICIAN:  BIANCA LANGE  TECHNIQUE:  Helical CT is performed from the aortic diaphragmatic  hiatus through the symphysis pubis before and after bolus  administration of intravenous contrast.  Images are reviewed and  processed at a workstation according to the CT angiogram protocol with  3-D and/or MIP post processing imaging generated.  Automated mA/kV exposure control was utilized and patient examination  was performed in strict accordance with principles of ALARA.  FINDINGS:   Lower thorax:  Lung bases:The lung bases are clear.  Heart: The heart is partially imaged. Heart size is normal. AICD leads  are partially imaged within the right atrium and right ventricle.  Abdomen:  Liver: Hepatic density is heterogeneous. No focal hepatic lesion is  seen.  Gallbladder: Small layering gallstones are seen. No gallbladder  distention or wall thickening.   Pancreas: The  pancreas is there is an enhancing 8 mm lesion within the  distal body of the pancreas (image 58, series 502). The pancreas is  otherwise unremarkable with no ductal distention or focal lesion.  Spleen: The spleen appears normal.  Adrenal glands: Adrenal glands are unremarkable.  Kidneys:  The kidneys are symmetric in appearance with no  nephrolithiasis or obstruction. No solid or cystic lesions.  Gastrointestinal tract: The stomach appears normal.  No large or small  bowel distention or wall thickening. Mild colonic diverticulosis  without inflammatory stranding. No evidence of GI bleed. The appendix  appears normal.  Vasculature: There is mild calcific atherosclerosis of the aortoiliac  tree and branch vessels. The aorta is not enlarged.   Lymph nodes: No pathologically enlarged lymph nodes are seen.  Peritoneum: No free fluid within the peritoneum. No free air.  Abdominal wall: No abdominal wall hernias are seen. Mild inflammatory  changes are seen within the right inguinal canal, significantly  improved from prior exam.  Pelvis:  Reproductive: Unremarkable.  Urinary bladder: The bladder is partially distended and unremarkable  in appearance.  Lymph nodes: No pathologically enlarged lymph nodes are seen.  Musculoskeletal:  Moderate spondylitic changes involving the spine. No suspicious bony  lesions.  Impression: 1.No evidence of active GI bleed.  2.8 mm enhancing lesion within the distal body of the pancreas.  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  Recommend further evaluation with pancreatic protocol MRI or  scintigraphy.  3.Mild colonic diverticulosis without evidence of diverticulitis.  4.Cholelithiasis without evidence of cholecystitis.  Signed by Mikhail Freitas MD      Physical Exam    Constitutional: Well developed, no distress, alert and cooperative  Skin: Warm and dry  Eyes: EOMI, clear sclera  ENMT: Rhinorocket in place  Respiratory: Patent airways, CTAB  Cardiovascular: Regular,  rate and rhythm, no murmurs  Abdominal: Nondistended, soft, non-tender, +BS  MSK: ROM intact  Neuro: alert and oriented x3      Assessment/Plan     Assessment & Plan      Patient is a 79 y.o. male w/ pmhx of CAD s/p CABG and s/p PCI Dec 2024, ICM, HTN, HFpEF, T2DM, dementia, and pacemaker presents with uncontrolled epistaxis and admitted for acute anemia requiring blood transfusion w/ question of GI bleed and incidental pancreatic mass finding.     Acute blood loss anemia 2/2 epistaxis, w/ ?GI Bleed  -Hx 1 month dark tarry stools, w/ iron supplementation (stopped 2 weeks ago  -Unclear if related to swallowed blood products from epistaxis vs true GI bleed  -Baseline Hbg 9-10 range  -Hbg goal 8 given CAD history, received 1u pRBC on admit  -IV PPI in interim  -GI consulted    CAD  -With recent PCI to RCA Dec 2024 complicated by groin hematoma   -Continued home aspirin given recent stent, holding home Effient   -Cardiology consulted regarding DAPT needs    Pancreatic lesion  -CTA on admit with 2.8mm enhancing lesion with distal body of pancreas   -CT ab/pelvis in 12/2024 didn't mention pancreatic mass  -MRCP ordered  -GI consulted    Epistaxis   -Known problem for patient  -Rhinorocket in place, epistaxis appears resolved, Consider toxic shock abx ppx if prolonged use   -Will apply vaseline once removed, PRN afrin spray   -Consider ENT consult if refractory     Seizure Disorder  -Continued home Keppra       Diet: NPO  DVT: SCDs  Consult: GI, Cards, Pt/OT            Jose M Salazar DO

## 2025-02-10 NOTE — ED PROVIDER NOTES
Emergency Department Provider Note        History of Present Illness     History provided by: Patient  Limitations to History: None  External Records Reviewed with Brief Summary: None    HPI:  Wayne Burkitt is a 79 y.o. male with a history of recurrent nose bleed, ischemic cardiomyopathy, CAD with 17 stents implant, HTN, seizure on Keppra, and diastolic congestive heart failure, who presents to the ED complaining of nosebleed.  Patient said that he was just sitting down watching TV when he suddenly started bleeding from his left nostril area.  Stated that he usually was able to control the nosebleed but the blood was running without the goal the EMS.  Bleeding was controlled by EMS before arrival to the ED.  Of note, patient had a stent placement a month ago and was started on Effient.  Patient stated that he is unaware of starting the medication.  Stated that he was seen by primary care yesterday with complaint of abdominal pain.  Stated that he have been having dark tarry stool for about a month now.  Denies headache, dizziness, lightheadedness, cough, fever, chest pain, nausea, vomiting, abdominal pain, diarrhea, or urinary symptoms.    Physical Exam   Triage vitals:  T 36.4 °C (97.5 °F)  HR 68  /66  RR 20  O2 99 %      General: Awake, alert, in no acute distress  Eyes: Gaze conjugate.  No scleral icterus or injection  HENT: Normo-cephalic, atraumatic. No stridor  CV: Regular rate, regular rhythm. Radial pulses 2+ bilaterally  Resp: Breathing non-labored, speaking in full sentences.  Clear to auscultation bilaterally  GI: Soft, non-distended, non-tender. No rebound or guarding.  : Not evaluated.  MSK/Extremities: No gross bony deformities. Moving all extremities  Skin: Warm. Appropriate color  Neuro: Alert. Oriented. Face symmetric. Speech is fluent.  Gross strength and sensation intact in b/l UE and LEs  Psych: Appropriate mood and affect    Medical Decision Making & ED Course   Medical Decision  M[age] y.o. male with a history of recurrent epistaxis, ischemic cardiomyopathy, CAD with 17 stents implant, HTN, seizure on Keppra, and diastolic congestive heart failure, who presents to the ED complaining of nosebleed.  On arrival to the ED, the patient was stable, A&Ox3, non-toxic, well-appearing, and in no acute distress. Primary assessment is evident for intact ABC. The patient was able to give account of current event and verbalize presenting symptoms.    The nosebleed was explored and patient pretreated with lidocaine and Afrin.  Silver nitrate was applied to control signs of bleeding, followed by a rapid rhino to stop bleeding.    My personal assessment is directed towards ordering labs including CBC, CMP, to rule out anemia, and electrolyte abnormality.     CBC is notable for anemia with H&H of 6.5/24.1 down from 9.2/28.7 a month ago, indicating significant anemia requiring blood transfusion.  Chemistry panel was unremarkable. Given the lab result, will order CT angiogram of the abdomen and pelvis to evaluate for signs of GI bleed. Consent for blood transfusion was obtained from the patient's power of  at bedside.  The patient was transferred to the unit of packed red blood cell.    CT angio of the abdomen and pelvis shows no evidence of active GI bleed. There is 2.8 mm enhancing lesion within the distal body of the pancreas. 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 Recommend further evaluation with pancreatic protocol MRI or scintigraphy. There is mild colonic diverticulosis without evidence of diverticulitis. There is cholelithiasis without evidence of cholecystitis.    Patient was updated with the lab and imaging result.  He was determined to benefit from inpatient management with GI follow-up.  Patient was amenable to the plan.  Medicine was consulted, the patient was admitted to the service of Dr. Beaver.    While the patient was seen in the ED, he was signed out to  the night shift resident, Dr. Brewer.   ----  Differential diagnoses considered include but are not limited to: Epistaxis, GI bleed    Care Considerations: As documented above in MDM    ED Course:  Diagnoses as of 02/10/25 0143   Anemia     Disposition   As a result of their workup, the patient will require admission to the hospital.  The patient was informed of his diagnosis.  The patient was given the opportunity to ask questions and I answered them. The patient agreed to be admitted to the hospital.    Procedures   Procedures    This was a shared visit with an ED attending.  The patient was seen and discussed with the ED attending Dr. Armenta.    Keyana Guerra MD  Emergency Medicine, PGY-2     Keyana Guerra MD  Resident  02/09/25 3537       Keyana Guerra MD  Resident  02/10/25 0145

## 2025-02-10 NOTE — CONSULTS
Gastroenterology  Consult     Patient Wayne Burkitt PCP Mikhail Galaviz DO    MRN [id number]  Admission Date 2/9/2025        Inpatient consult to Gastroenterology  Consult performed by: Asael Birch MD  Consult ordered by: Josep Armenta DO  Reason for consult: Acute Aenmia      Chief Complaint/Reason for Admission:  Johnny is a 79 y.o. male who presented today with  acute anemia     Subjective    Subjective   History of Presenting Illness  80 y/o male with past medical history of CAD s/p  CABG and s/p PCI to RCA C/B scrotal/groin hematoma who presented to the ED with uncontrolled epistaxis.  He endorses a history of recurrent nosebleeds; however, recently underwent PCI to mid RCA in December 2024 and subsequently developed groin/scrotal hematoma.  He was discharged on DAPT with aspirin and Effient.  He however also endorsed dark tarry stools for the past month.  He denies any prior history of GI bleed, and mentions that occasionally he swallows the bleeding dripping from the back of his nose.  He states his last colonoscopy was over 10 years ago, and they had removed a polyp at that time.  He also was on iron supplementation but stopped this about 2 weeks ago; however, the dark tarry stools continued.  He denies any concurrent NSAID use.  No recent weight loss.       In the ED, hemodynamically stable.  CBC showed Hgb 6.5 (baseline 8-9).  CT angio A/P did not show any evidence of active GI bleed but mild colonic diverticulosis without evidence of diverticulitis and cholelithiasis without evidence of cholecystitis.  He was found to have nosebleed and treated with Afrin and silver nitrate, followed by Meghan Bucio.  He was transfused 1 unit PRBCs in the ED.         Home Medication:  Prior to Admission medications    Medication Sig Start Date End Date Taking? Authorizing Provider   aspirin 81 mg EC tablet Take 1 tablet (81 mg) by mouth once daily.   Yes Historical Provider, MD   brimonidine (AlphaGAN) 0.2 %  "ophthalmic solution Administer 1 drop into both eyes 2 times a day.   Yes Historical Provider, MD   donepezil (Aricept) 5 mg tablet Take 1 tablet (5 mg) by mouth once daily at bedtime.   Yes Historical Provider, MD   levETIRAcetam (Keppra) 1,000 mg tablet Take 1 tablet (1,000 mg) by mouth 2 times a day.   Yes Historical Provider, MD   prasugrel (Effient) 10 mg tablet Take 1 tablet (10 mg) by mouth once daily.   Yes Historical Provider, MD   pravastatin (Pravachol) 80 mg tablet Take 1 tablet (80 mg) by mouth once daily at bedtime.   Yes Historical Provider, MD   QUEtiapine (SEROquel) 50 mg tablet Take 1 tablet (50 mg) by mouth once daily at bedtime.   Yes Historical Provider, MD   acetaminophen (Tylenol) 325 mg tablet Take 2 tablets (650 mg) by mouth every 4 hours if needed for mild pain (1 - 3) or fever (temp greater than 38.0 C).    Historical Provider, MD   docusate sodium (Colace) 100 mg capsule Take 1 capsule (100 mg) by mouth 2 times a day as needed for constipation.    Historical Provider, MD   nitroglycerin (Nitrostat) 0.4 mg SL tablet Place 1 tablet (0.4 mg) under the tongue every 5 minutes if needed for chest pain. 12/24/24   ERICK Barcenas   traZODone (Desyrel) 50 mg tablet Take 0.5 tablets (25 mg) by mouth as needed at bedtime (dementia with anxiety).    Historical Provider, MD   ferrous sulfate 325 (65 Fe) MG EC tablet Take 65 mg by mouth once daily with breakfast. Do not crush, chew, or split.  2/10/25  Historical Provider, MD   pantoprazole (ProtoNix) 40 mg EC tablet Take 1 tablet (40 mg) by mouth once daily in the morning. Take before meals. Do not crush, chew, or split.  2/10/25  Historical Provider, MD          Review of System:  Review of Systems  SEE HPI    Objective    Objective   Vital Signs:  Visit Vitals  /75 (BP Location: Left arm, Patient Position: Lying)   Pulse 61   Temp 36.3 °C (97.3 °F) (Temporal)   Resp 18   Ht 1.803 m (5' 11\")   Wt 78 kg (172 lb)   SpO2 100%   BMI 23.99 kg/m² "   Smoking Status Former   BSA 1.98 m²        Physical Examination:  General: Patient does not appear to be in any acute distress, alert, awake  Head: Normocephalic, Rhinorocket in place  Cardiovascular: RRR, S1/S2, no murmurs, rubs, or gallops, radial pulses +2  Pulmonary: CTAB, no respiratory distress.  Abdomen: +BS, soft, non-tender, nondistended, no guarding or rebound, no masses noted  Neuro: A&O x3, CN intact, no focal deficits, strength and sensation intact bilaterally  MSK: No joint swelling, normal movements of all extremities. Passive ROM intact  Extremities: No edema appreciated in lower extremities bilaterally, no cyanosis  Skin- Warm. Dry. No lesions, contusions, or erythema.  Psychiatric: Judgment intact. Appropriate mood and behavior      Laboratory Data:  Results for orders placed or performed during the hospital encounter of 02/09/25 (from the past 24 hours)   CBC and Auto Differential   Result Value Ref Range    WBC 5.3 4.4 - 11.3 x10*3/uL    nRBC 0.0 0.0 - 0.0 /100 WBCs    RBC 2.27 (L) 4.50 - 5.90 x10*6/uL    Hemoglobin 6.5 (LL) 13.5 - 17.5 g/dL    Hematocrit 24.1 (L) 41.0 - 52.0 %     (H) 80 - 100 fL    MCH 28.6 26.0 - 34.0 pg    MCHC 27.0 (L) 32.0 - 36.0 g/dL    RDW 15.9 (H) 11.5 - 14.5 %    Platelets 402 150 - 450 x10*3/uL    Neutrophils % 66.0 40.0 - 80.0 %    Immature Granulocytes %, Automated 0.2 0.0 - 0.9 %    Lymphocytes % 21.6 13.0 - 44.0 %    Monocytes % 8.1 2.0 - 10.0 %    Eosinophils % 3.2 0.0 - 6.0 %    Basophils % 0.9 0.0 - 2.0 %    Neutrophils Absolute 3.51 1.60 - 5.50 x10*3/uL    Immature Granulocytes Absolute, Automated 0.01 0.00 - 0.50 x10*3/uL    Lymphocytes Absolute 1.15 0.80 - 3.00 x10*3/uL    Monocytes Absolute 0.43 0.05 - 0.80 x10*3/uL    Eosinophils Absolute 0.17 0.00 - 0.40 x10*3/uL    Basophils Absolute 0.05 0.00 - 0.10 x10*3/uL   Comprehensive metabolic panel   Result Value Ref Range    Glucose 125 (H) 74 - 99 mg/dL    Sodium 139 136 - 145 mmol/L    Potassium 4.8  3.5 - 5.3 mmol/L    Chloride 110 (H) 98 - 107 mmol/L    Bicarbonate 22 21 - 32 mmol/L    Anion Gap 12 10 - 20 mmol/L    Urea Nitrogen 22 6 - 23 mg/dL    Creatinine 0.93 0.50 - 1.30 mg/dL    eGFR 85 >60 mL/min/1.73m*2    Calcium 8.6 8.6 - 10.3 mg/dL    Albumin 3.7 3.4 - 5.0 g/dL    Alkaline Phosphatase 70 33 - 136 U/L    Total Protein 6.4 6.4 - 8.2 g/dL    AST 61 (H) 9 - 39 U/L    Bilirubin, Total 0.3 0.0 - 1.2 mg/dL    ALT 20 10 - 52 U/L   Magnesium   Result Value Ref Range    Magnesium 1.97 1.60 - 2.40 mg/dL   Type and Screen   Result Value Ref Range    ABO TYPE B     Rh TYPE POS     ANTIBODY SCREEN NEG    Prepare RBC: 1 Units   Result Value Ref Range    PRODUCT CODE P9067J93     Unit Number [id number]     Unit ABO B     Unit RH NEG     XM INTEP COMP     Dispense Status TR     Blood Expiration Date 3/11/2025 11:59:00 PM EDT     PRODUCT BLOOD TYPE 1700     UNIT VOLUME 350    Cancer Antigen 19-9   Result Value Ref Range    Cancer AG 19-9 12.95 <35.00 U/mL   CBC   Result Value Ref Range    WBC 4.9 4.4 - 11.3 x10*3/uL    nRBC 0.0 0.0 - 0.0 /100 WBCs    RBC 2.76 (L) 4.50 - 5.90 x10*6/uL    Hemoglobin 8.0 (L) 13.5 - 17.5 g/dL    Hematocrit 26.3 (L) 41.0 - 52.0 %    MCV 95 80 - 100 fL    MCH 29.0 26.0 - 34.0 pg    MCHC 30.4 (L) 32.0 - 36.0 g/dL    RDW 17.4 (H) 11.5 - 14.5 %    Platelets 307 150 - 450 x10*3/uL   Hemoglobin and hematocrit, blood   Result Value Ref Range    Hemoglobin 7.7 (L) 13.5 - 17.5 g/dL    Hematocrit 25.8 (L) 41.0 - 52.0 %       Imaging:  CT angio abdomen pelvis w and or wo IV IV contrast    Result Date: 2/9/2025  STUDY: CT Angiogram of the Abdomen and Pelvis; 2/9/2025 at 11:11 p.m. INDICATION: GI bleed.  Abdominal pain. COMPARISON: US scrotum 12/21/2024.  CT AP 12/20/2024. ACCESSION NUMBER(S): [id number] ORDERING CLINICIAN: BIANCA LANGE TECHNIQUE:  Helical CT is performed from the aortic diaphragmatic hiatus through the symphysis pubis before and after bolus administration of intravenous  contrast.  Images are reviewed and processed at a workstation according to the CT angiogram protocol with 3-D and/or MIP post processing imaging generated. Automated mA/kV exposure control was utilized and patient examination was performed in strict accordance with principles of ALARA. FINDINGS: Lower thorax: Lung bases:The lung bases are clear. Heart: The heart is partially imaged. Heart size is normal. AICD leads are partially imaged within the right atrium and right ventricle. Abdomen: Liver: Hepatic density is heterogeneous. No focal hepatic lesion is seen. Gallbladder: Small layering gallstones are seen. No gallbladder distention or wall thickening. Pancreas: The pancreas is there is an enhancing 8 mm lesion within the distal body of the pancreas (image 58, series 502). The pancreas is otherwise unremarkable with no ductal distention or focal lesion. Spleen: The spleen appears normal. Adrenal glands: Adrenal glands are unremarkable. Kidneys:  The kidneys are symmetric in appearance with no nephrolithiasis or obstruction. No solid or cystic lesions. Gastrointestinal tract: The stomach appears normal.  No large or small bowel distention or wall thickening. Mild colonic diverticulosis without inflammatory stranding. No evidence of GI bleed. The appendix appears normal. Vasculature: There is mild calcific atherosclerosis of the aortoiliac tree and branch vessels. The aorta is not enlarged. Lymph nodes: No pathologically enlarged lymph nodes are seen. Peritoneum: No free fluid within the peritoneum. No free air. Abdominal wall: No abdominal wall hernias are seen. Mild inflammatory changes are seen within the right inguinal canal, significantly improved from prior exam. Pelvis: Reproductive: Unremarkable. Urinary bladder: The bladder is partially distended and unremarkable in appearance. Lymph nodes: No pathologically enlarged lymph nodes are seen. Musculoskeletal: Moderate spondylitic changes involving the spine. No  suspicious bony lesions.    1.No evidence of active GI bleed. 2.8 mm enhancing lesion within the distal body of the pancreas. 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 Recommend further evaluation with pancreatic protocol MRI or scintigraphy. 3.Mild colonic diverticulosis without evidence of diverticulitis. 4.Cholelithiasis without evidence of cholecystitis. Signed by Mikhail Freitas MD      Medications:  Scheduled medications  [START ON 2/11/2025] aspirin, 81 mg, oral, Daily  brimonidine, 1 drop, Both Eyes, BID  donepezil, 5 mg, oral, Nightly  levETIRAcetam, 1,000 mg, oral, BID  pravastatin, 80 mg, oral, Nightly  QUEtiapine, 50 mg, oral, Nightly      Continuous medications     PRN medications  PRN medications: oxymetazoline, traZODone    Assessment    Assessment & Plan   Mr. Wayne Burkitt is a 79 y.o. male who presents with acute anemia    Assessment & Plan  Anemia         #Acute Anemia likely 2/2 Epistaxis with concern for UGIB in the setting of DAPT use following recent PCI   -No prior hx of GI bleed, but recently started on DAPT with black tarry stools concerning for GI bleed   Plan:  -Given patient will need to be on DAPT following recent stent, will recommend EGD to r/o UGIB contributing to anemia  -Recommend ENT consult to address epistaxis before EGD as any obstructive process of the upper airway will complicate patient's breathing during endoscopy  -Transfuse to keep Hgb >8 in the setting of recent CAD        #Pancreatic Mass   -2.8mm enhancing lesion c/f neoplasm   -Recommend MRCP       Case seen and discussed with Dr. Queta Enrique DO  PGY-3 Internal Medicine  This note has been transcribed using Dragon voice recognition system and there is a possibility of unintentional typing misprints.  Any information found to be copied from previous providers is done in the best interest of the patient to provide accurate, quality, and continuity of care.    Recent PCI with stent and now on  DAP. Recurrent Epistaxis is the likely source of anemia and melena. However there is a possibility of PUD also contributing to the anemia ( specially with the extent of melena) . Can consider EGD while on DAP for risk stratification. At this time he has nose packing, will hold the procedure till that is taken out.   We will continue to follow for now.   Recommend ENT evaluation.     I saw and evaluated the patient. I personally obtained the key and critical portions of the history and physical exam or was physically present for key and critical portions performed by the resident/fellow. I reviewed the resident/fellow's documentation and discussed the patient with the resident/fellow. I agree with the resident/fellow's medical decision making as documented in the note.    Asael Birch MD

## 2025-02-10 NOTE — H&P
History Of Present Illness  Wayne Burkitt is a 79 y.o. male w/ pmhx of CAD s/p CABG and s/p PCI, ICM, HTN, HFpEF, T2DM, dementia, and pacemaker presents to [facility] ED from SNF for uncontrolled epistaxis. He states he has had frequent nose bleeds from his left nostril for decades, but has not been this bad in years. He had recent elective PCI with stent to mid RCA with Dr. Giron on 12/20 that was c/b acute anemia with groin/scrotal hematoma that required brief ICU stay. Discharged on ASA and effient 10mg. He has also been taking an iron supplement. He states he has had black tarry stools for the past 1 month. He initially suspected this was due to iron supplementation but stopped taking supplement 1.5 weeks ago and continues to notice 2x black tarry stools a day. Denies any light headedness, dizziness, headache, cp, sob, bright red stools, hematuria, hematemesis.    ED: Pt HDS. Rhino rocket placed in L nostril. Hgb 6.5. Transfused 1 unit pRBCs.      Past Medical History  He has a past medical history of Auditory hallucinations (09/28/2024), Conversion reaction (09/28/2024), Coronary artery disease involving native coronary artery of native heart without angina pectoris (09/28/2024), Epistaxis, recurrent (09/28/2024), Ischemic cardiomyopathy (09/28/2024), and Moderate vascular dementia with anxiety (09/28/2024).    Surgical History  He has a past surgical history that includes Coronary artery bypass graft (2010); Coronary angioplasty with stent; pacemaker placement; Total hip arthroplasty; Cardiac catheterization (N/A, 12/20/2024); and Cardiac catheterization (N/A, 12/20/2024).     Social History  He reports that he quit smoking about 31 years ago. His smoking use included cigarettes. He has never used smokeless tobacco. He reports that he does not currently use alcohol. He reports that he does not use drugs.    Family History  Family History   Family history unknown: Yes        Allergies  Alprazolam,  "Bepotastine besilate, Clopidogrel, Doxazosin, Lisinopril, Metronidazole, Hydrochlorothiazide, Methylprednisolone, Phenytoin, Sertraline, Amlodipine, Atorvastatin, Cephalexin, Ciprofloxacin, Diphenhydramine hcl, Guaifenesin, Hydrocodone, Telmisartan, and Topiramate    Review of Systems   ROS: 12 systems reviewed and negative except per HPI above     Physical Exam by System:    Constitutional: A&Ox4, NAD, resting comfortable   Head and Face: Atraumatic, normocephalic   Eyes: Normal external exam, EOMI  ENT: Rhino rocket in place  Cardiovascular: RRR, S1/S2, no murmurs, rubs, or gallops, radial pulses +2  Pulmonary: CTAB, no respiratory distress, no wheezing, rales or rhonchi, on RA  Abdomen: +BS, soft, non-tender, nondistended, no guarding rigidity or rebound tenderness, no masses noted  MSK: Negative for edema, No joint swelling, normal movements of all extremities.   Neuro: No focal deficits, normal motor function, normal sensation, follows all commands  Skin- pale, No lesions, contusions, or erythema.  Psychiatric: Judgment intact. Appropriate mood, affect and behavior      Last Recorded Vitals  Blood pressure 145/73, pulse 66, temperature 36.1 °C (97 °F), temperature source Temporal, resp. rate 16, height 1.803 m (5' 11\"), weight 78 kg (172 lb), SpO2 97%.    Relevant Results  Results for orders placed or performed during the hospital encounter of 02/09/25 (from the past 24 hours)   CBC and Auto Differential   Result Value Ref Range    WBC 5.3 4.4 - 11.3 x10*3/uL    nRBC 0.0 0.0 - 0.0 /100 WBCs    RBC 2.27 (L) 4.50 - 5.90 x10*6/uL    Hemoglobin 6.5 (LL) 13.5 - 17.5 g/dL    Hematocrit 24.1 (L) 41.0 - 52.0 %     (H) 80 - 100 fL    MCH 28.6 26.0 - 34.0 pg    MCHC 27.0 (L) 32.0 - 36.0 g/dL    RDW 15.9 (H) 11.5 - 14.5 %    Platelets 402 150 - 450 x10*3/uL    Neutrophils % 66.0 40.0 - 80.0 %    Immature Granulocytes %, Automated 0.2 0.0 - 0.9 %    Lymphocytes % 21.6 13.0 - 44.0 %    Monocytes % 8.1 2.0 - 10.0 %    " Eosinophils % 3.2 0.0 - 6.0 %    Basophils % 0.9 0.0 - 2.0 %    Neutrophils Absolute 3.51 1.60 - 5.50 x10*3/uL    Immature Granulocytes Absolute, Automated 0.01 0.00 - 0.50 x10*3/uL    Lymphocytes Absolute 1.15 0.80 - 3.00 x10*3/uL    Monocytes Absolute 0.43 0.05 - 0.80 x10*3/uL    Eosinophils Absolute 0.17 0.00 - 0.40 x10*3/uL    Basophils Absolute 0.05 0.00 - 0.10 x10*3/uL   Comprehensive metabolic panel   Result Value Ref Range    Glucose 125 (H) 74 - 99 mg/dL    Sodium 139 136 - 145 mmol/L    Potassium 4.8 3.5 - 5.3 mmol/L    Chloride 110 (H) 98 - 107 mmol/L    Bicarbonate 22 21 - 32 mmol/L    Anion Gap 12 10 - 20 mmol/L    Urea Nitrogen 22 6 - 23 mg/dL    Creatinine 0.93 0.50 - 1.30 mg/dL    eGFR 85 >60 mL/min/1.73m*2    Calcium 8.6 8.6 - 10.3 mg/dL    Albumin 3.7 3.4 - 5.0 g/dL    Alkaline Phosphatase 70 33 - 136 U/L    Total Protein 6.4 6.4 - 8.2 g/dL    AST 61 (H) 9 - 39 U/L    Bilirubin, Total 0.3 0.0 - 1.2 mg/dL    ALT 20 10 - 52 U/L   Magnesium   Result Value Ref Range    Magnesium 1.97 1.60 - 2.40 mg/dL   Type and Screen   Result Value Ref Range    ABO TYPE B     Rh TYPE POS     ANTIBODY SCREEN NEG    Prepare RBC: 1 Units   Result Value Ref Range    PRODUCT CODE L6796P49     Unit Number [id number]     Unit ABO B     Unit RH NEG     XM INTEP COMP     Dispense Status IS     Blood Expiration Date 3/11/2025 11:59:00 PM EDT     PRODUCT BLOOD TYPE 1700     UNIT VOLUME 350       Scheduled medications     Continuous medications     PRN medications  PRN medications: oxymetazoline     No results found.        Assessment/Plan   Assessment & Plan  Anemia    Patient is a 79 y.o. male w/ pmhx of CAD s/p CABG and s/p PCI, ICM, HTN, HFpEF, T2DM, dementia, and pacemaker presents with uncontrolled epistaxis and admitted for acute anemia requiring blood transfusion w/ question of GI bleed and incidental pancreatic mass finding.    #Acute blood loss anemia 2/2 epistaxis, w/ ?GI Bleed  -hx 1 month dark tarry stools, w/  iron supplementation (stopped 2 weeks ago)  -trend cbc, repeat after transfusion  -transfuse <7  -stool occult blood  -GI consulted  -NPO   -Cardiology consulted to see if pt can switch antiplatelet given recurrent bleeds    #2.8mm Pancreatic mass  -Ddx: neuroendocrine tumor, pancreatic carcinoma  -GI on consult, can likely have outpatient workup with MRI  -Added CA 19-9 tumor marker  -neuroendocrine markers: chromogranin A, gastrin, glucagon, serotonin, VIP, somatostatin based on vague mild symptoms of abdominal pain, looser stools, and bleeding  -CT ab/pelvis in 12/2024 didn't mention pancreatic mass    Diet: NPO  DVT: hold  Fluid: prn  Consult: GI, Cards, Pt/OT    Dispo: admitted for acute blood loss anemia, likely 1-2 night stay. Back to SNF    Discussed with attending,  Dajuan Zamarripa,  PGY-2    I saw and evaluated the patient. I personally obtained the key and critical portions of the history and physical exam or was physically present for key and critical portions performed by the resident/fellow. I reviewed the resident/fellow's documentation and discussed the patient with the resident/fellow. I agree with the resident/fellow's medical decision making as documented in the note.     79 YOM pmh of CAD s/p CABG and recent PCI where he was started on effient.  He has had black stools for about 1 month, was also coincidently started on iron supplementation at this time, but has stopped the iron for 1-2 weeks, and still having black stools.  Today he had an episode of epistaxis with a large amount of blood, requiring rhino rocket placed in ED. HgB downtrended from 9.5 -> 6.5. Unsure if blood loss anemia is 2/2 to epistaxis, slow GIB or combination.  HgB incremented appropriately post pRBC transfusion. Cross-sectional immaging showed incidental finding of pancreatic mass that will need further evaluation with GI, and suspect will need EUS/biopsy.  Will consult GI due to possible GIB and consult cardiology  regarding switching antiplatelets with recent stent.      Complexity high, anticipate 1-2 [place] stays    Heath Beaver, DO  Internal Medicine

## 2025-02-11 ENCOUNTER — ANESTHESIA (OUTPATIENT)
Dept: GASTROENTEROLOGY | Facility: HOSPITAL | Age: 80
End: 2025-02-11
Payer: MEDICARE

## 2025-02-11 ENCOUNTER — APPOINTMENT (OUTPATIENT)
Dept: GASTROENTEROLOGY | Facility: HOSPITAL | Age: 80
End: 2025-02-11
Payer: MEDICARE

## 2025-02-11 ENCOUNTER — ANESTHESIA EVENT (OUTPATIENT)
Dept: GASTROENTEROLOGY | Facility: HOSPITAL | Age: 80
End: 2025-02-11
Payer: MEDICARE

## 2025-02-11 LAB
ALBUMIN SERPL BCP-MCNC: 3.4 G/DL (ref 3.4–5)
ALP SERPL-CCNC: 75 U/L (ref 33–136)
ALT SERPL W P-5'-P-CCNC: 32 U/L (ref 10–52)
ANION GAP SERPL CALC-SCNC: 10 MMOL/L (ref 10–20)
AST SERPL W P-5'-P-CCNC: 30 U/L (ref 9–39)
BILIRUB SERPL-MCNC: 0.8 MG/DL (ref 0–1.2)
BLOOD EXPIRATION DATE: NORMAL
BUN SERPL-MCNC: 18 MG/DL (ref 6–23)
CALCIUM SERPL-MCNC: 8.5 MG/DL (ref 8.6–10.3)
CHLORIDE SERPL-SCNC: 111 MMOL/L (ref 98–107)
CO2 SERPL-SCNC: 24 MMOL/L (ref 21–32)
CREAT SERPL-MCNC: 0.83 MG/DL (ref 0.5–1.3)
DISPENSE STATUS: NORMAL
EGFRCR SERPLBLD CKD-EPI 2021: 90 ML/MIN/1.73M*2
ERYTHROCYTE [DISTWIDTH] IN BLOOD BY AUTOMATED COUNT: 17.6 % (ref 11.5–14.5)
GASTRIN SERPL-MCNC: 96 PG/ML (ref 0–100)
GLUCOSE SERPL-MCNC: 102 MG/DL (ref 74–99)
HCT VFR BLD AUTO: 28.1 % (ref 41–52)
HGB BLD-MCNC: 8.5 G/DL (ref 13.5–17.5)
MAGNESIUM SERPL-MCNC: 1.92 MG/DL (ref 1.6–2.4)
MCH RBC QN AUTO: 28.1 PG (ref 26–34)
MCHC RBC AUTO-ENTMCNC: 30.2 G/DL (ref 32–36)
MCV RBC AUTO: 93 FL (ref 80–100)
NRBC BLD-RTO: 0 /100 WBCS (ref 0–0)
PLATELET # BLD AUTO: 305 X10*3/UL (ref 150–450)
POTASSIUM SERPL-SCNC: 3.7 MMOL/L (ref 3.5–5.3)
PRODUCT BLOOD TYPE: 5100
PRODUCT CODE: NORMAL
PROT SERPL-MCNC: 5.7 G/DL (ref 6.4–8.2)
RBC # BLD AUTO: 3.03 X10*6/UL (ref 4.5–5.9)
SODIUM SERPL-SCNC: 141 MMOL/L (ref 136–145)
UNIT ABO: NORMAL
UNIT NUMBER: NORMAL
UNIT RH: NORMAL
UNIT VOLUME: 350
WBC # BLD AUTO: 4.2 X10*3/UL (ref 4.4–11.3)
XM INTEP: NORMAL

## 2025-02-11 PROCEDURE — 99100 ANES PT EXTEME AGE<1 YR&>70: CPT | Performed by: ANESTHESIOLOGY

## 2025-02-11 PROCEDURE — 2500000002 HC RX 250 W HCPCS SELF ADMINISTERED DRUGS (ALT 637 FOR MEDICARE OP, ALT 636 FOR OP/ED): Performed by: STUDENT IN AN ORGANIZED HEALTH CARE EDUCATION/TRAINING PROGRAM

## 2025-02-11 PROCEDURE — 83735 ASSAY OF MAGNESIUM: CPT | Performed by: STUDENT IN AN ORGANIZED HEALTH CARE EDUCATION/TRAINING PROGRAM

## 2025-02-11 PROCEDURE — 99233 SBSQ HOSP IP/OBS HIGH 50: CPT | Performed by: STUDENT IN AN ORGANIZED HEALTH CARE EDUCATION/TRAINING PROGRAM

## 2025-02-11 PROCEDURE — 99232 SBSQ HOSP IP/OBS MODERATE 35: CPT

## 2025-02-11 PROCEDURE — 0W3P8ZZ CONTROL BLEEDING IN GASTROINTESTINAL TRACT, VIA NATURAL OR ARTIFICIAL OPENING ENDOSCOPIC: ICD-10-PCS | Performed by: INTERNAL MEDICINE

## 2025-02-11 PROCEDURE — 7100000001 HC RECOVERY ROOM TIME - INITIAL BASE CHARGE

## 2025-02-11 PROCEDURE — A43255 PR EDG TRANSORAL CONTROL BLEEDING ANY METHOD: Performed by: ANESTHESIOLOGY

## 2025-02-11 PROCEDURE — 2500000004 HC RX 250 GENERAL PHARMACY W/ HCPCS (ALT 636 FOR OP/ED): Performed by: STUDENT IN AN ORGANIZED HEALTH CARE EDUCATION/TRAINING PROGRAM

## 2025-02-11 PROCEDURE — 2500000004 HC RX 250 GENERAL PHARMACY W/ HCPCS (ALT 636 FOR OP/ED): Performed by: NURSE ANESTHETIST, CERTIFIED REGISTERED

## 2025-02-11 PROCEDURE — 1200000002 HC GENERAL ROOM WITH TELEMETRY DAILY

## 2025-02-11 PROCEDURE — 2720000007 HC OR 272 NO HCPCS

## 2025-02-11 PROCEDURE — 43255 EGD CONTROL BLEEDING ANY: CPT | Performed by: INTERNAL MEDICINE

## 2025-02-11 PROCEDURE — 2500000002 HC RX 250 W HCPCS SELF ADMINISTERED DRUGS (ALT 637 FOR MEDICARE OP, ALT 636 FOR OP/ED): Performed by: NURSE PRACTITIONER

## 2025-02-11 PROCEDURE — 7100000002 HC RECOVERY ROOM TIME - EACH INCREMENTAL 1 MINUTE

## 2025-02-11 PROCEDURE — 85027 COMPLETE CBC AUTOMATED: CPT | Performed by: STUDENT IN AN ORGANIZED HEALTH CARE EDUCATION/TRAINING PROGRAM

## 2025-02-11 PROCEDURE — 2500000004 HC RX 250 GENERAL PHARMACY W/ HCPCS (ALT 636 FOR OP/ED): Performed by: ANESTHESIOLOGY

## 2025-02-11 PROCEDURE — 3700000001 HC GENERAL ANESTHESIA TIME - INITIAL BASE CHARGE

## 2025-02-11 PROCEDURE — A43255 PR EDG TRANSORAL CONTROL BLEEDING ANY METHOD: Performed by: NURSE ANESTHETIST, CERTIFIED REGISTERED

## 2025-02-11 PROCEDURE — 36415 COLL VENOUS BLD VENIPUNCTURE: CPT | Performed by: STUDENT IN AN ORGANIZED HEALTH CARE EDUCATION/TRAINING PROGRAM

## 2025-02-11 PROCEDURE — 80053 COMPREHEN METABOLIC PANEL: CPT | Performed by: STUDENT IN AN ORGANIZED HEALTH CARE EDUCATION/TRAINING PROGRAM

## 2025-02-11 PROCEDURE — 97161 PT EVAL LOW COMPLEX 20 MIN: CPT | Mod: GP

## 2025-02-11 PROCEDURE — 2500000001 HC RX 250 WO HCPCS SELF ADMINISTERED DRUGS (ALT 637 FOR MEDICARE OP): Performed by: STUDENT IN AN ORGANIZED HEALTH CARE EDUCATION/TRAINING PROGRAM

## 2025-02-11 PROCEDURE — 3700000002 HC GENERAL ANESTHESIA TIME - EACH INCREMENTAL 1 MINUTE

## 2025-02-11 RX ORDER — LABETALOL HYDROCHLORIDE 5 MG/ML
5 INJECTION, SOLUTION INTRAVENOUS ONCE AS NEEDED
Status: DISCONTINUED | OUTPATIENT
Start: 2025-02-11 | End: 2025-02-12 | Stop reason: HOSPADM

## 2025-02-11 RX ORDER — PRASUGREL 10 MG/1
10 TABLET, FILM COATED ORAL DAILY
Status: DISCONTINUED | OUTPATIENT
Start: 2025-02-12 | End: 2025-02-12

## 2025-02-11 RX ORDER — LIDOCAINE HYDROCHLORIDE 20 MG/ML
INJECTION, SOLUTION EPIDURAL; INFILTRATION; INTRACAUDAL; PERINEURAL AS NEEDED
Status: DISCONTINUED | OUTPATIENT
Start: 2025-02-11 | End: 2025-02-11

## 2025-02-11 RX ORDER — METOPROLOL SUCCINATE 25 MG/1
25 TABLET, EXTENDED RELEASE ORAL DAILY
Status: DISCONTINUED | OUTPATIENT
Start: 2025-02-11 | End: 2025-02-12 | Stop reason: HOSPADM

## 2025-02-11 RX ORDER — MAGNESIUM SULFATE HEPTAHYDRATE 40 MG/ML
2 INJECTION, SOLUTION INTRAVENOUS ONCE
Status: COMPLETED | OUTPATIENT
Start: 2025-02-11 | End: 2025-02-11

## 2025-02-11 RX ORDER — FENTANYL CITRATE 50 UG/ML
INJECTION, SOLUTION INTRAMUSCULAR; INTRAVENOUS AS NEEDED
Status: DISCONTINUED | OUTPATIENT
Start: 2025-02-11 | End: 2025-02-11

## 2025-02-11 RX ORDER — SUCCINYLCHOLINE/SOD CL,ISO/PF 100 MG/5ML
SYRINGE (ML) INTRAVENOUS AS NEEDED
Status: DISCONTINUED | OUTPATIENT
Start: 2025-02-11 | End: 2025-02-11

## 2025-02-11 RX ORDER — OXYCODONE AND ACETAMINOPHEN 5; 325 MG/1; MG/1
1 TABLET ORAL EVERY 4 HOURS PRN
Status: DISCONTINUED | OUTPATIENT
Start: 2025-02-11 | End: 2025-02-12 | Stop reason: HOSPADM

## 2025-02-11 RX ORDER — SODIUM CHLORIDE, SODIUM LACTATE, POTASSIUM CHLORIDE, CALCIUM CHLORIDE 600; 310; 30; 20 MG/100ML; MG/100ML; MG/100ML; MG/100ML
100 INJECTION, SOLUTION INTRAVENOUS CONTINUOUS
Status: DISCONTINUED | OUTPATIENT
Start: 2025-02-11 | End: 2025-02-11

## 2025-02-11 RX ORDER — ACETAMINOPHEN 325 MG/1
975 TABLET ORAL ONCE
Status: DISCONTINUED | OUTPATIENT
Start: 2025-02-11 | End: 2025-02-12 | Stop reason: HOSPADM

## 2025-02-11 RX ORDER — OXYCODONE HYDROCHLORIDE 5 MG/1
5 TABLET ORAL EVERY 4 HOURS PRN
Status: DISCONTINUED | OUTPATIENT
Start: 2025-02-11 | End: 2025-02-12 | Stop reason: HOSPADM

## 2025-02-11 RX ORDER — PROPOFOL 10 MG/ML
INJECTION, EMULSION INTRAVENOUS AS NEEDED
Status: DISCONTINUED | OUTPATIENT
Start: 2025-02-11 | End: 2025-02-11

## 2025-02-11 RX ORDER — HYDROMORPHONE HYDROCHLORIDE 0.2 MG/ML
0.2 INJECTION INTRAMUSCULAR; INTRAVENOUS; SUBCUTANEOUS EVERY 5 MIN PRN
Status: DISCONTINUED | OUTPATIENT
Start: 2025-02-11 | End: 2025-02-12 | Stop reason: HOSPADM

## 2025-02-11 RX ORDER — SODIUM CHLORIDE 9 MG/ML
INJECTION, SOLUTION INTRAVENOUS CONTINUOUS PRN
Status: DISCONTINUED | OUTPATIENT
Start: 2025-02-11 | End: 2025-02-11

## 2025-02-11 RX ORDER — LIDOCAINE HYDROCHLORIDE 10 MG/ML
0.1 INJECTION, SOLUTION EPIDURAL; INFILTRATION; INTRACAUDAL; PERINEURAL ONCE
Status: DISCONTINUED | OUTPATIENT
Start: 2025-02-11 | End: 2025-02-12 | Stop reason: HOSPADM

## 2025-02-11 RX ORDER — HYDRALAZINE HYDROCHLORIDE 20 MG/ML
5 INJECTION INTRAMUSCULAR; INTRAVENOUS EVERY 30 MIN PRN
Status: DISCONTINUED | OUTPATIENT
Start: 2025-02-11 | End: 2025-02-12 | Stop reason: HOSPADM

## 2025-02-11 RX ORDER — SUCRALFATE 1 G/10ML
1 SUSPENSION ORAL
Status: DISCONTINUED | OUTPATIENT
Start: 2025-02-11 | End: 2025-02-12 | Stop reason: HOSPADM

## 2025-02-11 RX ORDER — ALBUTEROL SULFATE 0.83 MG/ML
2.5 SOLUTION RESPIRATORY (INHALATION) ONCE AS NEEDED
Status: DISCONTINUED | OUTPATIENT
Start: 2025-02-11 | End: 2025-02-12 | Stop reason: HOSPADM

## 2025-02-11 RX ORDER — ONDANSETRON HYDROCHLORIDE 2 MG/ML
4 INJECTION, SOLUTION INTRAVENOUS ONCE AS NEEDED
Status: DISCONTINUED | OUTPATIENT
Start: 2025-02-11 | End: 2025-02-12 | Stop reason: HOSPADM

## 2025-02-11 RX ORDER — ONDANSETRON HYDROCHLORIDE 2 MG/ML
INJECTION, SOLUTION INTRAVENOUS AS NEEDED
Status: DISCONTINUED | OUTPATIENT
Start: 2025-02-11 | End: 2025-02-11

## 2025-02-11 RX ORDER — PHENYLEPHRINE HCL IN 0.9% NACL 1 MG/10 ML
SYRINGE (ML) INTRAVENOUS AS NEEDED
Status: DISCONTINUED | OUTPATIENT
Start: 2025-02-11 | End: 2025-02-11

## 2025-02-11 RX ADMIN — METOPROLOL SUCCINATE 25 MG: 25 TABLET, EXTENDED RELEASE ORAL at 11:29

## 2025-02-11 RX ADMIN — PRAVASTATIN SODIUM 80 MG: 20 TABLET ORAL at 21:09

## 2025-02-11 RX ADMIN — SODIUM CHLORIDE: 900 INJECTION, SOLUTION INTRAVENOUS at 13:57

## 2025-02-11 RX ADMIN — AMOXICILLIN AND CLAVULANATE POTASSIUM 1 TABLET: 875; 125 TABLET, FILM COATED ORAL at 21:08

## 2025-02-11 RX ADMIN — SUCRALFATE 1 G: 1 SUSPENSION ORAL at 15:23

## 2025-02-11 RX ADMIN — AMOXICILLIN AND CLAVULANATE POTASSIUM 1 TABLET: 875; 125 TABLET, FILM COATED ORAL at 09:46

## 2025-02-11 RX ADMIN — QUETIAPINE FUMARATE 50 MG: 50 TABLET ORAL at 21:14

## 2025-02-11 RX ADMIN — PANTOPRAZOLE SODIUM 40 MG: 40 INJECTION, POWDER, FOR SOLUTION INTRAVENOUS at 21:09

## 2025-02-11 RX ADMIN — FENTANYL CITRATE 50 MCG: 50 INJECTION, SOLUTION INTRAMUSCULAR; INTRAVENOUS at 14:00

## 2025-02-11 RX ADMIN — SODIUM CHLORIDE, POTASSIUM CHLORIDE, SODIUM LACTATE AND CALCIUM CHLORIDE 100 ML/HR: 600; 310; 30; 20 INJECTION, SOLUTION INTRAVENOUS at 15:20

## 2025-02-11 RX ADMIN — ASPIRIN 81 MG: 81 TABLET, COATED ORAL at 09:46

## 2025-02-11 RX ADMIN — BRIMONIDINE TARTRATE 1 DROP: 2 SOLUTION/ DROPS OPHTHALMIC at 09:46

## 2025-02-11 RX ADMIN — ONDANSETRON 4 MG: 2 INJECTION INTRAMUSCULAR; INTRAVENOUS at 14:07

## 2025-02-11 RX ADMIN — LEVETIRACETAM 1000 MG: 500 TABLET, FILM COATED ORAL at 21:09

## 2025-02-11 RX ADMIN — DONEPEZIL HYDROCHLORIDE 5 MG: 5 TABLET ORAL at 21:09

## 2025-02-11 RX ADMIN — BRIMONIDINE TARTRATE 1 DROP: 2 SOLUTION/ DROPS OPHTHALMIC at 21:09

## 2025-02-11 RX ADMIN — PROPOFOL 150 MG: 10 INJECTION, EMULSION INTRAVENOUS at 14:00

## 2025-02-11 RX ADMIN — Medication 100 MG: at 14:00

## 2025-02-11 RX ADMIN — LIDOCAINE HYDROCHLORIDE 60 MG: 20 INJECTION, SOLUTION EPIDURAL; INFILTRATION; INTRACAUDAL; PERINEURAL at 14:00

## 2025-02-11 RX ADMIN — Medication 200 MCG: at 14:03

## 2025-02-11 RX ADMIN — LEVETIRACETAM 1000 MG: 500 TABLET, FILM COATED ORAL at 09:46

## 2025-02-11 RX ADMIN — Medication 100 MCG: at 14:13

## 2025-02-11 RX ADMIN — PANTOPRAZOLE SODIUM 40 MG: 40 INJECTION, POWDER, FOR SOLUTION INTRAVENOUS at 10:38

## 2025-02-11 RX ADMIN — MAGNESIUM SULFATE HEPTAHYDRATE 2 G: 40 INJECTION, SOLUTION INTRAVENOUS at 11:44

## 2025-02-11 SDOH — HEALTH STABILITY: MENTAL HEALTH: CURRENT SMOKER: 0

## 2025-02-11 ASSESSMENT — PAIN SCALES - GENERAL
PAINLEVEL_OUTOF10: 0 - NO PAIN
PAINLEVEL_OUTOF10: 0 - NO PAIN
PAIN_LEVEL: 0
PAINLEVEL_OUTOF10: 0 - NO PAIN

## 2025-02-11 ASSESSMENT — COGNITIVE AND FUNCTIONAL STATUS - GENERAL
DRESSING REGULAR LOWER BODY CLOTHING: A LITTLE
STANDING UP FROM CHAIR USING ARMS: A LITTLE
DRESSING REGULAR LOWER BODY CLOTHING: A LITTLE
TOILETING: A LITTLE
HELP NEEDED FOR BATHING: A LITTLE
WALKING IN HOSPITAL ROOM: A LITTLE
WALKING IN HOSPITAL ROOM: A LITTLE
STANDING UP FROM CHAIR USING ARMS: A LITTLE
DAILY ACTIVITIY SCORE: 20
MOVING TO AND FROM BED TO CHAIR: A LITTLE
STANDING UP FROM CHAIR USING ARMS: A LITTLE
CLIMB 3 TO 5 STEPS WITH RAILING: A LOT
MOBILITY SCORE: 18
TURNING FROM BACK TO SIDE WHILE IN FLAT BAD: A LITTLE
WALKING IN HOSPITAL ROOM: A LITTLE
DRESSING REGULAR UPPER BODY CLOTHING: A LITTLE
CLIMB 3 TO 5 STEPS WITH RAILING: TOTAL
MOVING TO AND FROM BED TO CHAIR: A LITTLE
CLIMB 3 TO 5 STEPS WITH RAILING: A LOT
MOVING TO AND FROM BED TO CHAIR: A LITTLE
MOBILITY SCORE: 19
HELP NEEDED FOR BATHING: A LITTLE
DRESSING REGULAR UPPER BODY CLOTHING: A LITTLE
DAILY ACTIVITIY SCORE: 20
MOBILITY SCORE: 18
TOILETING: A LITTLE

## 2025-02-11 ASSESSMENT — PAIN - FUNCTIONAL ASSESSMENT
PAIN_FUNCTIONAL_ASSESSMENT: 0-10
PAIN_FUNCTIONAL_ASSESSMENT: UNABLE TO SELF-REPORT

## 2025-02-11 ASSESSMENT — ACTIVITIES OF DAILY LIVING (ADL): ADL_ASSISTANCE: INDEPENDENT

## 2025-02-11 NOTE — CARE PLAN
Patient underwent EGD today with Dr. Garcia noting gastric AVMs and the fundus and body of the stomach with stigmata of recent bleeding.  APC applied. Pt tolerated well.     Plan:  - continue supportive care  - clear liquid diet today  - continue PPI BID for 8 weeks  - carafate 1gm tid for 8 weeks  - continue to trend hgb daily unless pt becomes symptomatic or decompensates  - transfuse to maintain hgb >7  - continue to monitor for and document overt signs of bleeding  - no NSAIDS    Plan has been discussed with Dr. Birch. GI will continue to follow.    Jennifer Selby, APRN/CNP

## 2025-02-11 NOTE — PROGRESS NOTES
02/11/25 0847   Discharge Planning   Expected Discharge Disposition Home     Received message from Brian Roman AL confirms that patient is independent with ADL's and is currently active with outpatient services for therapy. Therapy evals pending- care transitions team to follow for d/c planning needs.

## 2025-02-11 NOTE — PROGRESS NOTES
Wayne Burkitt is a 79 y.o. male on day 1 of admission presenting with Anemia.      Subjective   Rhinorocket in place, epistaxis appears resolved. Will reach out to ENT for final recs regarding how long to keep in place. Reviewed cardio recs, will plan for anti-platelet monotherapy with Effient and stop aspirin. Patient is very hesitant regarding this as he prefers not to take Effient as he thinks it his causing his bleeding issues. Explained that we need to get EGD and need for Effient due to his stents.    Continued home Keppra, which is a high risk medication.        Objective     Last Recorded Vitals  /70   Pulse 59   Temp 35.7 °C (96.3 °F) (Temporal)   Resp 18   Wt 78 kg (172 lb)   SpO2 99%   Intake/Output last 3 Shifts:    Intake/Output Summary (Last 24 hours) at 2/11/2025 1127  Last data filed at 2/10/2025 2055  Gross per 24 hour   Intake 668.5 ml   Output --   Net 668.5 ml       Admission Weight  Weight: 78 kg (172 lb) (02/09/25 1924)    Daily Weight  02/09/25 : 78 kg (172 lb)    Image Results  CT angio abdomen pelvis w and or wo IV IV contrast  Narrative: STUDY:  CT Angiogram of the Abdomen and Pelvis; 2/9/2025 at 11:11 p.m.  INDICATION:  GI bleed.  Abdominal pain.  COMPARISON:  US scrotum 12/21/2024.  CT AP 12/20/2024.  ACCESSION NUMBER(S):  [id number]  ORDERING CLINICIAN:  BIANCA LANGE  TECHNIQUE:  Helical CT is performed from the aortic diaphragmatic  hiatus through the symphysis pubis before and after bolus  administration of intravenous contrast.  Images are reviewed and  processed at a workstation according to the CT angiogram protocol with  3-D and/or MIP post processing imaging generated.  Automated mA/kV exposure control was utilized and patient examination  was performed in strict accordance with principles of ALARA.  FINDINGS:   Lower thorax:  Lung bases:The lung bases are clear.  Heart: The heart is partially imaged. Heart size is normal. AICD leads  are partially imaged within  the right atrium and right ventricle.  Abdomen:  Liver: Hepatic density is heterogeneous. No focal hepatic lesion is  seen.  Gallbladder: Small layering gallstones are seen. No gallbladder  distention or wall thickening.   Pancreas: The pancreas is there is an enhancing 8 mm lesion within the  distal body of the pancreas (image 58, series 502). The pancreas is  otherwise unremarkable with no ductal distention or focal lesion.  Spleen: The spleen appears normal.  Adrenal glands: Adrenal glands are unremarkable.  Kidneys:  The kidneys are symmetric in appearance with no  nephrolithiasis or obstruction. No solid or cystic lesions.  Gastrointestinal tract: The stomach appears normal.  No large or small  bowel distention or wall thickening. Mild colonic diverticulosis  without inflammatory stranding. No evidence of GI bleed. The appendix  appears normal.  Vasculature: There is mild calcific atherosclerosis of the aortoiliac  tree and branch vessels. The aorta is not enlarged.   Lymph nodes: No pathologically enlarged lymph nodes are seen.  Peritoneum: No free fluid within the peritoneum. No free air.  Abdominal wall: No abdominal wall hernias are seen. Mild inflammatory  changes are seen within the right inguinal canal, significantly  improved from prior exam.  Pelvis:  Reproductive: Unremarkable.  Urinary bladder: The bladder is partially distended and unremarkable  in appearance.  Lymph nodes: No pathologically enlarged lymph nodes are seen.  Musculoskeletal:  Moderate spondylitic changes involving the spine. No suspicious bony  lesions.  Impression: 1.No evidence of active GI bleed.  2.8 mm enhancing lesion within the distal body of the pancreas.  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  Recommend further evaluation with pancreatic protocol MRI or  scintigraphy.  3.Mild colonic diverticulosis without evidence of diverticulitis.  4.Cholelithiasis without evidence of cholecystitis.  Signed by Mikhail  MD Zena      Physical Exam    Constitutional: Well developed, no distress, alert and cooperative  Skin: Warm and dry  Eyes: EOMI, clear sclera  ENMT: Rhinorocket in place  Respiratory: Patent airways, CTAB  Cardiovascular: Regular, rate and rhythm, no murmurs  Abdominal: Nondistended, soft, non-tender, +BS  MSK: ROM intact  Neuro: alert and oriented x3      Assessment/Plan     Assessment & Plan      Patient is a 79 y.o. male w/ pmhx of CAD s/p CABG and s/p PCI Dec 2024, ICM, HTN, HFpEF, T2DM, dementia, and pacemaker presents with uncontrolled epistaxis and admitted for acute anemia requiring blood transfusion w/ question of GI bleed and incidental pancreatic mass finding.     Acute blood loss anemia 2/2 epistaxis, w/ ?GI Bleed  -Hx 1 month dark tarry stools, w/ iron supplementation (stopped 2 weeks ago  -Unclear if related to swallowed blood products from epistaxis vs true GI bleed  -Baseline Hbg 9-10 range  -Hbg goal 8 given CAD history, received 2u pRBC to date  -IV PPI in interim  -GI following, planning EGD    CAD  -With recent PCI to RCA Dec 2024 complicated by groin hematoma   -Continued home aspirin given recent stent, holding home Effient   -Cardiology recommended stopping aspirin and to continue home Effient    Pancreatic lesion  -CTA on admit with 2.8mm enhancing lesion with distal body of pancreas   -CT ab/pelvis in 12/2024 didn't mention pancreatic mass  -CA 19-9 wnl  -GI following, likely outpatient follow up    Epistaxis   -Known problem for patient  -Rhinorocket in place, epistaxis appears resolved. Augmentin for toxic shock ppx  -Will apply vaseline once removed, PRN afrin spray   -ENT consulted. Per RN, ENT had examined patient and recommended rhino rocket x5 days from admit and outpatient follow up. Will await formal note.    Seizure Disorder  -Continued home Keppra    NSVT  -Noted 2/11/25, asymptomatic   -Metoprolol started, maintain electrolytes        Diet: NPO  DVT: SCDs  Dispo- Pending  GI/ENT recs            Jose M Salazar DO

## 2025-02-11 NOTE — CARE PLAN
Pt had no acute changes noted this shift. Pt was able to tolerate EGD well. Pt still have rhino rocket in place. Pt was able to tolerate clear lq diet this shift. Pt safety been maintained.

## 2025-02-11 NOTE — NURSING NOTE
Patient alert and oriented x 4.  Patient received 1 unit of PRBC .  Due to multiple runs of v-tach.  Patient tolerated procedure well and patient having less runs of v-tach.  Patient calls for assistance out of bed.  Bed alarm on for safety.

## 2025-02-11 NOTE — ANESTHESIA PREPROCEDURE EVALUATION
Patient: Wayne Burkitt    Procedure Information       Anesthesia Start Date/Time: 02/11/25 1346    Scheduled providers: Duran Garcia MD    Procedure: EGD    Location: [facility] [place]            Relevant Problems   Cardiac   (+) 3-vessel coronary artery disease   (+) Angina pectoris   (+) Angina pectoris, unstable (Multi)   (+) Coronary artery disease involving native coronary artery of native heart without angina pectoris   (+) Diastolic congestive heart failure   (+) Hypertensive disorder      Neuro   (+) Moderate vascular dementia with anxiety   (+) Moderate vascular dementia with psychotic disturbance   (+) 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   (+) Seizure disorder (Multi)      Hematology   (+) Anemia       Clinical information reviewed:   Tobacco  Allergies  Meds   Med Hx  Surg Hx   Fam Hx  Soc Hx        NPO Detail:  No data recorded     Physical Exam    Airway  Mallampati: II  TM distance: >3 FB     Cardiovascular   Rhythm: regular  Rate: normal     Dental - normal exam     Pulmonary   Breath sounds clear to auscultation     Abdominal            Anesthesia Plan    History of general anesthesia?: yes  History of complications of general anesthesia?: no    ASA 3     general     The patient is not a current smoker.    intravenous induction   Postoperative administration of opioids is intended.  Anesthetic plan and risks discussed with patient.    Plan discussed with CRNA.

## 2025-02-11 NOTE — CONSULTS
Inpatient consult to Cardiology  Consult performed by: Gabino Giron MD  Consult ordered by: Jose M Salazar DO  Reason for consult: Nosebleeding on DAPT  Assessment/Recommendations: Patient with prior intolerance to plavix and issues affording brilinta.  We can do effient monotherapy.  Thanks        History Of Present Illness:    Wayne Burkitt is a 79 y.o. male presenting with nosebleeds and possible GIB.  Low Hgb.  Patient with recent PCI complicated by groin hematoma, needing transfusion.  Needed transfusion in ED, noted at that time to have NSVT.  No chest pain or shortness of breath.     Last Recorded Vitals:  Vitals:    02/10/25 1815 02/10/25 1900 02/10/25 2000 02/10/25 2054   BP: 129/65 109/55 124/81 147/85   BP Location:   Left arm Left arm   Patient Position:   Lying Sitting   Pulse: 60 59 59 74   Resp: 14 14 16 16   Temp: 35.7 °C (96.3 °F) 35.7 °C (96.3 °F) 36 °C (96.8 °F) 36.5 °C (97.7 °F)   TempSrc: Temporal Temporal Temporal Temporal   SpO2: 100% 100% 97% 100%   Weight:       Height:           Last Labs:  CBC - 2/10/2025:  1:44 PM  4.9 7.7 307    25.8      CMP - 2/9/2025:  7:59 PM  8.6 6.4 61 --- 0.3   3.4 3.7 20 70      PTT - 12/20/2024:  5:38 PM  1.2   13.7 35     Troponin I, High Sensitivity   Date/Time Value Ref Range Status   12/23/2024 01:57 AM 3,116 (HH) 0 - 20 ng/L Final     Comment:     Previous result verified on 12/22/2024 1853 on specimen/case [id number] called with component [facility] for procedure Troponin I, High Sensitivity, Initial with value 2,479 ng/L.   12/22/2024 06:09 PM 2,479 (HH) 0 - 20 ng/L Final     BNP   Date/Time Value Ref Range Status   12/12/2024 10:18  (H) 0 - 99 pg/mL Final      Last I/O:  I/O last 3 completed shifts:  In: 3820 (49 mL/kg) [P.O.:240; Blood:3580]  Out: 500 (6.4 mL/kg) [Urine:500 (0.2 mL/kg/hr)]  Weight: 78 kg     Past Cardiology Tests (Last 3 Years):  EKG:  Electrocardiogram, 12-lead PRN ACS symptoms 12/22/2024      ECG 12 Lead       ECG 12  lead (Clinic Performed) 11/25/2024    Echo:  Transthoracic echo (TTE) complete 12/05/2024    Ejection Fractions:  EF   Date/Time Value Ref Range Status   12/05/2024 11:29 AM 58 %      Cath:  Cardiac Catheterization Procedure 12/20/2024    Stress Test:  Nuclear Stress Test 12/05/2024    Cardiac Imaging:  No results found for this or any previous visit from the past 1095 days.      Past Medical History:  He has a past medical history of Auditory hallucinations (09/28/2024), Conversion reaction (09/28/2024), Coronary artery disease involving native coronary artery of native heart without angina pectoris (09/28/2024), Epistaxis, recurrent (09/28/2024), Ischemic cardiomyopathy (09/28/2024), and Moderate vascular dementia with anxiety (09/28/2024).    Past Surgical History:  He has a past surgical history that includes Coronary artery bypass graft (2010); Coronary angioplasty with stent; pacemaker placement; Total hip arthroplasty; Cardiac catheterization (N/A, 12/20/2024); and Cardiac catheterization (N/A, 12/20/2024).      Social History:  He reports that he quit smoking about 31 years ago. His smoking use included cigarettes. He has never used smokeless tobacco. He reports that he does not currently use alcohol. He reports that he does not use drugs.    Family History:  Family History   Family history unknown: Yes        Allergies:  Alprazolam, Bepotastine besilate, Clopidogrel, Doxazosin, Lisinopril, Metronidazole, Amlodipine, Atorvastatin, Cephalexin, Ciprofloxacin, Diphenhydramine hcl, Guaifenesin, Hydrochlorothiazide, Hydrocodone, Methylprednisolone, Phenytoin, Sertraline, Telmisartan, and Topiramate    Inpatient Medications:  Scheduled medications   Medication Dose Route Frequency    amoxicillin-pot clavulanate  1 tablet oral q12h [facility]    [START ON 2/11/2025] aspirin  81 mg oral Daily    brimonidine  1 drop Both Eyes BID    donepezil  5 mg oral Nightly    levETIRAcetam  1,000 mg oral BID    pantoprazole  40 mg  intravenous BID    pravastatin  80 mg oral Nightly    QUEtiapine  50 mg oral Nightly     PRN medications   Medication    acetaminophen    ondansetron    oxymetazoline    sodium chloride    traZODone     Continuous Medications   Medication Dose Last Rate     Outpatient Medications:  Current Outpatient Medications   Medication Instructions    acetaminophen (TYLENOL) 650 mg, Every 4 hours PRN    aspirin 81 mg, Daily    brimonidine (AlphaGAN) 0.2 % ophthalmic solution 1 drop, 2 times daily    docusate sodium (COLACE) 100 mg, oral, 2 times daily PRN    donepezil (ARICEPT) 5 mg, Nightly    levETIRAcetam (KEPPRA) 1,000 mg, 2 times daily    nitroglycerin (NITROSTAT) 0.4 mg, sublingual, Every 5 min PRN    prasugrel (EFFIENT) 10 mg, Daily    pravastatin (PRAVACHOL) 80 mg, Nightly    QUEtiapine (SEROQUEL) 50 mg, Nightly    traZODone (DESYREL) 25 mg, oral, Nightly PRN       Physical Exam:  General: No acute distress, appears comfortable  Cardiac: Regular rate and rhythm with no murmurs rubs or gallops, normal S1-S2  Pulmonary: Clear to auscultation bilaterally with no rales or rhonchi, normal respiratory effort  Gastrointestinal: Soft abdomen with no tenderness or guarding, no rebound  Musculoskeletal: No lower extremity edema, normal  Neurological: Alert and oriented x 4, appropriate, moving all extremities well, normal affect  Psychiatric: Normal affect, mood appropriate to occasion  Skin: No rashes, hives or jaundice  HEENT: Normocephalic, atraumatic.  Pupils equal round and reactive.       Assessment/Plan     Problem List Items Addressed This Visit             ICD-10-CM       Hematology and Neoplasia    * (Principal) Anemia - Primary D64.9        Recommend discontinue ASA and stay on prasugrel monotherapy.  Does have stent thrombosis risk but bleeding risks may outweight, awaiting further workup.    Code Status:  Full Code    I spent 30 minutes in the professional and overall care of this patient.        Gabino TRAVIS  MD Bree

## 2025-02-11 NOTE — ANESTHESIA PROCEDURE NOTES
Airway  Date/Time: 2/11/2025 2:01 PM  Urgency: elective    Airway not difficult    Staffing  Performed: CRNA   Authorized by: Raúl Maza MD    Performed by: RACHID Duarte-CRNA, DNP  Patient location during procedure: OR    Indications and Patient Condition  Indications for airway management: anesthesia  Spontaneous Ventilation: absent  Sedation level: deep  Preoxygenated: yes  Patient position: sniffing  Mask difficulty assessment: 0 - not attempted  Planned trial extubation    Final Airway Details  Final airway type: endotracheal airway      Successful airway: ETT  Cuffed: yes   Successful intubation technique: video laryngoscopy  Facilitating devices/methods: intubating stylet  Endotracheal tube insertion site: oral  Blade size: #4  ETT size (mm): 7.5  Cormack-Lehane Classification: grade I - full view of glottis  Placement verified by: chest auscultation and capnometry   Measured from: lips  ETT to lips (cm): 22  Number of attempts at approach: 1  Number of other approaches attempted: 0    Additional Comments  Easy, atraumatic intubation. Secured with silk tape.

## 2025-02-11 NOTE — PROGRESS NOTES
Wayne Burkitt is a 79 y.o. male on day 1 of admission presenting with Anemia.      Subjective   Patient seen and examined at bedside. He required another 1u pRBCs due to dropping Hgb. This AM he denies any lightheadedness, nausea/vomiting, or abdominal pain. He believes his bleeding is slowing down       Objective     Last Recorded Vitals  /64 (BP Location: Left arm, Patient Position: Lying)   Pulse 60   Temp 36.1 °C (97 °F) (Temporal)   Resp 16   Wt 78 kg (172 lb)   SpO2 98%   Intake/Output last 3 Shifts:    Intake/Output Summary (Last 24 hours) at 2/11/2025 1536  Last data filed at 2/11/2025 1432  Gross per 24 hour   Intake 741.42 ml   Output --   Net 741.42 ml       Admission Weight  Weight: 78 kg (172 lb) (02/09/25 1924)    Daily Weight  02/09/25 : 78 kg (172 lb)    Image Results  Esophagogastroduodenoscopy (EGD)  Table formatting from the original result was not included.  Impression  Normal.  5 mm angioectasia in the fundus of the stomach and body of the stomach;   there was stigmata of recent hemorrhage; tissue was completely ablated   with argon plasma coagulation  The duodenal bulb and 2nd part of the duodenum appeared normal.    Findings  Normal  5 mm angioectasia in the fundus of the stomach and body of the stomach (5   cm from the GE junction); there was stigmata of recent hemorrhage; 5   lesions were completely ablated with 8 applications of argon plasma   coagulation using a straight fire probe (30 W and 1.1 mL/min flow rate),   coagulum was removed after ablation was completed  The duodenal bulb and 2nd part of the duodenum appeared normal.    Recommendation  Repeat EGD in 2 months, due: 4/12/2025   Pantoprazole 40 mg po bid 8 weeks  Sucralfate 1 g TID for 8 weeks        Indication  Melena    Staff  Staff Role   Duran Garcia MD Proceduralist     Medications  See Anesthesia Record.     Preprocedure  A history and physical has been performed, and patient medication   allergies have been  reviewed. The patient's tolerance of previous   anesthesia has been reviewed. The risks and benefits of the procedure and   the sedation options and risks were discussed with the patient. All   questions were answered and informed consent obtained.    Details of the Procedure  The patient underwent general anesthesia, which was administered by an   anesthesia professional. The patient's blood pressure, ECG, ETCO2, heart   rate, level of consciousness, respirations and oxygen were monitored   throughout the procedure. The scope was introduced through the mouth and   advanced to the second part of the duodenum. Retroflexion was performed in   the cardia. Prior to the procedure, the patient's H. Pylori status was   unknown. The patient experienced no blood loss. The procedure was not   difficult. The patient tolerated the procedure well. There were no   apparent adverse events.     Events  Procedure Events   Event Event Time   ENDO SCOPE IN TIME 2/11/2025  2:03 PM   ENDO SCOPE OUT TIME 2/11/2025  2:09 PM     Specimens  No specimens collected    Procedure Location  [address] [address]  [phone]    Referring Provider  RACHID Thorne-CNP    Procedure Provider  Duran Garcia MD      Physical Exam  General: Patient does not appear to be in any acute distress, alert, awake  Head: Normocephalic, Rhinorocket in place  Cardiovascular: RRR, S1/S2, no murmurs, rubs, or gallops, radial pulses +2  Pulmonary: CTAB, no respiratory distress.  Abdomen: +BS, soft, non-tender, nondistended, no guarding or rebound, no masses noted  Neuro: A&O x3, CN intact, no focal deficits, strength and sensation intact bilaterally  MSK: No joint swelling, normal movements of all extremities. Passive ROM intact  Extremities: No edema appreciated in lower extremities bilaterally, no cyanosis  Skin- Warm. Dry. No lesions, contusions, or erythema.  Psychiatric:  Judgment intact. 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 superimposed by recurrent Epistaxis  -EGD showed 5 mm angiectasia in the fundus of the stomach and body of the stomach with stigmata of recent hemorrhage s/p ablation with APC  Plan:  -EGD done today, see Care Plan for further details  -CLD today  - PPI twice daily for 8 weeks, Carafate 1 g 3 times daily for 8 weeks  -ENT consulted for epistaxis  -Transfuse to keep Hgb >8 in the setting of recent CAD  -Repeat EGD in 2 months (04/12/2025)         #Pancreatic Mass   -2.8mm enhancing lesion c/f neoplasm   -Recommend MRCP; however can be done as outpatient and can follow up with GI on discharge        Case seen and discussed with Dr. Queta Enrique DO  PGY-3 Internal Medicine  This note has been transcribed using Dragon voice recognition system and there is a possibility of unintentional typing misprints.  Any information found to be copied from previous providers is done in the best interest of the patient to provide accurate, quality, and continuity of care.

## 2025-02-11 NOTE — PROGRESS NOTES
Physical Therapy    Physical Therapy Evaluation    Patient Name: Wayne Burkitt  MRN: [id number]  Today's Date: 2/11/2025   Time Calculation  Start Time: 1058  Stop Time: 1113  Time Calculation (min): 15 min  3022/3022-A    Assessment/Plan   PT Assessment  PT Assessment Results: Decreased strength, Decreased range of motion, Decreased endurance, Impaired balance, Decreased mobility, Decreased safety awareness  Rehab Prognosis: Good  Evaluation/Treatment Tolerance: Patient tolerated treatment well  Medical Staff Made Aware: Yes  End of Session Communication: Bedside nurse  Assessment Comment: Pt is a 78 y/o male admitted for anemia. Pt presents with decreased strength, endurance and balance. Pt able to tolerate bed mobility, transfers and gait training. He is functioning below baseline level of function and will benefit from skilled therapy during stay to improve overall functional mobility and safety awareness. Upon discharge pt will benefit from low intensity therapy for continued improvement in functional mobility.     End of Session Patient Position: Up in chair, Alarm on (call light within reach and friends present)  IP OR SWING BED PT PLAN  Inpatient or Swing Bed: Inpatient  PT Plan  Treatment/Interventions: Bed mobility, Transfer training, Gait training, Stair training, Balance training, Neuromuscular re-education, Endurance training, Strengthening, Range of motion, Therapeutic exercise, Therapeutic activity, Home exercise program, Positioning, Postural re-education  PT Plan: Ongoing PT  PT Frequency: 3 times per week  PT Discharge Recommendations: Low intensity level of continued care  Equipment Recommended upon Discharge: Wheeled walker  PT Recommended Transfer Status: Assist x1, Contact guard  PT - OK to Discharge: Yes (Once medically cleared)    Subjective     Current Problem:  1. Anemia        2. Melena  Esophagogastroduodenoscopy (EGD)    Esophagogastroduodenoscopy (EGD)        Patient Active Problem List    Diagnosis    Ischemic cardiomyopathy    Moderate vascular dementia with anxiety    Conversion reaction    Auditory hallucinations    Coronary artery disease involving native coronary artery of native heart without angina pectoris    Epistaxis, recurrent    Angina pectoris    Moderate vascular dementia with psychotic disturbance    Episodes of formed visual hallucinations    Frequent falls    Hypertensive disorder    Obstructive sleep apnea syndrome    Orthostatic hypotension    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    Seizure disorder (Multi)    Angina pectoris, unstable (Multi)    Diastolic congestive heart failure    3-vessel coronary artery disease    Scrotal hematoma    Anemia     General Visit Information:  General  Reason for Referral: P/w for uncontrolled epistaxis. He states he has had frequent nose bleeds from his left nostril for decades, but has not been this bad in years. He had recent elective PCI with stent to mid RCA with Dr. Giron on 12/20 that was c/b acute anemia with groin/scrotal hematoma that required brief ICU stay. Pt admitted for anemia.  Referred By: Dr. Salazar  Past Medical History Relevant to Rehab: CAD s/p CABG and s/p PCI, ICM, HTN, HFpEF, T2DM, dementia, and pacemaker  Family/Caregiver Present: Yes  Caregiver Feedback: Friends present during session  Prior to Session Communication: Bedside nurse  Patient Position Received: Bed, 3 rail up, Alarm on  Preferred Learning Style: verbal  General Comment: Pt pleasant and agreeable to work with therapy.    Home Living:  Home Living  Home Adaptive Equipment: Walker rolling or standard  Home Living Comments: Pt resides at [facility] and has been there since Sept 2024. Pt has a walker; however, does not use one. Pt able to ambulate independently and ambulate to the dining room for meals. Pt independent with ADLs and facility/staff provides meals, housekeeping, laundry and medication management. Pt has active PT and friends provide  transporation. (-) falls    Prior Level of Function:  Prior Function Per Pt/Caregiver Report  Level of Roberts: Independent with ADLs and functional transfers, Needs assistance with homemaking  Receives Help From: Personal care attendant  ADL Assistance: Independent  Homemaking Assistance: Needs assistance  Ambulatory Assistance: Independent    Precautions:  Precautions  Hearing/Visual Limitations: Wears glasses  Medical Precautions: Fall precautions    Vital Signs:  Vital Signs  Vitals Session: During PT  Heart Rate: (!) 118  Heart Rate Source: Monitor  Vital Signs Comment: Pt with bout of v-tach with ambulation, RN aware and notified PT.    Objective     Pain:  Pain Assessment  Pain Assessment: 0-10  0-10 (Numeric) Pain Score: 0 - No pain    Cognition:  Cognition  Overall Cognitive Status: Within Functional Limits  Orientation Level: Oriented X4    General Assessments:      Activity Tolerance  Endurance: Tolerates 10 - 20 min exercise with multiple rests    Sensation  Light Touch: No apparent deficits  Sensation Comment: Pt denies any n/t.     Static Sitting Balance  Static Sitting-Balance Support: Bilateral upper extremity supported, Feet supported  Static Sitting-Level of Assistance: Close supervision  Static Standing Balance  Static Standing-Balance Support: Bilateral upper extremity supported  Static Standing-Level of Assistance:  (SBA)  Dynamic Standing Balance  Dynamic Standing-Balance Support: Bilateral upper extremity supported  Dynamic Standing-Level of Assistance: Contact guard (CGA with progression to SBA)    Functional Assessments:     Bed Mobility  Bed Mobility: Yes  Bed Mobility 1  Bed Mobility 1: Supine to sitting, Scooting  Level of Assistance 1: Modified independent    Transfers  Transfer: Yes  Transfer 1  Transfer From 1: Bed to  Transfer to 1: Stand  Technique 1: Sit to stand  Transfer Device 1: Gait belt  Transfer Level of Assistance 1:  (SBA)  Transfers 2  Transfer From 2: Stand  to  Transfer to 2: Chair with arms  Technique 2: Stand to sit  Transfer Device 2: Gait belt  Transfer Level of Assistance 2:  (SBA)    Ambulation/Gait Training  Ambulation/Gait Training Performed: Yes  Ambulation/Gait Training 1  Surface 1: Level tile  Device 1: No device  Gait Support Devices: Gait belt  Assistance 1: Contact guard (CGA initially with progression to SBA)  Comments/Distance (ft) 1: ~80 ft with reciprocal gait pattern     Extremity/Trunk Assessments:  RUE   RUE : Within Functional Limits  LUE   LUE: Within Functional Limits  RLE   RLE : Within Functional Limits  LLE   LLE : Within Functional Limits    Outcome Measures:     [facility] Basic Mobility  Turning from your back to your side while in a flat bed without using bedrails: None  Moving from lying on your back to sitting on the side of a flat bed without using bedrails: None  Moving to and from bed to chair (including a wheelchair): A little  Standing up from a chair using your arms (e.g. wheelchair or bedside chair): A little  To walk in hospital room: A little  Climbing 3-5 steps with railing: Total  Basic Mobility - Total Score: 18     Goals:  Encounter Problems       Encounter Problems (Active)       Mobility       Pt will be able to ambulate >/= 250 ft with LRD SBA with good safety awareness.        Start:  02/11/25    Expected End:  02/25/25            Pt will complete supine, seated and standing exercises to maintain/improve overall strength with minimal verbal cues.         Start:  02/11/25    Expected End:  02/25/25               PT Transfers       Pt will be able to complete all transfers with LRD mod I demonstrating good safety awareness and proper body mechanics.        Start:  02/11/25    Expected End:  02/25/25                 Education Documentation  Precautions, taught by Lucy Edmond, PT at 2/11/2025  3:24 PM.  Learner: Caregiver, Patient  Readiness: Acceptance  Method: Explanation  Response: Verbalizes Understanding,  Demonstrated Understanding, Needs Reinforcement    Body Mechanics, taught by Lucy Edmond PT at 2/11/2025  3:24 PM.  Learner: Caregiver, Patient  Readiness: Acceptance  Method: Explanation  Response: Verbalizes Understanding, Demonstrated Understanding, Needs Reinforcement    Home Exercise Program, taught by Lucy Edmond PT at 2/11/2025  3:24 PM.  Learner: Caregiver, Patient  Readiness: Acceptance  Method: Explanation  Response: Verbalizes Understanding, Demonstrated Understanding, Needs Reinforcement    Mobility Training, taught by Lucy Edmond PT at 2/11/2025  3:24 PM.  Learner: Caregiver, Patient  Readiness: Acceptance  Method: Explanation  Response: Verbalizes Understanding, Demonstrated Understanding, Needs Reinforcement    Education Comments  No comments found.

## 2025-02-11 NOTE — CARE PLAN
The patient's goals for the shift include      Problem: Pain - Adult  Goal: Verbalizes/displays adequate comfort level or baseline comfort level  Outcome: Progressing     Problem: Chronic Conditions and Co-morbidities  Goal: Patient's chronic conditions and co-morbidity symptoms are monitored and maintained or improved  Outcome: Progressing     Problem: Fall/Injury  Goal: Be free from injury by end of the shift  Outcome: Progressing     The clinical goals for the shift include Monitor patient to ensure he remains hemodynamically stable    .

## 2025-02-11 NOTE — ANESTHESIA POSTPROCEDURE EVALUATION
Patient: Wayne Burkitt    Procedure Summary       Date: 02/11/25 Room / Location: [facility]    Anesthesia Start: 1346 Anesthesia Stop: 1432    Procedure: EGD Diagnosis: Melena    Scheduled Providers: Duran Garcia MD Responsible Provider: Raúl Maza MD    Anesthesia Type: general ASA Status: 3            Anesthesia Type: general    Vitals Value Taken Time   /59 02/11/25 1429   Temp 36.3 02/11/25 1433   Pulse 60 02/11/25 1432   Resp 10 02/11/25 1432   SpO2 100 % 02/11/25 1432   Vitals shown include unfiled device data.    Anesthesia Post Evaluation    Patient location during evaluation: PACU  Patient participation: complete - patient cannot participate  Level of consciousness: responsive to verbal stimuli and sleepy but conscious  Pain score: 0  Pain management: adequate  Airway patency: patent  Cardiovascular status: acceptable and hemodynamically stable  Respiratory status: acceptable, face mask, nonlabored ventilation, spontaneous ventilation and unassisted  Hydration status: acceptable  Postoperative Nausea and Vomiting: none  Comments: Handoff to [facility] PACU RN        There were no known notable events for this encounter.

## 2025-02-11 NOTE — PROGRESS NOTES
02/11/25 1536   Discharge Planning   Expected Discharge Disposition Home     PT recommending low intensity rehab at d/c. Message sent to Brian Roman AL in [facility] to ask if they will just need outpatient therapy orders for patient to resume his therapy at d/c. Awaiting reply.     [id number]- Brian Roman confirmed patient will just need an order for outpatient therapy to resume his therapy when he returns.

## 2025-02-12 VITALS
BODY MASS INDEX: 24.08 KG/M2 | HEART RATE: 60 BPM | RESPIRATION RATE: 18 BRPM | DIASTOLIC BLOOD PRESSURE: 76 MMHG | HEIGHT: 71 IN | WEIGHT: 172 LBS | TEMPERATURE: 97 F | SYSTOLIC BLOOD PRESSURE: 113 MMHG | OXYGEN SATURATION: 98 %

## 2025-02-12 LAB
ANION GAP SERPL CALC-SCNC: 12 MMOL/L (ref 10–20)
BUN SERPL-MCNC: 14 MG/DL (ref 6–23)
CALCIUM SERPL-MCNC: 8.5 MG/DL (ref 8.6–10.3)
CHLORIDE SERPL-SCNC: 110 MMOL/L (ref 98–107)
CO2 SERPL-SCNC: 24 MMOL/L (ref 21–32)
CREAT SERPL-MCNC: 0.89 MG/DL (ref 0.5–1.3)
EGFRCR SERPLBLD CKD-EPI 2021: 88 ML/MIN/1.73M*2
ERYTHROCYTE [DISTWIDTH] IN BLOOD BY AUTOMATED COUNT: 17.1 % (ref 11.5–14.5)
GLUCOSE SERPL-MCNC: 100 MG/DL (ref 74–99)
HCT VFR BLD AUTO: 30.5 % (ref 41–52)
HGB BLD-MCNC: 8.8 G/DL (ref 13.5–17.5)
MCH RBC QN AUTO: 27.7 PG (ref 26–34)
MCHC RBC AUTO-ENTMCNC: 28.9 G/DL (ref 32–36)
MCV RBC AUTO: 96 FL (ref 80–100)
NRBC BLD-RTO: 0 /100 WBCS (ref 0–0)
PLATELET # BLD AUTO: 323 X10*3/UL (ref 150–450)
POTASSIUM SERPL-SCNC: 4 MMOL/L (ref 3.5–5.3)
RBC # BLD AUTO: 3.18 X10*6/UL (ref 4.5–5.9)
SODIUM SERPL-SCNC: 142 MMOL/L (ref 136–145)
WBC # BLD AUTO: 3.9 X10*3/UL (ref 4.4–11.3)

## 2025-02-12 PROCEDURE — 2500000001 HC RX 250 WO HCPCS SELF ADMINISTERED DRUGS (ALT 637 FOR MEDICARE OP): Performed by: STUDENT IN AN ORGANIZED HEALTH CARE EDUCATION/TRAINING PROGRAM

## 2025-02-12 PROCEDURE — 36415 COLL VENOUS BLD VENIPUNCTURE: CPT | Performed by: STUDENT IN AN ORGANIZED HEALTH CARE EDUCATION/TRAINING PROGRAM

## 2025-02-12 PROCEDURE — 82374 ASSAY BLOOD CARBON DIOXIDE: CPT | Performed by: STUDENT IN AN ORGANIZED HEALTH CARE EDUCATION/TRAINING PROGRAM

## 2025-02-12 PROCEDURE — 97110 THERAPEUTIC EXERCISES: CPT | Mod: GP,CQ

## 2025-02-12 PROCEDURE — 2500000004 HC RX 250 GENERAL PHARMACY W/ HCPCS (ALT 636 FOR OP/ED): Performed by: STUDENT IN AN ORGANIZED HEALTH CARE EDUCATION/TRAINING PROGRAM

## 2025-02-12 PROCEDURE — 97116 GAIT TRAINING THERAPY: CPT | Mod: GP,CQ

## 2025-02-12 PROCEDURE — 97165 OT EVAL LOW COMPLEX 30 MIN: CPT | Mod: GO

## 2025-02-12 PROCEDURE — 99239 HOSP IP/OBS DSCHRG MGMT >30: CPT | Performed by: STUDENT IN AN ORGANIZED HEALTH CARE EDUCATION/TRAINING PROGRAM

## 2025-02-12 PROCEDURE — 2500000002 HC RX 250 W HCPCS SELF ADMINISTERED DRUGS (ALT 637 FOR MEDICARE OP, ALT 636 FOR OP/ED): Performed by: NURSE PRACTITIONER

## 2025-02-12 PROCEDURE — 99232 SBSQ HOSP IP/OBS MODERATE 35: CPT

## 2025-02-12 PROCEDURE — 85027 COMPLETE CBC AUTOMATED: CPT | Performed by: STUDENT IN AN ORGANIZED HEALTH CARE EDUCATION/TRAINING PROGRAM

## 2025-02-12 RX ORDER — PANTOPRAZOLE SODIUM 40 MG/1
40 TABLET, DELAYED RELEASE ORAL 2 TIMES DAILY
Qty: 60 TABLET | Refills: 1 | Status: SHIPPED | OUTPATIENT
Start: 2025-02-12 | End: 2025-02-12

## 2025-02-12 RX ORDER — SUCRALFATE 1 G/10ML
1 SUSPENSION ORAL
Qty: 900 ML | Refills: 1 | Status: SHIPPED | OUTPATIENT
Start: 2025-02-12 | End: 2025-04-13

## 2025-02-12 RX ORDER — METOPROLOL SUCCINATE 25 MG/1
25 TABLET, EXTENDED RELEASE ORAL DAILY
Qty: 30 TABLET | Refills: 1 | Status: SHIPPED | OUTPATIENT
Start: 2025-02-13 | End: 2025-04-14

## 2025-02-12 RX ORDER — PANTOPRAZOLE SODIUM 40 MG/1
40 TABLET, DELAYED RELEASE ORAL 2 TIMES DAILY
Qty: 60 TABLET | Refills: 1 | Status: SHIPPED | OUTPATIENT
Start: 2025-02-12 | End: 2025-04-13

## 2025-02-12 RX ORDER — NAPROXEN SODIUM 220 MG/1
81 TABLET, FILM COATED ORAL DAILY
Status: DISCONTINUED | OUTPATIENT
Start: 2025-02-12 | End: 2025-02-12 | Stop reason: HOSPADM

## 2025-02-12 RX ADMIN — METOPROLOL SUCCINATE 25 MG: 25 TABLET, EXTENDED RELEASE ORAL at 08:48

## 2025-02-12 RX ADMIN — PANTOPRAZOLE SODIUM 40 MG: 40 INJECTION, POWDER, FOR SOLUTION INTRAVENOUS at 08:48

## 2025-02-12 RX ADMIN — BRIMONIDINE TARTRATE 1 DROP: 2 SOLUTION/ DROPS OPHTHALMIC at 08:48

## 2025-02-12 RX ADMIN — ASPIRIN 81 MG CHEWABLE TABLET 81 MG: 81 TABLET CHEWABLE at 10:28

## 2025-02-12 RX ADMIN — LEVETIRACETAM 1000 MG: 500 TABLET, FILM COATED ORAL at 08:48

## 2025-02-12 RX ADMIN — SUCRALFATE 1 G: 1 SUSPENSION ORAL at 06:03

## 2025-02-12 RX ADMIN — AMOXICILLIN AND CLAVULANATE POTASSIUM 1 TABLET: 875; 125 TABLET, FILM COATED ORAL at 08:47

## 2025-02-12 RX ADMIN — SUCRALFATE 1 G: 1 SUSPENSION ORAL at 10:28

## 2025-02-12 ASSESSMENT — PAIN SCALES - GENERAL
PAINLEVEL_OUTOF10: 0 - NO PAIN

## 2025-02-12 ASSESSMENT — COGNITIVE AND FUNCTIONAL STATUS - GENERAL
MOBILITY SCORE: 18
MOBILITY SCORE: 21
DRESSING REGULAR LOWER BODY CLOTHING: A LITTLE
WALKING IN HOSPITAL ROOM: A LITTLE
HELP NEEDED FOR BATHING: A LITTLE
DRESSING REGULAR LOWER BODY CLOTHING: A LITTLE
TOILETING: A LITTLE
PERSONAL GROOMING: A LITTLE
DAILY ACTIVITIY SCORE: 20
CLIMB 3 TO 5 STEPS WITH RAILING: A LOT
MOVING TO AND FROM BED TO CHAIR: A LITTLE
DAILY ACTIVITIY SCORE: 22
WALKING IN HOSPITAL ROOM: A LITTLE
STANDING UP FROM CHAIR USING ARMS: A LITTLE
HELP NEEDED FOR BATHING: A LITTLE
CLIMB 3 TO 5 STEPS WITH RAILING: TOTAL

## 2025-02-12 ASSESSMENT — ACTIVITIES OF DAILY LIVING (ADL): ADL_ASSISTANCE: INDEPENDENT

## 2025-02-12 ASSESSMENT — PAIN - FUNCTIONAL ASSESSMENT
PAIN_FUNCTIONAL_ASSESSMENT: 0-10
PAIN_FUNCTIONAL_ASSESSMENT: 0-10

## 2025-02-12 NOTE — CARE PLAN
Problem: Pain - Adult  Goal: Verbalizes/displays adequate comfort level or baseline comfort level  Outcome: Met     Problem: Discharge Planning  Goal: Discharge to home or other facility with appropriate resources  Outcome: Met     Problem: Fall/Injury  Goal: Not fall by end of shift  Outcome: Met  Goal: Be free from injury by end of the shift  Outcome: Met

## 2025-02-12 NOTE — DISCHARGE SUMMARY
Discharge Diagnosis  Anemia    Issues Requiring Follow-Up  If you change your mind about taking Effient, stop your aspirin and then start taking Effient. You are at risk for in stent rethrombosis and Effient will help lower that risk.   Follow up with GI team for repeat EGD in 2 months and MRI imaging for pancreas lesion  Take Protonix 40mg twice daily and Carafate 1g three times daily for next 8 weeks   Apply vaseline to inside of your nose given recent nosebleed     Discharge Meds     Medication List      START taking these medications     metoprolol succinate XL 25 mg 24 hr tablet; Commonly known as:   Toprol-XL; Take 1 tablet (25 mg) by mouth once daily. Do not crush or   chew.; Start taking on: February 13, 2025   pantoprazole 40 mg EC tablet; Commonly known as: ProtoNix; Take 1 tablet   (40 mg) by mouth 2 times a day. Do not crush, chew, or split.   sucralfate 100 mg/mL suspension; Commonly known as: Carafate; Take 10 mL   (1 g) by mouth 3 times a day before meals.     CONTINUE taking these medications     acetaminophen 325 mg tablet; Commonly known as: Tylenol   aspirin 81 mg EC tablet   brimonidine 0.2 % ophthalmic solution; Commonly known as: AlphaGAN   docusate sodium 100 mg capsule; Commonly known as: Colace   donepezil 5 mg tablet; Commonly known as: Aricept   levETIRAcetam 1,000 mg tablet; Commonly known as: Keppra   nitroglycerin 0.4 mg SL tablet; Commonly known as: Nitrostat; Place 1   tablet (0.4 mg) under the tongue every 5 minutes if needed for chest pain.   pravastatin 80 mg tablet; Commonly known as: Pravachol   QUEtiapine 50 mg tablet; Commonly known as: SEROquel   traZODone 50 mg tablet; Commonly known as: Desyrel     STOP taking these medications     prasugrel 10 mg tablet; Commonly known as: Effient       Test Results Pending At Discharge  Pending Labs       Order Current Status    Chromogranin A In process    Glucagon In process    Serotonin serum In process    Somatostatin In process     Vasoactive intestinal peptide (VIP) In process            Hospital Course   Patient is a 79 y.o. male w/ pmhx of CAD s/p CABG and s/p PCI Dec 2024, ICM, HTN, HFpEF, T2DM, dementia, and pacemaker presents with epistaxis and dark stools. Found to have acute on chronic anemia requiring 2u pRBC. He underwent rhinorocket placement on admit, which was removed at discharge. 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 He will take PPI BID and carafate TID x8 weeks with repeat endoscopy in 2 months as outpatient. His CT AP also noted a 2.8mm pancreatic lesion for which he will follow with GI for outpatient MRI imaging. CA 19-9 was 12.95. Cardiology evaluated patient as he was on Aspirin and Effient given recent PCI. They had recommended Effient monotherapy, but patient adamantly refused and declined Effient. He was educated on risk of in-stent rethrombosis multiple times, but he remained persistent on taking aspirin monotherapy.  He was also started on low-dose metoprolol given history of CAD and as patient had runs of NSVT on telemetry (asymptomatic). He will follow with GI as outpatient for MRI imaging and repeat EGD.      Greater than 30 minutes was spent facilitating this patients discharge from the hospital which included examining the patient, reconciling medications, and making arrangements for future care.         Pertinent Physical Exam At Time of Discharge  Physical Exam    Constitutional: Well developed, no distress, alert and cooperative  Skin: Warm and dry  Eyes: EOMI, clear sclera  ENMT: mucous membranes moist  Respiratory: Patent airways, CTAB  Cardiovascular: Regular, rate and rhythm, no murmurs  Abdominal: Nondistended, soft, non-tender, +BS  MSK: ROM intact  Neuro: alert and oriented x3    Outpatient Follow-Up  Future Appointments   Date Time Provider Department Center   4/24/2025 10:00 AM Gabino TRAVIS  MD [id number] [place]         Jose M Salazar, DO

## 2025-02-12 NOTE — NURSING NOTE
Patient alert and oriented x 4.  No complaints of pain or any acute changes.  Van rocket maintained.  Patient calls for assistance as needed.

## 2025-02-12 NOTE — PROGRESS NOTES
Physical Therapy    Physical Therapy Treatment    Patient Name: Wayne Burkitt  MRN: [id number]  Today's Date: 2/12/2025  Time Calculation  Start Time: 1100  Stop Time: 1130  Time Calculation (min): 30 min     3022/3022-A    Assessment/Plan        02/12/25 1100   PT  Visit   PT Received On 02/12/25   Response to Previous Treatment Patient with no complaints from previous session.   General   Reason for Referral P/w for uncontrolled epistaxis. He states he has had frequent nose bleeds from his left nostril for decades, but has not been this bad in years. He had recent elective PCI with stent to mid RCA with Dr. Giron on 12/20 that was c/b acute anemia with groin/scrotal hematoma that required brief ICU stay. Pt admitted for anemia.   Referred By Dr. Salazar   Past Medical History Relevant to Rehab CAD s/p CABG and s/p PCI, ICM, HTN, HFpEF, T2DM, dementia, and pacemaker   Family/Caregiver Present No   Prior to Session Communication Bedside nurse   Patient Position Received Bed, 3 rail up;Alarm on   Preferred Learning Style verbal   General Comment Pt pleasant and agreeable to work with therapy.   Precautions   Hearing/Visual Limitations Wears glasses   Medical Precautions Fall precautions   Pain Assessment   Pain Assessment 0-10   0-10 (Numeric) Pain Score 0 - No pain   Cognition   Overall Cognitive Status WFL   Orientation Level Oriented X4   Coordination   Movements are Fluid and Coordinated Yes   Therapeutic Exercise   Therapeutic Exercise Performed Yes   Therapeutic Exercise Activity 1 PF/DF x15   Therapeutic Exercise Activity 2 LAQ x15   Therapeutic Exercise Activity 3 Marches x15   Therapeutic Exercise Activity 4 Pillow squeeze x15   Therapeutic Exercise Activity 5 Resisted hip ABD x15   Bed Mobility   Bed Mobility Yes   Bed Mobility 1   Bed Mobility 1 Supine to sitting;Scooting   Level of Assistance 1 Modified independent   Bed Mobility Comments 1 x1   Ambulation/Gait Training   Ambulation/Gait Training  Performed Yes   Ambulation/Gait Training 1   Surface 1 Level tile   Device 1 Rolling walker   Gait Support Devices Gait belt   Assistance 1 Close supervision   Quality of Gait 1   (Slight forward flexed posture)   Comments/Distance (ft) 1 250' x1   Transfers   Transfer Yes   Transfer 1   Transfer From 1 Bed to   Transfer to 1 Stand   Technique 1 Sit to stand   Transfer Device 1 Gait belt;Walker   Transfer Level of Assistance 1 Contact guard   Trials/Comments 1 x1   Transfers 2   Transfer From 2 Stand to   Transfer to 2 Chair with arms   Technique 2 Stand to sit;Sit to stand   Transfer Device 2 Gait belt;Walker   Transfer Level of Assistance 2 Close supervision   Trials/Comments 2 x2   Activity Tolerance   Endurance Tolerates 30+ min exercise without fatigue   Early Mobility/Exercise Safety Screen Proceed with mobilization - No exclusion criteria met   PT Assessment   PT Assessment Results Decreased strength;Decreased mobility   Rehab Prognosis Good   Evaluation/Treatment Tolerance Patient tolerated treatment well   Strengths Ability to acquire knowledge;Attitude of self   End of Session Communication Bedside nurse   Assessment Comment Pt demonstrates improved strength with seated exercises. Pt endurance and ambulation distance improved since last session. Pt will continue to benefit from PT interventions to improve overall strength, endurance, and stability with functional activities.   End of Session Patient Position Up in chair;Alarm on   Outpatient Education   Individual(s) Educated Patient   Education Provided Posture;Fall Risk;POC   Risk and Benefits Discussed with Patient/Caregiver/Other yes   Patient/Caregiver Demonstrated Understanding yes   Plan of Care Discussed and Agreed Upon yes   Patient Response to Education Patient/Caregiver Verbalized Understanding of Information   PT Plan   Inpatient/Swing Bed or Outpatient Inpatient   PT Plan   Treatment/Interventions Bed mobility;Transfer training;Gait  training;Therapeutic exercise   PT Plan Ongoing PT   PT Frequency 3 times per week   PT Discharge Recommendations Low intensity level of continued care   Equipment Recommended upon Discharge Wheeled walker   PT Recommended Transfer Status Stand by assist;Assist x1             Outcome Measures:  [facility] Basic Mobility  Turning from your back to your side while in a flat bed without using bedrails: None  Moving from lying on your back to sitting on the side of a flat bed without using bedrails: None  Moving to and from bed to chair (including a wheelchair): A little  Standing up from a chair using your arms (e.g. wheelchair or bedside chair): A little  To walk in hospital room: A little  Climbing 3-5 steps with railing: Total  Basic Mobility - Total Score: 18          EDUCATION:  Outpatient Education  Individual(s) Educated: Patient  Education Provided: Posture, Fall Risk, POC  Risk and Benefits Discussed with Patient/Caregiver/Other: yes  Patient/Caregiver Demonstrated Understanding: yes  Plan of Care Discussed and Agreed Upon: yes  Patient Response to Education: Patient/Caregiver Verbalized Understanding of Information    GOALS:  Encounter Problems       Encounter Problems (Active)       Mobility       Pt will be able to ambulate >/= 250 ft with LRD SBA with good safety awareness.        Start:  02/11/25    Expected End:  02/25/25            Pt will complete supine, seated and standing exercises to maintain/improve overall strength with minimal verbal cues.         Start:  02/11/25    Expected End:  02/25/25               PT Transfers       Pt will be able to complete all transfers with LRD mod I demonstrating good safety awareness and proper body mechanics.        Start:  02/11/25    Expected End:  02/25/25               Pain - Adult            I personally have reviewed data entered by the student into the flowsheet and agree with this treatment session of this patient.    Forest Woo, PTA

## 2025-02-12 NOTE — PROGRESS NOTES
02/12/25 1236   Discharge Planning   Expected Discharge Disposition Home     Message sent to Brian RIVEAR to advise them of patient's d/c. Therapy orders also attached and sent.

## 2025-02-12 NOTE — CARE PLAN
The patient's goals for the shift include    Problem: Pain - Adult  Goal: Verbalizes/displays adequate comfort level or baseline comfort level  Outcome: Progressing     Problem: Chronic Conditions and Co-morbidities  Goal: Patient's chronic conditions and co-morbidity symptoms are monitored and maintained or improved  Outcome: Progressing     Problem: Fall/Injury  Goal: Verbalize understanding of risk factor reduction measures to prevent injury from fall in the home  Outcome: Progressing       The clinical goals for the shift include See POC.

## 2025-02-12 NOTE — PROGRESS NOTES
Occupational Therapy    Occupational Therapy    Evaluation    Patient Name: Wayne Burkitt  MRN: [id number]  Today's Date: 2/12/2025  Time Calculation  Start Time: 1119  Stop Time: 1137  Time Calculation (min): 18 min  3022/3022-A    Assessment  IP OT Assessment  OT Assessment:  (Pt. presents with decreased balance and endurance impacting pt. ability to complete ADLs and mobility independently)  Prognosis: Good  Barriers to Discharge Home: No anticipated barriers  Evaluation/Treatment Tolerance: Patient tolerated treatment well  End of Session Communication: Bedside nurse  End of Session Patient Position: Up in chair, Alarm on    Plan:  Treatment Interventions: ADL retraining, Functional transfer training  OT Frequency: 3 times per week  OT Discharge Recommendations: Low intensity level of continued care  Equipment Recommended upon Discharge: Wheeled walker  OT Recommended Transfer Status: Stand by assist  OT - OK to Discharge: Yes (Next level of care when cleared by medical team)    Subjective   Per EMR: Wayne Burkitt is a 79 y.o. male w/ pmhx of CAD s/p CABG and s/p PCI, ICM, HTN, HFpEF, T2DM, dementia, and pacemaker presents to [facility] ED from SNF for uncontrolled epistaxis. He states he has had frequent nose bleeds from his left nostril for decades, but has not been this bad in years. He had recent elective PCI with stent to mid RCA with Dr. Giron on 12/20 that was c/b acute anemia with groin/scrotal hematoma that required brief ICU stay. Discharged on ASA and effient 10mg. He has also been taking an iron supplement. He states he has had black tarry stools for the past 1 month. He initially suspected this was due to iron supplementation but stopped taking supplement 1.5 weeks ago and continues to notice 2x black tarry stools a day. Denies any light headedness, dizziness, headache, cp, sob, bright red stools, hematuria, hematemesis.     ED: Pt HDS. Rhino rocket placed in L nostril. Hgb 6.5. Transfused 1 unit  pRBCs.  Current Problem:  1. Anemia        2. Melena  Esophagogastroduodenoscopy (EGD)    Esophagogastroduodenoscopy (EGD)          General:  General  Reason for Referral: ADLs, discharge planning  Referred By: Dr. Salazar  Family/Caregiver Present: No  Prior to Session Communication: Bedside nurse  Patient Position Received: Bed, 3 rail up, Alarm on    Precautions:  Medical Precautions: Fall precautions      Pain:  Pain Assessment  Pain Assessment: 0-10  0-10 (Numeric) Pain Score: 0 - No pain    Objective     Cognition:  Overall Cognitive Status: Within Functional Limits  Orientation Level: Oriented X4             Home Living:  Home Living Comments:  (Pt. resides at [facility]. PLOF independent with ADLs and mobility)     Prior Function:  Level of [place]: Independent with ADLs and functional transfers  ADL Assistance: Independent  Homemaking Assistance: Needs assistance  Ambulatory Assistance: Independent        ADL:  Grooming Assistance:  (SUP)  Grooming Deficit: Teeth care, Wash/dry hands (Standing at sink)  LE Dressing Assistance: Modified independent (Device)    Activity Tolerance:  Endurance: Endurance does not limit participation in activity    Bed Mobility/Transfers:      Transfers  Transfer:  (sit<>stand SUP)    Ambulation/Gait Training:  Functional Mobility  Functional Mobility Performed:  (Completes functinal mobility with ww and gait belt SUP)    Sitting Balance:  Static Sitting Balance  Static Sitting-Balance Support: No upper extremity supported  Static Sitting-Level of Assistance: Independent    Standing Balance:  Static Standing Balance  Static Standing-Balance Support: Bilateral upper extremity supported  Static Standing-Level of Assistance: Close supervision      Sensation:  Sensation Comment: Denies numbness        Hand Function:  Hand Function  Gross Grasp: Functional  Coordination: Functional    Extremities: RUE   RUE : Within Functional Limits and LUE   LUE: Within Functional  Limits    Outcome Measures: [facility] Daily Activity  Putting on and taking off regular lower body clothing: A little  Bathing (including washing, rinsing, drying): A little  Putting on and taking off regular upper body clothing: None  Toileting, which includes using toilet, bedpan or urinal: A little  Taking care of personal grooming such as brushing teeth: A little  Eating Meals: None  Daily Activity - Total Score: 20                       EDUCATION:  Education  Individual(s) Educated: Patient  Education Provided: Fall precautons, POC discussed and agreed upon, Risk and benefits of OT discussed with patient or other  Patient Response to Education: Patient/Caregiver Verbalized Understanding of Information        Goals:   Encounter Problems       Encounter Problems (Active)       Dressings Lower Extremities       STG - Patient to complete lower body dressing independent       Start:  02/12/25    Expected End:  02/26/25               Functional Balance       LTG - Patient will maintain standing and sitting balance to allow for completion of daily activities       Start:  02/12/25    Expected End:  02/26/25               Grooming       STG - Patient completes grooming independent       Start:  02/12/25    Expected End:  02/26/25               OT Transfers       STG - Patient will perform toilet transfer MOD I       Start:  02/12/25    Expected End:  02/26/25

## 2025-02-12 NOTE — NURSING NOTE
1300 Rhinorocket removed from l nostril, patient tolerated well, and is without any bleeding or discomfort,       1335 reported called to [facility] spoke to nurse Savana    1340 patient stable this shift. Up with x 1 assist with walker. Patient had Rhinorocket removed this shift, patient is without any bleed. Patient denies any pain or discomfort. Patient discharging back to [facility], with outpatient therapy orders. AJ who is emergency contact is transporting patient.

## 2025-02-12 NOTE — DISCHARGE INSTRUCTIONS
If you change your mind about taking Effient, stop your aspirin and then start taking Effient. You are at risk for in stent rethrombosis and Effient will help lower that risk.   Follow up with GI team for repeat EGD in 2 months and MRI imaging for pancreas lesion  Take Protonix 40mg twice daily and Carafate 1g three times daily for next 8 weeks   Apply vaseline to inside of your nose given recent nosebleed

## 2025-02-13 ENCOUNTER — TELEPHONE (OUTPATIENT)
Dept: PRIMARY CARE | Facility: CLINIC | Age: 80
End: 2025-02-13
Payer: MEDICARE

## 2025-02-13 LAB — VIP SERPL-MCNC: 38.7 PG/ML (ref 0–89.1)

## 2025-02-13 NOTE — TELEPHONE ENCOUNTER
Mary([phone]--ext [phone]) called from [facility] and needs orders for physical therapy and occupational therapy.  Thanks

## 2025-02-14 ENCOUNTER — NURSING HOME VISIT (OUTPATIENT)
Dept: POST ACUTE CARE | Facility: EXTERNAL LOCATION | Age: 80
End: 2025-02-14
Payer: MEDICARE

## 2025-02-14 DIAGNOSIS — F01.B4 MODERATE VASCULAR DEMENTIA WITH ANXIETY: ICD-10-CM

## 2025-02-14 DIAGNOSIS — Z79.02 LONG TERM (CURRENT) USE OF ANTITHROMBOTICS/ANTIPLATELETS: ICD-10-CM

## 2025-02-14 DIAGNOSIS — G40.909 SEIZURE DISORDER (MULTI): ICD-10-CM

## 2025-02-14 DIAGNOSIS — R44.0 AUDITORY HALLUCINATIONS: ICD-10-CM

## 2025-02-14 DIAGNOSIS — K92.2 UPPER GI BLEED: Primary | ICD-10-CM

## 2025-02-14 DIAGNOSIS — R04.0 EPISTAXIS, RECURRENT: ICD-10-CM

## 2025-02-14 DIAGNOSIS — I25.5 ISCHEMIC CARDIOMYOPATHY: ICD-10-CM

## 2025-02-14 DIAGNOSIS — R53.81 PHYSICAL DEBILITY: ICD-10-CM

## 2025-02-14 DIAGNOSIS — I25.10 3-VESSEL CORONARY ARTERY DISEASE: ICD-10-CM

## 2025-02-14 DIAGNOSIS — I50.32 CHRONIC DIASTOLIC CONGESTIVE HEART FAILURE: ICD-10-CM

## 2025-02-14 DIAGNOSIS — R29.6 FREQUENT FALLS: ICD-10-CM

## 2025-02-14 DIAGNOSIS — I10 HYPERTENSION, UNSPECIFIED TYPE: ICD-10-CM

## 2025-02-14 DIAGNOSIS — D50.0 ANEMIA DUE TO GI BLOOD LOSS: ICD-10-CM

## 2025-02-14 DIAGNOSIS — F44.9 CONVERSION REACTION: ICD-10-CM

## 2025-02-14 PROCEDURE — 99350 HOME/RES VST EST HIGH MDM 60: CPT | Performed by: FAMILY MEDICINE

## 2025-02-16 LAB — SEROTONIN SER-MCNC: 159 NG/ML (ref 23–230)

## 2025-02-18 ENCOUNTER — NURSING HOME VISIT (OUTPATIENT)
Dept: POST ACUTE CARE | Facility: EXTERNAL LOCATION | Age: 80
End: 2025-02-18
Payer: MEDICARE

## 2025-02-18 VITALS
BODY MASS INDEX: 23.85 KG/M2 | TEMPERATURE: 97.6 F | DIASTOLIC BLOOD PRESSURE: 64 MMHG | HEART RATE: 74 BPM | WEIGHT: 171 LBS | SYSTOLIC BLOOD PRESSURE: 112 MMHG

## 2025-02-18 DIAGNOSIS — I50.32 CHRONIC DIASTOLIC CONGESTIVE HEART FAILURE: ICD-10-CM

## 2025-02-18 DIAGNOSIS — R44.0 AUDITORY HALLUCINATIONS: ICD-10-CM

## 2025-02-18 DIAGNOSIS — F44.9 CONVERSION REACTION: ICD-10-CM

## 2025-02-18 DIAGNOSIS — F01.B4 MODERATE VASCULAR DEMENTIA WITH ANXIETY: Primary | ICD-10-CM

## 2025-02-18 DIAGNOSIS — I25.5 ISCHEMIC CARDIOMYOPATHY: ICD-10-CM

## 2025-02-18 DIAGNOSIS — K92.2 UPPER GI BLEED: ICD-10-CM

## 2025-02-18 DIAGNOSIS — I25.10 CORONARY ARTERY DISEASE INVOLVING NATIVE CORONARY ARTERY OF NATIVE HEART WITHOUT ANGINA PECTORIS: ICD-10-CM

## 2025-02-18 PROBLEM — D50.0 ANEMIA DUE TO GI BLOOD LOSS: Status: ACTIVE | Noted: 2025-02-09

## 2025-02-18 PROBLEM — R53.81 PHYSICAL DEBILITY: Status: ACTIVE | Noted: 2025-02-18

## 2025-02-18 PROBLEM — Z79.02 LONG TERM (CURRENT) USE OF ANTITHROMBOTICS/ANTIPLATELETS: Status: ACTIVE | Noted: 2025-02-18

## 2025-02-18 LAB — CHROMOGRANIN A, LC/MS/MS: 837 NG/ML

## 2025-02-18 PROCEDURE — 99347 HOME/RES VST EST SF MDM 20: CPT | Performed by: FAMILY MEDICINE
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
## 2025-02-18 NOTE — PROGRESS NOTES
Subjective     Patient ID: [id number]     Wayne Burkitt is a 79 y.o. male who resides at [facility].    HPI  Patient will be moving to a different facility.  He needs an assessment form completed.  Objective   /64   Pulse 74   Temp 36.4 °C (97.6 °F)   Wt 77.6 kg (171 lb)   BMI 23.85 kg/m²    Physical Exam:   None ill-appearing male in no acute distress.  Ambulating with walker.  Nursing to assist with form completion    Assessment/Plan   Problem List Items Addressed This Visit       Ischemic cardiomyopathy    Moderate vascular dementia with anxiety - Primary    Conversion reaction    Auditory hallucinations    Coronary artery disease involving native coronary artery of native heart without angina pectoris    Diastolic congestive heart failure    Upper GI bleed     Anticipate resident moving to new facility in the future    Mikhail Galaviz DO

## 2025-02-18 NOTE — PROGRESS NOTES
General Medical Management Note    79 y.o. male being evaluated for return to facility after hospitalization  HPI  [place] [facility] [place].   Patient is a 79 y.o. male w/ pmhx of CAD s/p CABG and s/p PCI Dec 2024, ICM, HTN, HFpEF, T2DM, dementia, and pacemaker presents with epistaxis and dark stools. Found to have acute on chronic anemia requiring 2u pRBC. He underwent rhinorocket placement on admit, which was removed at discharge. 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 He will take PPI BID and carafate TID x8 weeks with repeat endoscopy in 2 months as outpatient.   His CT AP also noted a 2.8mm pancreatic lesion for which he will follow with GI for outpatient MRI imaging. CA 19-9 was 12.95.   Cardiology evaluated patient as he was on Aspirin and Effient given recent PCI. They had recommended Effient monotherapy, but patient adamantly refused and declined Effient. He was educated on risk of in-stent rethrombosis multiple times, but he remained persistent on taking aspirin monotherapy.  He was also started on low-dose metoprolol given history of CAD and as patient had runs of NSVT on telemetry (asymptomatic). He will follow with GI as outpatient for MRI imaging and repeat EGD.    -Patient had recent history of recurrent epistaxis which is why he may be declining Effient    Review of my note from February 8 indicated he was having intermittent stomach pain with occasional abdominal distention.  Staff states that his stools have been loose and black since admission.  He has been taking MiraLAX and iron supplements daily.  He had also gained 8 pounds since September 2024.    Past Medical History:   Diagnosis Date    Auditory hallucinations 09/28/2024    Conversion reaction 09/28/2024    Coronary artery disease involving native coronary artery of  native heart without angina pectoris 2024    Epistaxis, recurrent 2024    Ischemic cardiomyopathy 2024    Moderate vascular dementia with anxiety 2024      Past Surgical History:   Procedure Laterality Date    CARDIAC CATHETERIZATION N/A 2024    Procedure: Left Heart Cath, With LV;  Surgeon: Gabino Giron MD;  Location: [facility] Cardiac Cath Lab;  Service: Cardiovascular;  Laterality: N/A;    CARDIAC CATHETERIZATION N/A 2024    Procedure: PCI TATYANA Stent- Coronary;  Surgeon: Gabino Giron MD;  Location: [facility] Cardiac Cath Lab;  Service: Cardiovascular;  Laterality: N/A;    CORONARY ANGIOPLASTY WITH STENT PLACEMENT      states he has 16 stents after the CABG    CORONARY ARTERY BYPASS GRAFT      PACEMAKER PLACEMENT      TOTAL HIP ARTHROPLASTY       Family History   Family history unknown: Yes      Social History     Socioeconomic History    Marital status: Single     Spouse name: Not on file    Number of children: Not on file    Years of education: Not on file    Highest education level: Not on file   Occupational History    Not on file   Tobacco Use    Smoking status: Former     Current packs/day: 0.00     Types: Cigarettes     Quit date:      Years since quittin.1    Smokeless tobacco: Never   Substance and Sexual Activity    Alcohol use: Not Currently     Comment: Stopped into .    Drug use: Never    Sexual activity: Defer   Other Topics Concern    Not on file   Social History Narrative    Not on file     Social Drivers of Health     Financial Resource Strain: Low Risk  (2/10/2025)    Overall Financial Resource Strain (CARDIA)     Difficulty of Paying Living Expenses: Not very hard   Recent Concern: Financial Resource Strain - High Risk (2/10/2025)    Overall Financial Resource Strain (CARDIA)     Difficulty of Paying Living Expenses: Very hard   Food Insecurity: No Food Insecurity (2/10/2025)    Hunger Vital Sign     Worried About Running Out of Food in  the Last Year: Never true     Ran Out of Food in the Last Year: Never true   Transportation Needs: No Transportation Needs (2/10/2025)    PRAPARE - Transportation     Lack of Transportation (Medical): No     Lack of Transportation (Non-Medical): No   Physical Activity: Not on file   Stress: Not on file   Social Connections: Not on file   Intimate Partner Violence: Not At Risk (2/10/2025)    Humiliation, Afraid, Rape, and Kick questionnaire     Fear of Current or Ex-Partner: No     Emotionally Abused: No     Physically Abused: No     Sexually Abused: No   Housing Stability: Low Risk  (2/10/2025)    Housing Stability Vital Sign     Unable to Pay for Housing in the Last Year: No     Number of Times Moved in the Last Year: 1     Homeless in the Last Year: No       Current Outpatient Medications on File Prior to Visit   Medication Sig Dispense Refill    acetaminophen (Tylenol) 325 mg tablet Take 2 tablets (650 mg) by mouth every 4 hours if needed for mild pain (1 - 3) or fever (temp greater than 38.0 C).      aspirin 81 mg EC tablet Take 1 tablet (81 mg) by mouth once daily.      brimonidine (AlphaGAN) 0.2 % ophthalmic solution Administer 1 drop into both eyes 2 times a day.      docusate sodium (Colace) 100 mg capsule Take 1 capsule (100 mg) by mouth 2 times a day as needed for constipation.      donepezil (Aricept) 5 mg tablet Take 1 tablet (5 mg) by mouth once daily at bedtime.      levETIRAcetam (Keppra) 1,000 mg tablet Take 1 tablet (1,000 mg) by mouth 2 times a day.      metoprolol succinate XL (Toprol-XL) 25 mg 24 hr tablet Take 1 tablet (25 mg) by mouth once daily. Do not crush or chew. 30 tablet 1    nitroglycerin (Nitrostat) 0.4 mg SL tablet Place 1 tablet (0.4 mg) under the tongue every 5 minutes if needed for chest pain. 90 tablet 12    pantoprazole (ProtoNix) 40 mg EC tablet Take 1 tablet (40 mg) by mouth 2 times a day. Do not crush, chew, or split. 60 tablet 1    pravastatin (Pravachol) 80 mg tablet Take 1  tablet (80 mg) by mouth once daily at bedtime.      QUEtiapine (SEROquel) 50 mg tablet Take 1 tablet (50 mg) by mouth once daily at bedtime.      sucralfate (Carafate) 100 mg/mL suspension Take 10 mL (1 g) by mouth 3 times a day before meals. 900 mL 1    traZODone (Desyrel) 50 mg tablet Take 0.5 tablets (25 mg) by mouth as needed at bedtime (dementia with anxiety).      [DISCONTINUED] pantoprazole (ProtoNix) 40 mg EC tablet Take 1 tablet (40 mg) by mouth 2 times a day. Do not crush, chew, or split. 60 tablet 1    [DISCONTINUED] prasugrel (Effient) 10 mg tablet Take 1 tablet (10 mg) by mouth once daily.       No current facility-administered medications on file prior to visit.       Allergies   Allergen Reactions    Alprazolam Anaphylaxis    Bepotastine Besilate Swelling    Clopidogrel Swelling    Doxazosin Swelling    Lisinopril Swelling    Metronidazole Swelling    Amlodipine Hives    Atorvastatin Hives    Cephalexin Hives    Ciprofloxacin Itching    Diphenhydramine Hcl Hives    Guaifenesin Hives    Hydrochlorothiazide Unknown    Hydrocodone Hives    Methylprednisolone Unknown    Phenytoin Other    Sertraline Unknown    Telmisartan Hives    Topiramate Rash         ROS: Denies chest pain, SOB, Headache, GI problems     Visit Vitals  Smoking Status Former      There were no vitals filed for this visit.    PHYSICAL EXAM:  Alert and oriented to himself  Eyes: EOM grossly intact  Neck supple without lymph adenopathy or carotid bruit.  No masses or thyromegaly  Heart regular rate and rhythm without murmur.  Lungs clear to auscultation.  Legs without edema.  Gait is guarded with smaller steps and assist of walker  Speech clear.  Hearing mildly impaired.      Hospital record reviewed  Medications reviewed.    DIAGNOSIS/PLAN:    Problem List Items Addressed This Visit       Ischemic cardiomyopathy    Moderate vascular dementia with anxiety    Conversion reaction    Auditory hallucinations    Epistaxis, recurrent    Frequent  falls    Hypertensive disorder    Seizure disorder (Multi)    Diastolic congestive heart failure    3-vessel coronary artery disease    Upper GI bleed - Primary    Physical debility    Long term (current) use of antithrombotics/antiplatelets     Recommend evaluation and treatment by physical therapy and Occupational Therapy due to posthospitalization sarcopenia and resulting generalized weakness.    Will follow serial lab testing to monitor anemia      Mikhail Glaaviz DO, FACOFP

## 2025-02-20 DIAGNOSIS — I50.32 CHRONIC DIASTOLIC CONGESTIVE HEART FAILURE: Primary | ICD-10-CM

## 2025-02-20 LAB — GLUCAGON SERPL-MCNC: 243 NG/L

## 2025-02-25 ENCOUNTER — APPOINTMENT (OUTPATIENT)
Dept: PHARMACY | Facility: HOSPITAL | Age: 80
End: 2025-02-25
Payer: MEDICARE

## 2025-02-25 DIAGNOSIS — I50.32 CHRONIC DIASTOLIC CONGESTIVE HEART FAILURE: Primary | ICD-10-CM

## 2025-02-25 ASSESSMENT — ENCOUNTER SYMPTOMS
SHORTNESS OF BREATH: 0
ABDOMINAL PAIN: 0
NEAR-SYNCOPE: 0
ORTHOPNEA: 0
PALPITATIONS: 0
CHEST PRESSURE: 0
UNEXPECTED WEIGHT CHANGE: 0
FATIGUE: 0
CLAUDICATION: 0
EDEMA: 0

## 2025-02-25 NOTE — ASSESSMENT & PLAN NOTE
CONTINUE all meds as prescribed  CHF  is Controlled  Educate on side effects of metoprolol and CHF. Educate on CHF worsening symptoms  FUV prn with Clinical Pharmacy

## 2025-02-25 NOTE — PROGRESS NOTES
Pharmacy Post-Discharge Visit    Wayne Burkitt is a 79 y.o. male was referred to Clinical Pharmacy Team to complete a post-discharge medication optimization and monitoring visit.  The patient was referred for their Congestive Heart Failure.    Admission Date: 2/9/25  Discharge Date: 2/12/25    Referring Provider: Mikhail Galaviz DO  PCP: Mikhail Galaviz DO - last visit: 2/18/25, next visit: -      Subjective   Allergies   Allergen Reactions    Alprazolam Anaphylaxis    Bepotastine Besilate Swelling    Clopidogrel Swelling    Doxazosin Swelling    Lisinopril Swelling    Metronidazole Swelling    Amlodipine Hives    Atorvastatin Hives    Cephalexin Hives    Ciprofloxacin Itching    Diphenhydramine Hcl Hives    Guaifenesin Hives    Hydrochlorothiazide Unknown    Hydrocodone Hives    Methylprednisolone Unknown    Phenytoin Other    Sertraline Unknown    Telmisartan Hives    Topiramate Rash       [facility] [address] [facility] [address] [facility] [address]  [address] [facility] [address] [facility] [address]  Phone: [phone] Fax: [phone]      Medication System Management:  Affordability/Accessibility: none  Adherence/Organization: none      Social History     Social History Narrative    Not on file        Notable Medication changes following discharge:  Start: metoprolol, pantoprazole, sucralfate  Stop: Effient  Change: none    Congestive Heart Failure  Presents for initial visit. The disease course has been stable. Pertinent negatives include no abdominal pain, chest pain, chest pressure, claudication, edema, fatigue, muscle weakness, near-syncope, nocturia, orthopnea, palpitations, paroxysmal nocturnal dyspnea, shortness of breath or unexpected weight change. The symptoms have been resolved. Past treatments include beta blockers. The treatment provided significant relief. Compliance with prior treatments has been good. His past medical history is significant for HTN.     Denies anymore nose bleeding. Denies fatigue, falls, or signs  "of blood loss.   Review of Systems   Constitutional:  Negative for fatigue and unexpected weight change.   Respiratory:  Negative for shortness of breath.    Cardiovascular:  Negative for chest pain, palpitations, claudication and near-syncope.   Gastrointestinal:  Negative for abdominal pain.   Genitourinary:  Negative for nocturia.   Musculoskeletal:  Negative for muscle weakness.           Objective     There were no vitals taken for this visit.   BP Readings from Last 4 Encounters:   02/18/25 112/64   02/12/25 113/76   01/20/25 124/60   01/15/25 123/70      There were no vitals filed for this visit.     LAB  Lab Results   Component Value Date    BILITOT 0.8 02/11/2025    CALCIUM 8.5 (L) 02/12/2025    CO2 24 02/12/2025     (H) 02/12/2025    CREATININE 0.89 02/12/2025    GLUCOSE 100 (H) 02/12/2025    ALKPHOS 75 02/11/2025    K 4.0 02/12/2025    PROT 5.7 (L) 02/11/2025     02/12/2025    AST 30 02/11/2025    ALT 32 02/11/2025    BUN 14 02/12/2025    ANIONGAP 12 02/12/2025    MG 1.92 02/11/2025    PHOS 3.4 12/24/2024    ALBUMIN 3.4 02/11/2025     No results found for: \"TRIG\", \"CHOL\", \"LDLCALC\", \"HDL\"  No results found for: \"HGBA1C\"      Current Outpatient Medications   Medication Instructions    acetaminophen (TYLENOL) 650 mg, Every 4 hours PRN    aspirin 81 mg, Daily    brimonidine (AlphaGAN) 0.2 % ophthalmic solution 1 drop, 2 times daily    docusate sodium (COLACE) 100 mg, oral, 2 times daily PRN    donepezil (ARICEPT) 5 mg, Nightly    levETIRAcetam (KEPPRA) 1,000 mg, 2 times daily    metoprolol succinate XL (TOPROL-XL) 25 mg, oral, Daily, Do not crush or chew.    nitroglycerin (NITROSTAT) 0.4 mg, sublingual, Every 5 min PRN    pantoprazole (PROTONIX) 40 mg, oral, 2 times daily, Do not crush, chew, or split.    pravastatin (PRAVACHOL) 80 mg, Nightly    QUEtiapine (SEROQUEL) 50 mg, Nightly    sucralfate (CARAFATE) 1 g, oral, 3 times daily before meals    traZODone (DESYREL) 25 mg, oral, Nightly PRN    "     HISTORICAL PHARMACOTHERAPY  none    DRUG INTERACTIONS  None at time of review      Assessment/Plan   Problem List Items Addressed This Visit       Diastolic congestive heart failure - Primary     CONTINUE all meds as prescribed  CHF  is Controlled  Educate on side effects of metoprolol and CHF. Educate on CHF worsening symptoms  FUV prn with Clinical Pharmacy              Follow Up:    Continue all meds under the continuation of care with the referring provider and clinical pharmacy team.    Nishant England PharmD     Verbal consent to manage patient's drug therapy was obtained from the patient. They were informed they may decline to participate or withdraw from participation in pharmacy services at any time.

## 2025-02-26 ENCOUNTER — APPOINTMENT (OUTPATIENT)
Dept: PHARMACY | Facility: HOSPITAL | Age: 80
End: 2025-02-26
Payer: MEDICARE

## 2025-02-28 LAB — SOMATOSTATIN: 34 PG/ML

## 2025-04-01 ENCOUNTER — APPOINTMENT (OUTPATIENT)
Dept: RADIOLOGY | Facility: HOSPITAL | Age: 80
DRG: 378 | End: 2025-04-01
Payer: MEDICARE

## 2025-04-01 ENCOUNTER — HOSPITAL ENCOUNTER (INPATIENT)
Facility: HOSPITAL | Age: 80
LOS: 5 days | Discharge: HOME | End: 2025-04-06
Attending: STUDENT IN AN ORGANIZED HEALTH CARE EDUCATION/TRAINING PROGRAM | Admitting: STUDENT IN AN ORGANIZED HEALTH CARE EDUCATION/TRAINING PROGRAM
Payer: MEDICARE

## 2025-04-01 ENCOUNTER — APPOINTMENT (OUTPATIENT)
Dept: CARDIOLOGY | Facility: HOSPITAL | Age: 80
DRG: 378 | End: 2025-04-01
Payer: MEDICARE

## 2025-04-01 DIAGNOSIS — D64.9 SYMPTOMATIC ANEMIA: ICD-10-CM

## 2025-04-01 DIAGNOSIS — I47.29 NON-SUSTAINED VENTRICULAR TACHYCARDIA (MULTI): ICD-10-CM

## 2025-04-01 DIAGNOSIS — Z95.0 CARDIAC RESYNCHRONIZATION THERAPY PACEMAKER (CRT-P) IN PLACE: ICD-10-CM

## 2025-04-01 DIAGNOSIS — D50.0 ANEMIA DUE TO GI BLOOD LOSS: ICD-10-CM

## 2025-04-01 DIAGNOSIS — I25.10 CORONARY ARTERY DISEASE INVOLVING NATIVE CORONARY ARTERY OF NATIVE HEART WITHOUT ANGINA PECTORIS: ICD-10-CM

## 2025-04-01 DIAGNOSIS — R04.0 EPISTAXIS, RECURRENT: ICD-10-CM

## 2025-04-01 DIAGNOSIS — Z09 HOSPITAL DISCHARGE FOLLOW-UP: ICD-10-CM

## 2025-04-01 DIAGNOSIS — I25.5 ISCHEMIC CARDIOMYOPATHY: ICD-10-CM

## 2025-04-01 DIAGNOSIS — K92.2 UPPER GI BLEED: ICD-10-CM

## 2025-04-01 DIAGNOSIS — R07.9 CHEST PAIN, UNSPECIFIED TYPE: Primary | ICD-10-CM

## 2025-04-01 LAB
ABO GROUP (TYPE) IN BLOOD: NORMAL
ALBUMIN SERPL BCP-MCNC: 3.7 G/DL (ref 3.4–5)
ALBUMIN SERPL BCP-MCNC: 3.9 G/DL (ref 3.4–5)
ALP SERPL-CCNC: 91 U/L (ref 33–136)
ALT SERPL W P-5'-P-CCNC: 16 U/L (ref 10–52)
ANION GAP SERPL CALC-SCNC: 13 MMOL/L (ref 10–20)
ANION GAP SERPL CALC-SCNC: 14 MMOL/L (ref 10–20)
ANTIBODY SCREEN: NORMAL
AST SERPL W P-5'-P-CCNC: 24 U/L (ref 9–39)
BASOPHILS # BLD AUTO: 0.04 X10*3/UL (ref 0–0.1)
BASOPHILS NFR BLD AUTO: 0.9 %
BILIRUB SERPL-MCNC: 0.4 MG/DL (ref 0–1.2)
BNP SERPL-MCNC: 358 PG/ML (ref 0–99)
BUN SERPL-MCNC: 23 MG/DL (ref 6–23)
BUN SERPL-MCNC: 24 MG/DL (ref 6–23)
CALCIUM SERPL-MCNC: 8.4 MG/DL (ref 8.6–10.3)
CALCIUM SERPL-MCNC: 8.6 MG/DL (ref 8.6–10.3)
CARDIAC TROPONIN I PNL SERPL HS: 10 NG/L (ref 0–20)
CARDIAC TROPONIN I PNL SERPL HS: 10 NG/L (ref 0–20)
CARDIAC TROPONIN I PNL SERPL HS: 17 NG/L (ref 0–20)
CHLORIDE SERPL-SCNC: 109 MMOL/L (ref 98–107)
CHLORIDE SERPL-SCNC: 111 MMOL/L (ref 98–107)
CO2 SERPL-SCNC: 23 MMOL/L (ref 21–32)
CO2 SERPL-SCNC: 24 MMOL/L (ref 21–32)
CREAT SERPL-MCNC: 1.27 MG/DL (ref 0.5–1.3)
CREAT SERPL-MCNC: 1.32 MG/DL (ref 0.5–1.3)
DACRYOCYTES BLD QL SMEAR: NORMAL
EGFRCR SERPLBLD CKD-EPI 2021: 56 ML/MIN/1.73M*2
EGFRCR SERPLBLD CKD-EPI 2021: 58 ML/MIN/1.73M*2
EOSINOPHIL # BLD AUTO: 0.15 X10*3/UL (ref 0–0.4)
EOSINOPHIL NFR BLD AUTO: 3.5 %
ERYTHROCYTE [DISTWIDTH] IN BLOOD BY AUTOMATED COUNT: 18.8 % (ref 11.5–14.5)
FERRITIN SERPL-MCNC: 8 NG/ML (ref 20–300)
GLUCOSE SERPL-MCNC: 117 MG/DL (ref 74–99)
GLUCOSE SERPL-MCNC: 148 MG/DL (ref 74–99)
HCT VFR BLD AUTO: 18.7 % (ref 41–52)
HEMOCCULT SP1 STL QL: POSITIVE
HGB BLD-MCNC: 5.1 G/DL (ref 13.5–17.5)
HGB RETIC QN: 15 PG (ref 28–38)
HOLD SPECIMEN: NORMAL
HYPOCHROMIA BLD QL SMEAR: NORMAL
IMM GRANULOCYTES # BLD AUTO: 0.01 X10*3/UL (ref 0–0.5)
IMM GRANULOCYTES NFR BLD AUTO: 0.2 % (ref 0–0.9)
IMMATURE RETIC FRACTION: 9 %
INR PPP: 1.1 (ref 0.9–1.1)
IRON SATN MFR SERPL: ABNORMAL %
IRON SERPL-MCNC: <10 UG/DL (ref 35–150)
LDH SERPL L TO P-CCNC: 157 U/L (ref 84–246)
LYMPHOCYTES # BLD AUTO: 0.71 X10*3/UL (ref 0.8–3)
LYMPHOCYTES NFR BLD AUTO: 16.7 %
MCH RBC QN AUTO: 22.4 PG (ref 26–34)
MCHC RBC AUTO-ENTMCNC: 27.3 G/DL (ref 32–36)
MCV RBC AUTO: 82 FL (ref 80–100)
MONOCYTES # BLD AUTO: 0.34 X10*3/UL (ref 0.05–0.8)
MONOCYTES NFR BLD AUTO: 8 %
NEUTROPHILS # BLD AUTO: 2.99 X10*3/UL (ref 1.6–5.5)
NEUTROPHILS NFR BLD AUTO: 70.7 %
NRBC BLD-RTO: 0 /100 WBCS (ref 0–0)
OVALOCYTES BLD QL SMEAR: NORMAL
PHOSPHATE SERPL-MCNC: 3.5 MG/DL (ref 2.5–4.9)
PLATELET # BLD AUTO: 343 X10*3/UL (ref 150–450)
POLYCHROMASIA BLD QL SMEAR: NORMAL
POTASSIUM SERPL-SCNC: 4.3 MMOL/L (ref 3.5–5.3)
POTASSIUM SERPL-SCNC: 4.6 MMOL/L (ref 3.5–5.3)
PROT SERPL-MCNC: 7 G/DL (ref 6.4–8.2)
PROTHROMBIN TIME: 12.5 SECONDS (ref 9.8–12.4)
RBC # BLD AUTO: 2.28 X10*6/UL (ref 4.5–5.9)
RBC MORPH BLD: NORMAL
RETICS #: 0.03 X10*6/UL (ref 0.02–0.11)
RETICS/RBC NFR AUTO: 1.2 % (ref 0.5–2)
RH FACTOR (ANTIGEN D): NORMAL
SCHISTOCYTES BLD QL SMEAR: NORMAL
SODIUM SERPL-SCNC: 141 MMOL/L (ref 136–145)
SODIUM SERPL-SCNC: 144 MMOL/L (ref 136–145)
TARGETS BLD QL SMEAR: NORMAL
TIBC SERPL-MCNC: ABNORMAL UG/DL
UIBC SERPL-MCNC: 414 UG/DL (ref 110–370)
WBC # BLD AUTO: 4.2 X10*3/UL (ref 4.4–11.3)

## 2025-04-01 PROCEDURE — 36430 TRANSFUSION BLD/BLD COMPNT: CPT

## 2025-04-01 PROCEDURE — 2500000002 HC RX 250 W HCPCS SELF ADMINISTERED DRUGS (ALT 637 FOR MEDICARE OP, ALT 636 FOR OP/ED)

## 2025-04-01 PROCEDURE — 83615 LACTATE (LD) (LDH) ENZYME: CPT

## 2025-04-01 PROCEDURE — 36415 COLL VENOUS BLD VENIPUNCTURE: CPT | Performed by: STUDENT IN AN ORGANIZED HEALTH CARE EDUCATION/TRAINING PROGRAM

## 2025-04-01 PROCEDURE — 2060000001 HC INTERMEDIATE ICU ROOM DAILY

## 2025-04-01 PROCEDURE — 99285 EMERGENCY DEPT VISIT HI MDM: CPT | Mod: 25 | Performed by: STUDENT IN AN ORGANIZED HEALTH CARE EDUCATION/TRAINING PROGRAM

## 2025-04-01 PROCEDURE — 84100 ASSAY OF PHOSPHORUS: CPT

## 2025-04-01 PROCEDURE — 99223 1ST HOSP IP/OBS HIGH 75: CPT

## 2025-04-01 PROCEDURE — 86900 BLOOD TYPING SEROLOGIC ABO: CPT | Performed by: STUDENT IN AN ORGANIZED HEALTH CARE EDUCATION/TRAINING PROGRAM

## 2025-04-01 PROCEDURE — 96374 THER/PROPH/DIAG INJ IV PUSH: CPT

## 2025-04-01 PROCEDURE — 86923 COMPATIBILITY TEST ELECTRIC: CPT

## 2025-04-01 PROCEDURE — 84484 ASSAY OF TROPONIN QUANT: CPT | Performed by: STUDENT IN AN ORGANIZED HEALTH CARE EDUCATION/TRAINING PROGRAM

## 2025-04-01 PROCEDURE — 2500000004 HC RX 250 GENERAL PHARMACY W/ HCPCS (ALT 636 FOR OP/ED): Performed by: STUDENT IN AN ORGANIZED HEALTH CARE EDUCATION/TRAINING PROGRAM

## 2025-04-01 PROCEDURE — 36415 COLL VENOUS BLD VENIPUNCTURE: CPT | Performed by: EMERGENCY MEDICINE

## 2025-04-01 PROCEDURE — 84484 ASSAY OF TROPONIN QUANT: CPT

## 2025-04-01 PROCEDURE — 85610 PROTHROMBIN TIME: CPT | Performed by: STUDENT IN AN ORGANIZED HEALTH CARE EDUCATION/TRAINING PROGRAM

## 2025-04-01 PROCEDURE — 93005 ELECTROCARDIOGRAM TRACING: CPT

## 2025-04-01 PROCEDURE — 85045 AUTOMATED RETICULOCYTE COUNT: CPT

## 2025-04-01 PROCEDURE — 93010 ELECTROCARDIOGRAM REPORT: CPT | Performed by: INTERNAL MEDICINE

## 2025-04-01 PROCEDURE — 82728 ASSAY OF FERRITIN: CPT

## 2025-04-01 PROCEDURE — 85025 COMPLETE CBC W/AUTO DIFF WBC: CPT | Performed by: STUDENT IN AN ORGANIZED HEALTH CARE EDUCATION/TRAINING PROGRAM

## 2025-04-01 PROCEDURE — 83880 ASSAY OF NATRIURETIC PEPTIDE: CPT | Performed by: STUDENT IN AN ORGANIZED HEALTH CARE EDUCATION/TRAINING PROGRAM

## 2025-04-01 PROCEDURE — 71045 X-RAY EXAM CHEST 1 VIEW: CPT

## 2025-04-01 PROCEDURE — 71045 X-RAY EXAM CHEST 1 VIEW: CPT | Mod: FOREIGN READ | Performed by: RADIOLOGY

## 2025-04-01 PROCEDURE — 83550 IRON BINDING TEST: CPT

## 2025-04-01 PROCEDURE — 82270 OCCULT BLOOD FECES: CPT | Performed by: STUDENT IN AN ORGANIZED HEALTH CARE EDUCATION/TRAINING PROGRAM

## 2025-04-01 PROCEDURE — 80053 COMPREHEN METABOLIC PANEL: CPT | Performed by: STUDENT IN AN ORGANIZED HEALTH CARE EDUCATION/TRAINING PROGRAM

## 2025-04-01 PROCEDURE — 2500000001 HC RX 250 WO HCPCS SELF ADMINISTERED DRUGS (ALT 637 FOR MEDICARE OP)

## 2025-04-01 PROCEDURE — P9016 RBC LEUKOCYTES REDUCED: HCPCS

## 2025-04-01 PROCEDURE — 86901 BLOOD TYPING SEROLOGIC RH(D): CPT | Performed by: STUDENT IN AN ORGANIZED HEALTH CARE EDUCATION/TRAINING PROGRAM

## 2025-04-01 PROCEDURE — 80069 RENAL FUNCTION PANEL: CPT | Mod: CCI

## 2025-04-01 RX ORDER — PANTOPRAZOLE SODIUM 40 MG/10ML
80 INJECTION, POWDER, LYOPHILIZED, FOR SOLUTION INTRAVENOUS ONCE
Status: COMPLETED | OUTPATIENT
Start: 2025-04-01 | End: 2025-04-01

## 2025-04-01 RX ORDER — TRAZODONE HYDROCHLORIDE 50 MG/1
25 TABLET ORAL NIGHTLY PRN
Status: DISCONTINUED | OUTPATIENT
Start: 2025-04-01 | End: 2025-04-06 | Stop reason: HOSPADM

## 2025-04-01 RX ORDER — ACETAMINOPHEN 325 MG/1
650 TABLET ORAL EVERY 4 HOURS PRN
Status: DISCONTINUED | OUTPATIENT
Start: 2025-04-01 | End: 2025-04-06 | Stop reason: HOSPADM

## 2025-04-01 RX ORDER — METOPROLOL SUCCINATE 25 MG/1
25 TABLET, EXTENDED RELEASE ORAL DAILY
Status: DISCONTINUED | OUTPATIENT
Start: 2025-04-02 | End: 2025-04-06 | Stop reason: HOSPADM

## 2025-04-01 RX ORDER — QUETIAPINE FUMARATE 50 MG/1
50 TABLET, FILM COATED ORAL NIGHTLY
Status: DISCONTINUED | OUTPATIENT
Start: 2025-04-01 | End: 2025-04-06 | Stop reason: HOSPADM

## 2025-04-01 RX ORDER — VIT C/E/ZN/COPPR/LUTEIN/ZEAXAN 250MG-90MG
50 CAPSULE ORAL DAILY
COMMUNITY

## 2025-04-01 RX ORDER — NITROGLYCERIN 0.4 MG/1
0.4 TABLET SUBLINGUAL EVERY 5 MIN PRN
Status: DISCONTINUED | OUTPATIENT
Start: 2025-04-01 | End: 2025-04-06 | Stop reason: HOSPADM

## 2025-04-01 RX ORDER — DONEPEZIL HYDROCHLORIDE 5 MG/1
5 TABLET, FILM COATED ORAL NIGHTLY
Status: DISCONTINUED | OUTPATIENT
Start: 2025-04-01 | End: 2025-04-06 | Stop reason: HOSPADM

## 2025-04-01 RX ORDER — PRAVASTATIN SODIUM 20 MG/1
80 TABLET ORAL NIGHTLY
Status: DISCONTINUED | OUTPATIENT
Start: 2025-04-01 | End: 2025-04-06 | Stop reason: HOSPADM

## 2025-04-01 RX ORDER — PANTOPRAZOLE SODIUM 40 MG/10ML
40 INJECTION, POWDER, LYOPHILIZED, FOR SOLUTION INTRAVENOUS 2 TIMES DAILY
Status: DISCONTINUED | OUTPATIENT
Start: 2025-04-02 | End: 2025-04-04

## 2025-04-01 RX ORDER — LOPERAMIDE HCL 2 MG
2 TABLET ORAL 4 TIMES DAILY PRN
COMMUNITY

## 2025-04-01 RX ORDER — LEVETIRACETAM 500 MG/1
1000 TABLET ORAL 2 TIMES DAILY
Status: DISCONTINUED | OUTPATIENT
Start: 2025-04-01 | End: 2025-04-06 | Stop reason: HOSPADM

## 2025-04-01 RX ORDER — PANTOPRAZOLE SODIUM 40 MG/10ML
40 INJECTION, POWDER, LYOPHILIZED, FOR SOLUTION INTRAVENOUS DAILY
Status: DISCONTINUED | OUTPATIENT
Start: 2025-04-02 | End: 2025-04-01

## 2025-04-01 RX ORDER — LOPERAMIDE HCL 2 MG
4 TABLET ORAL AS NEEDED
COMMUNITY

## 2025-04-01 RX ORDER — ASPIRIN 81 MG/1
81 TABLET ORAL DAILY
Status: DISCONTINUED | OUTPATIENT
Start: 2025-04-02 | End: 2025-04-06 | Stop reason: HOSPADM

## 2025-04-01 RX ORDER — ADHESIVE BANDAGE
30 BANDAGE TOPICAL DAILY PRN
COMMUNITY

## 2025-04-01 RX ORDER — BRIMONIDINE TARTRATE 2 MG/ML
1 SOLUTION/ DROPS OPHTHALMIC 2 TIMES DAILY
Status: DISCONTINUED | OUTPATIENT
Start: 2025-04-01 | End: 2025-04-06 | Stop reason: HOSPADM

## 2025-04-01 RX ADMIN — PRAVASTATIN SODIUM 80 MG: 20 TABLET ORAL at 22:01

## 2025-04-01 RX ADMIN — PANTOPRAZOLE SODIUM 80 MG: 40 INJECTION, POWDER, FOR SOLUTION INTRAVENOUS at 15:59

## 2025-04-01 RX ADMIN — QUETIAPINE FUMARATE 50 MG: 50 TABLET ORAL at 22:02

## 2025-04-01 RX ADMIN — BRIMONIDINE TARTRATE 1 DROP: 2 SOLUTION/ DROPS OPHTHALMIC at 22:01

## 2025-04-01 RX ADMIN — LEVETIRACETAM 1000 MG: 500 TABLET, FILM COATED ORAL at 22:02

## 2025-04-01 RX ADMIN — DONEPEZIL HYDROCHLORIDE 5 MG: 5 TABLET ORAL at 22:02

## 2025-04-01 RX ADMIN — TRAZODONE HYDROCHLORIDE 25 MG: 50 TABLET ORAL at 22:03

## 2025-04-01 SDOH — SOCIAL STABILITY: SOCIAL INSECURITY: WERE YOU ABLE TO COMPLETE ALL THE BEHAVIORAL HEALTH SCREENINGS?: YES

## 2025-04-01 SDOH — ECONOMIC STABILITY: FOOD INSECURITY: WITHIN THE PAST 12 MONTHS, YOU WORRIED THAT YOUR FOOD WOULD RUN OUT BEFORE YOU GOT THE MONEY TO BUY MORE.: NEVER TRUE

## 2025-04-01 SDOH — ECONOMIC STABILITY: TRANSPORTATION INSECURITY: IN THE PAST 12 MONTHS, HAS LACK OF TRANSPORTATION KEPT YOU FROM MEDICAL APPOINTMENTS OR FROM GETTING MEDICATIONS?: NO

## 2025-04-01 SDOH — ECONOMIC STABILITY: HOUSING INSECURITY: IN THE PAST 12 MONTHS, HOW MANY TIMES HAVE YOU MOVED WHERE YOU WERE LIVING?: 1

## 2025-04-01 SDOH — SOCIAL STABILITY: SOCIAL INSECURITY: WITHIN THE LAST YEAR, HAVE YOU BEEN AFRAID OF YOUR PARTNER OR EX-PARTNER?: NO

## 2025-04-01 SDOH — ECONOMIC STABILITY: HOUSING INSECURITY: AT ANY TIME IN THE PAST 12 MONTHS, WERE YOU HOMELESS OR LIVING IN A SHELTER (INCLUDING NOW)?: NO

## 2025-04-01 SDOH — SOCIAL STABILITY: SOCIAL INSECURITY: DO YOU FEEL UNSAFE GOING BACK TO THE PLACE WHERE YOU ARE LIVING?: NO

## 2025-04-01 SDOH — SOCIAL STABILITY: SOCIAL INSECURITY: DO YOU FEEL ANYONE HAS EXPLOITED OR TAKEN ADVANTAGE OF YOU FINANCIALLY OR OF YOUR PERSONAL PROPERTY?: NO

## 2025-04-01 SDOH — ECONOMIC STABILITY: FOOD INSECURITY: WITHIN THE PAST 12 MONTHS, THE FOOD YOU BOUGHT JUST DIDN'T LAST AND YOU DIDN'T HAVE MONEY TO GET MORE.: NEVER TRUE

## 2025-04-01 SDOH — ECONOMIC STABILITY: FOOD INSECURITY: HOW HARD IS IT FOR YOU TO PAY FOR THE VERY BASICS LIKE FOOD, HOUSING, MEDICAL CARE, AND HEATING?: NOT VERY HARD

## 2025-04-01 SDOH — SOCIAL STABILITY: SOCIAL INSECURITY: WITHIN THE LAST YEAR, HAVE YOU BEEN HUMILIATED OR EMOTIONALLY ABUSED IN OTHER WAYS BY YOUR PARTNER OR EX-PARTNER?: NO

## 2025-04-01 SDOH — ECONOMIC STABILITY: INCOME INSECURITY: IN THE PAST 12 MONTHS HAS THE ELECTRIC, GAS, OIL, OR WATER COMPANY THREATENED TO SHUT OFF SERVICES IN YOUR HOME?: NO

## 2025-04-01 SDOH — SOCIAL STABILITY: SOCIAL INSECURITY: DOES ANYONE TRY TO KEEP YOU FROM HAVING/CONTACTING OTHER FRIENDS OR DOING THINGS OUTSIDE YOUR HOME?: NO

## 2025-04-01 SDOH — SOCIAL STABILITY: SOCIAL INSECURITY: ARE THERE ANY APPARENT SIGNS OF INJURIES/BEHAVIORS THAT COULD BE RELATED TO ABUSE/NEGLECT?: NO

## 2025-04-01 SDOH — SOCIAL STABILITY: SOCIAL INSECURITY: ARE YOU OR HAVE YOU BEEN THREATENED OR ABUSED PHYSICALLY, EMOTIONALLY, OR SEXUALLY BY ANYONE?: NO

## 2025-04-01 SDOH — ECONOMIC STABILITY: HOUSING INSECURITY: IN THE LAST 12 MONTHS, WAS THERE A TIME WHEN YOU WERE NOT ABLE TO PAY THE MORTGAGE OR RENT ON TIME?: NO

## 2025-04-01 SDOH — SOCIAL STABILITY: SOCIAL INSECURITY: HAVE YOU HAD THOUGHTS OF HARMING ANYONE ELSE?: NO

## 2025-04-01 SDOH — SOCIAL STABILITY: SOCIAL INSECURITY: ABUSE: ADULT

## 2025-04-01 SDOH — SOCIAL STABILITY: SOCIAL INSECURITY: HAS ANYONE EVER THREATENED TO HURT YOUR FAMILY OR YOUR PETS?: NO

## 2025-04-01 ASSESSMENT — COGNITIVE AND FUNCTIONAL STATUS - GENERAL
PATIENT BASELINE BEDBOUND: NO
MOBILITY SCORE: 24
MOBILITY SCORE: 18
TOILETING: A LITTLE
EATING MEALS: A LITTLE
PATIENT BASELINE BEDBOUND: NO
DRESSING REGULAR UPPER BODY CLOTHING: A LITTLE
DRESSING REGULAR LOWER BODY CLOTHING: A LITTLE
MOVING FROM LYING ON BACK TO SITTING ON SIDE OF FLAT BED WITH BEDRAILS: A LITTLE
DAILY ACTIVITIY SCORE: 18
WALKING IN HOSPITAL ROOM: A LITTLE
MOVING TO AND FROM BED TO CHAIR: A LITTLE
DAILY ACTIVITIY SCORE: 24
TURNING FROM BACK TO SIDE WHILE IN FLAT BAD: A LITTLE
STANDING UP FROM CHAIR USING ARMS: A LITTLE
PERSONAL GROOMING: A LITTLE
CLIMB 3 TO 5 STEPS WITH RAILING: A LITTLE
HELP NEEDED FOR BATHING: A LITTLE

## 2025-04-01 ASSESSMENT — PAIN SCALES - GENERAL
PAINLEVEL_OUTOF10: 0 - NO PAIN
PAINLEVEL_OUTOF10: 5 - MODERATE PAIN
PAINLEVEL_OUTOF10: 0 - NO PAIN
PAINLEVEL_OUTOF10: 0 - NO PAIN

## 2025-04-01 ASSESSMENT — LIFESTYLE VARIABLES
TOTAL SCORE: 0
HAVE PEOPLE ANNOYED YOU BY CRITICIZING YOUR DRINKING: NO
AUDIT-C TOTAL SCORE: 0
AUDIT-C TOTAL SCORE: 0
HOW MANY STANDARD DRINKS CONTAINING ALCOHOL DO YOU HAVE ON A TYPICAL DAY: PATIENT DOES NOT DRINK
HOW OFTEN DO YOU HAVE 6 OR MORE DRINKS ON ONE OCCASION: NEVER
HOW OFTEN DO YOU HAVE A DRINK CONTAINING ALCOHOL: NEVER
EVER FELT BAD OR GUILTY ABOUT YOUR DRINKING: NO
EVER HAD A DRINK FIRST THING IN THE MORNING TO STEADY YOUR NERVES TO GET RID OF A HANGOVER: NO
SKIP TO QUESTIONS 9-10: 1
HAVE YOU EVER FELT YOU SHOULD CUT DOWN ON YOUR DRINKING: NO

## 2025-04-01 ASSESSMENT — ACTIVITIES OF DAILY LIVING (ADL)
LACK_OF_TRANSPORTATION: NO
GROOMING: NEEDS ASSISTANCE
HEARING - RIGHT EAR: FUNCTIONAL
BATHING: NEEDS ASSISTANCE
LACK_OF_TRANSPORTATION: NO
JUDGMENT_ADEQUATE_SAFELY_COMPLETE_DAILY_ACTIVITIES: YES
DRESSING YOURSELF: NEEDS ASSISTANCE
WALKS IN HOME: NEEDS ASSISTANCE
ASSISTIVE_DEVICE: WALKER
PATIENT'S MEMORY ADEQUATE TO SAFELY COMPLETE DAILY ACTIVITIES?: YES
FEEDING YOURSELF: NEEDS ASSISTANCE
LACK_OF_TRANSPORTATION: NO
ADEQUATE_TO_COMPLETE_ADL: YES
TOILETING: NEEDS ASSISTANCE
HEARING - LEFT EAR: FUNCTIONAL

## 2025-04-01 ASSESSMENT — HEART SCORE
ECG: NON-SPECIFIC REPOLARIZATION DISTURBANCE
AGE: 65+
RISK FACTORS: >2 RISK FACTORS OR HX OF ATHEROSCLEROTIC DISEASE
HISTORY: MODERATELY SUSPICIOUS
HEART SCORE: 6
TROPONIN: LESS THAN OR EQUAL TO NORMAL LIMIT

## 2025-04-01 ASSESSMENT — PAIN - FUNCTIONAL ASSESSMENT
PAIN_FUNCTIONAL_ASSESSMENT: 0-10

## 2025-04-01 ASSESSMENT — PAIN DESCRIPTION - PROGRESSION: CLINICAL_PROGRESSION: GRADUALLY IMPROVING

## 2025-04-01 ASSESSMENT — PAIN DESCRIPTION - LOCATION: LOCATION: CHEST

## 2025-04-01 ASSESSMENT — PAIN DESCRIPTION - PAIN TYPE: TYPE: CHRONIC PAIN

## 2025-04-01 NOTE — ED PROVIDER NOTES
EMERGENCY DEPARTMENT ENCOUNTER      Pt Name: Wayne Burkitt  MRN: [id number]  Birthdate 1945  Date of evaluation: 4/1/2025  Provider: Veda Redding MD    CHIEF COMPLAINT       Chief Complaint   Patient presents with    Chest Pain         HISTORY OF PRESENT ILLNESS    A 79-year-old male past medical history of HLD, HFpEF, CAD, essential hypertension, s/p pacemaker s/p CABG, chronic stable angina status post normal stress test s/p PCI (12/2024), recurrent epistaxis and ischemic cardiomyopathy, per chart review. The patient presents to the ED for an episode of left-sided chest pain, rated 8 out of 10, lasting 1 to 4 minutes, described as a pressure-like in quality and radiating to the left arm. Patient denies chest pain currently and no other symptoms.  Patient reports that he is not taking blood thinners due to recurrent episodes of epistaxis. Patient endorses history of rectal bleeding but no recent rectal bleeding.  Denies recent surgeries, palpitations, history of blood clots, new cough, fever, GI/ symptoms, nausea/vomiting/diarrhea  or weakness.       History provided by:  Patient   used: No        Nursing Notes were reviewed.    PAST MEDICAL HISTORY     Past Medical History:   Diagnosis Date    Auditory hallucinations 09/28/2024    Conversion reaction 09/28/2024    Coronary artery disease involving native coronary artery of native heart without angina pectoris 09/28/2024    Epistaxis, recurrent 09/28/2024    Ischemic cardiomyopathy 09/28/2024    Moderate vascular dementia with anxiety 09/28/2024         SURGICAL HISTORY       Past Surgical History:   Procedure Laterality Date    CARDIAC CATHETERIZATION N/A 12/20/2024    Procedure: Left Heart Cath, With LV;  Surgeon: Gabino Giron MD;  Location: [facility] Cardiac Cath Lab;  Service: Cardiovascular;  Laterality: N/A;    CARDIAC CATHETERIZATION N/A 12/20/2024    Procedure: PCI TATYANA Stent- Coronary;  Surgeon: Gabino TRAVIS  MD Bree;  Location: [facility] Cardiac Cath Lab;  Service: Cardiovascular;  Laterality: N/A;    CORONARY ANGIOPLASTY WITH STENT PLACEMENT      states he has 16 stents after the CABG    CORONARY ARTERY BYPASS GRAFT  2010    PACEMAKER PLACEMENT      TOTAL HIP ARTHROPLASTY           CURRENT MEDICATIONS       Current Discharge Medication List        CONTINUE these medications which have NOT CHANGED    Details   acetaminophen (Tylenol) 325 mg tablet Take 2 tablets (650 mg) by mouth every 4 hours if needed for mild pain (1 - 3) or fever (temp greater than 38.0 C).      aspirin 81 mg EC tablet Take 1 tablet (81 mg) by mouth once daily.      brimonidine (AlphaGAN) 0.2 % ophthalmic solution Administer 1 drop into both eyes 2 times a day.      cholecalciferol (Vitamin D-3) 25 MCG (1000 UT) capsule Take 2 capsules (50 mcg) by mouth once daily.      docusate sodium (Colace) 100 mg capsule Take 1 capsule (100 mg) by mouth 2 times a day as needed for constipation.      donepezil (Aricept) 5 mg tablet Take 1 tablet (5 mg) by mouth once daily at bedtime.      levETIRAcetam (Keppra) 1,000 mg tablet Take 1 tablet (1,000 mg) by mouth 2 times a day.      !! loperamide (Imodium A-D) 2 mg tablet Take 1 tablet (2 mg) by mouth 4 times a day as needed for diarrhea. For each additional loose stool      !! loperamide (Imodium A-D) 2 mg tablet Take 2 tablets (4 mg) by mouth if needed for diarrhea. Take after first loose stool      magnesium hydroxide (Milk of Magnesia) 400 mg/5 mL suspension Take 30 mL by mouth once daily as needed for constipation.      metoprolol succinate XL (Toprol-XL) 25 mg 24 hr tablet Take 1 tablet (25 mg) by mouth once daily. Do not crush or chew.  Qty: 30 tablet, Refills: 1    Associated Diagnoses: 3-vessel coronary artery disease      nitroglycerin (Nitrostat) 0.4 mg SL tablet Place 1 tablet (0.4 mg) under the tongue every 5 minutes if needed for chest pain.  Qty: 90 tablet, Refills: 12    Associated Diagnoses:  3-vessel coronary artery disease      pantoprazole (ProtoNix) 40 mg EC tablet Take 1 tablet (40 mg) by mouth 2 times a day. Do not crush, chew, or split.  Qty: 60 tablet, Refills: 1    Associated Diagnoses: Melena      pravastatin (Pravachol) 80 mg tablet Take 1 tablet (80 mg) by mouth once daily at bedtime.      QUEtiapine (SEROquel) 50 mg tablet Take 1 tablet (50 mg) by mouth once daily at bedtime.      traZODone (Desyrel) 50 mg tablet Take 0.5 tablets (25 mg) by mouth as needed at bedtime (dementia with anxiety).      sucralfate (Carafate) 100 mg/mL suspension Take 10 mL (1 g) by mouth 3 times a day before meals.  Qty: 900 mL, Refills: 1    Associated Diagnoses: Melena       !! - Potential duplicate medications found. Please discuss with provider.          ALLERGIES     Alprazolam, Bepotastine besilate, Clopidogrel, Doxazosin, Lisinopril, Metronidazole, Amlodipine, Atorvastatin, Cephalexin, Ciprofloxacin, Diphenhydramine hcl, Guaifenesin, Hydrochlorothiazide, Hydrocodone, Methylprednisolone, Phenytoin, Sertraline, Telmisartan, and Topiramate    FAMILY HISTORY       Family History   Family history unknown: Yes          SOCIAL HISTORY       Social History     Socioeconomic History    Marital status: Single   Tobacco Use    Smoking status: Former     Current packs/day: 0.00     Types: Cigarettes     Quit date:      Years since quittin.2    Smokeless tobacco: Never   Substance and Sexual Activity    Alcohol use: Not Currently     Comment: Stopped into .    Drug use: Never    Sexual activity: Defer     Social Drivers of Health     Financial Resource Strain: Low Risk  (2025)    Overall Financial Resource Strain (CARDIA)     Difficulty of Paying Living Expenses: Not very hard   Recent Concern: Financial Resource Strain - High Risk (2/10/2025)    Overall Financial Resource Strain (CARDIA)     Difficulty of Paying Living Expenses: Very hard   Food Insecurity: No Food Insecurity (2025)    Hunger Vital  Sign     Worried About Running Out of Food in the Last Year: Never true     Ran Out of Food in the Last Year: Never true   Transportation Needs: No Transportation Needs (4/1/2025)    PRAPARE - Transportation     Lack of Transportation (Medical): No     Lack of Transportation (Non-Medical): No   Intimate Partner Violence: Not At Risk (4/1/2025)    Humiliation, Afraid, Rape, and Kick questionnaire     Fear of Current or Ex-Partner: No     Emotionally Abused: No     Physically Abused: No     Sexually Abused: No   Housing Stability: Low Risk  (4/1/2025)    Housing Stability Vital Sign     Unable to Pay for Housing in the Last Year: No     Number of Times Moved in the Last Year: 1     Homeless in the Last Year: No       SCREENINGS                        PHYSICAL EXAM    (up to 7 for level 4, 8 or more for level 5)     ED Triage Vitals [04/01/25 1454]   Temperature Heart Rate Respirations BP   36.5 °C (97.7 °F) 68 17 130/62      Pulse Ox Temp Source Heart Rate Source Patient Position   100 % Temporal Monitor Sitting      BP Location FiO2 (%)     Left arm --       Physical Exam  Vitals and nursing note reviewed.   Constitutional:       Appearance: He is well-developed.   Cardiovascular:      Rate and Rhythm: Normal rate and regular rhythm.      Heart sounds: Normal heart sounds.   Pulmonary:      Effort: Pulmonary effort is normal.      Breath sounds: Normal breath sounds. No decreased breath sounds, wheezing, rhonchi or rales.   Abdominal:      General: Bowel sounds are normal. There is no abdominal bruit.      Palpations: Abdomen is soft.      Tenderness: There is no guarding or rebound.   Genitourinary:     Comments: On rectal examination, no hemorrhoids, masses or active bleeding.  Minimal stool collected for occult blood.  Musculoskeletal:         General: Normal range of motion.      Right lower leg: Edema present.      Left lower leg: Edema present.   Skin:     General: Skin is warm.      Coloration: Skin is pale.    Neurological:      General: No focal deficit present.      Mental Status: He is alert and oriented to person, place, and time.   Psychiatric:         Mood and Affect: Mood normal.         Behavior: Behavior normal.          DIAGNOSTIC RESULTS     LABS:  Labs Reviewed   OCCULT BLOOD X1, STOOL - Abnormal       Result Value    Occult Blood, Stool X1 Positive (*)    CBC WITH AUTO DIFFERENTIAL - Abnormal    WBC 4.2 (*)     nRBC 0.0      RBC 2.28 (*)     Hemoglobin 5.1 (*)     Hematocrit 18.7 (*)     MCV 82      MCH 22.4 (*)     MCHC 27.3 (*)     RDW 18.8 (*)     Platelets 343      Neutrophils % 70.7      Immature Granulocytes %, Automated 0.2      Lymphocytes % 16.7      Monocytes % 8.0      Eosinophils % 3.5      Basophils % 0.9      Neutrophils Absolute 2.99      Immature Granulocytes Absolute, Automated 0.01      Lymphocytes Absolute 0.71 (*)     Monocytes Absolute 0.34      Eosinophils Absolute 0.15      Basophils Absolute 0.04     COMPREHENSIVE METABOLIC PANEL - Abnormal    Glucose 148 (*)     Sodium 141      Potassium 4.3      Chloride 109 (*)     Bicarbonate 23      Anion Gap 13      Urea Nitrogen 23      Creatinine 1.32 (*)     eGFR 56 (*)     Calcium 8.6      Albumin 3.9      Alkaline Phosphatase 91      Total Protein 7.0      AST 24      Bilirubin, Total 0.4      ALT 16     PROTIME-INR - Abnormal    Protime 12.5 (*)     INR 1.1     B-TYPE NATRIURETIC PEPTIDE - Abnormal     (*)     Narrative:        <100 pg/mL - Heart failure unlikely  100-299 pg/mL - Intermediate probability of acute heart                  failure exacerbation. Correlate with clinical                  context and patient history.    >=300 pg/mL - Heart Failure likely. Correlate with clinical                  context and patient history.    BNP testing is performed using different testing methodology at [facility] than at other [facility]. Direct result comparisons should only be made within the same method.       RENAL FUNCTION PANEL - Abnormal    Glucose 117 (*)     Sodium 144      Potassium 4.6      Chloride 111 (*)     Bicarbonate 24      Anion Gap 14      Urea Nitrogen 24 (*)     Creatinine 1.27      eGFR 58 (*)     Calcium 8.4 (*)     Phosphorus 3.5      Albumin 3.7     FERRITIN - Abnormal    Ferritin 8 (*)    IRON AND TIBC - Abnormal    Iron <10 (*)     UIBC 414 (*)     TIBC        % Saturation       RETICULOCYTES - Abnormal    Retic % 1.2      Retic Absolute 0.027      Reticulocyte Hemoglobin 15 (*)     Immature Retic fraction 9.0     SERIAL TROPONIN-INITIAL - Normal    Troponin I, High Sensitivity 10      Narrative:     Less than 99th percentile of normal range cutoff-  Female and children under 18 years old <14 ng/L; Male <21 ng/L: Negative  Repeat testing should be performed if clinically indicated.     Female and children under 18 years old 14-50 ng/L; Male 21-50 ng/L:  Consistent with possible cardiac damage and possible increased clinical   risk. Serial measurements may help to assess extent of myocardial damage.     >50 ng/L: Consistent with cardiac damage, increased clinical risk and  myocardial infarction. Serial measurements may help assess extent of   myocardial damage.      NOTE: Children less than 1 year old may have higher baseline troponin   levels and results should be interpreted in conjunction with the overall   clinical context.     NOTE: Troponin I testing is performed using a different   testing methodology at [facility] than at other   [facility]. Direct result comparisons should only   be made within the same method.   SERIAL TROPONIN, 1 HOUR - Normal    Troponin I, High Sensitivity 10      Narrative:     Less than 99th percentile of normal range cutoff-  Female and children under 18 years old <14 ng/L; Male <21 ng/L: Negative  Repeat testing should be performed if clinically indicated.     Female and children under 18 years old 14-50 ng/L; Male 21-50 ng/L:  Consistent with  possible cardiac damage and possible increased clinical   risk. Serial measurements may help to assess extent of myocardial damage.     >50 ng/L: Consistent with cardiac damage, increased clinical risk and  myocardial infarction. Serial measurements may help assess extent of   myocardial damage.      NOTE: Children less than 1 year old may have higher baseline troponin   levels and results should be interpreted in conjunction with the overall   clinical context.     NOTE: Troponin I testing is performed using a different   testing methodology at [facility] than at other   [facility]. Direct result comparisons should only   be made within the same method.   TROPONIN I, HIGH SENSITIVITY - Normal    Troponin I, High Sensitivity 17      Narrative:     Less than 99th percentile of normal range cutoff-  Female and children under 18 years old <14 ng/L; Male <21 ng/L: Negative  Repeat testing should be performed if clinically indicated.     Female and children under 18 years old 14-50 ng/L; Male 21-50 ng/L:  Consistent with possible cardiac damage and possible increased clinical   risk. Serial measurements may help to assess extent of myocardial damage.     >50 ng/L: Consistent with cardiac damage, increased clinical risk and  myocardial infarction. Serial measurements may help assess extent of   myocardial damage.      NOTE: Children less than 1 year old may have higher baseline troponin   levels and results should be interpreted in conjunction with the overall   clinical context.     NOTE: Troponin I testing is performed using a different   testing methodology at [facility] than at other   [facility]. Direct result comparisons should only   be made within the same method.   LACTATE DEHYDROGENASE - Normal         TROPONIN SERIES- (INITIAL, 1 HR)    Narrative:     The following orders were created for panel order Troponin I Series, High Sensitivity (0, 1 HR).  Procedure                                Abnormality         Status                     ---------                               -----------         ------                     Troponin I, High Sensiti...[[id number]]  Normal              Final result               Troponin, High Sensitivi...[[id number]]  Normal              Final result                 Please view results for these tests on the individual orders.   TYPE AND SCREEN    ABO TYPE B      Rh TYPE POS      ANTIBODY SCREEN NEG     CBC   MAGNESIUM   HAPTOGLOBIN   PREPARE RBC    PRODUCT CODE Y9120B95      Unit Number [id number]      Unit ABO B      Unit RH NEG      XM INTEP COMP      Dispense Status IS      Blood Expiration Date 5/1/2025 11:59:00 PM EDT      PRODUCT BLOOD TYPE 1700      UNIT VOLUME 350      PRODUCT CODE D7453Q08      Unit Number [id number]      Unit ABO B      Unit RH NEG      XM INTEP COMP      Dispense Status XM      Blood Expiration Date 5/2/2025 11:59:00 PM EDT      PRODUCT BLOOD TYPE 1700      UNIT VOLUME 350     PREPARE RBC    PRODUCT CODE V0357V62      Unit Number [id number]      Unit ABO O      Unit RH POS      XM INTEP COMP      Dispense Status XM      Blood Expiration Date 4/18/2025 11:59:00 PM EDT      PRODUCT BLOOD TYPE 5100      UNIT VOLUME 314     MORPHOLOGY    RBC Morphology See Below      Polychromasia Mild      Hypochromia Marked      RBC Fragments Few      Target Cells Few      Ovalocytes Few      Teardrop Cells Few     PATH REVIEW-CBC DIFFERENTIAL       All other labs were within normal range or not returned as of this dictation.    Imaging  XR chest 1 view   Final Result   No acute process.   Signed by Forest Grubbs MD      CT angio abdomen pelvis w and or wo IV IV contrast    (Results Pending)   Transthoracic Echo (TTE) Limited    (Results Pending)        Procedures  Procedures     EMERGENCY DEPARTMENT COURSE/MDM:     Diagnoses as of 04/01/25 2117   Chest pain, unspecified type   Symptomatic anemia        Medical Decision  Making  A 79-year-old male with history as above comes for an episode of chest pain that resolved spontaneously without any intervention. On examination, patient presented pale in appearance, normotensive, no tachycardia, no tachypnea and saturating well on room air.   Given transient chest pain, a history of severe coronary stenosis, and a hemoglobin drop from 8 to 5.1, there is a concern for transient ischemia.   Blood consent was signed, and the benefits versus risks were explained to the patient and POA at the bedside.  Both expressed understanding, agreed and signed the consent. 2pRBC's were ordered for transfusion.  Rectal examination is unremarkable and occult blood test was positive.   EKG showed NSR 60 vent rate, V5 V6 T wave inversion, no QT prolongation, normal QRS and VA intervals, NAD and no acute ischemic changes.  Trops within normal parameters and no delta.  Blood work remarkable for mild hyperglycemia and BLANQUITA.  Patient is admitted under medicine for symptomatic anemia and is currently undergoing transfusion. Medicine team recommended CTA abdomen pelvis.  Patient and or family in agreement and understanding of treatment plan.  All questions answered.      I reviewed the case with the attending ED physician. The attending ED physician agrees with the plan. Patient and/or patient´s representative was counseled regarding labs, imaging, likely diagnosis, and plan. All questions were answered.    ED Medications administered this visit:    Medications   acetaminophen (Tylenol) tablet 650 mg (has no administration in time range)   brimonidine (AlphaGAN) 0.2 % ophthalmic solution 1 drop (has no administration in time range)   donepezil (Aricept) tablet 5 mg (has no administration in time range)   levETIRAcetam (Keppra) tablet 1,000 mg (has no administration in time range)   metoprolol succinate XL (Toprol-XL) 24 hr tablet 25 mg (has no administration in time range)   nitroglycerin (Nitrostat) SL tablet 0.4  mg (has no administration in time range)   pravastatin (Pravachol) tablet 80 mg (has no administration in time range)   traZODone (Desyrel) tablet 25 mg (has no administration in time range)   QUEtiapine (SEROquel) tablet 50 mg (has no administration in time range)   aspirin EC tablet 81 mg ( oral Dose Auto Held 4/6/25 0900)   pantoprazole (Protonix) injection 40 mg (has no administration in time range)   perflutren lipid microspheres (Definity) injection 0.5-10 mL of dilution (has no administration in time range)   sulfur hexafluoride microsphr (Lumason) injection 24.28 mg (has no administration in time range)   perflutren protein A microsphere (Optison) injection 0.5 mL (has no administration in time range)   pantoprazole (Protonix) injection 80 mg (80 mg intravenous Given 4/1/25 1559)       New Prescriptions from this visit:    Current Discharge Medication List          Follow-up:  No follow-up provider specified.      Final Impression:   1. Chest pain, unspecified type    2. Symptomatic anemia          (Please note that portions of this note were completed with a voice recognition program.  Efforts were made to edit the dictations but occasionally words are mis-transcribed.)     Veda Redding MD  Resident  04/01/25 [id number]

## 2025-04-01 NOTE — HOSPITAL COURSE
Mr. Wayne Burkitt is a 79 y.o. male with PMHx of CAD s/p CABG and s/p PCI to RCA C/B scrotal/groin hematoma 12/2024 (not on DAPT), ischemic cardiomyopathy s/p pacemaker, HFpEF, vascular dementia, primary conversion reaction, chronic anemia (baseline Hgb 8-9), recurrent epistaxis, HTN, NIDDM II who presented to [facility] ED on 4/1/25 with chest pain. VSS HDS on RA. Initial Hgb 5.1 with appropriate incrementation after receiving 3 units PRBC at admission. He was found to have new TWI in inferior leads II, III and aVF and in lateral leads V5 and V6 on ECG. Repeat trops were negative. He was admitted to [facility] for acute on chronic anemia and concerns for in-stent rethrombosis. Patient underwent EGD and colonoscopy with Dr. Garcia 4/3. EGD notable for 3 gastric AVMs treated with APC, extensive sigmoid diverticulosis. Diet advanced to regular diet 4/4/25. Patient deemed safe to resume ASA therapy as well, no recurrent GIB during the admission thereafter and stable Hgb on subsequent re-checks. Additionally, cardiology was consulted and the determination from their standpoint was that his CP was likely 2/2 demand ischemia related to profound anemia, which seems likely given it acute resolved with administration of pRBCs. He was deemed stable for discharge on 4/5/25 back to [facility]. He is to follow up with GI 5/22/25 (Dr. Birch) for planned pill endoscopy. He should continue with nasal saline to aid with prevention of recurrent epistaxis as well. Lastly, Rx for Afrin nasal spray PRN was given to aid with stopping epistaxis in the future.

## 2025-04-01 NOTE — ED NOTES
1835 Srikanth from Blood bank called 1 unit of blood ready for patient   Sent secure chat message to Nurse Gracia Garcia, EMT  04/01/25 1832

## 2025-04-01 NOTE — H&P
History Of Present Illness  Wayne Burkitt is a 79 y.o. male with pmhx of CAD s/p CABG and s/p PCI Dec 2024, ICM, HTN, HFpEF, T2DM, dementia, and pacemaker presenting with chest pain.  The patient reports experiencing pressure-like chest pain near the sternum this morning after breakfast, which radiated to the left arm.  Patient reports this pain increased with eating and with exertion and it associated with sob.he denies dizziness, nausea, vomiting, fever, abdominal pain, changes in bowel or urinary habits, hematemesis, epistaxis, or hematuria.  Notably, the patient was recently admitted in February 2025 for epistaxis and dark stool. During that admission, he received two packed red blood cell transfusions and underwent Rhino Rocket placement. An EGD revealed a 5 mm angiectasia with signs of recent hemorrhage, for which he underwent argon plasma coagulation . At that time, the patient was advised to continue Effient and discontinue aspirin to reduce the risk of stent re thrombosis. However, the patient refused and insisted on continuing aspirin while discontinuing Effient. He was also advised to repeat an EGD in two months and to undergo an MRI to monitor a pancreatic lesion discovered during that admission.  Per patient he is not on antiplatelet therapy 6 weeks.  -Of note patient lives in [facility], recently started to use walker for ambulation.    ED course:  Patient was afebrile, /62, HR 68, RR 17, SaO2 100% on room air.  -CMP remarkable for BUN 24, creatinine 1.27.  , troponin negative x 2 CBC remarkable for anemia Hgb 5.1(baseline 8),  WBC 4.2.  Occult blood stool positive  -EKG revealed atrial paced rhythm with with prolonged AV conduction, HR 61, QTc 434.  ST depression in lead I, aVL, T wave inversion in lead II, iii,aVF, aVL, V5, V6  -Chest x-ray reveals no acute process.  -Intervention: Patient received blood transfusion 1 packed RBCs and admitted for further evaluation    Past  Medical History  He has a past medical history of Auditory hallucinations (09/28/2024), Conversion reaction (09/28/2024), Coronary artery disease involving native coronary artery of native heart without angina pectoris (09/28/2024), Epistaxis, recurrent (09/28/2024), Ischemic cardiomyopathy (09/28/2024), and Moderate vascular dementia with anxiety (09/28/2024).    Surgical History  He has a past surgical history that includes Coronary artery bypass graft (2010); Coronary angioplasty with stent; pacemaker placement; Total hip arthroplasty; Cardiac catheterization (N/A, 12/20/2024); and Cardiac catheterization (N/A, 12/20/2024).     Social History  He reports that he quit smoking about 31 years ago. His smoking use included cigarettes. He has never used smokeless tobacco. He reports that he does not currently use alcohol. He reports that he does not use drugs.    Family History  Family History   Family history unknown: Yes        Allergies  Alprazolam, Bepotastine besilate, Clopidogrel, Doxazosin, Lisinopril, Metronidazole, Amlodipine, Atorvastatin, Cephalexin, Ciprofloxacin, Diphenhydramine hcl, Guaifenesin, Hydrochlorothiazide, Hydrocodone, Methylprednisolone, Phenytoin, Sertraline, Telmisartan, and Topiramate    Review of Systems  Review of system negative apart of as noted above in H&P  Physical Exam  Constitutional:       Appearance: Normal appearance.   HENT:      Head: Normocephalic and atraumatic.      Mouth/Throat:      Mouth: Mucous membranes are moist.   Cardiovascular:      Rate and Rhythm: Normal rate.      Heart sounds: No murmur heard.     No gallop.   Pulmonary:      Effort: No respiratory distress.      Breath sounds: No wheezing, rhonchi or rales.   Abdominal:      Palpations: Abdomen is soft.      Tenderness: There is abdominal tenderness.   Musculoskeletal:      Right lower leg: No edema.      Left lower leg: No edema.   Skin:     General: Skin is warm.      Comments: Abrasion and left knee    Neurological:      Mental Status: He is alert and oriented to person, place, and time.      Cranial Nerves: No cranial nerve deficit.      Motor: No weakness.   Psychiatric:         Mood and Affect: Mood normal.          Last Recorded Vitals  /67 (BP Location: Right arm, Patient Position: Sitting)   Pulse 81   Temp 35.8 °C (96.4 °F) (Temporal)   Resp 22   Wt 81.6 kg (180 lb)   SpO2 99%       Scheduled medications  [Held by provider] aspirin, 81 mg, oral, Daily  brimonidine, 1 drop, Both Eyes, BID  donepezil, 5 mg, oral, Nightly  levETIRAcetam, 1,000 mg, oral, BID  [START ON 4/2/2025] metoprolol succinate XL, 25 mg, oral, Daily  [START ON 4/2/2025] pantoprazole, 40 mg, intravenous, BID  pravastatin, 80 mg, oral, Nightly  QUEtiapine, 50 mg, oral, Nightly      Continuous medications     PRN medications  PRN medications: acetaminophen, nitroglycerin, traZODone      Relevant Results  Results for orders placed or performed during the hospital encounter of 04/01/25 (from the past 24 hours)   Lavender Top   Result Value Ref Range    Extra Tube Hold for add-ons.    B-type natriuretic peptide   Result Value Ref Range     (H) 0 - 99 pg/mL   CBC with Differential   Result Value Ref Range    WBC 4.2 (L) 4.4 - 11.3 x10*3/uL    nRBC 0.0 0.0 - 0.0 /100 WBCs    RBC 2.28 (L) 4.50 - 5.90 x10*6/uL    Hemoglobin 5.1 (LL) 13.5 - 17.5 g/dL    Hematocrit 18.7 (L) 41.0 - 52.0 %    MCV 82 80 - 100 fL    MCH 22.4 (L) 26.0 - 34.0 pg    MCHC 27.3 (L) 32.0 - 36.0 g/dL    RDW 18.8 (H) 11.5 - 14.5 %    Platelets 343 150 - 450 x10*3/uL    Neutrophils % 70.7 40.0 - 80.0 %    Immature Granulocytes %, Automated 0.2 0.0 - 0.9 %    Lymphocytes % 16.7 13.0 - 44.0 %    Monocytes % 8.0 2.0 - 10.0 %    Eosinophils % 3.5 0.0 - 6.0 %    Basophils % 0.9 0.0 - 2.0 %    Neutrophils Absolute 2.99 1.60 - 5.50 x10*3/uL    Immature Granulocytes Absolute, Automated 0.01 0.00 - 0.50 x10*3/uL    Lymphocytes Absolute 0.71 (L) 0.80 - 3.00 x10*3/uL     Monocytes Absolute 0.34 0.05 - 0.80 x10*3/uL    Eosinophils Absolute 0.15 0.00 - 0.40 x10*3/uL    Basophils Absolute 0.04 0.00 - 0.10 x10*3/uL   Comprehensive Metabolic Panel   Result Value Ref Range    Glucose 148 (H) 74 - 99 mg/dL    Sodium 141 136 - 145 mmol/L    Potassium 4.3 3.5 - 5.3 mmol/L    Chloride 109 (H) 98 - 107 mmol/L    Bicarbonate 23 21 - 32 mmol/L    Anion Gap 13 10 - 20 mmol/L    Urea Nitrogen 23 6 - 23 mg/dL    Creatinine 1.32 (H) 0.50 - 1.30 mg/dL    eGFR 56 (L) >60 mL/min/1.73m*2    Calcium 8.6 8.6 - 10.3 mg/dL    Albumin 3.9 3.4 - 5.0 g/dL    Alkaline Phosphatase 91 33 - 136 U/L    Total Protein 7.0 6.4 - 8.2 g/dL    AST 24 9 - 39 U/L    Bilirubin, Total 0.4 0.0 - 1.2 mg/dL    ALT 16 10 - 52 U/L   Troponin I, High Sensitivity, Initial   Result Value Ref Range    Troponin I, High Sensitivity 10 0 - 20 ng/L   Morphology   Result Value Ref Range    RBC Morphology See Below     Polychromasia Mild     Hypochromia Marked     RBC Fragments Few     Target Cells Few     Ovalocytes Few     Teardrop Cells Few    Protime-INR   Result Value Ref Range    Protime 12.5 (H) 9.8 - 12.4 seconds    INR 1.1 0.9 - 1.1   Prepare RBC: 2 Units   Result Value Ref Range    PRODUCT CODE L9423W98     Unit Number [id number]     Unit ABO B     Unit RH NEG     XM INTEP COMP     Dispense Status IS     Blood Expiration Date 5/1/2025 11:59:00 PM EDT     PRODUCT BLOOD TYPE 1700     UNIT VOLUME 350     PRODUCT CODE S3878E63     Unit Number [id number]     Unit ABO B     Unit RH NEG     XM INTEP COMP     Dispense Status XM     Blood Expiration Date 5/2/2025 11:59:00 PM EDT     PRODUCT BLOOD TYPE 1700     UNIT VOLUME 350    Type And Screen   Result Value Ref Range    ABO TYPE B     Rh TYPE POS     ANTIBODY SCREEN NEG    Troponin, High Sensitivity, 1 Hour   Result Value Ref Range    Troponin I, High Sensitivity 10 0 - 20 ng/L   Renal Function Panel   Result Value Ref Range    Glucose 117 (H) 74 - 99 mg/dL    Sodium 144 136 -  145 mmol/L    Potassium 4.6 3.5 - 5.3 mmol/L    Chloride 111 (H) 98 - 107 mmol/L    Bicarbonate 24 21 - 32 mmol/L    Anion Gap 14 10 - 20 mmol/L    Urea Nitrogen 24 (H) 6 - 23 mg/dL    Creatinine 1.27 0.50 - 1.30 mg/dL    eGFR 58 (L) >60 mL/min/1.73m*2    Calcium 8.4 (L) 8.6 - 10.3 mg/dL    Phosphorus 3.5 2.5 - 4.9 mg/dL    Albumin 3.7 3.4 - 5.0 g/dL   Occult Blood, Stool    Specimen: Stool   Result Value Ref Range    Occult Blood, Stool X1 Positive (A) Negative   Prepare RBC: 1 Units   Result Value Ref Range    PRODUCT CODE O7236J50     Unit Number [id number]     Unit ABO O     Unit RH POS     XM INTEP COMP     Dispense Status XM     Blood Expiration Date 4/18/2025 11:59:00 PM EDT     PRODUCT BLOOD TYPE 5100     UNIT VOLUME 314      XR chest 1 view    Result Date: 4/1/2025  STUDY: Chest Radiograph;  04/01/2025 03:56 PM INDICATION: Chest pain. COMPARISON: None available. ACCESSION NUMBER(S): [id number] ORDERING CLINICIAN: CELENA SANTO TECHNIQUE:  Frontal chest was obtained at 15:35 hours. FINDINGS: CARDIOMEDIASTINAL SILHOUETTE: Cardiomediastinal silhouette is normal in size and configuration.  LUNGS: Lungs are clear. Left-sided pacemaker in place. Median sternotomy wires noted.  ABDOMEN: No remarkable upper abdominal findings.  BONES: No acute osseous changes.    No acute process. Signed by Forest Grubbs MD     Assessment and plan:  79-year-old male with PMHx of CAD s/p CABG and s/p PCI December 2024, ischemic cardiomyopathy  hypertension, HFpEF 55 to 60%, T2DM, dementia, pacemaker.  He presented with pressure-like chest pain radiated to the left arm.  Patient denies dizziness, nausea or vomiting. In ED he found to be anemic Hgb 5.1, occult blood stool positive.  Most likely due to upper GI bleed in setting of his previous black stool and previous endoscopy finding of bleeding angiectasia.  His EKG showed ST depression in lead I and aVL and T wave inversion in lead II, III, aVL, V5, V6 however troponin  negative x 2.  Most likely EKG changes could be due to ischemia from not having antiplatelets for 6 weeks patient is concerning for stent rethrombosis or anemia induced demand mismatch.Also he found has high  most likely due to anemia related high output state, its less likely to be in heart failure, patient has no raise JVP, no PND, chest x-ray was clear.    #Acute on chronic anemia  #Upper GI bleed  -Patient has acute on chronic anemia Hgb 5.1 [baseline around 8], patient has positive occult blood in stool.  -Patient has no hematemesis, epistaxis, melena.  Last bowel movement was yesterday and it was normal.  -He has recent admission with epistaxis and dark stool in February 2025 he received 2 bags of RBCs underwent Rhino Rocket, EGD which revealed 5 mm angiectasia with signs of recent hemorrhage underwent argon plasma coagulation.    Plan:  -N.p.o. midnight  -Continue blood transfusion for total of 3 packed RBCs  -Repeat CBC  -PPI 40 mg twice daily  -In setting of his ischemic heart disease we will maintain Hgb above 9  -Iron study ( iron, ferritin, TIBC)  -Hemolysis study  -Avoid NSAIDs  -GI consult, appreciate recommendation    #Angina chest pain concern for stent rethrombosis versus demand ischemia  #Hx CAD s/p CABG, s/p PCI  #HFpEF 55 to 60%  #Hx ischemic cardiac myopathy  #S/p pacemaker  Patient has angina chest pain increased with eating and exertion associated with shortness of breath.  -EKG revealed ST depression in lead I, aVL unchanged from previous EKG.  T wave inversion in lead II, lead III, aVF, V5, V6.  -Troponin negative x 2  -Patient is not on antiplatelet for 6 weeks..  Raise concern for stent rethrombosis  -Patient has  most likely due to high output from anemia.  Patient has no raised JVD, no crackles, no lower limb swelling, chest x-ray clear  -Last echocardiogram 11/2024: LVEF normal 55 to 60%, normal right ventricular global systolic function, no significant valvular disease,  normal PA pressure    -Cardiac catheterization 2024: Stent for right coronary    Plan:  -Cardiology consultation  -Echocardiogram was ordered  -Will continue home meds: Metoprolol 25 mg daily, statin    #Acute kidney injury  Most likely prerenal due to anemia that decreased effective blood flow to kidney.  Patient has creatinine 1.3 on admission [baseline 0.9].  BUN 23.  Repeated RFP creatinine down trended to 1.27.    Plan:  -I&Os  -Avoid nephrotoxic drug  -Daily RFP    #Hypertension  #Type 2 diabetes mellitus  -Continue home medication as appropriate      Global plan of care:  IV fluid: None  Diet: Cardiac diet.  N.p.o. midnight  GI prophylaxis: PPI  Consultation: Cardiology, gastroenterology  DVT prophylaxis: SCDs  Dispo: Anticipate 2-3 midnight for further evaluation of anemia.    CODE STATUS: Full code        Jeffrey Moore MD  Internal medicine, PGY1    Mr. Burkitt is a 79-year-old male presenting to [facility] ED with complaints of substernal/upper epigastric angina with arm radiation and associated chest pressure.  Associated shortness of breath as well.  Symptoms are mostly present upon exertion, and subside with rest. Since his last discharge, he has not been taking any antiplatelet therapy, and states that he has not had any antiplatelet therapy for 6 to 7 weeks.  It was extensively discussed with the patient during last hospitalization for risk of in-stent restenosis if he was not taking any antiplatelet therapy (Effient was recommended over aspirin).  Patient states he has been reluctant to take any antiplatelet therapy for risk of having dark tarry stools.  He was also not taking his TID Carafate, and is unsure if he is been taking his Protonix.  Since his hospital discharge, he states he has not had any hemoptysis, hematemesis, melena or hematochezia.  He does state that he has daily epistaxis, mostly of which is swallowed.  States that it can happen hourly, more frequent, or less frequent.  He states  that he has followed with ENT in the past, however no procedure to identify the cause of bleeding was established.  He has been having epistaxis since 1992.    PMH: CAD s/p CABG and s/p PCI to RCA C/B scrotal/groin hematoma 12/2024, HTN, T2DM, history of pacemaker, ischemic cardiomyopathy, HFpEF, UGIB moderate vascular dementia, primary conversion reaction, chronic anemia (baseline Hgb 8-9), recurrent epistaxis    Of note, recently discharge from the hospital and February 2025 where he presented with epistaxis and dark stool, found to have acute on chronic anemia requiring 2 unit PRBC with Rhino Rocket.  At that time, underwent EGD revealing a 5 mm angioectasia in the fundus of the stomach and body of the stomach with stigmata of recent bleeding s/p APC.  Also underwent CT of the abdomen pelvis demonstrating a 2.8 mm pancreatic lesion, for which she would receive outpatient MRI with GI.  CA 19-9 at that time was 12.95.  He was instructed to take twice daily PPI and Carafate 3 times daily x 8 weeks with repeat endoscopy scheduled in 2 months outpatient with GI (tentatively April).  Cardiology evaluated the patient during that time as well given he was on aspirin and Effient given recent PCI.  Cardiology recommended Effient monotherapy, however patient adamantly refused, and only wanted to continue aspirin monotherapy.  He was started on metoprolol succinate 25 mg given he had episodic runs of NSVT.    In the ED, he is vitally and hemodynamically stable saturating appropriately on room air.  Mild leukopenia to 4.2 with lymphopenia of 0.71.  Hemoglobin 5.1, MCV 82.  No thrombocytopenia.  BUN to creatinine 23 and 1.32 (BL CR ~0.9).  .  Troponin 10 x 2.  Fecal occult positive.    At the time of signout to internal medicine team, he has 3 unit PRBC plan to be given. He was loaded with Protonix 80 mg x 1. CTA abdomen/pelvis pending.    On exam, he is comfortable appearing saturating appropriately on room air.  He is  in no acute distress, ANO x 3.  Heart RRR without MRG.  Lungs CTAB.  No reproducible chest pain on palpation of the chest wall or epigastric region.  Bowel sounds appropriate.  No lower extremity edema.      # Typical angina and c/f in-stent restenosis  # Acute on chronic anemia  # ?C/F GIB  # BLANQUITA, likely prerenal 2/2 anemia  # CAD and ICM s/p PCI to the RCA 12/2025 ( not on antiplatelet)  # Hx NSVT  # S/p pacemaker and CABG  # Hx recurrent epistaxis (since 1992)  -VSS HDS on RA.  Initial Hgb 5.1. Pending 3 unit PRBC with posttransfusion CBC given CAD and recent PCI for ICM.  Baseline hemoglobin 8-9  -Troponins negative  -EKG with new TWI in inferior leads II, III and aVF.  New TWI in lateral leads V5 and V6.  -Loaded with Protonix 80 mg, continue 40 mg twice daily IV Protonix  -GI consulted to follow along peripherally. Would definitely need cleared by cardiology prior to undergoing any endoscopy, however given recent endoscopy findings and patient not on Carafate or Protonix, there is suspicion for some element of GIB complicating his anemia.  BUN to creatinine is normal in ratio  -Cardiology consulted given new TWI and recent PCI with patient not currently on antiplatelet therapy  -Given Hgb of 5.1, will hold antiplatelets overnight pending cardiology consultation  -[facility] 12/12/2024: 100% stenosis mid LAD, 50 to 70% first diagonal LAD stenosis, mid circumflex 100% stenosis, 90% percent RCA stenosis s/p stent, 100% PDA RCA stenosis, 70% SVG to second marginal stenosis  -TTE 12/5/2024: EF 55 to 60%, normal RVSP/RVSF  -Given angina and recent PCI with multiple vessel stenoses, repeat limited TTE  -Cont metoprolol given hx of NSVT, though watch pressures given low hgb  -Anticipate BLANQUITA 2/2 blood loss/dehydration/pre-renal      Patient seen and discussed with attending physician    Misael Hunter, DO  Internal Medicine, PGY-III

## 2025-04-02 ENCOUNTER — APPOINTMENT (OUTPATIENT)
Dept: RADIOLOGY | Facility: HOSPITAL | Age: 80
DRG: 378 | End: 2025-04-02
Payer: MEDICARE

## 2025-04-02 ENCOUNTER — APPOINTMENT (OUTPATIENT)
Dept: CARDIOLOGY | Facility: HOSPITAL | Age: 80
End: 2025-04-02
Payer: MEDICARE

## 2025-04-02 LAB
ALBUMIN SERPL BCP-MCNC: 3.7 G/DL (ref 3.4–5)
ANION GAP SERPL CALC-SCNC: 10 MMOL/L (ref 10–20)
BLOOD EXPIRATION DATE: NORMAL
BLOOD EXPIRATION DATE: NORMAL
BUN SERPL-MCNC: 20 MG/DL (ref 6–23)
CALCIUM SERPL-MCNC: 8.6 MG/DL (ref 8.6–10.3)
CHLORIDE SERPL-SCNC: 108 MMOL/L (ref 98–107)
CO2 SERPL-SCNC: 26 MMOL/L (ref 21–32)
CREAT SERPL-MCNC: 1.06 MG/DL (ref 0.5–1.3)
DISPENSE STATUS: NORMAL
DISPENSE STATUS: NORMAL
EGFRCR SERPLBLD CKD-EPI 2021: 72 ML/MIN/1.73M*2
EJECTION FRACTION APICAL 4 CHAMBER: 44.5
EJECTION FRACTION: 48 %
ERYTHROCYTE [DISTWIDTH] IN BLOOD BY AUTOMATED COUNT: 17.4 % (ref 11.5–14.5)
GLUCOSE SERPL-MCNC: 105 MG/DL (ref 74–99)
HAPTOGLOB SERPL NEPH-MCNC: 121 MG/DL (ref 30–200)
HCT VFR BLD AUTO: 29.1 % (ref 41–52)
HGB BLD-MCNC: 9.2 G/DL (ref 13.5–17.5)
LEFT ATRIUM VOLUME AREA LENGTH INDEX BSA: 33 ML/M2
LEFT VENTRICLE INTERNAL DIMENSION DIASTOLE: 5 CM (ref 3.5–6)
LV EJECTION FRACTION BIPLANE: 46 %
MAGNESIUM SERPL-MCNC: 2.07 MG/DL (ref 1.6–2.4)
MCH RBC QN AUTO: 26.3 PG (ref 26–34)
MCHC RBC AUTO-ENTMCNC: 31.6 G/DL (ref 32–36)
MCV RBC AUTO: 83 FL (ref 80–100)
MITRAL VALVE E/A RATIO: 1.76
NRBC BLD-RTO: 0 /100 WBCS (ref 0–0)
PATH REVIEW-CBC DIFFERENTIAL: NORMAL
PHOSPHATE SERPL-MCNC: 3.5 MG/DL (ref 2.5–4.9)
PLATELET # BLD AUTO: 265 X10*3/UL (ref 150–450)
POTASSIUM SERPL-SCNC: 4.2 MMOL/L (ref 3.5–5.3)
PRODUCT BLOOD TYPE: 1700
PRODUCT BLOOD TYPE: 1700
PRODUCT CODE: NORMAL
PRODUCT CODE: NORMAL
RBC # BLD AUTO: 3.5 X10*6/UL (ref 4.5–5.9)
RIGHT VENTRICLE FREE WALL PEAK S': 8.16 CM/S
RIGHT VENTRICLE PEAK SYSTOLIC PRESSURE: 32.4 MMHG
SODIUM SERPL-SCNC: 140 MMOL/L (ref 136–145)
TRICUSPID ANNULAR PLANE SYSTOLIC EXCURSION: 1.6 CM
UNIT ABO: NORMAL
UNIT ABO: NORMAL
UNIT NUMBER: NORMAL
UNIT NUMBER: NORMAL
UNIT RH: NORMAL
UNIT RH: NORMAL
UNIT VOLUME: 350
UNIT VOLUME: 350
WBC # BLD AUTO: 5.7 X10*3/UL (ref 4.4–11.3)
XM INTEP: NORMAL
XM INTEP: NORMAL

## 2025-04-02 PROCEDURE — 2060000001 HC INTERMEDIATE ICU ROOM DAILY

## 2025-04-02 PROCEDURE — P9016 RBC LEUKOCYTES REDUCED: HCPCS

## 2025-04-02 PROCEDURE — 2550000001 HC RX 255 CONTRASTS: Performed by: STUDENT IN AN ORGANIZED HEALTH CARE EDUCATION/TRAINING PROGRAM

## 2025-04-02 PROCEDURE — 2500000004 HC RX 250 GENERAL PHARMACY W/ HCPCS (ALT 636 FOR OP/ED)

## 2025-04-02 PROCEDURE — 84100 ASSAY OF PHOSPHORUS: CPT

## 2025-04-02 PROCEDURE — 2500000001 HC RX 250 WO HCPCS SELF ADMINISTERED DRUGS (ALT 637 FOR MEDICARE OP): Performed by: INTERNAL MEDICINE

## 2025-04-02 PROCEDURE — 2500000002 HC RX 250 W HCPCS SELF ADMINISTERED DRUGS (ALT 637 FOR MEDICARE OP, ALT 636 FOR OP/ED)

## 2025-04-02 PROCEDURE — 85027 COMPLETE CBC AUTOMATED: CPT

## 2025-04-02 PROCEDURE — 36415 COLL VENOUS BLD VENIPUNCTURE: CPT

## 2025-04-02 PROCEDURE — 93308 TTE F-UP OR LMTD: CPT

## 2025-04-02 PROCEDURE — 2500000001 HC RX 250 WO HCPCS SELF ADMINISTERED DRUGS (ALT 637 FOR MEDICARE OP)

## 2025-04-02 PROCEDURE — 93308 TTE F-UP OR LMTD: CPT | Performed by: INTERNAL MEDICINE

## 2025-04-02 PROCEDURE — 99222 1ST HOSP IP/OBS MODERATE 55: CPT | Performed by: INTERNAL MEDICINE

## 2025-04-02 PROCEDURE — 74174 CTA ABD&PLVS W/CONTRAST: CPT

## 2025-04-02 PROCEDURE — 36430 TRANSFUSION BLD/BLD COMPNT: CPT

## 2025-04-02 PROCEDURE — 97161 PT EVAL LOW COMPLEX 20 MIN: CPT | Mod: GP

## 2025-04-02 PROCEDURE — 2500000001 HC RX 250 WO HCPCS SELF ADMINISTERED DRUGS (ALT 637 FOR MEDICARE OP): Performed by: NURSE PRACTITIONER

## 2025-04-02 PROCEDURE — 74174 CTA ABD&PLVS W/CONTRAST: CPT | Mod: FOREIGN READ | Performed by: RADIOLOGY

## 2025-04-02 PROCEDURE — 83010 ASSAY OF HAPTOGLOBIN QUANT: CPT | Mod: STJLAB

## 2025-04-02 PROCEDURE — 83735 ASSAY OF MAGNESIUM: CPT

## 2025-04-02 PROCEDURE — 2500000004 HC RX 250 GENERAL PHARMACY W/ HCPCS (ALT 636 FOR OP/ED): Performed by: NURSE PRACTITIONER

## 2025-04-02 RX ORDER — ISOSORBIDE MONONITRATE 30 MG/1
30 TABLET, EXTENDED RELEASE ORAL DAILY
Status: DISCONTINUED | OUTPATIENT
Start: 2025-04-02 | End: 2025-04-06 | Stop reason: HOSPADM

## 2025-04-02 RX ORDER — BISACODYL 5 MG
10 TABLET, DELAYED RELEASE (ENTERIC COATED) ORAL EVERY 8 HOURS
Status: COMPLETED | OUTPATIENT
Start: 2025-04-02 | End: 2025-04-03

## 2025-04-02 RX ORDER — POLYETHYLENE GLYCOL 3350 17 G/17G
238 POWDER, FOR SOLUTION ORAL ONCE
Status: COMPLETED | OUTPATIENT
Start: 2025-04-02 | End: 2025-04-02

## 2025-04-02 RX ADMIN — BRIMONIDINE TARTRATE 1 DROP: 2 SOLUTION/ DROPS OPHTHALMIC at 20:32

## 2025-04-02 RX ADMIN — SODIUM CHLORIDE 300 MG: 9 INJECTION, SOLUTION INTRAVENOUS at 12:25

## 2025-04-02 RX ADMIN — PRAVASTATIN SODIUM 80 MG: 20 TABLET ORAL at 20:32

## 2025-04-02 RX ADMIN — IOHEXOL 90 ML: 350 INJECTION, SOLUTION INTRAVENOUS at 03:20

## 2025-04-02 RX ADMIN — LEVETIRACETAM 1000 MG: 500 TABLET, FILM COATED ORAL at 20:32

## 2025-04-02 RX ADMIN — METOPROLOL SUCCINATE 25 MG: 25 TABLET, EXTENDED RELEASE ORAL at 09:24

## 2025-04-02 RX ADMIN — QUETIAPINE FUMARATE 50 MG: 50 TABLET ORAL at 20:32

## 2025-04-02 RX ADMIN — PANTOPRAZOLE SODIUM 40 MG: 40 INJECTION, POWDER, FOR SOLUTION INTRAVENOUS at 20:32

## 2025-04-02 RX ADMIN — ISOSORBIDE MONONITRATE 30 MG: 30 TABLET, EXTENDED RELEASE ORAL at 09:24

## 2025-04-02 RX ADMIN — PANTOPRAZOLE SODIUM 40 MG: 40 INJECTION, POWDER, FOR SOLUTION INTRAVENOUS at 09:25

## 2025-04-02 RX ADMIN — DONEPEZIL HYDROCHLORIDE 5 MG: 5 TABLET ORAL at 20:32

## 2025-04-02 RX ADMIN — BRIMONIDINE TARTRATE 1 DROP: 2 SOLUTION/ DROPS OPHTHALMIC at 09:25

## 2025-04-02 RX ADMIN — BISACODYL 10 MG: 5 TABLET, COATED ORAL at 17:21

## 2025-04-02 RX ADMIN — LEVETIRACETAM 1000 MG: 500 TABLET, FILM COATED ORAL at 09:24

## 2025-04-02 RX ADMIN — POLYETHYLENE GLYCOL 3350 238 G: 17 POWDER, FOR SOLUTION ORAL at 17:20

## 2025-04-02 SDOH — ECONOMIC STABILITY: HOUSING INSECURITY: IN THE LAST 12 MONTHS, WAS THERE A TIME WHEN YOU WERE NOT ABLE TO PAY THE MORTGAGE OR RENT ON TIME?: NO

## 2025-04-02 SDOH — ECONOMIC STABILITY: FOOD INSECURITY: HOW HARD IS IT FOR YOU TO PAY FOR THE VERY BASICS LIKE FOOD, HOUSING, MEDICAL CARE, AND HEATING?: NOT VERY HARD

## 2025-04-02 SDOH — ECONOMIC STABILITY: HOUSING INSECURITY: IN THE PAST 12 MONTHS, HOW MANY TIMES HAVE YOU MOVED WHERE YOU WERE LIVING?: 1

## 2025-04-02 SDOH — ECONOMIC STABILITY: FOOD INSECURITY: WITHIN THE PAST 12 MONTHS, THE FOOD YOU BOUGHT JUST DIDN'T LAST AND YOU DIDN'T HAVE MONEY TO GET MORE.: NEVER TRUE

## 2025-04-02 SDOH — ECONOMIC STABILITY: FOOD INSECURITY: WITHIN THE PAST 12 MONTHS, YOU WORRIED THAT YOUR FOOD WOULD RUN OUT BEFORE YOU GOT THE MONEY TO BUY MORE.: NEVER TRUE

## 2025-04-02 SDOH — ECONOMIC STABILITY: INCOME INSECURITY: IN THE PAST 12 MONTHS HAS THE ELECTRIC, GAS, OIL, OR WATER COMPANY THREATENED TO SHUT OFF SERVICES IN YOUR HOME?: NO

## 2025-04-02 SDOH — ECONOMIC STABILITY: HOUSING INSECURITY: AT ANY TIME IN THE PAST 12 MONTHS, WERE YOU HOMELESS OR LIVING IN A SHELTER (INCLUDING NOW)?: NO

## 2025-04-02 SDOH — ECONOMIC STABILITY: TRANSPORTATION INSECURITY: IN THE PAST 12 MONTHS, HAS LACK OF TRANSPORTATION KEPT YOU FROM MEDICAL APPOINTMENTS OR FROM GETTING MEDICATIONS?: NO

## 2025-04-02 ASSESSMENT — PAIN - FUNCTIONAL ASSESSMENT

## 2025-04-02 ASSESSMENT — ENCOUNTER SYMPTOMS
DIZZINESS: 0
PSYCHIATRIC NEGATIVE: 1
GASTROINTESTINAL NEGATIVE: 1
DIFFICULTY URINATING: 1
DYSURIA: 0
LIGHT-HEADEDNESS: 0
SHORTNESS OF BREATH: 1
PALPITATIONS: 0
ARTHRALGIAS: 1

## 2025-04-02 ASSESSMENT — COGNITIVE AND FUNCTIONAL STATUS - GENERAL
MOBILITY SCORE: 20
WALKING IN HOSPITAL ROOM: A LITTLE
MOVING TO AND FROM BED TO CHAIR: A LITTLE
STANDING UP FROM CHAIR USING ARMS: A LITTLE
CLIMB 3 TO 5 STEPS WITH RAILING: A LITTLE

## 2025-04-02 ASSESSMENT — PAIN SCALES - GENERAL

## 2025-04-02 ASSESSMENT — ACTIVITIES OF DAILY LIVING (ADL)
LACK_OF_TRANSPORTATION: NO
LACK_OF_TRANSPORTATION: NO

## 2025-04-02 NOTE — PROGRESS NOTES
"ID:  Wayne Burkitt is a 79 y.o. male on hospital day 1 of admission presenting with Chest pain, unspecified type.    Subjective   The patient seen and examined this morning at bedside. No significant overnight events. Patient reports he had a brown BM last night. There was blood on toilet paper per RN after wiping. He has not experienced chest pain, hematemesis, epistaxis, or hematochezia since admission. No acute complaints at this time. He's received 3 units of pRBCs overnight. He has been taking ASA 81 outpatient.     Objective     Visit Vitals  /71   Pulse 60   Temp 35.9 °C (96.6 °F) (Temporal)   Resp 23   Ht 1.803 m (5' 11\")   Wt 79.2 kg (174 lb 9.7 oz)   SpO2 95%   BMI 24.35 kg/m²   Smoking Status Former   BSA 1.99 m²        Physical Examination:  General: Not in acute distress, A&O x3, alert, coopertive, well-developed  HEENT: Normocephalic, atraumatic, EOMI, moist mucous membranes  Neck: Neck supple, trachea midline, no evidence of trauma  Cardiovascular: RRR, S1 and S2 appreciated, no murmurs rubs gallops appreciated, distal pulses 2+ bilaterally (radial and dorsalis pedis), pacemaker in place  Respiratory: Vesicular breath sounds appreciated bilaterally, no adventitious sounds appreciated, no increased work of breathing  GI: Abdomen soft, nondistended, nontender to palpation, bowel sounds present  Extremities: No edema appreciated in lower extremities bilaterally, no cyanosis  Neuro: A&O x3, no focal deficits, strength and sensation intact bilaterally  Skin: Warm and dry, without lesions or rashes  Psychiatric: Judgment intact.  Appropriate mood, affect and behavior.    Laboratory Data:  Results for orders placed or performed during the hospital encounter of 04/01/25 (from the past 24 hours)   Lavender Top   Result Value Ref Range    Extra Tube Hold for add-ons.    B-type natriuretic peptide   Result Value Ref Range     (H) 0 - 99 pg/mL   CBC with Differential   Result Value Ref Range    WBC " 4.2 (L) 4.4 - 11.3 x10*3/uL    nRBC 0.0 0.0 - 0.0 /100 WBCs    RBC 2.28 (L) 4.50 - 5.90 x10*6/uL    Hemoglobin 5.1 (LL) 13.5 - 17.5 g/dL    Hematocrit 18.7 (L) 41.0 - 52.0 %    MCV 82 80 - 100 fL    MCH 22.4 (L) 26.0 - 34.0 pg    MCHC 27.3 (L) 32.0 - 36.0 g/dL    RDW 18.8 (H) 11.5 - 14.5 %    Platelets 343 150 - 450 x10*3/uL    Neutrophils % 70.7 40.0 - 80.0 %    Immature Granulocytes %, Automated 0.2 0.0 - 0.9 %    Lymphocytes % 16.7 13.0 - 44.0 %    Monocytes % 8.0 2.0 - 10.0 %    Eosinophils % 3.5 0.0 - 6.0 %    Basophils % 0.9 0.0 - 2.0 %    Neutrophils Absolute 2.99 1.60 - 5.50 x10*3/uL    Immature Granulocytes Absolute, Automated 0.01 0.00 - 0.50 x10*3/uL    Lymphocytes Absolute 0.71 (L) 0.80 - 3.00 x10*3/uL    Monocytes Absolute 0.34 0.05 - 0.80 x10*3/uL    Eosinophils Absolute 0.15 0.00 - 0.40 x10*3/uL    Basophils Absolute 0.04 0.00 - 0.10 x10*3/uL   Comprehensive Metabolic Panel   Result Value Ref Range    Glucose 148 (H) 74 - 99 mg/dL    Sodium 141 136 - 145 mmol/L    Potassium 4.3 3.5 - 5.3 mmol/L    Chloride 109 (H) 98 - 107 mmol/L    Bicarbonate 23 21 - 32 mmol/L    Anion Gap 13 10 - 20 mmol/L    Urea Nitrogen 23 6 - 23 mg/dL    Creatinine 1.32 (H) 0.50 - 1.30 mg/dL    eGFR 56 (L) >60 mL/min/1.73m*2    Calcium 8.6 8.6 - 10.3 mg/dL    Albumin 3.9 3.4 - 5.0 g/dL    Alkaline Phosphatase 91 33 - 136 U/L    Total Protein 7.0 6.4 - 8.2 g/dL    AST 24 9 - 39 U/L    Bilirubin, Total 0.4 0.0 - 1.2 mg/dL    ALT 16 10 - 52 U/L   Troponin I, High Sensitivity, Initial   Result Value Ref Range    Troponin I, High Sensitivity 10 0 - 20 ng/L   Morphology   Result Value Ref Range    RBC Morphology See Below     Polychromasia Mild     Hypochromia Marked     RBC Fragments Few     Target Cells Few     Ovalocytes Few     Teardrop Cells Few    Reticulocytes   Result Value Ref Range    Retic % 1.2 0.5 - 2.0 %    Retic Absolute 0.027 0.017 - 0.110 x10*6/uL    Reticulocyte Hemoglobin 15 (L) 28 - 38 pg    Immature Retic  fraction 9.0 <=16.0 %   Protime-INR   Result Value Ref Range    Protime 12.5 (H) 9.8 - 12.4 seconds    INR 1.1 0.9 - 1.1   Prepare RBC: 2 Units   Result Value Ref Range    PRODUCT CODE W3562Z26     Unit Number [id number]     Unit ABO B     Unit RH NEG     XM INTEP COMP     Dispense Status TR     Blood Expiration Date 5/1/2025 11:59:00 PM EDT     PRODUCT BLOOD TYPE 1700     UNIT VOLUME 350     PRODUCT CODE Q9826P81     Unit Number [id number]     Unit ABO B     Unit RH NEG     XM INTEP COMP     Dispense Status TR     Blood Expiration Date 5/2/2025 11:59:00 PM EDT     PRODUCT BLOOD TYPE 1700     UNIT VOLUME 350    Type And Screen   Result Value Ref Range    ABO TYPE B     Rh TYPE POS     ANTIBODY SCREEN NEG    Troponin, High Sensitivity, 1 Hour   Result Value Ref Range    Troponin I, High Sensitivity 10 0 - 20 ng/L   Renal Function Panel   Result Value Ref Range    Glucose 117 (H) 74 - 99 mg/dL    Sodium 144 136 - 145 mmol/L    Potassium 4.6 3.5 - 5.3 mmol/L    Chloride 111 (H) 98 - 107 mmol/L    Bicarbonate 24 21 - 32 mmol/L    Anion Gap 14 10 - 20 mmol/L    Urea Nitrogen 24 (H) 6 - 23 mg/dL    Creatinine 1.27 0.50 - 1.30 mg/dL    eGFR 58 (L) >60 mL/min/1.73m*2    Calcium 8.4 (L) 8.6 - 10.3 mg/dL    Phosphorus 3.5 2.5 - 4.9 mg/dL    Albumin 3.7 3.4 - 5.0 g/dL   Occult Blood, Stool    Specimen: Stool   Result Value Ref Range    Occult Blood, Stool X1 Positive (A) Negative   Prepare RBC: 1 Units   Result Value Ref Range    PRODUCT CODE [id number]     Unit Number [id number]     Unit ABO O     Unit RH POS     XM INTEP COMP     Dispense Status IS     Blood Expiration Date 4/18/2025 11:59:00 PM EDT     PRODUCT BLOOD TYPE 5100     UNIT VOLUME 314    Troponin I, High Sensitivity   Result Value Ref Range    Troponin I, High Sensitivity 17 0 - 20 ng/L   Ferritin   Result Value Ref Range    Ferritin 8 (L) 20 - 300 ng/mL   Iron and TIBC   Result Value Ref Range    Iron <10 (L) 35 - 150 ug/dL    UIBC 414 (H) 110 - 370  ug/dL    TIBC      % Saturation     Lactate dehydrogenase   Result Value Ref Range     84 - 246 U/L   Magnesium   Result Value Ref Range    Magnesium 2.07 1.60 - 2.40 mg/dL   CBC   Result Value Ref Range    WBC 5.7 4.4 - 11.3 x10*3/uL    nRBC 0.0 0.0 - 0.0 /100 WBCs    RBC 3.50 (L) 4.50 - 5.90 x10*6/uL    Hemoglobin 9.2 (L) 13.5 - 17.5 g/dL    Hematocrit 29.1 (L) 41.0 - 52.0 %    MCV 83 80 - 100 fL    MCH 26.3 26.0 - 34.0 pg    MCHC 31.6 (L) 32.0 - 36.0 g/dL    RDW 17.4 (H) 11.5 - 14.5 %    Platelets 265 150 - 450 x10*3/uL       Imaging:  Imaging  CT angio abdomen pelvis w and or wo IV IV contrast    Result Date: 4/2/2025  No evidence of active arterial bleeding. Several scattered colonic diverticula. Signed by Gokul Huffman MD    XR chest 1 view    Result Date: 4/1/2025  No acute process. Signed by Forest Grubbs MD     Cardiology, Vascular, and Other Imaging  No other imaging results found for the past 2 days       Medications:  Scheduled medications  [Held by provider] aspirin, 81 mg, oral, Daily  brimonidine, 1 drop, Both Eyes, BID  donepezil, 5 mg, oral, Nightly  levETIRAcetam, 1,000 mg, oral, BID  metoprolol succinate XL, 25 mg, oral, Daily  pantoprazole, 40 mg, intravenous, BID  perflutren lipid microspheres, 0.5-10 mL of dilution, intravenous, Once in imaging  perflutren protein A microsphere, 0.5 mL, intravenous, Once in imaging  pravastatin, 80 mg, oral, Nightly  QUEtiapine, 50 mg, oral, Nightly  sulfur hexafluoride microsphr, 2 mL, intravenous, Once in imaging      Continuous medications     PRN medications  PRN medications: acetaminophen, nitroglycerin, traZODone    Assessment    Assessment & Plan   Mr. Wayne Burkitt is a 79 y.o. male with PMHx of CAD s/p CABG and s/p PCI to RCA C/B scrotal/groin hematoma 12/2024 (not on DAPT), ischemic cardiomyopathy s/p pacemaker, HFpEF, vascular dementia, primary conversion reaction, chronic anemia (baseline Hgb 8-9), recurrent epistaxis, HTN, NIDDM II who  presented to [facility] ED on 4/1/25 with chest pain. VSS HDS on RA. Initial Hgb 5.1 with appropriate incrementation after receiving 3 units PRBC at admission. He was found to have new TWI in inferior leads II, III and aVF and in lateral leads V5 and V6 on ECG. Repeat trops were negative. He was admitted to [facility] for acute on chronic anemia and concerns for in-stent rethrombosis.     Assessment & Plan  Chest pain, unspecified type    #Acute on chronic anemia (baseline Hgb 8-9)   #MOLLY and anemia of chronic disease   #Chronic epistaxis (since 1992)   #C/f for possible GIB  -EGD 2/11/25 revealed 5 mm angiectasia in the fundus of the stomach and body of the stomach with stigmata of recent hemorrhage s/p argon plasma coagulation   -CTA abdomen showed no evidence of active arterial bleeding. Several scattered colonic diverticula.  -No active signs of bleeding clinically at this time  -Received 3 units of pRBCs with appropriate incrementation of Hgb from 5.1 to 9.3  -Hemolysis workup negative with normal haptoglobin and LDH  -Iron<10, Ferritin of 8, and elevated UIBC indicate MOLLY  Plan:  -Discussed with patient that he should be on oral iron supplement, which is known to cause black stools, for his MOLLY  -Will transfuse with Venofer inpatient and plan to start on ferrous sulfate at discharge  -GI following for possible GIB with plans for EGD/Colonoscopy tomorrow   -NPO at midnight  -Monitor Hgb on am CBCs and transfuse if less than 7  -Avoid NSAIDs    #Typical angina   #CAD s/p CABG and s/p PCI to RCA c/b scrotal/groin hematoma 12/2024 (not on DAPT)  #Not on DAPT (ASA, Effient) c/f in-stent rethrombosis  #ICM s/p pacemaker   #HFpEF  #NSVT on telemetry (asymptomatic)   -EKG in ED with new TWI in inferior leads II, III and aVF. New TWI in lateral leads V5 and V6.   -[facility] 12/12/2024: 100% stenosis mid LAD, 50 to 70% first diagonal LAD stenosis, mid circumflex 100% stenosis, 90% percent RCA stenosis s/p stent, 100% PDA RCA stenosis,  70% SVG to second marginal stenosis  -TTE 12/5/2024: EF 55 to 60%, normal RVSP/RVSF  -Cardiology consulted and recommended starting Imdur   -Chest pain consistent with typical angina likely with a component of demand ischemia from his acute anemia   -Will plan to continue ASA 81 at discharge  -Limited TTE pending    CHRONIC PROBLEMS:  #Vascular Dementia  #[id number]  - Continue home medications as appropriate    Global Plan of Care  IVF: As needed  Electrolytes: Replete with goal K>4 and Mg>2   Diet: CLD (no reds), NPO at midnight   DVT prophylaxis: none  Consults: cardiology (peripherally), GI  Code status: Full Code     Disposition: Anticipate 2-3 midnight stays pending EGD/Colonoscopy results and clinical course    Patient seen and plan of care was discussed with senior resident and the attending.    Pao Serrano, DO  Internal Medicine, PGY-1  April 2, 2025

## 2025-04-02 NOTE — CONSULTS
Inpatient consult to Cardiology  Consult performed by: Kavita Varner MD  Consult ordered by: Elliot Hernandez MD  Reason for consult: chest pain, hx CAD          Cardiology Consult Note      Date:   4/2/2025  Patient name:  Wayne Burkitt  Date of admission:  4/1/2025  2:44 PM  MRN:   [id number]  YOB: 1945  Time of Consult:  10:28 AM    Consulting Cardiologist: Dr. Kavita Varner        Primary Cardiologist:   Dr. Gabino Giron     Referring Provider: Dr. Elliot Hernandez      Admission Diagnosis:     Chest pain, unspecified type    History of Present Illness:      Wayne Burkitt is a 79 y.o.  male patient who is being at the request of Dr. Hernandez for inpatient consultation of angina. He was admitted on 4/1/2025.  Previous [facility] and  records have been reviewed in detail.      Asked to see this patient who was admitted with chest discomfort consistent with angina pectoris and severe anemia.  The patient has a history of coronary artery disease with prior bypass surgery, hypertension and ischemic cardiomyopathy.  He has a history of vascular dementia and used to follow with Dr. Asencio and it with a cardiologist in [place].  He presented to [facility] in November of last year with angina and in December his cardiac catheterization led to RCA stenting.  His cardiac cath showed mild disease in the left main, mid LAD was occluded mid vessel with patent proximal stents in the first diagonal had a 50 to 70% stenosis.  The mid left circumflex was occluded and there were patent previously placed stents the right coronary artery had patent previous stents but with significant obstruction and a 90% lesion in the mid right coronary artery that was stented in December.  The posterior descending artery was 100% occluded the LIMA to the LAD was patent the vein graft to the second obtuse marginal branch and a 70% stenosis and LV function was 55%.    Since that procedure in December the patient presented  with severe anemia and bleeding.  He was taken off of his aspirin and placed on Effient which the patient did not tolerate and he chose to stop Effient and take aspirin.  He has had problems with Plavix in the past as well.    He now presents with precordial chest pressure and heaviness and tightness in his chest.  He was found to be profoundly anemic and received multiple units transfusion overnight.  He is currently chest pain-free and without shortness of breath.  He refuses to consider Effient in terms of antiplatelets, or Plavix or Brilinta therapy.  He will take aspirin 81 mg.    He has had recurrent epistaxis and states he has a hole in his nasal cavity and has been seen by ear nose and throat physician.  He denies any melena or bloody stools but has had an EGD showing angioectasia with signs of recent hemorrhage for which she went under argon plasma coagulation.    Other medical problems include hypertension, heart failure with preserved ejection fraction, type 2 diabetes, vascular dementia and prior pacemaker.  He also has a history of osteoarthritis with joint replacement and is a former smoker.  Allergies:     Allergies   Allergen Reactions    Alprazolam Anaphylaxis    Bepotastine Besilate Swelling    Clopidogrel Swelling    Doxazosin Swelling    Lisinopril Swelling    Metronidazole Swelling    Amlodipine Hives    Atorvastatin Hives    Cephalexin Hives    Ciprofloxacin Itching    Diphenhydramine Hcl Hives    Guaifenesin Hives    Hydrochlorothiazide Unknown    Hydrocodone Hives    Methylprednisolone Unknown    Phenytoin Other    Sertraline Unknown    Telmisartan Hives    Topiramate Rash       Past Medical History:     Past Medical History:   Diagnosis Date    Auditory hallucinations 09/28/2024    Conversion reaction 09/28/2024    Coronary artery disease involving native coronary artery of native heart without angina pectoris 09/28/2024    Epistaxis, recurrent 09/28/2024    Ischemic cardiomyopathy  2024    Moderate vascular dementia with anxiety 2024       Past Surgical History:     Past Surgical History:   Procedure Laterality Date    CARDIAC CATHETERIZATION N/A 2024    Procedure: Left Heart Cath, With LV;  Surgeon: Gabino Giron MD;  Location: [facility] Cardiac Cath Lab;  Service: Cardiovascular;  Laterality: N/A;    CARDIAC CATHETERIZATION N/A 2024    Procedure: PCI TATYANA Stent- Coronary;  Surgeon: Gabino Giron MD;  Location: [facility] Cardiac Cath Lab;  Service: Cardiovascular;  Laterality: N/A;    CORONARY ANGIOPLASTY WITH STENT PLACEMENT      states he has 16 stents after the CABG    CORONARY ARTERY BYPASS GRAFT  2010    PACEMAKER PLACEMENT      TOTAL HIP ARTHROPLASTY         Family History:     Family History   Family history unknown: Yes       Social History:     Social History     Socioeconomic History    Marital status: Single   Tobacco Use    Smoking status: Former     Current packs/day: 0.00     Types: Cigarettes     Quit date:      Years since quittin.2    Smokeless tobacco: Never   Substance and Sexual Activity    Alcohol use: Not Currently     Comment: Stopped into .    Drug use: Never    Sexual activity: Defer     Social Drivers of Health     Financial Resource Strain: Low Risk  (2025)    Overall Financial Resource Strain (CARDIA)     Difficulty of Paying Living Expenses: Not very hard   Recent Concern: Financial Resource Strain - High Risk (2/10/2025)    Overall Financial Resource Strain (CARDIA)     Difficulty of Paying Living Expenses: Very hard   Food Insecurity: No Food Insecurity (2025)    Hunger Vital Sign     Worried About Running Out of Food in the Last Year: Never true     Ran Out of Food in the Last Year: Never true   Transportation Needs: No Transportation Needs (2025)    PRAPARE - Transportation     Lack of Transportation (Medical): No     Lack of Transportation (Non-Medical): No   Intimate Partner Violence: Not At Risk  (4/1/2025)    Humiliation, Afraid, Rape, and Kick questionnaire     Fear of Current or Ex-Partner: No     Emotionally Abused: No     Physically Abused: No     Sexually Abused: No   Housing Stability: Low Risk  (4/1/2025)    Housing Stability Vital Sign     Unable to Pay for Housing in the Last Year: No     Number of Times Moved in the Last Year: 1     Homeless in the Last Year: No       Home Medications:     Prior to Admission medications    Medication Sig Start Date End Date Taking? Authorizing Provider   acetaminophen (Tylenol) 325 mg tablet Take 2 tablets (650 mg) by mouth every 4 hours if needed for mild pain (1 - 3) or fever (temp greater than 38.0 C).   Yes Historical Provider, MD   aspirin 81 mg EC tablet Take 1 tablet (81 mg) by mouth once daily.   Yes Historical Provider, MD   brimonidine (AlphaGAN) 0.2 % ophthalmic solution Administer 1 drop into both eyes 2 times a day.   Yes Historical Provider, MD   cholecalciferol (Vitamin D-3) 25 MCG (1000 UT) capsule Take 2 capsules (50 mcg) by mouth once daily.   Yes Historical Provider, MD   docusate sodium (Colace) 100 mg capsule Take 1 capsule (100 mg) by mouth 2 times a day as needed for constipation.   Yes Historical Provider, MD   donepezil (Aricept) 5 mg tablet Take 1 tablet (5 mg) by mouth once daily at bedtime.   Yes Historical Provider, MD   levETIRAcetam (Keppra) 1,000 mg tablet Take 1 tablet (1,000 mg) by mouth 2 times a day.   Yes Historical Provider, MD   loperamide (Imodium A-D) 2 mg tablet Take 1 tablet (2 mg) by mouth 4 times a day as needed for diarrhea. For each additional loose stool   Yes Historical Provider, MD   loperamide (Imodium A-D) 2 mg tablet Take 2 tablets (4 mg) by mouth if needed for diarrhea. Take after first loose stool   Yes Historical Provider, MD   magnesium hydroxide (Milk of Magnesia) 400 mg/5 mL suspension Take 30 mL by mouth once daily as needed for constipation.   Yes Historical Provider, MD   metoprolol succinate XL  (Toprol-XL) 25 mg 24 hr tablet Take 1 tablet (25 mg) by mouth once daily. Do not crush or chew. 2/13/25 4/14/25 Yes Jose M Salazar DO   nitroglycerin (Nitrostat) 0.4 mg SL tablet Place 1 tablet (0.4 mg) under the tongue every 5 minutes if needed for chest pain. 12/24/24  Yes ERICK Barcenas   pantoprazole (ProtoNix) 40 mg EC tablet Take 1 tablet (40 mg) by mouth 2 times a day. Do not crush, chew, or split. 2/12/25 4/13/25 Yes Jose M Salazar DO   pravastatin (Pravachol) 80 mg tablet Take 1 tablet (80 mg) by mouth once daily at bedtime.   Yes Historical Provider, MD   QUEtiapine (SEROquel) 50 mg tablet Take 1 tablet (50 mg) by mouth once daily at bedtime.   Yes Historical Provider, MD   traZODone (Desyrel) 50 mg tablet Take 0.5 tablets (25 mg) by mouth as needed at bedtime (dementia with anxiety).   Yes Historical Provider, MD   sucralfate (Carafate) 100 mg/mL suspension Take 10 mL (1 g) by mouth 3 times a day before meals.  Patient not taking: Reported on 4/1/2025 2/12/25 4/13/25  Jose M Salazar DO       Current Medications:     Current Outpatient Medications   Medication Instructions    acetaminophen (TYLENOL) 650 mg, Every 4 hours PRN    aspirin 81 mg, Daily    brimonidine (AlphaGAN) 0.2 % ophthalmic solution 1 drop, 2 times daily    cholecalciferol (VITAMIN D-3) 50 mcg, Daily    docusate sodium (COLACE) 100 mg, 2 times daily PRN    donepezil (ARICEPT) 5 mg, Nightly    levETIRAcetam (KEPPRA) 1,000 mg, 2 times daily    loperamide (IMODIUM A-D) 2 mg, 4 times daily PRN    loperamide (IMODIUM A-D) 4 mg, As needed    magnesium hydroxide (Milk of Magnesia) 400 mg/5 mL suspension 30 mL, Daily PRN    metoprolol succinate XL (TOPROL-XL) 25 mg, oral, Daily, Do not crush or chew.    nitroglycerin (NITROSTAT) 0.4 mg, sublingual, Every 5 min PRN    pantoprazole (PROTONIX) 40 mg, oral, 2 times daily, Do not crush, chew, or split.    pravastatin (PRAVACHOL) 80 mg, Nightly    QUEtiapine (SEROQUEL) 50 mg, Nightly    sucralfate  (CARAFATE) 1 g, oral, 3 times daily before meals    traZODone (DESYREL) 25 mg, Nightly PRN        IV Medications:          Review of Systems:      Review of Systems   HENT:  Positive for nosebleeds.    Respiratory:  Positive for shortness of breath.    Cardiovascular:  Positive for chest pain. Negative for palpitations and leg swelling.   Gastrointestinal: Negative.    Genitourinary:  Positive for difficulty urinating. Negative for dysuria.   Musculoskeletal:  Positive for arthralgias.   Neurological:  Negative for dizziness and light-headedness.   Psychiatric/Behavioral: Negative.     All other systems reviewed and are negative.      Vital Signs:     Vitals:    04/02/25 0400 04/02/25 0430 04/02/25 0630 04/02/25 0800   BP: 157/71  165/73    BP Location: Right arm  Right arm    Patient Position: Lying  Lying    Pulse: 60  60 60   Resp: 23  12 11   Temp: 35.9 °C (96.6 °F)  36 °C (96.8 °F)    TempSrc: Temporal  Temporal    SpO2: 95% 95% 97% 97%   Weight:       Height:           Intake/Output Summary (Last 24 hours) at 4/2/2025 1028  Last data filed at 4/2/2025 0600  Gross per 24 hour   Intake 1496.67 ml   Output 1100 ml   Net 396.67 ml     Vitals:    04/01/25 1930   Weight: 79.2 kg (174 lb 9.7 oz)       Physical Examination:     GENERAL APPEARANCE: Pale gentleman who is well developed, well nourished, in no acute distress.  HEENT: No gross abnormalities of conjunctiva, teeth, gums, oral mucosa  NECK:  no JVD, no bruit. Thyroid not palpable. Carotid upstrokes normal.  CHEST: Symmetric and non-tender.  LUNGS: Clear to auscultation bilaterally; normal respiratory effort.  Diminished at the bases  HEART: PMI is nondisplaced.  There is a regular rhythm occasionally irregular with a normal S1 and S2 without S3.  Soft systolic ejection murmur present at the upper sternal border no diastolic murmur  ABDOMEN: Soft, nontender, no palpable hepatosplenomegaly, no mases, no bruits. Abdominal aorta not noted to be  "enlarged.  MUSCULOSKELETAL: Osteoarthritic changes  EXTREMITIES: Warm with good color, no clubbing or cyanois. There is no edema noted.  PERIPHERAL VASCULAR: Pulses present and equally palpable.  NEURO/PSHCY: Alert and oriented x3; appropriate behavior and responses and responses, grossly normal cerebellar function with normal balance and coordination  INTEGUMENT: Skin warm and dry, without gross excoriationis or lesions.    Lab:     CBC:   Lab Results   Component Value Date    WBC 5.7 04/02/2025    RBC 3.50 (L) 04/02/2025    HGB 9.2 (L) 04/02/2025    HCT 29.1 (L) 04/02/2025     04/02/2025        CMP:    Lab Results   Component Value Date     04/01/2025    K 4.6 04/01/2025     (H) 04/01/2025    CO2 24 04/01/2025    BUN 24 (H) 04/01/2025    CREATININE 1.27 04/01/2025    GLUCOSE 117 (H) 04/01/2025    CALCIUM 8.4 (L) 04/01/2025       Magnesium:    Lab Results   Component Value Date    MG 2.07 04/02/2025       Lipid Profile:    No results found for: \"CHLPL\", \"TRIG\", \"HDL\", \"LDLCALC\", \"LDLDIRECT\"    TSH:    No results found for: \"TSH\"    BNP:   Lab Results   Component Value Date     (H) 04/01/2025        PT/INR:    Lab Results   Component Value Date    PROTIME 12.5 (H) 04/01/2025    INR 1.1 04/01/2025       HgBA1c:    No results found for: \"HGBA1C\"    BMP:  Lab Results   Component Value Date     04/01/2025     04/01/2025    K 4.6 04/01/2025    K 4.3 04/01/2025     (H) 04/01/2025     (H) 04/01/2025    CO2 24 04/01/2025    CO2 23 04/01/2025    BUN 24 (H) 04/01/2025    BUN 23 04/01/2025    CREATININE 1.27 04/01/2025    CREATININE 1.32 (H) 04/01/2025       CBC:  Lab Results   Component Value Date    WBC 5.7 04/02/2025    WBC 4.2 (L) 04/01/2025    RBC 3.50 (L) 04/02/2025    RBC 2.28 (L) 04/01/2025    HGB 9.2 (L) 04/02/2025    HGB 5.1 (LL) 04/01/2025    HCT 29.1 (L) 04/02/2025    HCT 18.7 (L) 04/01/2025    MCV 83 04/02/2025    MCV 82 04/01/2025    MCH 26.3 04/02/2025    MCH " 22.4 (L) 04/01/2025    MCHC 31.6 (L) 04/02/2025    MCHC 27.3 (L) 04/01/2025    RDW 17.4 (H) 04/02/2025    RDW 18.8 (H) 04/01/2025     04/02/2025     04/01/2025       Cardiac Enzymes:    Lab Results   Component Value Date    TROPHS 17 04/01/2025    TROPHS 10 04/01/2025    TROPHS 10 04/01/2025       Hepatic Function Panel:    Lab Results   Component Value Date    ALKPHOS 91 04/01/2025    ALT 16 04/01/2025    AST 24 04/01/2025    PROT 7.0 04/01/2025    BILITOT 0.4 04/01/2025     Labs reviewed    Diagnostic Studies:     ECG 12 lead    Result Date: 4/2/2025  Atrial-paced rhythm with prolonged AV conduction Left ventricular hypertrophy with repolarization abnormality ( R in aVL ) Inferior infarct , age undetermined Anterolateral infarct , age undetermined Abnormal ECG When compared with ECG of 22-DEC-2024 17:41, Significant changes have occurred    CT angio abdomen pelvis w and or wo IV IV contrast    Result Date: 4/2/2025  STUDY: CT CTA ABDOMEN and PELVIS w + wo CONTRAST; 4/2/2025 3:35 AM INDICATION:  Low hemoglobin, occult blood positive. TECHNIQUE:  Helical CT is performed from the aortic diaphragmatic hiatus through the symphysis pubis before and after bolus administration of 90 mL of Omnipaque 350.  Images were reviewed and processed at a workstation according to the CT angiogram protocol with 3-D and/or MIP post processing imaging generated.  1862 DICOM images received. Automated mA/kV exposure control was utilized and patient examination was performed in strict accordance with principles of ALARA. COMPARISON:  CTA abdomen and pelvis 2/9/2025. FINDINGS: Vascular: Mesenteric: The celiac, SMA, and GIUSEPPE demonstrate no significant stenosis.  Right renal: Single vessel demonstrates no significant stenosis.  Left renal: Single vessel demonstrates no significant stenosis. Abdominal aorta: The abdominal aorta is not aneurysmal and demonstrates no significant occlusive disease. Iliacs: The common, external, and  internal iliac vessels demonstrate no aneurysmal disease or significant stenosis. Abdomen: The lung bases show mild septal thickening trace atelectasis.  Small right and trace left pleural effusions. The liver is shows no acute finding.  A few tiny calcified gallstones.  No signs of acute cholecystitis.  The pancreas, spleen, adrenal glands, and kidneys demonstrate no acute pathology. There is no free air or lymph node enlargement. Pelvis: There is no bowel wall thickening or obstruction.  Scattered diverticula.  Normal appendix.  There is no free fluid.  Lymph nodes are not enlarged.  Urinary bladder is unremarkable. Skeleton:  There are no acute fractures.  No suspicious bony lesions.  Right hip Arthroplasty noted.    No evidence of active arterial bleeding. Several scattered colonic diverticula. Signed by Gokul Huffman MD    XR chest 1 view    Result Date: 4/1/2025  STUDY: Chest Radiograph;  04/01/2025 03:56 PM INDICATION: Chest pain. COMPARISON: None available. ACCESSION NUMBER(S): [id number] ORDERING CLINICIAN: CELENA VEGATON TECHNIQUE:  Frontal chest was obtained at 15:35 hours. FINDINGS: CARDIOMEDIASTINAL SILHOUETTE: Cardiomediastinal silhouette is normal in size and configuration.  LUNGS: Lungs are clear. Left-sided pacemaker in place. Median sternotomy wires noted.  ABDOMEN: No remarkable upper abdominal findings.  BONES: No acute osseous changes.    No acute process. Signed by Forest Grubbs MD    Imaging and EKGs reviewed    Radiology:     CT angio abdomen pelvis w and or wo IV IV contrast   Final Result   No evidence of active arterial bleeding.   Several scattered colonic diverticula.   Signed by Gokul Huffman MD      XR chest 1 view   Final Result   No acute process.   Signed by Forest Grubbs MD      Transthoracic Echo (TTE) Limited    (Results Pending)       Assessment:     Problem List Items Addressed This Visit       * (Principal) Chest pain, unspecified type - Primary    Relevant Orders     Transthoracic Echo (TTE) Limited     Other Visit Diagnoses       Symptomatic anemia                Patient Active Problem List   Diagnosis    Ischemic cardiomyopathy    Moderate vascular dementia with anxiety    Conversion reaction    Auditory hallucinations    Coronary artery disease involving native coronary artery of native heart without angina pectoris    Epistaxis, recurrent    Angina pectoris    Moderate vascular dementia with psychotic disturbance    Episodes of formed visual hallucinations    Frequent falls    Hypertensive disorder    Obstructive sleep apnea syndrome    Orthostatic hypotension    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    Seizure disorder (Multi)    Angina pectoris, unstable (Multi)    Diastolic congestive heart failure    3-vessel coronary artery disease    Scrotal hematoma    Anemia due to GI blood loss    Upper GI bleed    Physical debility    Long term (current) use of antithrombotics/antiplatelets    Chest pain, unspecified type       Plan:     1.  Symptomatic severe anemia.  Presenting hemoglobin is 5.2 is now status post transfusion with 3 units packed cells.  He is currently off his aspirin therapy but would recommend when appropriate it be restarted.  Will defer workup and additional treatment to the admitting team.    2.  Angina pectoris.  Most likely related to severe anemia.  There is no signs of any myocardial injury on serial cardiac biomarkers.  Patient is not a candidate for any type of invasive evaluation or treatment.  Would continue with conservative medical therapy.  Towards that end I have ordered oral nitrate therapy to his medical regimen, and would continue his beta-blocker therapy.  Would also recommend continuing his statin therapy.  He does not need to remain n.p.o. for any type of cardiac testing but if GI evaluation or treatment with endoscopy as planned would proceed as clinically appropriate.    3.  History of heart failure with preserved ejection fraction.  No  signs of any decompensated heart failure today.    As discussed above would restart aspirin therapy when clinically appropriate.  Given the fact the patient only reports nasal bleeding and not GI bleeding will defer to the primary team whether ENT needs to be consulted as an inpatient.  As he has had no injury as long as he is stable he can be discharged home from a cardiac standpoint.  Cardiology will follow peripherally and as needed.  If there are any problems or concerns please reach out to us so we can see him.    Thank you for the opportunity to participate in the care of your patient.  Please do not hesitate to call if you have any questions.    Electronically signed by Kavita Varner MD, on 4/2/2025 at 10:28 AM

## 2025-04-02 NOTE — PROGRESS NOTES
04/02/25 1200   Discharge Planning   Living Arrangements Alone   Support Systems Friends/neighbors   Assistance Needed uses walker every now and then   Type of Residence Assisted living   Number of Stairs to Enter Residence 0   Number of Stairs Within Residence 0   Do you have animals or pets at home? No   Care Facility Name [facility]   Who is requesting discharge planning? Provider   Home or Post Acute Services Post acute facilities (Rehab/SNF/etc)   Type of Post Acute Facility Services Assisted living   Expected Discharge Disposition Home  (Back to [facility])   Does the patient need discharge transport arranged? Yes   RoundTrip coordination needed? Yes   Has discharge transport been arranged? No   Financial Resource Strain   How hard is it for you to pay for the very basics like food, housing, medical care, and heating? Not very   Housing Stability   In the last 12 months, was there a time when you were not able to pay the mortgage or rent on time? N   In the past 12 months, how many times have you moved where you were living? 1   At any time in the past 12 months, were you homeless or living in a shelter (including now)? N   Transportation Needs   In the past 12 months, has lack of transportation kept you from medical appointments or from getting medications? no   In the past 12 months, has lack of transportation kept you from meetings, work, or from getting things needed for daily living? No   Patient Choice   Patient / Family choosing to utilize agency / facility established prior to hospitalization Yes   Stroke Family Assessment   Stroke Family Assessment Needed No   Intensity of Service   Intensity of Service >30 min     Met with patient at bedside. Introduced self and role in hospital. Verified that patient is a resident at [facility], insurance and patient really doesn't have a PCP at this time. Facility obtains patient's medications and also administers them. Uses a walker sometimes and is independent  with ADL's. Patient plans to return to [facility] on discharge and may possibly need transport to return. Patient has only been at facility for about 2 months. CT team to follow patient.

## 2025-04-02 NOTE — CONSULTS
Reason for consult  Acute on chronic anemia    HPI  Wayne Burkitt is a 79 y.o. male with PMH of HTN, HLD, HFpEF, pacemaker presenting with L sided chest pain radiating to left arm.  He is no longer on anticoagulation due to recurrent epistaxis.  Patient seen by our service in February for anemia, epistaxis and underwent EGD noting recently bleeding gastric AVMs treated with APC with plan for PPI twice daily to repeat in 2 months.  Hemoglobin was down to 6.5 at that time.  Patient now presenting with hemoglobin 5.1 incrementing to 9.2 s/p 3 units PRBCs.  INR 1.1.  BUN 24/creatinine 1.27.  Platelets 265.  Iron studies consistent with iron deficiency.  Patient was previously on oral iron replacement.  No longer on anticoagulation.  He does take PPI twice daily as well as carafate tid.  His last colonoscopy was about 10 years ago with polyp removal.  Patient denies being symptomatic.  No current CP, SOB, N/V, constipation or diarrhea, hematochezia or melena.  He does endorse cramping under his left rib cage.    PMH  He has a past medical history of Auditory hallucinations (09/28/2024), Conversion reaction (09/28/2024), Coronary artery disease involving native coronary artery of native heart without angina pectoris (09/28/2024), Epistaxis, recurrent (09/28/2024), Ischemic cardiomyopathy (09/28/2024), and Moderate vascular dementia with anxiety (09/28/2024).    PSH  He has a past surgical history that includes Coronary artery bypass graft (2010); Coronary angioplasty with stent; pacemaker placement; Total hip arthroplasty; Cardiac catheterization (N/A, 12/20/2024); and Cardiac catheterization (N/A, 12/20/2024).    Family  Family History   Family history unknown: Yes       Social  He reports that he quit smoking about 31 years ago. His smoking use included cigarettes. He has never used smokeless tobacco. He reports that he does not currently use alcohol. He reports that he does not use drugs.      Review of Systems  ROS  "negative unless stated otherwise in HPI     Objective  /73 (BP Location: Right arm, Patient Position: Lying)   Pulse 60   Temp 36 °C (96.8 °F) (Temporal)   Resp 11   Ht 1.803 m (5' 11\")   Wt 79.2 kg (174 lb 9.7 oz)   SpO2 97%   BMI 24.35 kg/m²     Physical Exam  Constitutional: Alert, pleasant and interactive, in NAD  Eyes: PERRL, sclera clear, no conjunctival injection  Skin: Warm and dry, no rash or ecchymosis  ENMT: Mucous membranes moist, no lesions noted  Resp: CTAB, even and unlabored  CV: RRR, normal S1, S2, no m,r,g  GI: +BS, soft, round, NT, no rebound tenderness or guarding, no palpable masses or organomegaly  Extremities: Extremities warm, no edema, contusions, wounds or cyanosis  Neuro: Alert and oriented x3  Psych: Appropriate mood and behavior    Medications  Scheduled medications  [Held by provider] aspirin, 81 mg, oral, Daily  brimonidine, 1 drop, Both Eyes, BID  donepezil, 5 mg, oral, Nightly  levETIRAcetam, 1,000 mg, oral, BID  metoprolol succinate XL, 25 mg, oral, Daily  pantoprazole, 40 mg, intravenous, BID  perflutren lipid microspheres, 0.5-10 mL of dilution, intravenous, Once in imaging  perflutren protein A microsphere, 0.5 mL, intravenous, Once in imaging  pravastatin, 80 mg, oral, Nightly  QUEtiapine, 50 mg, oral, Nightly  sulfur hexafluoride microsphr, 2 mL, intravenous, Once in imaging      Continuous medications     PRN medications  PRN medications: acetaminophen, nitroglycerin, traZODone     Labs  Lab Results   Component Value Date    WBC 5.7 04/02/2025    HGB 9.2 (L) 04/02/2025    HCT 29.1 (L) 04/02/2025    MCV 83 04/02/2025     04/02/2025     Lab Results   Component Value Date    GLUCOSE 117 (H) 04/01/2025    CALCIUM 8.4 (L) 04/01/2025     04/01/2025    K 4.6 04/01/2025    CO2 24 04/01/2025     (H) 04/01/2025    BUN 24 (H) 04/01/2025    CREATININE 1.27 04/01/2025     Lab Results   Component Value Date    ALT 16 04/01/2025    AST 24 04/01/2025    ALKPHOS " 91 04/01/2025    BILITOT 0.4 04/01/2025     Lab Results   Component Value Date    IRON <10 (L) 04/01/2025    TIBC  04/01/2025      Comment:      One or more of the analytes used in this calculation is outside of the analytical measurement range.      FERRITIN 8 (L) 04/01/2025     Lab Results   Component Value Date    INR 1.1 04/01/2025    INR 1.2 (H) 12/20/2024    INR 1.1 12/12/2024    PROTIME 12.5 (H) 04/01/2025    PROTIME 13.7 (H) 12/20/2024    PROTIME 12.9 (H) 12/12/2024       Radiology  CTA A/P with and without IV contrast 4/2/2025 noting:  Impression:     No evidence of active arterial bleeding.  Several scattered colonic diverticula.  Signed by Gokul Huffman MD       Assessment:  Wayne Burkitt is a 79 y.o. male with PMH of HTN, HLD, HFpEF, pacemaker presenting with chest pain which has since resolved.  GI consulted for acute anemia with hemoglobin to 5.1 incrementing to 9.3 with 3 units PRBCs.  Previously seen with hemoglobin 6.5, melena in February with EGD noting recently bleeding gastric AVMs treated with APC.  No longer on anticoagulation.  No overt signs of bleeding.  Continues on PPI twice daily, Carafate 3 times daily.    Plan:  - continue supportive care  - clear liquids today  - NPO after MN for EGD/colonoscopy tomorrow  - begin prep at 1600 today  - call endo in am if pt stool is not clear  - continue to trend hgb daily unless pt becomes symptomatic or decompensates  - transfuse to maintain hgb >7  - continue to monitor for and document overt signs of bleeding  - no NSAIDS  - continue PPI BID  - agree with IV iron    Plan has been discussed with Dr. Bianchi. GI will continue to follow.     Jennifer Selby, APRN/CNP

## 2025-04-02 NOTE — NURSING NOTE
PT CARE WAS TAKEN OVER BY THIS RN. . THE UNIT OF PRC #1 WAS COMPLETED [id number].  [facility] P[facility] C[facility] WITH THE PT . CARINA CALLED ONOFRE CALVO IN ER AND WAS TOLD IT WAS UNAVAILABLE BUT WAS DOCUMENTED ON LINE. THE CONSENT WAS FOUND IN THE CHART. THE PT IS ASYMPTOMATIC. APPEARS PALE. DENIES ANY ABD PAIN. VOIDING PER THE BSC. NO CP, PALPITATIONS, DIZZINESS, SOB OR NAUSEA WAS VOICED AT THIS TIME. BREATH SOUNDS ARE CTA. THE PT IS ON RA. EKG- NSR.  THE PT WAS ORIENTED TO THE , BS EQUIPMENT AND POC.   2229 THE #2 UNIT OF PRC  [id number] WAS INITIATED. THE PT CONTINUES TO BE ASYMPTOMATIC.  2300- THE PT HAD X1 MEDIUM BROWN HARD FORMED STOOL VIA THE BSC. NO ABD PAIN, NAUSEA OR BLOOD WITH THE STOOL OBSERVED. ASYMPTOMATIC WHEN OOB.  0030 2ND UNIT OF PRC COMPLETED. NO SIGNS OF BLOOD REACTIONS AT THIS TIME. THE PT IS VOICING COMFORT AT THIS TIME. VSS. TEMP AFEBRILE. RESPS EVEN AND UNLABORED ON RA.   0300- 3RD UNIT OF BLD COMPLETED. THE PT CONTINUES TO BE ASYMPTOMATIC. AWAKE AND SOCIABLE. DENIES ANY PAIN OR DISCOMFORT.  0330- THE PT WAS TAKEN FOR AN ABD CATSCAN BY BED WITH TELEMETRY MONITORING.  0345 THE PT RETURNED TO HIS RM W/O INCIDENCE.   TEACHING UPDATED.

## 2025-04-02 NOTE — PROGRESS NOTES
Physical Therapy    Physical Therapy Evaluation    Patient Name: Wayne Burkitt  MRN: [id number]  Today's Date: 4/2/2025   Time Calculation  Start Time: 1025  Stop Time: 1042  Time Calculation (min): 17 min  2105/2105-A    Assessment/Plan   PT Assessment: Pt demonstrates impairments listed below.  Pt appears below baseline level of function and based on current level of function, pt would benefit from continued skilled therapy while in the hospital to ensure safety, decrease risk of falls, and regain strength/mobility back to baseline.  Once stable enough for discharge, pt would benefit from low intensity therapy or no further therapy needs pending progress.     PT Assessment Results: Decreased strength, Decreased mobility  Rehab Prognosis: Good  Evaluation/Treatment Tolerance: Patient tolerated treatment well  Medical Staff Made Aware: Yes  End of Session Communication: Bedside nurse  End of Session Patient Position: Up in chair, Alarm on  IP OR SWING BED PT PLAN  Inpatient or Swing Bed: Inpatient  PT Plan  Treatment/Interventions: Bed mobility, Transfer training, Gait training, Balance training, Neuromuscular re-education, Strengthening, Range of motion, Therapeutic exercise, Therapeutic activity, Home exercise program  PT Plan: Ongoing PT  PT Frequency: 3 times per week  PT Discharge Recommendations:  (low vs no needs pending progress)  PT Recommended Transfer Status: Stand by assist, Contact guard  PT - OK to Discharge: Yes - To next level of care when cleared by medical team    Subjective     Current Problem:  1. Chest pain, unspecified type  Transthoracic Echo (TTE) Limited    Transthoracic Echo (TTE) Limited      2. Symptomatic anemia            Past Medical History:  Patient Active Problem List   Diagnosis    Ischemic cardiomyopathy    Moderate vascular dementia with anxiety    Conversion reaction    Auditory hallucinations    Coronary artery disease involving native coronary artery of native heart without  angina pectoris    Epistaxis, recurrent    Angina pectoris    Moderate vascular dementia with psychotic disturbance    Episodes of formed visual hallucinations    Frequent falls    Hypertensive disorder    Obstructive sleep apnea syndrome    Orthostatic hypotension    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    Seizure disorder (Multi)    Angina pectoris, unstable (Multi)    Diastolic congestive heart failure    3-vessel coronary artery disease    Scrotal hematoma    Anemia due to GI blood loss    Upper GI bleed    Physical debility    Long term (current) use of antithrombotics/antiplatelets    Chest pain, unspecified type       General Visit Information:  Per EMR: pt presenting with chest pain.  The patient reports experiencing pressure-like chest pain near the sternum this morning after breakfast, which radiated to the left arm.  Patient reports this pain increased with eating and with exertion and it associated with sob.he denies dizziness, nausea, vomiting, fever, abdominal pain, changes in bowel or urinary habits, hematemesis, epistaxis, or hematuria.  Notably, the patient was recently admitted in February 2025 for epistaxis and dark stool. During that admission, he received two packed red blood cell transfusions and underwent Rhino Rocket placement. An EGD revealed a 5 mm angiectasia with signs of recent hemorrhage, for which he underwent argon plasma coagulation . At that time, the patient was advised to continue Effient and discontinue aspirin to reduce the risk of stent re thrombosis. However, the patient refused and insisted on continuing aspirin while discontinuing Effient. He was also advised to repeat an EGD in two months and to undergo an MRI to monitor a pancreatic lesion discovered during that admission.  Per patient he is not on antiplatelet therapy 6 weeks.  -Of note patient lives in [facility], recently started to use walker for ambulation.    On arrival, pt sitting EOB.  Pt in no apparent  distress and agreeable to therapy.    General  Reason for Referral: impaired mobility, gait training  Referred By: Elliot Hernandez  Prior to Session Communication: Bedside nurse  Patient Position Received: Alarm on (sitting EOB)    Home Living/PLOF:  Pt lives at [facility].  IND with ADLs.  IND with mobility in his apt, will sometimes use FWW to ambulate to dining room for meals since it is a long distance.  Reports x1 fall.  Facility provides meals, housekeeping, laundry, and med management.     Precautions:  Precautions  Medical Precautions: Fall precautions     Objective     Pain:  Pain Assessment  Pain Assessment: 0-10  0-10 (Numeric) Pain Score: 0 - No pain    Cognition:  Cognition  Overall Cognitive Status: Within Functional Limits  Orientation Level: Oriented X4    General Assessments:      Activity Tolerance  Endurance: Tolerates 10 - 20 min exercise with multiple rests  Sensation  Sensation Comment: pt reports numbness/tingling at R hip 2/2 R hip replacement ~3 years ago    Static Sitting Balance  Static Sitting-Comment/Number of Minutes: fair; posteiror lean at times; difficulty maintaining unsupported sit  Dynamic Sitting Balance  Dynamic Sitting-Comments: fair  Static Standing Balance  Static Standing-Comment/Number of Minutes: fair plus  Dynamic Standing Balance  Dynamic Standing-Comments: fair plus    Extremity/Trunk Assessments:  BLE strength: grossly WFL    Functional Mobility:  Bed mobility  NT 2/2 sitting EOB on arrival     Transfers  Sit to stand: SBA  Stand to sit: SBA    Ambulation/Stairs  Pt ambulated 20 ft with SBA-CGA without device; grossly steady without LOB; slow gait speed    Outcome Measures:  [facility] Basic Mobility  Turning from your back to your side while in a flat bed without using bedrails: None  Moving from lying on your back to sitting on the side of a flat bed without using bedrails: None  Moving to and from bed to chair (including a wheelchair): A little  Standing up from a chair using  your arms (e.g. wheelchair or bedside chair): A little  To walk in hospital room: A little  Climbing 3-5 steps with railing: A little  Basic Mobility - Total Score: 20    Goals:  Encounter Problems       Encounter Problems (Active)       PT Problem       Pt will be able to perform all bed mobility tasks with Mod I.  (Progressing)       Start:  04/02/25    Expected End:  04/16/25            Pt will perform all transfers with IND with proper safety mechanics.   (Progressing)       Start:  04/02/25    Expected End:  04/16/25            Pt will ambulate 150 ft with IND for improved functional independence.  (Progressing)       Start:  04/02/25    Expected End:  04/16/25                 Education Documentation  Body Mechanics, taught by Aziza Dudley PT at 4/2/2025 11:44 AM.  Learner: Patient  Readiness: Acceptance  Method: Explanation  Response: Verbalizes Understanding    Home Exercise Program, taught by Aziza Dudley PT at 4/2/2025 11:44 AM.  Learner: Patient  Readiness: Acceptance  Method: Explanation  Response: Verbalizes Understanding    Mobility Training, taught by Aziza Dudley PT at 4/2/2025 11:44 AM.  Learner: Patient  Readiness: Acceptance  Method: Explanation  Response: Verbalizes Understanding    Education Comments  No comments found.

## 2025-04-02 NOTE — CARE PLAN
The patient's goals for the shift include to get home     The clinical goals for the shift include the pt will be hds, maintain a hgb > 7, have no complications from his mult bld transfusions ; experience no cp or sob,  and demonstrate no signs of active gi bleeding by the end of this shift.

## 2025-04-02 NOTE — PROGRESS NOTES
Spiritual Care Visit  Spiritual Care Request    Reason for Visit:  Routine Visit: Introduction     Request Received From:       Focus of Care:  Visited With: Patient         Refer to :          Spiritual Care Assessment    Spiritual Assessment:                      Care Provided:  Intended Effects: Promote sense of peace, Preserve dignity and respect, Meaning-making  Methods: Offer spiritual/Faith support  Interventions: Share words of hope and inspiration, [place]    Sense of Community and or Congregation Affiliation:  [employer]         Addressed Needs/Concerns and/or Sina Through:          Outcome:        Advance Directives:         Spiritual Care Annotation    Annotation:  Patient was with his two neighbors.  Patient shared his story and the  listened and prayed at his request.

## 2025-04-03 ENCOUNTER — ANESTHESIA EVENT (OUTPATIENT)
Dept: GASTROENTEROLOGY | Facility: HOSPITAL | Age: 80
End: 2025-04-03
Payer: MEDICARE

## 2025-04-03 ENCOUNTER — ANESTHESIA (OUTPATIENT)
Dept: GASTROENTEROLOGY | Facility: HOSPITAL | Age: 80
End: 2025-04-03
Payer: MEDICARE

## 2025-04-03 ENCOUNTER — APPOINTMENT (OUTPATIENT)
Dept: GASTROENTEROLOGY | Facility: HOSPITAL | Age: 80
End: 2025-04-03
Payer: MEDICARE

## 2025-04-03 ENCOUNTER — APPOINTMENT (OUTPATIENT)
Dept: GASTROENTEROLOGY | Facility: HOSPITAL | Age: 80
DRG: 378 | End: 2025-04-03
Payer: MEDICARE

## 2025-04-03 LAB
ALBUMIN SERPL BCP-MCNC: 3.4 G/DL (ref 3.4–5)
ANION GAP SERPL CALC-SCNC: 12 MMOL/L (ref 10–20)
BLOOD EXPIRATION DATE: NORMAL
BUN SERPL-MCNC: 16 MG/DL (ref 6–23)
CALCIUM SERPL-MCNC: 8.2 MG/DL (ref 8.6–10.3)
CHLORIDE SERPL-SCNC: 110 MMOL/L (ref 98–107)
CO2 SERPL-SCNC: 24 MMOL/L (ref 21–32)
CREAT SERPL-MCNC: 0.98 MG/DL (ref 0.5–1.3)
DISPENSE STATUS: NORMAL
EGFRCR SERPLBLD CKD-EPI 2021: 79 ML/MIN/1.73M*2
ERYTHROCYTE [DISTWIDTH] IN BLOOD BY AUTOMATED COUNT: 18.4 % (ref 11.5–14.5)
GLUCOSE SERPL-MCNC: 81 MG/DL (ref 74–99)
HCT VFR BLD AUTO: 27.4 % (ref 41–52)
HGB BLD-MCNC: 8.3 G/DL (ref 13.5–17.5)
MAGNESIUM SERPL-MCNC: 1.91 MG/DL (ref 1.6–2.4)
MCH RBC QN AUTO: 25.9 PG (ref 26–34)
MCHC RBC AUTO-ENTMCNC: 30.3 G/DL (ref 32–36)
MCV RBC AUTO: 85 FL (ref 80–100)
NRBC BLD-RTO: 0.4 /100 WBCS (ref 0–0)
PHOSPHATE SERPL-MCNC: 3.6 MG/DL (ref 2.5–4.9)
PLATELET # BLD AUTO: 269 X10*3/UL (ref 150–450)
POTASSIUM SERPL-SCNC: 3.8 MMOL/L (ref 3.5–5.3)
PRODUCT BLOOD TYPE: 5100
PRODUCT CODE: NORMAL
RBC # BLD AUTO: 3.21 X10*6/UL (ref 4.5–5.9)
SODIUM SERPL-SCNC: 142 MMOL/L (ref 136–145)
UNIT ABO: NORMAL
UNIT NUMBER: NORMAL
UNIT RH: NORMAL
UNIT VOLUME: 314
WBC # BLD AUTO: 4.8 X10*3/UL (ref 4.4–11.3)
XM INTEP: NORMAL

## 2025-04-03 PROCEDURE — 2500000004 HC RX 250 GENERAL PHARMACY W/ HCPCS (ALT 636 FOR OP/ED): Performed by: ANESTHESIOLOGIST ASSISTANT

## 2025-04-03 PROCEDURE — 2500000002 HC RX 250 W HCPCS SELF ADMINISTERED DRUGS (ALT 637 FOR MEDICARE OP, ALT 636 FOR OP/ED)

## 2025-04-03 PROCEDURE — 45378 DIAGNOSTIC COLONOSCOPY: CPT

## 2025-04-03 PROCEDURE — 7100000001 HC RECOVERY ROOM TIME - INITIAL BASE CHARGE

## 2025-04-03 PROCEDURE — 2500000001 HC RX 250 WO HCPCS SELF ADMINISTERED DRUGS (ALT 637 FOR MEDICARE OP): Performed by: INTERNAL MEDICINE

## 2025-04-03 PROCEDURE — 99100 ANES PT EXTEME AGE<1 YR&>70: CPT | Performed by: ANESTHESIOLOGY

## 2025-04-03 PROCEDURE — 85027 COMPLETE CBC AUTOMATED: CPT

## 2025-04-03 PROCEDURE — 0W3P8ZZ CONTROL BLEEDING IN GASTROINTESTINAL TRACT, VIA NATURAL OR ARTIFICIAL OPENING ENDOSCOPIC: ICD-10-PCS | Performed by: NURSE PRACTITIONER

## 2025-04-03 PROCEDURE — 43255 EGD CONTROL BLEEDING ANY: CPT

## 2025-04-03 PROCEDURE — 2500000004 HC RX 250 GENERAL PHARMACY W/ HCPCS (ALT 636 FOR OP/ED)

## 2025-04-03 PROCEDURE — A45378 PR COLONOSCOPY,DIAGNOSTIC: Performed by: ANESTHESIOLOGIST ASSISTANT

## 2025-04-03 PROCEDURE — 2060000001 HC INTERMEDIATE ICU ROOM DAILY

## 2025-04-03 PROCEDURE — 3700000001 HC GENERAL ANESTHESIA TIME - INITIAL BASE CHARGE

## 2025-04-03 PROCEDURE — 80069 RENAL FUNCTION PANEL: CPT

## 2025-04-03 PROCEDURE — 45378 DIAGNOSTIC COLONOSCOPY: CPT | Performed by: INTERNAL MEDICINE

## 2025-04-03 PROCEDURE — 83735 ASSAY OF MAGNESIUM: CPT

## 2025-04-03 PROCEDURE — 99232 SBSQ HOSP IP/OBS MODERATE 35: CPT | Performed by: INTERNAL MEDICINE

## 2025-04-03 PROCEDURE — 2720000007 HC OR 272 NO HCPCS

## 2025-04-03 PROCEDURE — 3700000002 HC GENERAL ANESTHESIA TIME - EACH INCREMENTAL 1 MINUTE

## 2025-04-03 PROCEDURE — 2500000001 HC RX 250 WO HCPCS SELF ADMINISTERED DRUGS (ALT 637 FOR MEDICARE OP)

## 2025-04-03 PROCEDURE — A45378 PR COLONOSCOPY,DIAGNOSTIC: Performed by: ANESTHESIOLOGY

## 2025-04-03 PROCEDURE — 2500000001 HC RX 250 WO HCPCS SELF ADMINISTERED DRUGS (ALT 637 FOR MEDICARE OP): Performed by: NURSE PRACTITIONER

## 2025-04-03 PROCEDURE — 99232 SBSQ HOSP IP/OBS MODERATE 35: CPT

## 2025-04-03 PROCEDURE — 7100000002 HC RECOVERY ROOM TIME - EACH INCREMENTAL 1 MINUTE

## 2025-04-03 PROCEDURE — 0DJD8ZZ INSPECTION OF LOWER INTESTINAL TRACT, VIA NATURAL OR ARTIFICIAL OPENING ENDOSCOPIC: ICD-10-PCS | Performed by: NURSE PRACTITIONER

## 2025-04-03 PROCEDURE — 43255 EGD CONTROL BLEEDING ANY: CPT | Performed by: INTERNAL MEDICINE

## 2025-04-03 PROCEDURE — 36415 COLL VENOUS BLD VENIPUNCTURE: CPT

## 2025-04-03 RX ORDER — ALBUTEROL SULFATE 0.83 MG/ML
2.5 SOLUTION RESPIRATORY (INHALATION) ONCE AS NEEDED
OUTPATIENT
Start: 2025-04-03

## 2025-04-03 RX ORDER — PROPOFOL 10 MG/ML
INJECTION, EMULSION INTRAVENOUS AS NEEDED
Status: DISCONTINUED | OUTPATIENT
Start: 2025-04-03 | End: 2025-04-03

## 2025-04-03 RX ORDER — ONDANSETRON HYDROCHLORIDE 2 MG/ML
4 INJECTION, SOLUTION INTRAVENOUS ONCE AS NEEDED
OUTPATIENT
Start: 2025-04-03

## 2025-04-03 RX ORDER — POTASSIUM CHLORIDE 20 MEQ/1
40 TABLET, EXTENDED RELEASE ORAL ONCE
Status: COMPLETED | OUTPATIENT
Start: 2025-04-03 | End: 2025-04-03

## 2025-04-03 RX ORDER — DEXTROMETHORPHAN/PSEUDOEPHED 2.5-7.5/.8
DROPS ORAL AS NEEDED
Status: DISCONTINUED | OUTPATIENT
Start: 2025-04-03 | End: 2025-04-03 | Stop reason: HOSPADM

## 2025-04-03 RX ORDER — FENTANYL CITRATE 50 UG/ML
12.5 INJECTION, SOLUTION INTRAMUSCULAR; INTRAVENOUS EVERY 5 MIN PRN
OUTPATIENT
Start: 2025-04-03

## 2025-04-03 RX ORDER — FENTANYL CITRATE 50 UG/ML
25 INJECTION, SOLUTION INTRAMUSCULAR; INTRAVENOUS EVERY 5 MIN PRN
OUTPATIENT
Start: 2025-04-03

## 2025-04-03 RX ORDER — HYDRALAZINE HYDROCHLORIDE 20 MG/ML
5 INJECTION INTRAMUSCULAR; INTRAVENOUS EVERY 30 MIN PRN
OUTPATIENT
Start: 2025-04-03

## 2025-04-03 RX ORDER — LANOLIN ALCOHOL/MO/W.PET/CERES
800 CREAM (GRAM) TOPICAL ONCE
Status: COMPLETED | OUTPATIENT
Start: 2025-04-03 | End: 2025-04-03

## 2025-04-03 RX ORDER — SODIUM CHLORIDE, SODIUM LACTATE, POTASSIUM CHLORIDE, CALCIUM CHLORIDE 600; 310; 30; 20 MG/100ML; MG/100ML; MG/100ML; MG/100ML
125 INJECTION, SOLUTION INTRAVENOUS CONTINUOUS
OUTPATIENT
Start: 2025-04-03 | End: 2025-04-03

## 2025-04-03 RX ADMIN — SIMETHICONE 333 MG: 20 EMULSION ORAL at 15:17

## 2025-04-03 RX ADMIN — ACETAMINOPHEN 650 MG: 325 TABLET ORAL at 00:18

## 2025-04-03 RX ADMIN — ISOSORBIDE MONONITRATE 30 MG: 30 TABLET, EXTENDED RELEASE ORAL at 09:20

## 2025-04-03 RX ADMIN — LEVETIRACETAM 1000 MG: 500 TABLET, FILM COATED ORAL at 09:20

## 2025-04-03 RX ADMIN — PRAVASTATIN SODIUM 80 MG: 20 TABLET ORAL at 21:38

## 2025-04-03 RX ADMIN — POTASSIUM CHLORIDE 40 MEQ: 1500 TABLET, EXTENDED RELEASE ORAL at 17:06

## 2025-04-03 RX ADMIN — BRIMONIDINE TARTRATE 1 DROP: 2 SOLUTION/ DROPS OPHTHALMIC at 09:20

## 2025-04-03 RX ADMIN — BRIMONIDINE TARTRATE 1 DROP: 2 SOLUTION/ DROPS OPHTHALMIC at 21:49

## 2025-04-03 RX ADMIN — PANTOPRAZOLE SODIUM 40 MG: 40 INJECTION, POWDER, FOR SOLUTION INTRAVENOUS at 09:20

## 2025-04-03 RX ADMIN — PROPOFOL 50 MG: 10 INJECTION, EMULSION INTRAVENOUS at 15:06

## 2025-04-03 RX ADMIN — LEVETIRACETAM 1000 MG: 500 TABLET, FILM COATED ORAL at 21:38

## 2025-04-03 RX ADMIN — Medication 800 MG: at 17:06

## 2025-04-03 RX ADMIN — PROPOFOL 75 MCG/KG/MIN: 10 INJECTION, EMULSION INTRAVENOUS at 15:16

## 2025-04-03 RX ADMIN — BISACODYL 10 MG: 5 TABLET, COATED ORAL at 00:18

## 2025-04-03 RX ADMIN — TRAZODONE HYDROCHLORIDE 25 MG: 50 TABLET ORAL at 21:39

## 2025-04-03 RX ADMIN — PROPOFOL 100 MG: 10 INJECTION, EMULSION INTRAVENOUS at 15:02

## 2025-04-03 RX ADMIN — METOPROLOL SUCCINATE 25 MG: 25 TABLET, EXTENDED RELEASE ORAL at 09:20

## 2025-04-03 RX ADMIN — QUETIAPINE FUMARATE 50 MG: 50 TABLET ORAL at 21:38

## 2025-04-03 RX ADMIN — PROPOFOL 50 MG: 10 INJECTION, EMULSION INTRAVENOUS at 15:12

## 2025-04-03 RX ADMIN — PANTOPRAZOLE SODIUM 40 MG: 40 INJECTION, POWDER, FOR SOLUTION INTRAVENOUS at 21:39

## 2025-04-03 RX ADMIN — DONEPEZIL HYDROCHLORIDE 5 MG: 5 TABLET ORAL at 21:39

## 2025-04-03 SDOH — HEALTH STABILITY: MENTAL HEALTH: CURRENT SMOKER: 0

## 2025-04-03 ASSESSMENT — PAIN SCALES - GENERAL
PAINLEVEL_OUTOF10: 0 - NO PAIN
PAINLEVEL_OUTOF10: 3

## 2025-04-03 ASSESSMENT — ENCOUNTER SYMPTOMS
LIGHT-HEADEDNESS: 0
SHORTNESS OF BREATH: 0
ARTHRALGIAS: 1
ABDOMINAL DISTENTION: 1
FATIGUE: 1
HALLUCINATIONS: 1
DYSURIA: 0
PALPITATIONS: 0
DIFFICULTY URINATING: 1
DIZZINESS: 0

## 2025-04-03 ASSESSMENT — PAIN - FUNCTIONAL ASSESSMENT
PAIN_FUNCTIONAL_ASSESSMENT: 0-10
PAIN_FUNCTIONAL_ASSESSMENT: UNABLE TO SELF-REPORT
PAIN_FUNCTIONAL_ASSESSMENT: 0-10

## 2025-04-03 ASSESSMENT — PAIN DESCRIPTION - LOCATION: LOCATION: HIP

## 2025-04-03 ASSESSMENT — PAIN DESCRIPTION - ORIENTATION: ORIENTATION: RIGHT

## 2025-04-03 NOTE — CARE PLAN
The patient's goals for the shift include no pain.    The clinical goals for the shift include no signs of a GI bleed.       Pt. Did not have pain this shift. Pt. Had 3AVM in upper GI and ACT treatment performed. No bleeding found in the lower GI.  No units of blood given this shift.

## 2025-04-03 NOTE — PROGRESS NOTES
Called to [facility] to speak with nurse regarding patient to discuss discharge back to facility and to see how much assistance patient receives at facility. Nurse not available so a message was left for a call back. Ct team to follow.    1150- Received a call from Naz, the liaison for [facility] requesting updates on patient. Naz was informed of patient testing and the outcome is not known at this time. Patient is from the Memory Care unit at [place]. Will keep Naz updated. Ct team to follow.

## 2025-04-03 NOTE — PROGRESS NOTES
Cardiology Progress Note           Rounding Cardiologist:  Dr. Kavita Varner  Primary Cardiologist:  Dr. Gabino Giron     Date:  4/3/2025  Patient:  Wayne Burkitt  YOB: 1945  MRN:  [id number]   Admit Date:  4/1/2025      SUBJECTIVE    Wayne Burkitt was seen and examined today at bedside.   Patient was sitting up in the bedside chair.  He is doing better today.  He has had no further chest pain or discomfort no shortness of breath.  He is very disturbed as he had some confusion and visual hallucinations last night which is something he has not experienced previously when he has been hospitalized.  He still little traumatized from this this morning.    No new complaints or concerns, no visual bleeding.    Review of Systems   Constitutional:  Positive for fatigue.   Respiratory:  Negative for shortness of breath.    Cardiovascular:  Negative for chest pain, palpitations and leg swelling.   Gastrointestinal:  Positive for abdominal distention.   Genitourinary:  Positive for difficulty urinating. Negative for dysuria.   Musculoskeletal:  Positive for arthralgias.   Neurological:  Negative for dizziness and light-headedness.   Psychiatric/Behavioral:  Positive for hallucinations.    All other systems reviewed and are negative.       VITALS     Vitals:    04/03/25 0000 04/03/25 0400 04/03/25 0617 04/03/25 0800   BP:   131/60    BP Location:   Left arm    Patient Position:   Lying    Pulse: 60 60 60 60   Resp: 13 14 10 11   Temp:   37.1 °C (98.8 °F)    TempSrc:   Temporal    SpO2:   95%    Weight:       Height:           Intake/Output Summary (Last 24 hours) at 4/3/2025 1035  Last data filed at 4/2/2025 1800  Gross per 24 hour   Intake 975 ml   Output --   Net 975 ml       Vitals:    04/01/25 1930   Weight: 79.2 kg (174 lb 9.7 oz)       CURRENT MEDICATIONS    [Held by provider] aspirin, 81 mg, oral, Daily  brimonidine, 1 drop, Both Eyes, BID  donepezil, 5 mg, oral, Nightly  isosorbide  mononitrate ER, 30 mg, oral, Daily  levETIRAcetam, 1,000 mg, oral, BID  magnesium oxide, 800 mg, oral, Once  metoprolol succinate XL, 25 mg, oral, Daily  pantoprazole, 40 mg, intravenous, BID  perflutren lipid microspheres, 0.5-10 mL of dilution, intravenous, Once in imaging  perflutren protein A microsphere, 0.5 mL, intravenous, Once in imaging  potassium chloride CR, 40 mEq, oral, Once  pravastatin, 80 mg, oral, Nightly  QUEtiapine, 50 mg, oral, Nightly  sulfur hexafluoride microsphr, 2 mL, intravenous, Once in imaging         Current Outpatient Medications   Medication Instructions    acetaminophen (TYLENOL) 650 mg, Every 4 hours PRN    aspirin 81 mg, Daily    brimonidine (AlphaGAN) 0.2 % ophthalmic solution 1 drop, 2 times daily    cholecalciferol (VITAMIN D-3) 50 mcg, Daily    docusate sodium (COLACE) 100 mg, 2 times daily PRN    donepezil (ARICEPT) 5 mg, Nightly    levETIRAcetam (KEPPRA) 1,000 mg, 2 times daily    loperamide (IMODIUM A-D) 2 mg, 4 times daily PRN    loperamide (IMODIUM A-D) 4 mg, As needed    magnesium hydroxide (Milk of Magnesia) 400 mg/5 mL suspension 30 mL, Daily PRN    metoprolol succinate XL (TOPROL-XL) 25 mg, oral, Daily, Do not crush or chew.    nitroglycerin (NITROSTAT) 0.4 mg, sublingual, Every 5 min PRN    pantoprazole (PROTONIX) 40 mg, oral, 2 times daily, Do not crush, chew, or split.    pravastatin (PRAVACHOL) 80 mg, Nightly    QUEtiapine (SEROQUEL) 50 mg, Nightly    sucralfate (CARAFATE) 1 g, oral, 3 times daily before meals    traZODone (DESYREL) 25 mg, Nightly PRN        PHYSICAL EXAMINATION   GENERAL:  Well developed, well nourished, in no acute distress.  CHEST:  Symmetric and nontender.  NECK:  Supple, no JVD, no bruit.  LUNGS:  Clear to auscultation bilaterally, normal respiratory effort.  HEART:  Rate and rhythm regular with no evident murmur, no gallop appreciated.        There are no rubs, clicks or heaves.  ABDOMEN: Soft, NT, ND without palpable organomegaly, normoactive  "bowel sounds.   EXTREMITIES:  Warm with good color, no clubbing or cyanosis.  There is no edema noted.  PERIPHERAL VASCULAR:  Pulses present and equally palpable; 2+ throughout.  NEURO/PSYCH:  Alert and oriented times three with approppriate behavior and responses.  INTEGUMENT: No rashes    LAB DATA     CBC:   Results from last 7 days   Lab Units 04/03/25  0529   WBC AUTO x10*3/uL 4.8   RBC AUTO x10*6/uL 3.21*   HEMOGLOBIN g/dL 8.3*   HEMATOCRIT % 27.4*   PLATELETS AUTO x10*3/uL 269        CMP:    Results from last 7 days   Lab Units 04/03/25  0529   SODIUM mmol/L 142   POTASSIUM mmol/L 3.8   CHLORIDE mmol/L 110*   CO2 mmol/L 24   BUN mg/dL 16   CREATININE mg/dL 0.98   GLUCOSE mg/dL 81   CALCIUM mg/dL 8.2*       Cardiac Enzymes:    Lab Results   Component Value Date    TROPHS 17 04/01/2025    TROPHS 10 04/01/2025    TROPHS 10 04/01/2025       Magnesium:    Lab Results   Component Value Date    MG 1.91 04/03/2025       Lipid Profile:  No results found for: \"CHLPL\", \"TRIG\", \"HDL\", \"LDLCALC\", \"LDLDIRECT\"    TSH:  No results found for: \"TSH\"    BNP:   Lab Results   Component Value Date     (H) 04/01/2025        PT/INR:    Lab Results   Component Value Date    PROTIME 12.5 (H) 04/01/2025    INR 1.1 04/01/2025       HgBA1c:  No results found for: \"HGBA1C\"    CBC:  Lab Results   Component Value Date    WBC 4.8 04/03/2025    WBC 5.7 04/02/2025    WBC 4.2 (L) 04/01/2025    RBC 3.21 (L) 04/03/2025    RBC 3.50 (L) 04/02/2025    RBC 2.28 (L) 04/01/2025    HGB 8.3 (L) 04/03/2025    HGB 9.2 (L) 04/02/2025    HGB 5.1 (LL) 04/01/2025    HCT 27.4 (L) 04/03/2025    HCT 29.1 (L) 04/02/2025    HCT 18.7 (L) 04/01/2025    MCV 85 04/03/2025    MCV 83 04/02/2025    MCV 82 04/01/2025    MCH 25.9 (L) 04/03/2025    MCH 26.3 04/02/2025    MCH 22.4 (L) 04/01/2025    MCHC 30.3 (L) 04/03/2025    MCHC 31.6 (L) 04/02/2025    MCHC 27.3 (L) 04/01/2025    RDW 18.4 (H) 04/03/2025    RDW 17.4 (H) 04/02/2025    RDW 18.8 (H) 04/01/2025     " 04/03/2025     04/02/2025     04/01/2025       BMP:  Lab Results   Component Value Date     04/03/2025     04/02/2025     04/01/2025    K 3.8 04/03/2025    K 4.2 04/02/2025    K 4.6 04/01/2025     (H) 04/03/2025     (H) 04/02/2025     (H) 04/01/2025    CO2 24 04/03/2025    CO2 26 04/02/2025    CO2 24 04/01/2025    BUN 16 04/03/2025    BUN 20 04/02/2025    BUN 24 (H) 04/01/2025    CREATININE 0.98 04/03/2025    CREATININE 1.06 04/02/2025    CREATININE 1.27 04/01/2025       Hepatic Function Panel:    Lab Results   Component Value Date    ALKPHOS 91 04/01/2025    ALT 16 04/01/2025    AST 24 04/01/2025    PROT 7.0 04/01/2025    BILITOT 0.4 04/01/2025         DIAGNOSTIC TESTING RESULTS     Transthoracic Echo (TTE) Limited    Result Date: 4/2/2025            [facility] [place] [address] [address]    Tel [phone] Fax [phone] TRANSTHORACIC ECHOCARDIOGRAM REPORT Patient Name:       LA NENA GAMBINOSALLY Ivory Physician:    [id number] Kavita Varner MD Study Date:         4/2/2025             Ordering Provider:    [id number] LILIANE ANDERSON MRN/PID:            [id number]             Fellow: Accession#:         [id number]         Nurse: Date of Birth/Age:  1945 / 79      Sonographer:          Amy chong Gender Assigned at  M                    Additional Staff: Birth: Height:             180.34 cm            Admit Date: Weight:             78.93 kg             Admission Status:     Inpatient -                                                                Priority                                                                discharge BSA / BMI:          1.99 m2 / 24.27      Department Location:  [facility] [place]                     kg/m2 Blood Pressure: 165 /73 mmHg Study Type:    TRANSTHORACIC ECHO (TTE) LIMITED Diagnosis/ICD: Chest pain, unspecified-R07.9  Indication:    Chest Pain CPT Codes:     Echo Limited-42222; Doppler Limited-40587; Color Doppler-30252  Study Detail: The following Echo studies were performed: 2D, M-Mode, color flow               and Doppler.  PHYSICIAN INTERPRETATION: Left Ventricle: Left ventricular ejection fraction is mildly decreased, by visual estimate at 45-50%. There is global hypokinesis of the left ventricle with minor regional variations. The left ventricular cavity size is normal. There is no evidence of left ventricular hypertrophy. Spectral Doppler shows a Grade II (pseudonormal pattern) of left ventricular diastolic filling with an elevated left atrial pressure. The intraventricular septum appears intact without evidence of shunting or a ventricular septal defect. Left Atrium: The left atrial size is normal. Right Ventricle: The right ventricle is normal in size. There is normal right ventricular global systolic function. A device is visualized in the right ventricle. Right Atrium: The right atrial size is normal. Aortic Valve: The aortic valve was not assessed. There is no evidence of aortic valve regurgitation. Mitral Valve: The mitral valve is mild to moderately thickened. There is no evidence of mitral valve stenosis. There is mild mitral annular calcification. There is no evidence of mitral valve regurgitation. Tricuspid Valve: The tricuspid valve is structurally normal. There is no evidence of tricuspid valve stenosis. There is mild tricuspid regurgitation. The Doppler estimated RVSP is within normal limits at 32.4 mmHg. Pulmonic Valve: The pulmonic valve is not well visualized. The pulmonic valve regurgitation was not assessed. Pericardium: Trivial pericardial effusion. Aorta: The aortic root was not assessed. Systemic Veins: The inferior vena cava appears normal in size, with IVC inspiratory collapse greater than 50%. In comparison to the previous echocardiogram(s): Compared with study dated 12/5/2024,. LVEF has mildly  declined.  CONCLUSIONS:  1. Left ventricular ejection fraction is mildly decreased, by visual estimate at 45-50%.  2. There is global hypokinesis of the left ventricle with minor regional variations.  3. Spectral Doppler shows a Grade II (pseudonormal pattern) of left ventricular diastolic filling with an elevated left atrial pressure.  4. Intact intraventricular septum without shunting or a ventricular septal defect.  5. There is no evidence of left ventricular hypertrophy.  6. There is normal right ventricular global systolic function.  7. There is No tricuspid stenosis.  8. Right ventricular systolic pressure is within normal limits.  9. Technically difficult study due to poor acoustic images - no contrasted images. QUANTITATIVE DATA SUMMARY:  2D MEASUREMENTS:             Normal Ranges: LAs:             3.40 cm     (2.7-4.0cm) IVSd:            0.80 cm     (0.6-1.1cm) LVPWd:           0.90 cm     (0.6-1.1cm) LVIDd:           5.00 cm     (3.9-5.9cm) LVIDs:           3.90 cm LV Mass Index:   73.9 g/m2 LVEDV Index:     40.01 ml/m2 LV % FS          22.0 %  LEFT ATRIUM:                  Normal Ranges: LA Vol A4C:        65.1 ml    (22+/-6mL/m2) LA Vol A2C:        60.3 ml LA Vol BP:         65.6 ml LA Vol Index A4C:  32.7ml/m2 LA Vol Index A2C:  30.3 ml/m2 LA Vol Index BP:   33.0 ml/m2 LA Area A4C:       21.0 cm2 LA Area A2C:       19.3 cm2 LA Major Axis A4C: 5.8 cm LA Major Axis A2C: 5.2 cm LA Volume Index:   30.0 ml/m2 LA Vol A4C:        57.5 ml LA Vol A2C:        56.9 ml LA Vol Index BSA:  28.8 ml/m2  RIGHT ATRIUM:                 Normal Ranges: RA Vol A4C:        27.4 ml    (8.3-19.5ml) RA Vol Index A4C:  13.8 ml/m2 RA Area A4C:       12.7 cm2 RA Major Axis A4C: 5.0 cm  LV SYSTOLIC FUNCTION:                      Normal Ranges: EF-A4C View:    45 % (>=55%) EF-A2C View:    43 % EF-Biplane:     46 % EF-Visual:      48 % LV EF Reported: 48 %  LV DIASTOLIC FUNCTION:           Normal Ranges: MV Peak E:             0.87  m/s  (0.7-1.2 m/s) MV Peak A:             0.49 m/s  (0.42-0.7 m/s) E/A Ratio:             1.76      (1.0-2.2) MV e'                  0.061 m/s (>8.0) MV lateral e'          0.07 m/s MV medial e'           0.05 m/s E/e' Ratio:            14.27     (<8.0)  MITRAL VALVE:          Normal Ranges: MV DT:        207 msec (150-240msec)  RIGHT VENTRICLE: TAPSE: 16.1 mm RV s'  0.08 m/s  TRICUSPID VALVE/RVSP:          Normal Ranges: Peak TR Velocity:     2.71 m/s Est. RA Pressure:     3 mmHg RV Syst Pressure:     32 mmHg  (< 30mmHg) IVC Diam:             1.50 cm  [id number] Kavita Varner MD Electronically signed on 4/2/2025 at 6:12:47 PM  ** Final **     ECG 12 lead    Result Date: 4/2/2025  Atrial-paced rhythm with prolonged AV conduction Left ventricular hypertrophy with repolarization abnormality ( R in aVL ) Inferior infarct , age undetermined Anterolateral infarct , age undetermined Abnormal ECG When compared with ECG of 22-DEC-2024 17:41, Significant changes have occurred    CT angio abdomen pelvis w and or wo IV IV contrast    Result Date: 4/2/2025  STUDY: CT CTA ABDOMEN and PELVIS w + wo CONTRAST; 4/2/2025 3:35 AM INDICATION:  Low hemoglobin, occult blood positive. TECHNIQUE:  Helical CT is performed from the aortic diaphragmatic hiatus through the symphysis pubis before and after bolus administration of 90 mL of Omnipaque 350.  Images were reviewed and processed at a workstation according to the CT angiogram protocol with 3-D and/or MIP post processing imaging generated.  1862 DICOM images received. Automated mA/kV exposure control was utilized and patient examination was performed in strict accordance with principles of ALARA. COMPARISON:  CTA abdomen and pelvis 2/9/2025. FINDINGS: Vascular: Mesenteric: The celiac, SMA, and GIUSEPPE demonstrate no significant stenosis.  Right renal: Single vessel demonstrates no significant stenosis.  Left renal: Single vessel demonstrates no significant stenosis. Abdominal aorta: The  abdominal aorta is not aneurysmal and demonstrates no significant occlusive disease. Iliacs: The common, external, and internal iliac vessels demonstrate no aneurysmal disease or significant stenosis. Abdomen: The lung bases show mild septal thickening trace atelectasis.  Small right and trace left pleural effusions. The liver is shows no acute finding.  A few tiny calcified gallstones.  No signs of acute cholecystitis.  The pancreas, spleen, adrenal glands, and kidneys demonstrate no acute pathology. There is no free air or lymph node enlargement. Pelvis: There is no bowel wall thickening or obstruction.  Scattered diverticula.  Normal appendix.  There is no free fluid.  Lymph nodes are not enlarged.  Urinary bladder is unremarkable. Skeleton:  There are no acute fractures.  No suspicious bony lesions.  Right hip Arthroplasty noted.    No evidence of active arterial bleeding. Several scattered colonic diverticula. Signed by Gokul Huffman MD    XR chest 1 view    Result Date: 4/1/2025  STUDY: Chest Radiograph;  04/01/2025 03:56 PM INDICATION: Chest pain. COMPARISON: None available. ACCESSION NUMBER(S): [id number] ORDERING CLINICIAN: CELENA SANTO TECHNIQUE:  Frontal chest was obtained at 15:35 hours. FINDINGS: CARDIOMEDIASTINAL SILHOUETTE: Cardiomediastinal silhouette is normal in size and configuration.  LUNGS: Lungs are clear. Left-sided pacemaker in place. Median sternotomy wires noted.  ABDOMEN: No remarkable upper abdominal findings.  BONES: No acute osseous changes.    No acute process. Signed by Forest Grubbs MD         RADIOLOGY     Transthoracic Echo (TTE) Limited   Final Result      CT angio abdomen pelvis w and or wo IV IV contrast   Final Result   No evidence of active arterial bleeding.   Several scattered colonic diverticula.   Signed by Gokul Huffman MD      XR chest 1 view   Final Result   No acute process.   Signed by Forest Grubbs MD        Echo and imaging reviewed.     ASSESSMENT     Problem  List Items Addressed This Visit       Anemia due to GI blood loss    Relevant Orders    Esophagogastroduodenoscopy (EGD)    Colonoscopy Diagnostic    * (Principal) Chest pain, unspecified type - Primary    Relevant Orders    Transthoracic Echo (TTE) Limited (Completed)     Other Visit Diagnoses       Symptomatic anemia                Patient Active Problem List   Diagnosis    Ischemic cardiomyopathy    Moderate vascular dementia with anxiety    Conversion reaction    Auditory hallucinations    Coronary artery disease involving native coronary artery of native heart without angina pectoris    Epistaxis, recurrent    Angina pectoris    Moderate vascular dementia with psychotic disturbance    Episodes of formed visual hallucinations    Frequent falls    Hypertensive disorder    Obstructive sleep apnea syndrome    Orthostatic hypotension    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    Seizure disorder (Multi)    Angina pectoris, unstable (Multi)    Diastolic congestive heart failure    3-vessel coronary artery disease    Scrotal hematoma    Anemia due to GI blood loss    Upper GI bleed    Physical debility    Long term (current) use of antithrombotics/antiplatelets    Chest pain, unspecified type       PLAN     Secondary angina.  Patient is doing well at this time with a combination of transfusion and oral medications without any further angina.  Would continue his current therapy.  Anemia.  Improved.  Will defer to the primary service any ongoing workup.  Coronary artery disease with history of coronary intervention.  Continue conservative medical therapy only until this issue related to his anemia and bleeding has resolved.  If he has any residual symptoms once it is resolved would then defer to his outpatient cardiologist whether repeat invasive evaluation would be appropriate.    The patient is stable for discharge from a cardiac standpoint.  He should follow-up with Dr. Lima in 1 to 2 weeks once discharged from  the hospital.  Please consult or contact me for any recurrent problems during this hospitalization.    Please do not hesitate to call with questions.  Electronically signed by Kavita Varner MD, on 4/3/2025 at 10:35 AM

## 2025-04-03 NOTE — PROGRESS NOTES
Occupational Therapy                 Therapy Communication Note    Patient Name: Wayne Burkitt  MRN: [id number]  Department: [facility]  Room: 2105/2105-A  Today's Date: 4/3/2025     Discipline: Occupational Therapy    OT Missed Visit: Yes     Missed Visit Reason: Missed Visit Reason:  (Patient to have EGD and colonoscopy this afternoon.  Patient dealing with bowel prep.  Reattempt as appropriate.)    Missed Time: Attempt    Comment:

## 2025-04-03 NOTE — PROGRESS NOTES
"ID:  Wayne Burkitt is a 79 y.o. male on hospital day 2 of admission presenting with Chest pain, unspecified type.    Subjective   The patient seen and examined this morning at bedside. No significant overnight events. Patient tolerated bowel prep well per RN. No acute complaints at this time. Scheduled for EGD/colonoscopy today.    Objective     Visit Vitals  /60 (BP Location: Left arm, Patient Position: Lying)   Pulse 60   Temp 37.1 °C (98.8 °F) (Temporal)   Resp 11   Ht 1.803 m (5' 11\")   Wt 79.2 kg (174 lb 9.7 oz)   SpO2 95%   BMI 24.35 kg/m²   Smoking Status Former   BSA 1.99 m²        Physical Examination:  General: Not in acute distress, A&O x3, alert, coopertive, well-developed  HEENT: Normocephalic, atraumatic, EOMI, moist mucous membranes  Neck: Neck supple, trachea midline, no evidence of trauma  Cardiovascular: RRR, S1 and S2 appreciated, no murmurs rubs gallops appreciated, distal pulses 2+ bilaterally (radial and dorsalis pedis), pacemaker in place  Respiratory: Vesicular breath sounds appreciated bilaterally, no adventitious sounds appreciated, no increased work of breathing  GI: Abdomen soft, nondistended, nontender to palpation, bowel sounds present  Extremities: No edema appreciated in lower extremities bilaterally, no cyanosis  Neuro: A&O x3, no focal deficits, strength and sensation intact bilaterally  Skin: Warm and dry, without lesions or rashes  Psychiatric: Judgment intact.  Appropriate mood, affect and behavior.    Laboratory Data:  Results for orders placed or performed during the hospital encounter of 04/01/25 (from the past 24 hours)   Renal function panel   Result Value Ref Range    Glucose 105 (H) 74 - 99 mg/dL    Sodium 140 136 - 145 mmol/L    Potassium 4.2 3.5 - 5.3 mmol/L    Chloride 108 (H) 98 - 107 mmol/L    Bicarbonate 26 21 - 32 mmol/L    Anion Gap 10 10 - 20 mmol/L    Urea Nitrogen 20 6 - 23 mg/dL    Creatinine 1.06 0.50 - 1.30 mg/dL    eGFR 72 >60 mL/min/1.73m*2    Calcium " 8.6 8.6 - 10.3 mg/dL    Phosphorus 3.5 2.5 - 4.9 mg/dL    Albumin 3.7 3.4 - 5.0 g/dL   Transthoracic Echo (TTE) Limited   Result Value Ref Range    LV Biplane EF 46 %    MV E/A ratio 1.76     Tricuspid annular plane systolic excursion 1.6 cm    LA vol index A/L 33.0 ml/m2    LV EF 48 %    RV free wall pk S' 8.16 cm/s    RVSP 32.4 mmHg    LVIDd 5.00 cm    LV A4C EF 44.5    CBC   Result Value Ref Range    WBC 4.8 4.4 - 11.3 x10*3/uL    nRBC 0.4 (H) 0.0 - 0.0 /100 WBCs    RBC 3.21 (L) 4.50 - 5.90 x10*6/uL    Hemoglobin 8.3 (L) 13.5 - 17.5 g/dL    Hematocrit 27.4 (L) 41.0 - 52.0 %    MCV 85 80 - 100 fL    MCH 25.9 (L) 26.0 - 34.0 pg    MCHC 30.3 (L) 32.0 - 36.0 g/dL    RDW 18.4 (H) 11.5 - 14.5 %    Platelets 269 150 - 450 x10*3/uL   Renal Function Panel   Result Value Ref Range    Glucose 81 74 - 99 mg/dL    Sodium 142 136 - 145 mmol/L    Potassium 3.8 3.5 - 5.3 mmol/L    Chloride 110 (H) 98 - 107 mmol/L    Bicarbonate 24 21 - 32 mmol/L    Anion Gap 12 10 - 20 mmol/L    Urea Nitrogen 16 6 - 23 mg/dL    Creatinine 0.98 0.50 - 1.30 mg/dL    eGFR 79 >60 mL/min/1.73m*2    Calcium 8.2 (L) 8.6 - 10.3 mg/dL    Phosphorus 3.6 2.5 - 4.9 mg/dL    Albumin 3.4 3.4 - 5.0 g/dL   Magnesium   Result Value Ref Range    Magnesium 1.91 1.60 - 2.40 mg/dL       Imaging:  Imaging  CT angio abdomen pelvis w and or wo IV IV contrast    Result Date: 4/2/2025  No evidence of active arterial bleeding. Several scattered colonic diverticula. Signed by Gokul Huffman MD    XR chest 1 view    Result Date: 4/1/2025  No acute process. Signed by Forest Grubbs MD     Cardiology, Vascular, and Other Imaging  Transthoracic Echo (TTE) Limited    Result Date: 4/2/2025            [facility] [place] [address] [address], [facility] [address]    Tel [phone] Fax [phone] TRANSTHORACIC ECHOCARDIOGRAM REPORT Patient Name:       WAYNE BURKITT        Reading Physician:    [id number] Kavita                                                                 Telly FRANZ Study Date:         4/2/2025             Ordering Provider:    [id number] LILIANE ANDERSON MRN/PID:            [id number]             Fellow: Accession#:         [id number]         Nurse: Date of Birth/Age:  1945 / 79      Sonographer:          Amy chong Gender Assigned at  M                    Additional Staff: Birth: Height:             180.34 cm            Admit Date: Weight:             78.93 kg             Admission Status:     Inpatient -                                                                Priority                                                                discharge BSA / BMI:          1.99 m2 / 24.27      Department Location:  [facility] CCU SD                     kg/m2 Blood Pressure: 165 /73 mmHg Study Type:    TRANSTHORACIC ECHO (TTE) LIMITED Diagnosis/ICD: Chest pain, unspecified-R07.9 Indication:    Chest Pain CPT Codes:     Echo Limited-97863; Doppler Limited-89455; Color Doppler-27598  Study Detail: The following Echo studies were performed: 2D, M-Mode, color flow               and Doppler.  PHYSICIAN INTERPRETATION: Left Ventricle: Left ventricular ejection fraction is mildly decreased, by visual estimate at 45-50%. There is global hypokinesis of the left ventricle with minor regional variations. The left ventricular cavity size is normal. There is no evidence of left ventricular hypertrophy. Spectral Doppler shows a Grade II (pseudonormal pattern) of left ventricular diastolic filling with an elevated left atrial pressure. The intraventricular septum appears intact without evidence of shunting or a ventricular septal defect. Left Atrium: The left atrial size is normal. Right Ventricle: The right ventricle is normal in size. There is normal right ventricular global systolic function. A device is visualized in the right ventricle. Right Atrium: The right atrial size is normal. Aortic Valve: The aortic valve was not assessed. There is no evidence of aortic  valve regurgitation. Mitral Valve: The mitral valve is mild to moderately thickened. There is no evidence of mitral valve stenosis. There is mild mitral annular calcification. There is no evidence of mitral valve regurgitation. Tricuspid Valve: The tricuspid valve is structurally normal. There is no evidence of tricuspid valve stenosis. There is mild tricuspid regurgitation. The Doppler estimated RVSP is within normal limits at 32.4 mmHg. Pulmonic Valve: The pulmonic valve is not well visualized. The pulmonic valve regurgitation was not assessed. Pericardium: Trivial pericardial effusion. Aorta: The aortic root was not assessed. Systemic Veins: The inferior vena cava appears normal in size, with IVC inspiratory collapse greater than 50%. In comparison to the previous echocardiogram(s): Compared with study dated 12/5/2024,. LVEF has mildly declined.  CONCLUSIONS:  1. Left ventricular ejection fraction is mildly decreased, by visual estimate at 45-50%.  2. There is global hypokinesis of the left ventricle with minor regional variations.  3. Spectral Doppler shows a Grade II (pseudonormal pattern) of left ventricular diastolic filling with an elevated left atrial pressure.  4. Intact intraventricular septum without shunting or a ventricular septal defect.  5. There is no evidence of left ventricular hypertrophy.  6. There is normal right ventricular global systolic function.  7. There is No tricuspid stenosis.  8. Right ventricular systolic pressure is within normal limits.  9. Technically difficult study due to poor acoustic images - no contrasted images. QUANTITATIVE DATA SUMMARY:  2D MEASUREMENTS:             Normal Ranges: LAs:             3.40 cm     (2.7-4.0cm) IVSd:            0.80 cm     (0.6-1.1cm) LVPWd:           0.90 cm     (0.6-1.1cm) LVIDd:           5.00 cm     (3.9-5.9cm) LVIDs:           3.90 cm LV Mass Index:   73.9 g/m2 LVEDV Index:     40.01 ml/m2 LV % FS          22.0 %  LEFT ATRIUM:                   Normal Ranges: LA Vol A4C:        65.1 ml    (22+/-6mL/m2) LA Vol A2C:        60.3 ml LA Vol BP:         65.6 ml LA Vol Index A4C:  32.7ml/m2 LA Vol Index A2C:  30.3 ml/m2 LA Vol Index BP:   33.0 ml/m2 LA Area A4C:       21.0 cm2 LA Area A2C:       19.3 cm2 LA Major Axis A4C: 5.8 cm LA Major Axis A2C: 5.2 cm LA Volume Index:   30.0 ml/m2 LA Vol A4C:        57.5 ml LA Vol A2C:        56.9 ml LA Vol Index BSA:  28.8 ml/m2  RIGHT ATRIUM:                 Normal Ranges: RA Vol A4C:        27.4 ml    (8.3-19.5ml) RA Vol Index A4C:  13.8 ml/m2 RA Area A4C:       12.7 cm2 RA Major Axis A4C: 5.0 cm  LV SYSTOLIC FUNCTION:                      Normal Ranges: EF-A4C View:    45 % (>=55%) EF-A2C View:    43 % EF-Biplane:     46 % EF-Visual:      48 % LV EF Reported: 48 %  LV DIASTOLIC FUNCTION:           Normal Ranges: MV Peak E:             0.87 m/s  (0.7-1.2 m/s) MV Peak A:             0.49 m/s  (0.42-0.7 m/s) E/A Ratio:             1.76      (1.0-2.2) MV e'                  0.061 m/s (>8.0) MV lateral e'          0.07 m/s MV medial e'           0.05 m/s E/e' Ratio:            14.27     (<8.0)  MITRAL VALVE:          Normal Ranges: MV DT:        207 msec (150-240msec)  RIGHT VENTRICLE: TAPSE: 16.1 mm RV s'  0.08 m/s  TRICUSPID VALVE/RVSP:          Normal Ranges: Peak TR Velocity:     2.71 m/s Est. RA Pressure:     3 mmHg RV Syst Pressure:     32 mmHg  (< 30mmHg) IVC Diam:             1.50 cm  [id number] Kavita Varner MD Electronically signed on 4/2/2025 at 6:12:47 PM  ** Final **     ECG 12 lead    Result Date: 4/2/2025  Atrial-paced rhythm with prolonged AV conduction Left ventricular hypertrophy with repolarization abnormality ( R in aVL ) Inferior infarct , age undetermined Anterolateral infarct , age undetermined Abnormal ECG When compared with ECG of 22-DEC-2024 17:41, Significant changes have occurred        Medications:  Scheduled medications  [Held by provider] aspirin, 81 mg, oral, Daily  brimonidine, 1 drop, Both  Eyes, BID  donepezil, 5 mg, oral, Nightly  isosorbide mononitrate ER, 30 mg, oral, Daily  levETIRAcetam, 1,000 mg, oral, BID  metoprolol succinate XL, 25 mg, oral, Daily  pantoprazole, 40 mg, intravenous, BID  perflutren lipid microspheres, 0.5-10 mL of dilution, intravenous, Once in imaging  perflutren protein A microsphere, 0.5 mL, intravenous, Once in imaging  pravastatin, 80 mg, oral, Nightly  QUEtiapine, 50 mg, oral, Nightly  sulfur hexafluoride microsphr, 2 mL, intravenous, Once in imaging      Continuous medications     PRN medications  PRN medications: acetaminophen, nitroglycerin, traZODone    Assessment    Assessment & Plan   Mr. Wayne Burkitt is a 79 y.o. male with PMHx of CAD s/p CABG and s/p PCI to RCA C/B scrotal/groin hematoma 12/2024 (not on DAPT), ischemic cardiomyopathy s/p pacemaker, HFpEF, vascular dementia, primary conversion reaction, chronic anemia (baseline Hgb 8-9), recurrent epistaxis, HTN, NIDDM II who presented to [facility] ED on 4/1/25 with chest pain. VSS HDS on RA. Initial Hgb 5.1 with appropriate incrementation after receiving 3 units PRBC at admission. He was found to have new TWI in inferior leads II, III and aVF and in lateral leads V5 and V6 on ECG. Repeat trops were negative. He was admitted to [facility] for acute on chronic anemia and concerns for in-stent rethrombosis.     Assessment & Plan  Chest pain, unspecified type    #Acute on chronic anemia (baseline Hgb 8-9)   #MOLLY and anemia of chronic disease   #Chronic epistaxis (since 1992)   #C/f for possible GIB  -EGD 2/11/25 revealed 5 mm angiectasia in the fundus of the stomach and body of the stomach with stigmata of recent hemorrhage s/p argon plasma coagulation   -CTA abdomen showed no evidence of active arterial bleeding. Several scattered colonic diverticula.  -No active signs of bleeding clinically at this time  -Received 3 units of pRBCs with appropriate incrementation of Hgb from 5.1 to 9.3  -Hemolysis workup negative with normal  haptoglobin and LDH  -Iron<10, Ferritin of 8, and elevated UIBC indicate MOLLY  -S/p Venofer transfusion on 4/2   -Hgb stable this morning  Plan:  -po K and Mag ordered to be given in the afternoon after procedures  -GI following for possible GIB with plans for EGD/Colonoscopy this afternoon  -NPO at midnight  -Monitor Hgb on am CBCs and transfuse if less than 7  -Avoid NSAIDs  -Start ferrous sulfate at discharge    #Typical angina   #CAD s/p CABG and s/p PCI to RCA c/b scrotal/groin hematoma 12/2024 (not on DAPT)  #Not on DAPT (ASA, Effient) c/f in-stent rethrombosis  #ICM s/p pacemaker   #HFpEF  #NSVT on telemetry (asymptomatic)   -EKG in ED with new TWI in inferior leads II, III and aVF. New TWI in lateral leads V5 and V6.   -[facility] 12/12/2024: 100% stenosis mid LAD, 50 to 70% first diagonal LAD stenosis, mid circumflex 100% stenosis, 90% percent RCA stenosis s/p stent, 100% PDA RCA stenosis, 70% SVG to second marginal stenosis  -TTE 12/5/2024: EF 55 to 60%, normal RVSP/RVSF  -Cardiology consulted and recommended starting Imdur   -Chest pain consistent with typical angina likely with a component of demand ischemia from his acute anemia   -Will plan to continue ASA 81 at discharge  -Limited TTE 4/2 showed mildly decreased EF at 45-50% with global hypokinesis of the LV, Grade II (pseudonormal pattern) of left ventricular diastolic filling with an elevated left atrial pressure.    CHRONIC PROBLEMS:  #Vascular Dementia  #[id number]  - Continue home medications as appropriate    Global Plan of Care  IVF: As needed  Electrolytes: Replete with goal K>4 and Mg>2   Diet: Currently NPO for EGD and colonoscopy today   DVT prophylaxis: none  Consults: cardiology (peripherally), GI  Code status: Full Code     Disposition: Anticipate 1-2 midnight stays pending EGD/Colonoscopy results     Patient seen and plan of care was discussed with senior resident and the attending.    Pao Serrano, DO  Internal Medicine, PGY-1  April 3, 2025

## 2025-04-03 NOTE — PROGRESS NOTES
Physical Therapy                 Therapy Communication Note    Patient Name: Wayne Burkitt  MRN: [id number]  Today's Date: 4/3/2025     Discipline: Physical Therapy    Room 2102    Missed Time:   Missed Visit Reason:   Comment:       04/03/25 1101   General   Missed Visit Reason Other (Comment)    Pt. scheduled for EGD and colonoscopy this PM.     Will see see the next available time.

## 2025-04-03 NOTE — ANESTHESIA PREPROCEDURE EVALUATION
Patient: Wayne Burkitt    Procedure Information       Anesthesia Start Date/Time: 04/03/25 1448    Scheduled providers: Duran Garcia MD    Procedures:       EGD      COLONOSCOPY    Location: [facility] [place]            Relevant Problems   Cardiac   (+) 3-vessel coronary artery disease   (+) Angina pectoris   (+) Angina pectoris, unstable (Multi)   (+) Chest pain, unspecified type   (+) Coronary artery disease involving native coronary artery of native heart without angina pectoris   (+) Diastolic congestive heart failure   (+) Hypertensive disorder      Neuro   (+) Moderate vascular dementia with anxiety   (+) Moderate vascular dementia with psychotic disturbance   (+) 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   (+) Seizure disorder (Multi)      GI   (+) Upper GI bleed      Hematology   (+) Anemia due to GI blood loss       Clinical information reviewed:   Tobacco  Allergies  Meds  Problems  Med Hx  Surg Hx   Fam Hx          NPO Detail:  No data recorded     Physical Exam    Airway  Mallampati: II  TM distance: >3 FB  Neck ROM: full     Cardiovascular   Rhythm: irregular  Rate: normal     Dental    Pulmonary   Breath sounds clear to auscultation  (+) decreased breath sounds     Abdominal   (+) obese  Abdomen: soft  Bowel sounds: normal           Anesthesia Plan    History of general anesthesia?: yes  History of complications of general anesthesia?: no    ASA 4     general and MAC   (MAC or TIVA.)  The patient is not a current smoker.  Patient was previously instructed to abstain from smoking on day of procedure.  Patient did not smoke on day of procedure.  Education provided regarding risk of obstructive sleep apnea.  intravenous induction   Postoperative administration of opioids is intended.  Anesthetic plan and risks discussed with patient.  Use of blood products discussed with patient who consented to blood products.    Plan discussed with CAA.

## 2025-04-03 NOTE — ANESTHESIA PROCEDURE NOTES
Peripheral IV  Date/Time: 4/3/2025 3:00 PM      Placement  Needle size: 22 G  Laterality: right  Location: wrist  Local anesthetic: none  Site prep: alcohol  Technique: anatomical landmarks  Attempts: 1  Difficult Venous Access: Yes

## 2025-04-03 NOTE — CARE PLAN
The patient's goals for the shift include  no pain    The clinical goals for the shift include  pt's hemoglobin will be >7 this shift.    Goals are met. Pt. Hemoglobin was 9.1 this shift. Pt. Is currently doing a bowel prep for a colonoscopy/EGD tomorrow. Per. Cardio no need for cath for possible stent reocclusion.

## 2025-04-03 NOTE — ANESTHESIA POSTPROCEDURE EVALUATION
Patient: Wayne Burkitt    Procedure Summary       Date: 04/03/25 Room / Location: [facility]    Anesthesia Start: 1448 Anesthesia Stop: 1530    Procedures:       EGD      COLONOSCOPY Diagnosis: Anemia due to GI blood loss    Scheduled Providers: Duran Garcia MD Responsible Provider: Raúl Maza MD    Anesthesia Type: MAC ASA Status: 4            Anesthesia Type: MAC    Vitals Value Taken Time   /65 04/03/25 1555   Temp 36.1 °C (97 °F) 04/03/25 1528   Pulse 60 04/03/25 1556   Resp 25 04/03/25 1556   SpO2 99 % 04/03/25 1556   Vitals shown include unfiled device data.    Anesthesia Post Evaluation    Patient location during evaluation: PACU  Patient participation: complete - patient participated  Level of consciousness: awake and alert  Pain management: satisfactory to patient  Airway patency: patent  Cardiovascular status: acceptable  Respiratory status: acceptable  Hydration status: acceptable  Postoperative Nausea and Vomiting: none        No notable events documented.

## 2025-04-04 LAB
ALBUMIN SERPL BCP-MCNC: 3.3 G/DL (ref 3.4–5)
ANION GAP SERPL CALC-SCNC: 8 MMOL/L (ref 10–20)
ATRIAL RATE: 61 BPM
BUN SERPL-MCNC: 12 MG/DL (ref 6–23)
CALCIUM SERPL-MCNC: 8.4 MG/DL (ref 8.6–10.3)
CHLORIDE SERPL-SCNC: 110 MMOL/L (ref 98–107)
CO2 SERPL-SCNC: 26 MMOL/L (ref 21–32)
CREAT SERPL-MCNC: 1.01 MG/DL (ref 0.5–1.3)
EGFRCR SERPLBLD CKD-EPI 2021: 77 ML/MIN/1.73M*2
ERYTHROCYTE [DISTWIDTH] IN BLOOD BY AUTOMATED COUNT: 18.9 % (ref 11.5–14.5)
GLUCOSE BLD MANUAL STRIP-MCNC: 96 MG/DL (ref 74–99)
GLUCOSE SERPL-MCNC: 89 MG/DL (ref 74–99)
HCT VFR BLD AUTO: 28 % (ref 41–52)
HGB BLD-MCNC: 8.4 G/DL (ref 13.5–17.5)
MAGNESIUM SERPL-MCNC: 1.92 MG/DL (ref 1.6–2.4)
MCH RBC QN AUTO: 25.5 PG (ref 26–34)
MCHC RBC AUTO-ENTMCNC: 30 G/DL (ref 32–36)
MCV RBC AUTO: 85 FL (ref 80–100)
NRBC BLD-RTO: 0 /100 WBCS (ref 0–0)
P AXIS: 57 DEGREES
P OFFSET: 168 MS
P ONSET: 133 MS
PHOSPHATE SERPL-MCNC: 3.6 MG/DL (ref 2.5–4.9)
PLATELET # BLD AUTO: 257 X10*3/UL (ref 150–450)
POTASSIUM SERPL-SCNC: 4.2 MMOL/L (ref 3.5–5.3)
PR INTERVAL: 214 MS
Q ONSET: 213 MS
QRS COUNT: 10 BEATS
QRS DURATION: 96 MS
QT INTERVAL: 432 MS
QTC CALCULATION(BAZETT): 434 MS
QTC FREDERICIA: 434 MS
R AXIS: 23 DEGREES
RBC # BLD AUTO: 3.29 X10*6/UL (ref 4.5–5.9)
SODIUM SERPL-SCNC: 140 MMOL/L (ref 136–145)
T AXIS: 173 DEGREES
T OFFSET: 429 MS
VENTRICULAR RATE: 61 BPM
WBC # BLD AUTO: 4.2 X10*3/UL (ref 4.4–11.3)

## 2025-04-04 PROCEDURE — 97110 THERAPEUTIC EXERCISES: CPT | Mod: GP,CQ

## 2025-04-04 PROCEDURE — 2500000002 HC RX 250 W HCPCS SELF ADMINISTERED DRUGS (ALT 637 FOR MEDICARE OP, ALT 636 FOR OP/ED)

## 2025-04-04 PROCEDURE — 36415 COLL VENOUS BLD VENIPUNCTURE: CPT

## 2025-04-04 PROCEDURE — 97116 GAIT TRAINING THERAPY: CPT | Mod: GP,CQ

## 2025-04-04 PROCEDURE — 99233 SBSQ HOSP IP/OBS HIGH 50: CPT | Performed by: STUDENT IN AN ORGANIZED HEALTH CARE EDUCATION/TRAINING PROGRAM

## 2025-04-04 PROCEDURE — 85027 COMPLETE CBC AUTOMATED: CPT

## 2025-04-04 PROCEDURE — 2500000004 HC RX 250 GENERAL PHARMACY W/ HCPCS (ALT 636 FOR OP/ED)

## 2025-04-04 PROCEDURE — 83735 ASSAY OF MAGNESIUM: CPT

## 2025-04-04 PROCEDURE — 84520 ASSAY OF UREA NITROGEN: CPT

## 2025-04-04 PROCEDURE — 97530 THERAPEUTIC ACTIVITIES: CPT | Mod: GP,CQ

## 2025-04-04 PROCEDURE — 1200000002 HC GENERAL ROOM WITH TELEMETRY DAILY

## 2025-04-04 PROCEDURE — 82947 ASSAY GLUCOSE BLOOD QUANT: CPT

## 2025-04-04 PROCEDURE — 2500000001 HC RX 250 WO HCPCS SELF ADMINISTERED DRUGS (ALT 637 FOR MEDICARE OP)

## 2025-04-04 PROCEDURE — 2500000001 HC RX 250 WO HCPCS SELF ADMINISTERED DRUGS (ALT 637 FOR MEDICARE OP): Performed by: STUDENT IN AN ORGANIZED HEALTH CARE EDUCATION/TRAINING PROGRAM

## 2025-04-04 PROCEDURE — 2500000001 HC RX 250 WO HCPCS SELF ADMINISTERED DRUGS (ALT 637 FOR MEDICARE OP): Performed by: INTERNAL MEDICINE

## 2025-04-04 RX ORDER — PANTOPRAZOLE SODIUM 40 MG/1
40 TABLET, DELAYED RELEASE ORAL
Status: DISCONTINUED | OUTPATIENT
Start: 2025-04-05 | End: 2025-04-06 | Stop reason: HOSPADM

## 2025-04-04 RX ORDER — MAGNESIUM SULFATE HEPTAHYDRATE 40 MG/ML
2 INJECTION, SOLUTION INTRAVENOUS ONCE
Status: COMPLETED | OUTPATIENT
Start: 2025-04-04 | End: 2025-04-04

## 2025-04-04 RX ADMIN — DONEPEZIL HYDROCHLORIDE 5 MG: 5 TABLET ORAL at 21:09

## 2025-04-04 RX ADMIN — PANTOPRAZOLE SODIUM 40 MG: 40 INJECTION, POWDER, FOR SOLUTION INTRAVENOUS at 09:28

## 2025-04-04 RX ADMIN — QUETIAPINE FUMARATE 50 MG: 50 TABLET ORAL at 21:09

## 2025-04-04 RX ADMIN — METOPROLOL SUCCINATE 25 MG: 25 TABLET, EXTENDED RELEASE ORAL at 09:28

## 2025-04-04 RX ADMIN — LEVETIRACETAM 1000 MG: 500 TABLET, FILM COATED ORAL at 21:08

## 2025-04-04 RX ADMIN — ASPIRIN 81 MG: 81 TABLET, COATED ORAL at 09:28

## 2025-04-04 RX ADMIN — MAGNESIUM SULFATE HEPTAHYDRATE 2 G: 40 INJECTION, SOLUTION INTRAVENOUS at 17:56

## 2025-04-04 RX ADMIN — TRAZODONE HYDROCHLORIDE 25 MG: 50 TABLET ORAL at 21:09

## 2025-04-04 RX ADMIN — PRAVASTATIN SODIUM 80 MG: 20 TABLET ORAL at 21:08

## 2025-04-04 RX ADMIN — ISOSORBIDE MONONITRATE 30 MG: 30 TABLET, EXTENDED RELEASE ORAL at 09:28

## 2025-04-04 RX ADMIN — LEVETIRACETAM 1000 MG: 500 TABLET, FILM COATED ORAL at 09:28

## 2025-04-04 RX ADMIN — ACETAMINOPHEN 650 MG: 325 TABLET ORAL at 21:08

## 2025-04-04 ASSESSMENT — PAIN SCALES - GENERAL
PAINLEVEL_OUTOF10: 0 - NO PAIN
PAINLEVEL_OUTOF10: 0 - NO PAIN
PAINLEVEL_OUTOF10: 3
PAINLEVEL_OUTOF10: 0 - NO PAIN
PAINLEVEL_OUTOF10: 0 - NO PAIN

## 2025-04-04 ASSESSMENT — COGNITIVE AND FUNCTIONAL STATUS - GENERAL
DRESSING REGULAR UPPER BODY CLOTHING: A LITTLE
DRESSING REGULAR LOWER BODY CLOTHING: A LITTLE
WALKING IN HOSPITAL ROOM: A LITTLE
STANDING UP FROM CHAIR USING ARMS: A LITTLE
MOVING FROM LYING ON BACK TO SITTING ON SIDE OF FLAT BED WITH BEDRAILS: A LITTLE
CLIMB 3 TO 5 STEPS WITH RAILING: A LITTLE
TURNING FROM BACK TO SIDE WHILE IN FLAT BAD: A LITTLE
PERSONAL GROOMING: A LITTLE
CLIMB 3 TO 5 STEPS WITH RAILING: A LITTLE
WALKING IN HOSPITAL ROOM: A LITTLE
MOBILITY SCORE: 20
MOVING TO AND FROM BED TO CHAIR: A LITTLE
MOBILITY SCORE: 18
TOILETING: A LITTLE
DAILY ACTIVITIY SCORE: 19
MOVING TO AND FROM BED TO CHAIR: A LITTLE
STANDING UP FROM CHAIR USING ARMS: A LITTLE
HELP NEEDED FOR BATHING: A LITTLE

## 2025-04-04 ASSESSMENT — PAIN DESCRIPTION - ORIENTATION: ORIENTATION: LEFT

## 2025-04-04 ASSESSMENT — PAIN DESCRIPTION - LOCATION: LOCATION: NECK

## 2025-04-04 ASSESSMENT — PAIN - FUNCTIONAL ASSESSMENT
PAIN_FUNCTIONAL_ASSESSMENT: 0-10

## 2025-04-04 NOTE — SIGNIFICANT EVENT
Rapid Response Note    Wayne Burkitt is a 79 y.o. male with pmhx of CAD s/p CABG and s/p PCI Dec 2024, ICM, HTN, HFpEF, T2DM, dementia, and pacemaker presenting with chest pain.       At 0449 p.m.: Rapid response was called for nonsustained V. tach.  On arrival to the room patient was alert and oriented, sitting in bed comfortably, asymptomatic.  He had just arrived to the unit being downgraded from the stepdown.    His vitals on arrival was HR 59, RR 20, /93, SpO2 98% on room air.    Patient reported that he was little bit angry of the nurses otherwise he did not have any other complaints.  He denied having any lightheadedness, dizziness, blurring of vision, chest pain, shortness of breath, or abdominal pain.    Physical exam was not significant with normal heart and lung sounds in addition to active bowel sounds.  No lower extremity edema was noted on physical exam.    Review of labs from this morning showed potassium level of 4.2, calcium 8.4, and magnesium of 1.92.  Given the patient's stable vitals, absence of clinical symptoms, and no significant lab abnormalities rapid response was ended with the plan to keep monitoring the patient.  No labs or imaging were ordered for the patient.    Dr. Lesly Bray was in the room during the rapid response and she personally evaluated the patient.      Discussed with attending,  Armando Roberts MD.   Internal Medicine Resident, PGY-1

## 2025-04-04 NOTE — CARE PLAN
The patient's goals for the shift include get some sleep    The clinical goals for the shift include Pt will remain HDS throughout shift and have no signs of acute bleeding.

## 2025-04-04 NOTE — PROGRESS NOTES
"ID:  Wayne Burkitt is a 79 y.o. male on hospital day 3 of admission presenting with Chest pain, unspecified type.    Subjective   The patient seen and examined this morning at bedside. No significant overnight events. No acute complaints today.     Objective     Visit Vitals  /73 (BP Location: Right arm, Patient Position: Lying)   Pulse 61   Temp 36.1 °C (97 °F) (Temporal)   Resp 18   Ht 1.803 m (5' 11\")   Wt 76.7 kg (169 lb 1.5 oz)   SpO2 95%   BMI 23.58 kg/m²   Smoking Status Former   BSA 1.96 m²        Physical Examination:  General: Not in acute distress, A&O x3, alert, coopertive, well-developed  HEENT: Normocephalic, atraumatic, EOMI, moist mucous membranes  Neck: trachea midline, no evidence of trauma  Cardiovascular: RRR, S1 and S2 appreciated, no murmurs rubs gallops appreciated  Respiratory: Vesicular breath sounds appreciated bilaterally, no adventitious sounds appreciated, no increased work of breathing  GI: Abdomen soft, nondistended, nontender to palpation  Extremities: No edema appreciated in lower extremities bilaterally, no cyanosis  Neuro: A&O x3, no focal deficits, strength and sensation intact bilaterally  Skin: Warm and dry, without lesions or rashes  Psychiatric: Judgment intact.  Appropriate mood, affect and behavior.    Laboratory Data:  Results for orders placed or performed during the hospital encounter of 04/01/25 (from the past 24 hours)   CBC   Result Value Ref Range    WBC 4.2 (L) 4.4 - 11.3 x10*3/uL    nRBC 0.0 0.0 - 0.0 /100 WBCs    RBC 3.29 (L) 4.50 - 5.90 x10*6/uL    Hemoglobin 8.4 (L) 13.5 - 17.5 g/dL    Hematocrit 28.0 (L) 41.0 - 52.0 %    MCV 85 80 - 100 fL    MCH 25.5 (L) 26.0 - 34.0 pg    MCHC 30.0 (L) 32.0 - 36.0 g/dL    RDW 18.9 (H) 11.5 - 14.5 %    Platelets 257 150 - 450 x10*3/uL   Renal Function Panel   Result Value Ref Range    Glucose 89 74 - 99 mg/dL    Sodium 140 136 - 145 mmol/L    Potassium 4.2 3.5 - 5.3 mmol/L    Chloride 110 (H) 98 - 107 mmol/L    Bicarbonate " 26 21 - 32 mmol/L    Anion Gap 8 (L) 10 - 20 mmol/L    Urea Nitrogen 12 6 - 23 mg/dL    Creatinine 1.01 0.50 - 1.30 mg/dL    eGFR 77 >60 mL/min/1.73m*2    Calcium 8.4 (L) 8.6 - 10.3 mg/dL    Phosphorus 3.6 2.5 - 4.9 mg/dL    Albumin 3.3 (L) 3.4 - 5.0 g/dL   Magnesium   Result Value Ref Range    Magnesium 1.92 1.60 - 2.40 mg/dL         Medications:  Scheduled medications  aspirin, 81 mg, oral, Daily  brimonidine, 1 drop, Both Eyes, BID  donepezil, 5 mg, oral, Nightly  isosorbide mononitrate ER, 30 mg, oral, Daily  levETIRAcetam, 1,000 mg, oral, BID  metoprolol succinate XL, 25 mg, oral, Daily  [START ON 4/5/2025] pantoprazole, 40 mg, oral, Daily before breakfast  pravastatin, 80 mg, oral, Nightly  QUEtiapine, 50 mg, oral, Nightly      Continuous medications     PRN medications  PRN medications: acetaminophen, nitroglycerin, traZODone    Assessment    Assessment & Plan   Mr. Wayne Burkitt is a 79 y.o. male with PMHx of CAD s/p CABG and s/p PCI to RCA C/B scrotal/groin hematoma 12/2024 (not on DAPT), ischemic cardiomyopathy s/p pacemaker, HFpEF, vascular dementia, primary conversion reaction, chronic anemia (baseline Hgb 8-9), recurrent epistaxis, HTN, NIDDM II who presented to [facility] ED on 4/1/25 with chest pain. VSS HDS on RA. Initial Hgb 5.1 with appropriate incrementation after receiving 3 units PRBC at admission. He was found to have new TWI in inferior leads II, III and aVF and in lateral leads V5 and V6 on ECG. Repeat trops were negative. He was admitted to [facility] for acute on chronic anemia and concerns for in-stent rethrombosis. Patient underwent EGD and colonoscopy with Dr. Blake yesterday. EGD noting 3 gastric AVMs treated with APC, extensive sigmoid diverticulosis. Diet advanced today, likely discharge back to [facility] tomorrow.     Assessment & Plan  Chest pain, unspecified type    #Acute on chronic anemia (baseline Hgb 8-9) 2/2 GIB/Recurrent Angioectasia s/p APC 4/3  -EGD 2/11/25 revealed 5 mm angiectasia in  the fundus of the stomach and body of the stomach with stigmata of recent hemorrhage s/p argon plasma coagulation   -CTA abdomen showed no evidence of active arterial bleeding. Several scattered colonic diverticula.  -No active signs of bleeding clinically at this time  -Hemolysis workup negative with normal haptoglobin and LDH  -Iron<10, Ferritin of 8, and elevated UIBC indicate MOLLY  -S/p Venofer transfusion on 4/2   -Hgb stable this morning  -EGD noting 3 gastric AVMs treated with APC, extensive sigmoid diverticulosis  Plan:  -Regular diet today   -Resume ASA, monitor for any more s/s of GIB   -Monitor Hgb on am CBCs and transfuse if less than 7  -Avoid NSAIDs  -Start ferrous sulfate at discharge  -Probable discharge tomorrow     #Typical angina 2/2 anemia in the setting of known CAD resolved with blood transfusion  #CAD s/p CABG and s/p PCI to RCA c/b scrotal/groin hematoma 12/2024 (not on DAPT, pt refused)  #ICM   #PPM  #HFpEF  #NSVT on telemetry (asymptomatic)   -EKG in ED with new TWI in inferior leads II, III and aVF. New TWI in lateral leads V5 and V6.   -LHC 12/12/2024: 100% stenosis mid LAD, 50 to 70% first diagonal LAD stenosis, mid circumflex 100% stenosis, 90% percent RCA stenosis s/p stent, 100% PDA RCA stenosis, 70% SVG to second marginal stenosis  -TTE 12/5/2024: EF 55 to 60%, normal RVSP/RVSF  -Cardiology consulted and recommended starting Imdur   -Chest pain consistent with typical angina likely with a component of demand ischemia from his acute anemia   -Will plan to continue ASA 81 at discharge  -Limited TTE 4/2 showed mildly decreased EF at 45-50% with global hypokinesis of the LV, Grade II (pseudonormal pattern) of left ventricular diastolic filling with an elevated left atrial pressure.    CHRONIC PROBLEMS:  #Vascular Dementia  #[id number]  - Continue home medications as appropriate    Global Plan of Care  IVF: As needed  Electrolytes: Replete with goal K>4 and Mg>2   Diet: Regular   DVT  prophylaxis: none  Consults: cardiology (peripherally), GI  Code status: Full Code     Disposition: Anticipate discharge back to [facility] tomorrow.     Patient seen and plan of care was discussed with senior resident and the attending.    Olvin Morales,   Family Medicine, PGY-2  April 4, 2025     -------------------------  Attending Addendum  -------------------------    Seen and examined with resident physicians. Labs and imaging personally reviewed.     79-year-old male with past medical history of CAD/CABG/PCI underwent stenting 12/2024 also with history of HFpEF, ischemic cardiomyopathy and he has a pacemaker although I cannot find the indication in the EMR.  He presented with chest pain, was found to be anemic, his chest pain resolved with blood transfusions.  Recently had a stent placed in December, refused P2Y inhibitor and on single agent aspirin.  Underwent EGD 4/3 which identified gastric angioectasias which had evidence of recent hemorrhage although were not actively bleeding and underwent APC.  Today's hemoglobin is stable at 8.4, he has been off aspirin since admission due to concern for GI bleed.    No acute events overnight, feeling well today, no complaints, hemodynamically stable and afebrile, denying chest pain, feels well, denying melena, no abdominal distention, well-perfused, regular rate and rhythm, oxygenating well on room air, moving all limbs, no acute distress    Acute on chronic blood loss anemia secondary to gastric angio ectasia status post APC  CAD status post recent PCI/TATYANA 12/2024  Will resume aspirin today, advance diet and observe overnight with hemoglobin in the morning to hopefully ensure resolution of bleeding and toleration of p.o. intake  Will follow-up with Dr. Segura in May to consider pill endoscopy as an outpatient  Will continue PPI daily  Hopefully should be stable for discharge tomorrow if he remains hemodynamically stable without evidence of bleeding  overnight    Case discussed with case management, residents and patient.    Complexity: High    I saw and evaluated the patient. I personally obtained the key and critical portions of the history and physical exam or was physically present for key and critical portions performed by the resident/fellow. I reviewed the resident/fellow's documentation and discussed the patient with the resident/fellow. I agree with the resident/fellow's medical decision making as documented in the note.    Festus Diaz, DO

## 2025-04-04 NOTE — NURSING NOTE
No events overnight. The pt had no nausea, no abd pain and no stooling overnight. The pt tolerated his full lq diet. His hgb remained stable this am at 8.4/28 .The pts abd is rounded but soft with bs present.  He's had no complaints of cp, dizziness  or sob. His bp can get ellevated after exertion at times  but returns to wnl with rest. .His EKG HAS BEEN MOSTLY PACED WITH OCC PVCS. TEMP AFEBRILE. He is voiding w/o difficulty.  The pt calls appropriately. Has been oriented all shift. Is voicing hunger this am. Pt safety was maintained.

## 2025-04-04 NOTE — NURSING NOTE
Received orders to transfer patient to 3.    Report called to Jennifer. Patient left unit with Eden CALVO at [phone]

## 2025-04-04 NOTE — PROGRESS NOTES
Physical Therapy    Physical Therapy Treatment    Patient Name: Wayne Burkitt  MRN: [id number]  Today's Date: 4/4/2025  Time Calculation  Start Time: 1255  Stop Time: 1346  Time Calculation (min): 51 min     3025/3025-A     04/04/25 1255   PT  Visit   PT Received On 04/04/25   General   Reason for Referral impaired mobility, gait training   Referred By Elliot Hernandez   Prior to Session Communication Bedside nurse   Patient Position Received Bed, 3 rail up;Alarm on   Preferred Learning Style verbal   General Comment Pt eager to participate in PT session, cleared with nurse   Vital Signs   Heart Rate 60   Heart Rate Source Monitor   Resp 17   /58   BP Location Left arm   BP Method Automatic   Patient Position Lying   Cognition   Orientation Level Oriented X4   Postural Control   Posture Comment Downward gaze and forward head able to correct with verbal cues   Static Sitting Balance   Static Sitting-Balance Support No upper extremity supported   Static Sitting-Level of Assistance Distant supervision   Static Sitting-Comment/Number of Minutes 4 mins EOB with verbal, tactile cues to avoid posterior lean, difficulty maintaining unsupported sit   Static Standing Balance   Static Standing-Balance Support No upper extremity supported   Static Standing-Level of Assistance Contact guard   Static Standing-Comment/Number of Minutes 2 mins no LOB, downward gaze able to correct with verbal cues   Therapeutic Exercise   Therapeutic Exercise Performed Yes   Therapeutic Exercise Activity 1 Supine: PF/DF, HS, GS, L hip ABD x 10; unable to complete R ABD d/t hip pain, bridging attempted stopped d/t R hip pain   Therapeutic Exercise Activity 2 Seated EOB LAQ x 10   Therapeutic Exercise Activity 3 Standing marches x 20, verbal cues to maintain upright posture   Bed Mobility   Bed Mobility Yes   Bed Mobility 1   Bed Mobility 1 Supine to sitting;Sitting to supine   Level of Assistance 1 Distant supervision   Bed Mobility Comments 1 Pt quick  with movments with slightly increased effort, able to scoot out of bed divot with verbal cues   Ambulation/Gait Training   Ambulation/Gait Training Performed Yes   Ambulation/Gait Training 1   Surface 1 Level tile   Device 1 Rolling walker   Gait Support Devices Gait belt   Assistance 1 Contact guard   Quality of Gait 1 Decreased step length   Comments/Distance (ft) 1 Verbal cues to maintain upright posture, no LOB. Able to interact with nurses while walking with slow speed and trunk rotation, maintaining forward amb. Difficulty sequencing during turn, with wide radius needing adjustment after getting too close to the wall.   Activity Tolerance   Endurance Endurance does not limit participation in activity   PT Assessment   PT Assessment Results Decreased strength;Decreased mobility   Rehab Prognosis Good   End of Session Communication Bedside nurse   End of Session Patient Position Bed, 3 rail up;Alarm off, caregiver present   PT Plan   PT Plan Ongoing PT   PT Frequency 3 times per week         Assessment/Plan         PT Plan  Treatment/Interventions: Bed mobility, Transfer training, Gait training, Balance training, Neuromuscular re-education, Strengthening, Range of motion, Therapeutic exercise, Therapeutic activity, Home exercise program  PT Plan: Ongoing PT  PT Frequency: 3 times per week  PT Discharge Recommendations:  (low vs no needs pending progress)  PT Recommended Transfer Status: Stand by assist, Contact guard  PT - OK to Discharge: Yes    Outcome Measures:  [facility] Basic Mobility  Turning from your back to your side while in a flat bed without using bedrails: None  Moving from lying on your back to sitting on the side of a flat bed without using bedrails: None  Moving to and from bed to chair (including a wheelchair): A little  Standing up from a chair using your arms (e.g. wheelchair or bedside chair): A little  To walk in hospital room: A little  Climbing 3-5 steps with railing: A little  Basic Mobility  - Total Score: 20                             EDUCATION:     Education Documentation  Precautions, taught by Alen Redmond PTA at 4/4/2025 12:55 PM.  Learner: Patient  Readiness: Acceptance  Method: Explanation, Demonstration  Response: Needs Reinforcement, Verbalizes Understanding    Body Mechanics, taught by Alen Redmond PTA at 4/4/2025 12:55 PM.  Learner: Patient  Readiness: Acceptance  Method: Explanation, Demonstration  Response: Needs Reinforcement, Verbalizes Understanding    Home Exercise Program, taught by Alen Redmond PTA at 4/4/2025 12:55 PM.  Learner: Patient  Readiness: Acceptance  Method: Explanation, Demonstration  Response: Needs Reinforcement, Verbalizes Understanding    Mobility Training, taught by Alen Redmond PTA at 4/4/2025 12:55 PM.  Learner: Patient  Readiness: Acceptance  Method: Explanation, Demonstration  Response: Needs Reinforcement, Verbalizes Understanding    Education Comments  No comments found.        GOALS:  Encounter Problems       Encounter Problems (Active)       PT Problem       Pt will be able to perform all bed mobility tasks with Mod I.  (Progressing)       Start:  04/02/25    Expected End:  04/16/25            Pt will perform all transfers with IND with proper safety mechanics.   (Progressing)       Start:  04/02/25    Expected End:  04/16/25            Pt will ambulate 150 ft with IND for improved functional independence.  (Progressing)       Start:  04/02/25    Expected End:  04/16/25               Pain - Adult

## 2025-04-04 NOTE — PROGRESS NOTES
04/04/25 1543   Discharge Planning   Expected Discharge Disposition Home     Patient transferred to  at 1414 today. Patient is from [facility]- received a message from Naz at [facility] [phone] requesting clinicals for patient for their DON to review. Faxed clinicals to [phone].

## 2025-04-04 NOTE — PROGRESS NOTES
"Subjective  Patient sitting up in bed in NAD.  S/p EGD and colonoscopy.  EGD noting 3 gastric AVMs treated with APC.  Colonoscopy noting extensive sigmoid diverticulosis.  Plan for PPI daily.  Patient will be scheduled for outpatient video capsule endoscopy.  Patient is scheduled for outpatient clinic appointment with Dr. Birch 5/22/2025.     Objective  Blood pressure 103/51, pulse 60, temperature 36.3 °C (97.3 °F), temperature source Temporal, resp. rate (!) 28, height 1.803 m (5' 11\"), weight 76.7 kg (169 lb 1.5 oz), SpO2 94%.    Physical Exam  Constitutional: Alert, pleasant and interactive, in NAD  Eyes: PERRL, sclera clear, no conjunctival injection  Skin: Warm and dry, no rash or ecchymosis  ENMT: Mucous membranes moist, no lesions noted  Resp: CTAB, even and unlabored  CV: RRR, normal S1, S2, no m,r,g  GI: +BS, soft, round, NT, no rebound tenderness or guarding, no palpable masses or organomegaly  Extremities: Extremities warm, no edema, contusions, wounds or cyanosis  Neuro: Alert and oriented x3  Psych: Appropriate mood and behavior    Medications  Scheduled medications  aspirin, 81 mg, oral, Daily  brimonidine, 1 drop, Both Eyes, BID  donepezil, 5 mg, oral, Nightly  isosorbide mononitrate ER, 30 mg, oral, Daily  levETIRAcetam, 1,000 mg, oral, BID  metoprolol succinate XL, 25 mg, oral, Daily  [START ON 4/5/2025] pantoprazole, 40 mg, oral, Daily before breakfast  pravastatin, 80 mg, oral, Nightly  QUEtiapine, 50 mg, oral, Nightly      Continuous medications     PRN medications  PRN medications: acetaminophen, nitroglycerin, traZODone     Labs  Lab Results   Component Value Date    WBC 4.2 (L) 04/04/2025    HGB 8.4 (L) 04/04/2025    HCT 28.0 (L) 04/04/2025    MCV 85 04/04/2025     04/04/2025     Lab Results   Component Value Date    GLUCOSE 89 04/04/2025    CALCIUM 8.4 (L) 04/04/2025     04/04/2025    K 4.2 04/04/2025    CO2 26 04/04/2025     (H) 04/04/2025    BUN 12 04/04/2025    " CREATININE 1.01 04/04/2025     Lab Results   Component Value Date    ALT 16 04/01/2025    AST 24 04/01/2025    ALKPHOS 91 04/01/2025    BILITOT 0.4 04/01/2025     Lab Results   Component Value Date    IRON <10 (L) 04/01/2025    TIBC  04/01/2025      Comment:      One or more of the analytes used in this calculation is outside of the analytical measurement range.      FERRITIN 8 (L) 04/01/2025     Lab Results   Component Value Date    INR 1.1 04/01/2025    INR 1.2 (H) 12/20/2024    INR 1.1 12/12/2024    PROTIME 12.5 (H) 04/01/2025    PROTIME 13.7 (H) 12/20/2024    PROTIME 12.9 (H) 12/12/2024     Radiology  CTA A/P with and without IV contrast 4/2/2025 noting:  Impression:     No evidence of active arterial bleeding.  Several scattered colonic diverticula.  Signed by Gokul Huffman MD         Assessment:  Wayne Burkitt is a 79 y.o. male with PMH of HTN, HLD, HFpEF, pacemaker presenting with chest pain which has since resolved.  GI consulted for acute anemia with hemoglobin to 5.1 incrementing to 9.3 with 3 units PRBCs.  Previously seen with hemoglobin 6.5, melena in February with EGD noting recently bleeding gastric AVMs treated with APC.  No longer on anticoagulation.  No overt signs of bleeding.      Patient underwent EGD and colonoscopy with Dr. Blake yesterday.  EGD noting 3 gastric AVMs treated with APC, extensive sigmoid diverticulosis noted on colonoscopy.  Patient feeling well this morning.  Hemoglobin stable at 8.4.  Patient tolerating diet.     Plan:  - continue supportive care  - ok to advance diet as tolerated  - continue to trend hgb daily unless pt becomes symptomatic or decompensates  - transfuse to maintain hgb >7  - continue to monitor for and document overt signs of bleeding  - no NSAIDS  - continue PPI daily  - pt will be scheduled for capsule endoscopy- order placed  - pt has follow up in clinic with Dr. Birch 5/22/25 at 2:20 pm      Plan has been discussed with Dr. Ron. GI will sign off.       Jennifer Selby, APRN/CNP

## 2025-04-04 NOTE — PROGRESS NOTES
Placed a call to [facility] to discuss patient and if patient able to return to facility on discharge. No answer on unit and a message was left to return call. CT team to follow patient.

## 2025-04-05 LAB
ALBUMIN SERPL BCP-MCNC: 3.4 G/DL (ref 3.4–5)
ANION GAP SERPL CALC-SCNC: 12 MMOL/L (ref 10–20)
BUN SERPL-MCNC: 12 MG/DL (ref 6–23)
CALCIUM SERPL-MCNC: 8.3 MG/DL (ref 8.6–10.3)
CHLORIDE SERPL-SCNC: 108 MMOL/L (ref 98–107)
CO2 SERPL-SCNC: 23 MMOL/L (ref 21–32)
CREAT SERPL-MCNC: 0.97 MG/DL (ref 0.5–1.3)
EGFRCR SERPLBLD CKD-EPI 2021: 80 ML/MIN/1.73M*2
ERYTHROCYTE [DISTWIDTH] IN BLOOD BY AUTOMATED COUNT: 19.7 % (ref 11.5–14.5)
GLUCOSE SERPL-MCNC: 101 MG/DL (ref 74–99)
HCT VFR BLD AUTO: 29.7 % (ref 41–52)
HGB BLD-MCNC: 8.8 G/DL (ref 13.5–17.5)
MAGNESIUM SERPL-MCNC: 2.21 MG/DL (ref 1.6–2.4)
MCH RBC QN AUTO: 26 PG (ref 26–34)
MCHC RBC AUTO-ENTMCNC: 29.6 G/DL (ref 32–36)
MCV RBC AUTO: 88 FL (ref 80–100)
NRBC BLD-RTO: 0 /100 WBCS (ref 0–0)
PHOSPHATE SERPL-MCNC: 3.5 MG/DL (ref 2.5–4.9)
PLATELET # BLD AUTO: 249 X10*3/UL (ref 150–450)
POTASSIUM SERPL-SCNC: 3.7 MMOL/L (ref 3.5–5.3)
RBC # BLD AUTO: 3.39 X10*6/UL (ref 4.5–5.9)
SODIUM SERPL-SCNC: 139 MMOL/L (ref 136–145)
WBC # BLD AUTO: 5 X10*3/UL (ref 4.4–11.3)

## 2025-04-05 PROCEDURE — 2500000001 HC RX 250 WO HCPCS SELF ADMINISTERED DRUGS (ALT 637 FOR MEDICARE OP)

## 2025-04-05 PROCEDURE — 83735 ASSAY OF MAGNESIUM: CPT

## 2025-04-05 PROCEDURE — 2500000002 HC RX 250 W HCPCS SELF ADMINISTERED DRUGS (ALT 637 FOR MEDICARE OP, ALT 636 FOR OP/ED)

## 2025-04-05 PROCEDURE — 2500000001 HC RX 250 WO HCPCS SELF ADMINISTERED DRUGS (ALT 637 FOR MEDICARE OP): Performed by: INTERNAL MEDICINE

## 2025-04-05 PROCEDURE — 36415 COLL VENOUS BLD VENIPUNCTURE: CPT

## 2025-04-05 PROCEDURE — 85027 COMPLETE CBC AUTOMATED: CPT

## 2025-04-05 PROCEDURE — 1200000002 HC GENERAL ROOM WITH TELEMETRY DAILY

## 2025-04-05 PROCEDURE — 80069 RENAL FUNCTION PANEL: CPT

## 2025-04-05 PROCEDURE — 99239 HOSP IP/OBS DSCHRG MGMT >30: CPT | Performed by: STUDENT IN AN ORGANIZED HEALTH CARE EDUCATION/TRAINING PROGRAM

## 2025-04-05 PROCEDURE — 2500000001 HC RX 250 WO HCPCS SELF ADMINISTERED DRUGS (ALT 637 FOR MEDICARE OP): Performed by: STUDENT IN AN ORGANIZED HEALTH CARE EDUCATION/TRAINING PROGRAM

## 2025-04-05 RX ORDER — PSYLLIUM HUSK 3.4G/5.8G
2 POWDER (GRAM) ORAL 2 TIMES DAILY
Qty: 126 ML | Refills: 0 | Status: SHIPPED | OUTPATIENT
Start: 2025-04-05

## 2025-04-05 RX ORDER — OXYMETAZOLINE HCL 0.05 %
2 SPRAY, NON-AEROSOL (ML) NASAL EVERY 12 HOURS PRN
Qty: 30 ML | Refills: 0 | Status: SHIPPED | OUTPATIENT
Start: 2025-04-05

## 2025-04-05 RX ORDER — FERROUS SULFATE 325(65) MG
325 TABLET, DELAYED RELEASE (ENTERIC COATED) ORAL
Qty: 30 TABLET | Refills: 0 | Status: SHIPPED | OUTPATIENT
Start: 2025-04-05 | End: 2025-04-05 | Stop reason: HOSPADM

## 2025-04-05 RX ORDER — OXYMETAZOLINE HCL 0.05 %
2 SPRAY, NON-AEROSOL (ML) NASAL EVERY 12 HOURS PRN
Status: DISCONTINUED | OUTPATIENT
Start: 2025-04-05 | End: 2025-04-06 | Stop reason: HOSPADM

## 2025-04-05 RX ADMIN — LEVETIRACETAM 1000 MG: 500 TABLET, FILM COATED ORAL at 08:03

## 2025-04-05 RX ADMIN — DONEPEZIL HYDROCHLORIDE 5 MG: 5 TABLET ORAL at 20:07

## 2025-04-05 RX ADMIN — METOPROLOL SUCCINATE 25 MG: 25 TABLET, EXTENDED RELEASE ORAL at 08:03

## 2025-04-05 RX ADMIN — PANTOPRAZOLE SODIUM 40 MG: 40 TABLET, DELAYED RELEASE ORAL at 06:00

## 2025-04-05 RX ADMIN — QUETIAPINE FUMARATE 50 MG: 50 TABLET ORAL at 20:07

## 2025-04-05 RX ADMIN — BRIMONIDINE TARTRATE 1 DROP: 2 SOLUTION/ DROPS OPHTHALMIC at 20:09

## 2025-04-05 RX ADMIN — ISOSORBIDE MONONITRATE 30 MG: 30 TABLET, EXTENDED RELEASE ORAL at 08:03

## 2025-04-05 RX ADMIN — LEVETIRACETAM 1000 MG: 500 TABLET, FILM COATED ORAL at 20:07

## 2025-04-05 RX ADMIN — ACETAMINOPHEN 650 MG: 325 TABLET ORAL at 08:03

## 2025-04-05 RX ADMIN — BRIMONIDINE TARTRATE 1 DROP: 2 SOLUTION/ DROPS OPHTHALMIC at 08:07

## 2025-04-05 RX ADMIN — ASPIRIN 81 MG: 81 TABLET, COATED ORAL at 08:03

## 2025-04-05 RX ADMIN — PRAVASTATIN SODIUM 80 MG: 20 TABLET ORAL at 20:06

## 2025-04-05 ASSESSMENT — COGNITIVE AND FUNCTIONAL STATUS - GENERAL
CLIMB 3 TO 5 STEPS WITH RAILING: A LITTLE
DRESSING REGULAR LOWER BODY CLOTHING: A LITTLE
CLIMB 3 TO 5 STEPS WITH RAILING: A LITTLE
TOILETING: A LITTLE
MOBILITY SCORE: 18
WALKING IN HOSPITAL ROOM: A LITTLE
HELP NEEDED FOR BATHING: A LITTLE
MOBILITY SCORE: 20
STANDING UP FROM CHAIR USING ARMS: A LITTLE
DRESSING REGULAR UPPER BODY CLOTHING: A LITTLE
DAILY ACTIVITIY SCORE: 20
MOVING TO AND FROM BED TO CHAIR: A LITTLE
DAILY ACTIVITIY SCORE: 19
PERSONAL GROOMING: A LITTLE
PERSONAL GROOMING: A LITTLE
MOVING TO AND FROM BED TO CHAIR: A LITTLE
HELP NEEDED FOR BATHING: A LITTLE
WALKING IN HOSPITAL ROOM: A LITTLE
MOVING FROM LYING ON BACK TO SITTING ON SIDE OF FLAT BED WITH BEDRAILS: A LITTLE
TURNING FROM BACK TO SIDE WHILE IN FLAT BAD: A LITTLE
STANDING UP FROM CHAIR USING ARMS: A LITTLE
TOILETING: A LITTLE
DRESSING REGULAR UPPER BODY CLOTHING: A LITTLE

## 2025-04-05 ASSESSMENT — PAIN - FUNCTIONAL ASSESSMENT
PAIN_FUNCTIONAL_ASSESSMENT: 0-10
PAIN_FUNCTIONAL_ASSESSMENT: 0-10

## 2025-04-05 ASSESSMENT — PAIN SCALES - GENERAL
PAINLEVEL_OUTOF10: 0 - NO PAIN
PAINLEVEL_OUTOF10: 0 - NO PAIN
PAINLEVEL_OUTOF10: 3

## 2025-04-05 ASSESSMENT — PAIN DESCRIPTION - ORIENTATION: ORIENTATION: LEFT

## 2025-04-05 ASSESSMENT — PAIN DESCRIPTION - LOCATION: LOCATION: NECK

## 2025-04-05 NOTE — DISCHARGE INSTRUCTIONS
You were seen in the hospital for anemia, chest pain, GI bleed.  Please follow up with your PCP in the next 2 weeks regarding hospitalization stay.     Please follow-up with gastroenterology to have your pill endoscopy.    Please take all of your medications as directed.  Importantly, please continue using nasal saline twice a day to aid with the dryness of your nose.  Per your ENT visit 11/2024, this is the most important thing you can do to continue preventing nosebleeds in the future.    New medications:  Nasal saline spray, twice a day  Afrin nasal spray, as needed to aid with stopping nose bleeds     Discontinued medications:    If you have any worsening symptoms or concerning symptoms, please call your doctor or return to the hospital.    Thank you for allowing us to participate in your health care.    -[facility] Teaching Service.

## 2025-04-05 NOTE — NURSING NOTE
Patient had telemetry alarming vtach episodes. Patient asymptomatic. Rrt called EKG and vitals maintained. Magnesium 1.9 . Patient has history of pacemaker. No new orders obtained. Patient back to sinus in 60's. Order obtained to obtain pacemaker check. 2 gram iv magnesium ordered and infused. Patient tolerated well. Call placed to [employer] for pacer check.

## 2025-04-05 NOTE — SIGNIFICANT EVENT
Cardiovascular Nurse Navigator Education, Pacemaker Interrogation    Bedside nurse contacted myself to conduct a [employer] pacemaker interrogation on this patient.   Reading obtained, report faxed and placed in chart.  While on phone with [employer] rep obtaining reading, we discovered that his device had stopped sending any home readings. Pt states he left his home device monitor in [place]. Therefore, the company will send a new home monitoring device to the [place] address. Pt's friend NOÉ and nurse at the [place] informed to be watching for this in the mail and to plug it in when it arrives. Will add this to his dc AVS as well.

## 2025-04-05 NOTE — NURSING NOTE
1645: discharge order placed for pt to return to [facility]. Andreina Obregon  notified. Rn awaiting bed confirmation with case management

## 2025-04-05 NOTE — DISCHARGE SUMMARY
Discharge Diagnosis  Chest pain, unspecified type    Issues Requiring Follow-Up  Chronic anemia   Bleeding gastric AVMs/Chronic GIB     Discharge Meds     Medication List      START taking these medications     oxymetazoline 0.05 % nasal spray; Commonly known as: Afrin; Administer 2   sprays into each nostril every 12 hours if needed (For nose bleeds.). Do   not use for more than 3 days consecutively. Use as needed to aid with   stopping nose bleeds.   Saline Nasal Mist 3 % mist; Generic drug: sodium chloride; Administer 2   Squirts into affected nostril(s) 2 times a day.     CONTINUE taking these medications     acetaminophen 325 mg tablet; Commonly known as: Tylenol   aspirin 81 mg EC tablet   brimonidine 0.2 % ophthalmic solution; Commonly known as: AlphaGAN   cholecalciferol 25 MCG (1000 UT) capsule; Commonly known as: Vitamin D-3   docusate sodium 100 mg capsule; Commonly known as: Colace   donepezil 5 mg tablet; Commonly known as: Aricept   levETIRAcetam 1,000 mg tablet; Commonly known as: Keppra   * loperamide 2 mg tablet; Commonly known as: Imodium A-D   * loperamide 2 mg tablet; Commonly known as: Imodium A-D   magnesium hydroxide 400 mg/5 mL suspension; Commonly known as: Milk of   Magnesia   metoprolol succinate XL 25 mg 24 hr tablet; Commonly known as:   Toprol-XL; Take 1 tablet (25 mg) by mouth once daily. Do not crush or   chew.   nitroglycerin 0.4 mg SL tablet; Commonly known as: Nitrostat; Place 1   tablet (0.4 mg) under the tongue every 5 minutes if needed for chest pain.   pantoprazole 40 mg EC tablet; Commonly known as: ProtoNix; Take 1 tablet   (40 mg) by mouth 2 times a day. Do not crush, chew, or split.   pravastatin 80 mg tablet; Commonly known as: Pravachol   QUEtiapine 50 mg tablet; Commonly known as: SEROquel   traZODone 50 mg tablet; Commonly known as: Desyrel  * This list has 2 medication(s) that are the same as other medications   prescribed for you. Read the directions carefully, and  ask your doctor or   other care provider to review them with you.     STOP taking these medications     sucralfate 100 mg/mL suspension; Commonly known as: Carafate       Test Results Pending At Discharge  Pending Labs       No current pending labs.            Hospital Course  Mr. Wayne Burkitt is a 79 y.o. male with PMHx of CAD s/p CABG and s/p PCI to RCA C/B scrotal/groin hematoma 12/2024 (not on DAPT), ischemic cardiomyopathy s/p pacemaker, HFpEF, vascular dementia, primary conversion reaction, chronic anemia (baseline Hgb 8-9), recurrent epistaxis, HTN, NIDDM II who presented to [facility] ED on 4/1/25 with chest pain. VSS HDS on RA. Initial Hgb 5.1 with appropriate incrementation after receiving 3 units PRBC at admission. He was found to have new TWI in inferior leads II, III and aVF and in lateral leads V5 and V6 on ECG. Repeat trops were negative. He was admitted to [facility] for acute on chronic anemia and concerns for in-stent rethrombosis. Patient underwent EGD and colonoscopy with Dr. Garcia 4/3. EGD notable for 3 gastric AVMs treated with APC, extensive sigmoid diverticulosis. Diet advanced to regular diet 4/4/25. Patient deemed safe to resume ASA therapy as well, no recurrent GIB during the admission thereafter and stable Hgb on subsequent re-checks. Additionally, cardiology was consulted and the determination from their standpoint was that his CP was likely 2/2 demand ischemia related to profound anemia, which seems likely given it resolved with administration of pRBCs. He was deemed stable for discharge on 4/5/25 back to [facility]. He is to follow up with GI 5/22/25 (Dr. Birch) for planned pill endoscopy, cardiology, EP and a new PCP for which a referral was placed. He should continue with nasal saline to aid with prevention of recurrent epistaxis as well. Lastly, Rx for Afrin nasal spray PRN was given to aid with stopping epistaxis in the future.     Pertinent Physical Exam At Time of Discharge  Physical  Exam  Constitutional:       General: He is not in acute distress.     Appearance: Normal appearance. He is not ill-appearing.   HENT:      Head: Normocephalic and atraumatic.   Eyes:      Extraocular Movements: Extraocular movements intact.   Musculoskeletal:         General: Normal range of motion.   Skin:     General: Skin is warm and dry.   Neurological:      General: No focal deficit present.      Mental Status: He is alert.   Psychiatric:         Mood and Affect: Mood normal.         Thought Content: Thought content normal.         Outpatient Follow-Up  Future Appointments   Date Time Provider Department Center   4/24/2025 10:00 AM Gabino Giron MD [id number] [place]   5/22/2025  2:20 PM Asael Birch MD [id number] [place]     LUIS MANUEL WoodruffO.   Family Medicine PGY-2, [facility]  04/05/25

## 2025-04-05 NOTE — CARE PLAN
The patient's goals for the shift include patient safety    The clinical goals for the shift include see poc    Over the shift, the patient did not make progress toward the following goals. Barriers to progression include vascular dementia diagnosis. Recommendations to address these barriers include bed and safety alarms, reorientation, nurse/pca assistance with toileting.

## 2025-04-05 NOTE — CARE PLAN
The patient's goals for the shift include      The clinical goals for the shift include See plan of care      Problem: Pain - Adult  Goal: Verbalizes/displays adequate comfort level or baseline comfort level  Outcome: Progressing     Problem: Safety - Adult  Goal: Free from fall injury  Outcome: Progressing     Problem: Discharge Planning  Goal: Discharge to home or other facility with appropriate resources  Outcome: Progressing     Problem: Chronic Conditions and Co-morbidities  Goal: Patient's chronic conditions and co-morbidity symptoms are monitored and maintained or improved  Outcome: Progressing     Problem: Nutrition  Goal: Nutrient intake appropriate for maintaining nutritional needs  Outcome: Progressing     2100: Patient administered PRN Trazodone for sleep and PRN Tylenol for left neck pain.    2200: Patient sleeping peacefully.    EOS: Patient's pacemaker was interrogated at the start of shift. Patient slept well after PRN medications were administered. Patient vital signs stable. Patient safety maintained.

## 2025-04-05 NOTE — DISCHARGE INSTR - OTHER ORDERS
New [employer] pacemaker home monitoring unit will be sent to the [place]. Please be watching for this and follow the instructions with the unit to set up.

## 2025-04-05 NOTE — DOCUMENTATION CLARIFICATION NOTE
"    PATIENT:               BURKITT, WAYNE  ACCT #:                  [id number]  MRN:                       [id number]  :                       1945  ADMIT DATE:       2025 2:44 PM  DISCH DATE:  RESPONDING PROVIDER #:        [id number]          PROVIDER RESPONSE TEXT:    GI Bleed due to or enhanced by use of Aspirin    CDI QUERY TEXT:    Clarification    Instruction:  Based on your assessment of the patient and the clinical information, please provide the requested documentation by clicking on the appropriate radio button and enter any additional information if prompted.    Question: Please further clarify if a relationship exists between GI Bleed and use of Aspirin    When answering this query, please exercise your independent professional judgment. The fact that a question is being asked, does not imply that any particular answer is desired or expected.    The patient's clinical indicators include:  Clinical Information:  Admitted for GI Bleed s/p EGD with findings of Angiodysplasia w/recent Hemorrhage.    Clinical Indicators:  Hgb: 5.1, 9.2, 8.3, 8.4 from -.    Cardiology note  1028 AM states \"Presenting hemoglobin is 5.2 is now status post transfusion with 3 units packed cells. He is currently off his aspirin therapy but would recommend when appropriate it be restarted.\"    IM note 4/3 0836 AM states \"Acute on chronic anemia\", \"Held by provider aspirin 81 mg, oral, Daily\".    Treatment: Lab monitoring of H/H.  Stool occult.  Aspirin held.  3u PRBCs.  Iron Sucrose 300 mg IV on .  GI consult s/p EGD w/ablation, APC.    Risk Factors: Elderly male w/hx of Anemia, Epistaxis, on Aspirin in outpatient setting.  Options provided:  -- GI Bleed due to or enhanced by use of Aspirin  -- GI Bleed not due to or enhanced by use of Aspirin  -- Other - I will add my own diagnosis  -- Refer to Clinical Documentation Reviewer    Query created by: Saima Laughlin on 2025 7:33 AM      Electronically signed by:  " LILIANE ANDERSON MD 4/5/2025 11:10 AM

## 2025-04-05 NOTE — NURSING NOTE
Patient transferred from unit to [facility], assessment and vitals maintained.

## 2025-04-06 ENCOUNTER — DOCUMENTATION (OUTPATIENT)
Dept: INPATIENT UNIT | Facility: HOSPITAL | Age: 80
End: 2025-04-06
Payer: MEDICARE

## 2025-04-06 VITALS
RESPIRATION RATE: 16 BRPM | WEIGHT: 169.09 LBS | HEIGHT: 71 IN | OXYGEN SATURATION: 97 % | BODY MASS INDEX: 23.67 KG/M2 | HEART RATE: 60 BPM | SYSTOLIC BLOOD PRESSURE: 111 MMHG | DIASTOLIC BLOOD PRESSURE: 62 MMHG | TEMPERATURE: 96.3 F

## 2025-04-06 LAB
ALBUMIN SERPL BCP-MCNC: 3.7 G/DL (ref 3.4–5)
ANION GAP SERPL CALC-SCNC: 10 MMOL/L (ref 10–20)
BUN SERPL-MCNC: 14 MG/DL (ref 6–23)
CALCIUM SERPL-MCNC: 8.8 MG/DL (ref 8.6–10.3)
CARDIAC TROPONIN I PNL SERPL HS: 12 NG/L (ref 0–20)
CHLORIDE SERPL-SCNC: 108 MMOL/L (ref 98–107)
CO2 SERPL-SCNC: 25 MMOL/L (ref 21–32)
CREAT SERPL-MCNC: 0.91 MG/DL (ref 0.5–1.3)
EGFRCR SERPLBLD CKD-EPI 2021: 87 ML/MIN/1.73M*2
ERYTHROCYTE [DISTWIDTH] IN BLOOD BY AUTOMATED COUNT: 20.9 % (ref 11.5–14.5)
GLUCOSE SERPL-MCNC: 90 MG/DL (ref 74–99)
HCT VFR BLD AUTO: 32.7 % (ref 41–52)
HGB BLD-MCNC: 10.1 G/DL (ref 13.5–17.5)
MAGNESIUM SERPL-MCNC: 1.93 MG/DL (ref 1.6–2.4)
MCH RBC QN AUTO: 26.4 PG (ref 26–34)
MCHC RBC AUTO-ENTMCNC: 30.9 G/DL (ref 32–36)
MCV RBC AUTO: 85 FL (ref 80–100)
NRBC BLD-RTO: 0 /100 WBCS (ref 0–0)
PHOSPHATE SERPL-MCNC: 3.1 MG/DL (ref 2.5–4.9)
PLATELET # BLD AUTO: 287 X10*3/UL (ref 150–450)
POTASSIUM SERPL-SCNC: 3.9 MMOL/L (ref 3.5–5.3)
RBC # BLD AUTO: 3.83 X10*6/UL (ref 4.5–5.9)
SODIUM SERPL-SCNC: 139 MMOL/L (ref 136–145)
WBC # BLD AUTO: 6.2 X10*3/UL (ref 4.4–11.3)

## 2025-04-06 PROCEDURE — 83735 ASSAY OF MAGNESIUM: CPT

## 2025-04-06 PROCEDURE — 84484 ASSAY OF TROPONIN QUANT: CPT

## 2025-04-06 PROCEDURE — 2500000001 HC RX 250 WO HCPCS SELF ADMINISTERED DRUGS (ALT 637 FOR MEDICARE OP)

## 2025-04-06 PROCEDURE — 2500000001 HC RX 250 WO HCPCS SELF ADMINISTERED DRUGS (ALT 637 FOR MEDICARE OP): Performed by: INTERNAL MEDICINE

## 2025-04-06 PROCEDURE — 2500000004 HC RX 250 GENERAL PHARMACY W/ HCPCS (ALT 636 FOR OP/ED)

## 2025-04-06 PROCEDURE — 36415 COLL VENOUS BLD VENIPUNCTURE: CPT

## 2025-04-06 PROCEDURE — 85027 COMPLETE CBC AUTOMATED: CPT

## 2025-04-06 PROCEDURE — 2500000001 HC RX 250 WO HCPCS SELF ADMINISTERED DRUGS (ALT 637 FOR MEDICARE OP): Performed by: STUDENT IN AN ORGANIZED HEALTH CARE EDUCATION/TRAINING PROGRAM

## 2025-04-06 PROCEDURE — 80069 RENAL FUNCTION PANEL: CPT

## 2025-04-06 RX ORDER — LANOLIN ALCOHOL/MO/W.PET/CERES
400 CREAM (GRAM) TOPICAL ONCE
Status: COMPLETED | OUTPATIENT
Start: 2025-04-06 | End: 2025-04-06

## 2025-04-06 RX ORDER — MAGNESIUM SULFATE HEPTAHYDRATE 40 MG/ML
2 INJECTION, SOLUTION INTRAVENOUS ONCE
Status: DISCONTINUED | OUTPATIENT
Start: 2025-04-06 | End: 2025-04-06

## 2025-04-06 RX ADMIN — Medication 400 MG: at 02:58

## 2025-04-06 RX ADMIN — PANTOPRAZOLE SODIUM 40 MG: 40 TABLET, DELAYED RELEASE ORAL at 06:02

## 2025-04-06 RX ADMIN — ASPIRIN 81 MG: 81 TABLET, COATED ORAL at 08:00

## 2025-04-06 RX ADMIN — SALINE NASAL SPRAY 1 SPRAY: 1.5 SOLUTION NASAL at 08:00

## 2025-04-06 RX ADMIN — ACETAMINOPHEN 650 MG: 325 TABLET ORAL at 07:51

## 2025-04-06 RX ADMIN — BRIMONIDINE TARTRATE 1 DROP: 2 SOLUTION/ DROPS OPHTHALMIC at 08:00

## 2025-04-06 RX ADMIN — LEVETIRACETAM 1000 MG: 500 TABLET, FILM COATED ORAL at 08:00

## 2025-04-06 RX ADMIN — ISOSORBIDE MONONITRATE 30 MG: 30 TABLET, EXTENDED RELEASE ORAL at 08:00

## 2025-04-06 ASSESSMENT — PAIN DESCRIPTION - ORIENTATION: ORIENTATION: LEFT

## 2025-04-06 ASSESSMENT — PAIN DESCRIPTION - LOCATION: LOCATION: NECK

## 2025-04-06 ASSESSMENT — COGNITIVE AND FUNCTIONAL STATUS - GENERAL
MOVING TO AND FROM BED TO CHAIR: A LITTLE
MOBILITY SCORE: 20
CLIMB 3 TO 5 STEPS WITH RAILING: A LITTLE
WALKING IN HOSPITAL ROOM: A LITTLE
STANDING UP FROM CHAIR USING ARMS: A LITTLE

## 2025-04-06 ASSESSMENT — PAIN - FUNCTIONAL ASSESSMENT: PAIN_FUNCTIONAL_ASSESSMENT: 0-10

## 2025-04-06 ASSESSMENT — PAIN SCALES - GENERAL
PAINLEVEL_OUTOF10: 3
PAINLEVEL_OUTOF10: 0 - NO PAIN

## 2025-04-06 ASSESSMENT — PAIN SCALES - PAIN ASSESSMENT IN ADVANCED DEMENTIA (PAINAD): TOTALSCORE: MEDICATION (SEE MAR)

## 2025-04-06 NOTE — NURSING NOTE
0209: Patient had a 16 beat run of Vtach. Patient was asymptomatic. Provider notified. EKG done. Oral magnesium given as IV access was lost at 0230 and patient refused another IV start, despite being educated on the importance of having IV access while in the hospital.     EOS: Patient rested well throughout the night. Patient had no complaints of pain. Safety has been maintained. Patient is stable at the time of writing.

## 2025-04-06 NOTE — PROGRESS NOTES
1230: pt discharged from facility in stable condition and with all belongings. Taken to car via wheelchair for pickup by friend NOÉ to return to [facility] memory care.  Rhiannon Friedman notified admissions director as well at [facility] to follow up due to lack of callback

## 2025-04-06 NOTE — PROGRESS NOTES
04/06/25 1204   Discharge Planning   Home or Post Acute Services Post acute facilities (Rehab/SNF/etc)   Type of Post Acute Facility Services Assisted living   Expected Discharge Disposition Home     Patient has an active d/c order in. Nursing unable to give nurse to nurse at [facility], attempt x 2 and message left. Message sent to Jacky at [facility] to update her on d/c. She stated she would let the DON know.

## 2025-04-06 NOTE — CARE PLAN
The patient's goals for the shift include monitor cardiac status d/t intermittent episodes of vtach      The clinical goals for the shift include see poc    Over the shift, the patient did not make progress toward the following goals. Barriers to progression include n/a. Recommendations to address these barriers include n/a.

## 2025-04-06 NOTE — NURSING NOTE
1129: Friend Sam called to  patient at 1230 pm. Attempted to call report to [facility] and was sent to voicemail twice. RN left a voicemail with call back number as well as informing facility pt would be returning to room 32 per their . Their  was also notified of pt discharge and pickup time

## 2025-04-07 ENCOUNTER — PATIENT OUTREACH (OUTPATIENT)
Dept: PRIMARY CARE | Facility: CLINIC | Age: 80
End: 2025-04-07
Payer: MEDICARE

## 2025-04-07 LAB
ATRIAL RATE: 104 BPM
Q ONSET: 213 MS
QRS COUNT: 10 BEATS
QRS DURATION: 94 MS
QT INTERVAL: 428 MS
QTC CALCULATION(BAZETT): 434 MS
QTC FREDERICIA: 432 MS
R AXIS: 48 DEGREES
T AXIS: 196 DEGREES
T OFFSET: 427 MS
VENTRICULAR RATE: 62 BPM

## 2025-04-07 NOTE — PROGRESS NOTES
Discharge Facility:[facility]  Discharge Diagnosis:Chest pain  Admission Date:4/1/25  Discharge Date: 4/5/25    PCP Appointment Date:No contact made. Message sent to office.  Specialist Appointment Date: Cardiology 4/24/25, Gastroenterology 5/22/25. Endoscopy TBD  Hospital Encounter and Summary Linked: Yes    ED to Hosp-Admission (Discharged) with Marc Diaz DO; Elliot Hernandez MD (04/01/2025)   Discharge Summary by Marc Diaz DO (04/05/2025 13:00)     2 call attempts made

## 2025-04-14 ENCOUNTER — APPOINTMENT (OUTPATIENT)
Dept: PRIMARY CARE | Facility: CLINIC | Age: 80
End: 2025-04-14
Payer: MEDICARE

## 2025-04-14 VITALS
WEIGHT: 167 LBS | SYSTOLIC BLOOD PRESSURE: 124 MMHG | DIASTOLIC BLOOD PRESSURE: 70 MMHG | TEMPERATURE: 98.1 F | HEART RATE: 72 BPM | OXYGEN SATURATION: 98 % | BODY MASS INDEX: 23.38 KG/M2 | RESPIRATION RATE: 16 BRPM | HEIGHT: 71 IN

## 2025-04-14 DIAGNOSIS — Z09 HOSPITAL DISCHARGE FOLLOW-UP: ICD-10-CM

## 2025-04-14 DIAGNOSIS — R04.0 RECURRENT EPISTAXIS: Primary | ICD-10-CM

## 2025-04-14 DIAGNOSIS — F41.0 PANIC DISORDER WITHOUT AGORAPHOBIA: ICD-10-CM

## 2025-04-14 DIAGNOSIS — I25.10 CORONARY ARTERY DISEASE INVOLVING NATIVE CORONARY ARTERY OF NATIVE HEART WITHOUT ANGINA PECTORIS: ICD-10-CM

## 2025-04-14 DIAGNOSIS — I10 ESSENTIAL HYPERTENSION: ICD-10-CM

## 2025-04-14 DIAGNOSIS — K31.819 GASTRIC AVM: ICD-10-CM

## 2025-04-14 PROBLEM — R56.9 SEIZURE (MULTI): Status: ACTIVE | Noted: 2023-07-13

## 2025-04-14 PROBLEM — I50.9 CONGESTIVE HEART FAILURE: Status: ACTIVE | Noted: 2024-12-12

## 2025-04-14 PROBLEM — I20.0 UNSTABLE ANGINA PECTORIS (MULTI): Status: ACTIVE | Noted: 2023-06-07

## 2025-04-14 PROBLEM — D50.0 IRON DEFICIENCY ANEMIA DUE TO CHRONIC BLOOD LOSS: Status: ACTIVE | Noted: 2024-12-25

## 2025-04-14 PROBLEM — E78.5 HYPERLIPIDEMIA: Status: ACTIVE | Noted: 2024-12-25

## 2025-04-14 PROBLEM — I25.5 GENERALIZED ISCHEMIC MYOCARDIAL DYSFUNCTION: Status: RESOLVED | Noted: 2024-09-28 | Resolved: 2025-04-14

## 2025-04-14 PROCEDURE — 3074F SYST BP LT 130 MM HG: CPT

## 2025-04-14 PROCEDURE — 1159F MED LIST DOCD IN RCRD: CPT

## 2025-04-14 PROCEDURE — 1111F DSCHRG MED/CURRENT MED MERGE: CPT

## 2025-04-14 PROCEDURE — 1036F TOBACCO NON-USER: CPT

## 2025-04-14 PROCEDURE — 99495 TRANSJ CARE MGMT MOD F2F 14D: CPT

## 2025-04-14 PROCEDURE — 3078F DIAST BP <80 MM HG: CPT

## 2025-04-14 NOTE — ASSESSMENT & PLAN NOTE
- Known history of CAD.  No recent lipid panel, will check now  Orders:    Lipid Panel; Future    Follow Up In Advanced Primary Care - PCP - Medicare Annual; Future

## 2025-04-14 NOTE — ASSESSMENT & PLAN NOTE
- Stable, antipsychotic medications accurate and updated in chart accordingly  Orders:    Follow Up In Advanced Primary Care - PCP - Medicare Annual; Future

## 2025-04-14 NOTE — ASSESSMENT & PLAN NOTE
- Well-controlled, BP in office 124/70  Orders:    Follow Up In Advanced Primary Care - PCP - Medicare Annual; Future

## 2025-04-14 NOTE — PROGRESS NOTES
"Patient: Wayne Burkitt  : 1945  PCP: Olvin Morales DO  MRN: [id number]  Program: Transitional Care Management  Status: Enrolled  Effective Dates: 2025 - present  Responsible Staff: Evon Bui LPN  Social Drivers to be Addressed: Physical Activity, Social Connections, Stress, Tobacco Use         Wayne Burkitt is a 79 y.o. male presenting today for follow-up after being discharged from the hospital 8 days ago. The main problem requiring admission was anemia and chest pain. The discharge summary and/or Transitional Care Management documentation was reviewed. Medication reconciliation was performed as indicated via the \"Raúl as Reviewed\" timestamp.     Wayne Burkitt was contacted by Transitional Care Management services two days after his discharge. This encounter and supporting documentation was reviewed.    Mr. Miller is here to establish care with me after his recent hospitalization at [facility].  During his hospitalization, I did personally partake in his care.  He was hospitalized from 2025 to 2025.  He presented initially due to chest pain.  He was found to be profoundly anemic.  Notably, he has chronic mild bleeding that occurs from his GI tract and he also has recurrent epistaxis which were likely precipitating factors.  During the course of his admission cardiology was consulted, the belief is that he had demand ischemia secondary to profound anemia.  During his admission, his chest pain resolved with the administration of packed red blood cell units.  He had an EGD while he was admitted, which revealed 3 gastric AVMs which were likely the source of the bleeding.  His antiplatelet therapy was resumed during his admission, he remained stable thereafter.  He was to follow-up with GI 2025 to have a pill endoscopy.  And he needed to be seen by PCP after his follow-up as well.  He also was to see cardiology 2025.  He lives at [facility].  Lastly, he was noted to have a " "cardiac monitor in place.  However, when it was placed, and/or the cardiology office dose was to be monitoring his reports was unknown.  The heart failure navigator was consulted during this admission and determined that his monitor was left in [place].  Was shipped to his assisted living.  Today he states that he received.  He is supposed to follow-up with electrophysiology for which a referral was placed at his discharge.    He only reports 1 dark stool that has occurred since hospital discharge which notably occurred after his episode of epistaxis.    /70 (BP Location: Right arm, Patient Position: Sitting, BP Cuff Size: Adult)   Pulse 72   Temp 36.7 °C (98.1 °F)   Resp 16   Ht 1.803 m (5' 11\")   Wt 75.8 kg (167 lb)   SpO2 98%   BMI 23.29 kg/m²     Physical Exam  Constitutional:       General: He is not in acute distress.     Appearance: Normal appearance. He is not ill-appearing.   HENT:      Head: Normocephalic and atraumatic.      Mouth/Throat:      Mouth: Mucous membranes are moist.      Pharynx: Oropharynx is clear.   Eyes:      Extraocular Movements: Extraocular movements intact.      Pupils: Pupils are equal, round, and reactive to light.      Comments: Subconjunctiva bilaterally pink and well-perfused.   Cardiovascular:      Rate and Rhythm: Normal rate and regular rhythm.      Pulses:           Radial pulses are 2+ on the right side and 2+ on the left side.        Posterior tibial pulses are 2+ on the right side and 2+ on the left side.      Heart sounds: Normal heart sounds.   Pulmonary:      Effort: Pulmonary effort is normal. No respiratory distress.      Breath sounds: Normal breath sounds.   Abdominal:      General: Abdomen is flat. There is no distension.      Palpations: Abdomen is soft. There is no mass.      Tenderness: There is no abdominal tenderness.   Musculoskeletal:         General: Normal range of motion.      Cervical back: Normal range of motion and neck supple.      Right " lower leg: No edema.      Left lower leg: No edema.   Lymphadenopathy:      Cervical: No cervical adenopathy.   Skin:     General: Skin is warm and dry.   Neurological:      General: No focal deficit present.      Mental Status: He is alert. Mental status is at baseline.   Psychiatric:         Mood and Affect: Mood normal.         Thought Content: Thought content normal.         The complexity of medical decision making for this patient's transitional care is moderate.      Assessment & Plan  Recurrent epistaxis  - Doing well with the addition of twice daily nasal saline since his hospital discharge.  He has had 1 nosebleed since then    Orders:    Lipid Panel; Future    CBC; Future    Follow Up In Advanced Primary Care - PCP - Medicare Annual; Future    Hospital discharge follow-up  - History as above.  I personally partook in this patient's care during his hospital admission.  I again reviewed all notes, labs, pertinent imaging related to the patient's admission  -He is to see GI for follow-up on 5/22/2025, cardiology (electrophysiology)  - Doing well overall  Orders:    Lipid Panel; Future    CBC; Future    Gastric AVM  - History as above  - GI follow-up 5/22/2025  - 1 questionable reported episode of melena but none others since discharge  Orders:    Lipid Panel; Future    CBC; Future    Follow Up In Advanced Primary Care - PCP - Medicare Annual; Future    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  - Stable, antipsychotic medications accurate and updated in chart accordingly  Orders:    Follow Up In Advanced Primary Care - PCP - Medicare Annual; Future    Essential hypertension  - Well-controlled, BP in office 124/70  Orders:    Follow Up In Advanced Primary Care - PCP - Medicare Annual; Future    Coronary artery disease involving native coronary artery of native heart without angina pectoris  - Known history of CAD.  No recent lipid panel, will check now  Orders:    Lipid Panel; Future    Follow Up In Advanced Primary  Care - PCP - Medicare Annual; Future      Follow-up 6 months for AWV.    Olvin Morales D.O.   Family Medicine PGY-2, [facility]  04/14/25

## 2025-04-14 NOTE — PROGRESS NOTES
I reviewed the resident/fellow's documentation and discussed the patient with the resident. I agree with the resident medical decision making as documented in the note.   Patient per the resident been feeling well.  No chest pain shortness of breath.  He had 1 darker stool since he left the hospital.  The rest of been normal.  His nosebleeds have improved.  Patient has no chest pain or shortness of breath no abdominal pain.  He has appointment scheduled with electro physiology, GI.  Patient aware if any chest pain shortness of breath any dark stools abdominal pain blood in his urine or blood in his stool blood in toilet paper notify the office or go to ER  Follow-up in 1 month  Agree with assessment and plan  Ephraim Infante, DO

## 2025-04-14 NOTE — PROGRESS NOTES
Subjective   Wayne Burkitt is a 79 y.o. male who presents for Hospital Follow-up.  Mr. Miller is here to establish care with me after his recent hospitalization at [facility].  During his hospitalization, I did personally partake in his care.  He was hospitalized from 4/1/2025 to 4/6/2025.  He presented initially due to chest pain.  He was found to be profoundly anemic.  Notably, he has chronic mild bleeding that occurs from his GI tract and he also has recurrent epistaxis which were likely precipitating factors.  During the course of his admission cardiology was consulted, the belief is that he had demand ischemia secondary to profound anemia.  During his admission, his chest pain resolved with the administration of packed red blood cell units.  He had an EGD while he was admitted, which revealed 3 gastric AVMs which were likely the source of the bleeding.  His antiplatelet therapy was resumed during his admission, he remained stable thereafter.  He was to follow-up with GI 5/22/2025 to have a pill endoscopy.  And he needed to be seen by PCP after his follow-up as well.  He also was to see cardiology 4/24/2025.  He lives at [facility].        Comp Hx reviewed and documented appropriately in chart.         Active Problem List  Patient Active Problem List   Diagnosis    Ischemic cardiomyopathy    Moderate vascular dementia with anxiety    Conversion reaction    Auditory hallucinations    Coronary artery disease involving native coronary artery of native heart without angina pectoris    Epistaxis, recurrent    Angina pectoris    Moderate vascular dementia with psychotic disturbance    Episodes of formed visual hallucinations    Frequent falls    Hypertensive disorder    Obstructive sleep apnea syndrome    Orthostatic hypotension    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    Seizure disorder (Multi)    Angina pectoris, unstable (Multi)    Diastolic congestive heart failure    3-vessel coronary artery disease     Scrotal hematoma    Anemia due to GI blood loss    Upper GI bleed    Physical debility    Long term (current) use of antithrombotics/antiplatelets    Chest pain, unspecified type       Comprehensive Medical/Surgical/Family History  Past Medical History:   Diagnosis Date    Auditory hallucinations 09/28/2024    Conversion reaction 09/28/2024    Coronary artery disease involving native coronary artery of native heart without angina pectoris 09/28/2024    Epistaxis, recurrent 09/28/2024    Ischemic cardiomyopathy 09/28/2024    Moderate vascular dementia with anxiety 09/28/2024     Past Surgical History:   Procedure Laterality Date    CARDIAC CATHETERIZATION N/A 12/20/2024    Procedure: Left Heart Cath, With LV;  Surgeon: Gabino Giron MD;  Location: [facility] Cardiac Cath Lab;  Service: Cardiovascular;  Laterality: N/A;    CARDIAC CATHETERIZATION N/A 12/20/2024    Procedure: PCI TATYANA Stent- Coronary;  Surgeon: Gabino Giron MD;  Location: [facility] Cardiac Cath Lab;  Service: Cardiovascular;  Laterality: N/A;    CORONARY ANGIOPLASTY WITH STENT PLACEMENT      states he has 16 stents after the CABG    CORONARY ARTERY BYPASS GRAFT  2010    PACEMAKER PLACEMENT      TOTAL HIP ARTHROPLASTY         Social History  Social History     Social History Narrative    Not on file       Allergies and Medications  Alprazolam, Bepotastine besilate, Clopidogrel, Doxazosin, Lisinopril, Metronidazole, Amlodipine, Atorvastatin, Cephalexin, Ciprofloxacin, Diphenhydramine hcl, Guaifenesin, Hydrochlorothiazide, Hydrocodone, Methylprednisolone, Phenytoin, Sertraline, Telmisartan, and Topiramate  Current Outpatient Medications on File Prior to Visit   Medication Sig Dispense Refill    acetaminophen (Tylenol) 325 mg tablet Take 2 tablets (650 mg) by mouth every 4 hours if needed for mild pain (1 - 3) or fever (temp greater than 38.0 C).      aspirin 81 mg EC tablet Take 1 tablet (81 mg) by mouth once daily.      brimonidine (AlphaGAN) 0.2 %  ophthalmic solution Administer 1 drop into both eyes 2 times a day.      levETIRAcetam (Keppra) 1,000 mg tablet Take 1 tablet (1,000 mg) by mouth 2 times a day.      loperamide (Imodium A-D) 2 mg tablet Take 1 tablet (2 mg) by mouth 4 times a day as needed for diarrhea. For each additional loose stool      magnesium hydroxide (Milk of Magnesia) 400 mg/5 mL suspension Take 30 mL by mouth once daily as needed for constipation.      metoprolol succinate XL (Toprol-XL) 25 mg 24 hr tablet Take 1 tablet (25 mg) by mouth once daily. Do not crush or chew. 30 tablet 1    nitroglycerin (Nitrostat) 0.4 mg SL tablet Place 1 tablet (0.4 mg) under the tongue every 5 minutes if needed for chest pain. 90 tablet 12    oxymetazoline (Afrin) 0.05 % nasal spray Administer 2 sprays into each nostril every 12 hours if needed (For nose bleeds.). Do not use for more than 3 days consecutively. Use as needed to aid with stopping nose bleeds. 30 mL 0    pravastatin (Pravachol) 80 mg tablet Take 1 tablet (80 mg) by mouth once daily at bedtime.      QUEtiapine (SEROquel) 50 mg tablet Take 1 tablet (50 mg) by mouth once daily at bedtime.      sodium chloride (Saline Nasal Mist) 3 % mist Administer 2 Squirts into affected nostril(s) 2 times a day. 126 mL 0    traZODone (Desyrel) 50 mg tablet Take 0.5 tablets (25 mg) by mouth as needed at bedtime (dementia with anxiety).      cholecalciferol (Vitamin D-3) 25 MCG (1000 UT) capsule Take 2 capsules (50 mcg) by mouth once daily. (Patient not taking: Reported on 4/14/2025)      docusate sodium (Colace) 100 mg capsule Take 1 capsule (100 mg) by mouth 2 times a day as needed for constipation. (Patient not taking: Reported on 4/14/2025)      donepezil (Aricept) 5 mg tablet Take 1 tablet (5 mg) by mouth once daily at bedtime. (Patient not taking: Reported on 4/14/2025)      loperamide (Imodium A-D) 2 mg tablet Take 2 tablets (4 mg) by mouth if needed for diarrhea. Take after first loose stool       "pantoprazole (ProtoNix) 40 mg EC tablet Take 1 tablet (40 mg) by mouth 2 times a day. Do not crush, chew, or split. 60 tablet 1     No current facility-administered medications on file prior to visit.         /70 (BP Location: Right arm, Patient Position: Sitting, BP Cuff Size: Adult)   Pulse 72   Temp 36.7 °C (98.1 °F)   Resp 16   Ht 1.803 m (5' 11\")   Wt 75.8 kg (167 lb)   SpO2 98%   BMI 23.29 kg/m²   Objective   Physical Exam    Assessment & Plan  Recurrent epistaxis         Hospital discharge follow-up         Gastric AVM         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         Essential hypertension         Coronary artery disease involving native coronary artery of native heart without angina pectoris           Follow up ***.     Olvin Morales D.O.   Family Medicine PGY-2, [facility]  04/14/25      Please excuse any errors in grammar or translation related to this dictation. Voice recognition software may have been utilized to prepare this document.   "

## 2025-04-15 ENCOUNTER — PATIENT OUTREACH (OUTPATIENT)
Dept: PRIMARY CARE | Facility: CLINIC | Age: 80
End: 2025-04-15
Payer: MEDICARE

## 2025-04-20 ENCOUNTER — APPOINTMENT (OUTPATIENT)
Dept: RADIOLOGY | Facility: HOSPITAL | Age: 80
DRG: 064 | End: 2025-04-20
Payer: MEDICARE

## 2025-04-20 ENCOUNTER — HOSPITAL ENCOUNTER (INPATIENT)
Facility: HOSPITAL | Age: 80
LOS: 5 days | Discharge: HOME | DRG: 064 | End: 2025-04-26
Attending: STUDENT IN AN ORGANIZED HEALTH CARE EDUCATION/TRAINING PROGRAM | Admitting: INTERNAL MEDICINE
Payer: MEDICARE

## 2025-04-20 DIAGNOSIS — R53.81 PHYSICAL DEBILITY: ICD-10-CM

## 2025-04-20 DIAGNOSIS — I25.10 CORONARY ARTERY DISEASE INVOLVING NATIVE CORONARY ARTERY OF NATIVE HEART WITHOUT ANGINA PECTORIS: ICD-10-CM

## 2025-04-20 DIAGNOSIS — S06.5XAA SUBDURAL HEMATOMA (MULTI): Primary | ICD-10-CM

## 2025-04-20 DIAGNOSIS — I47.20 V TACH (MULTI): ICD-10-CM

## 2025-04-20 DIAGNOSIS — I47.20 VENTRICULAR TACHYCARDIA (MULTI): ICD-10-CM

## 2025-04-20 LAB
ANION GAP SERPL CALC-SCNC: 15 MMOL/L (ref 10–20)
BASOPHILS # BLD AUTO: 0.06 X10*3/UL (ref 0–0.1)
BASOPHILS NFR BLD AUTO: 1.1 %
BUN SERPL-MCNC: 17 MG/DL (ref 6–23)
CALCIUM SERPL-MCNC: 9.1 MG/DL (ref 8.6–10.3)
CARDIAC TROPONIN I PNL SERPL HS: 9 NG/L (ref 0–20)
CHLORIDE SERPL-SCNC: 107 MMOL/L (ref 98–107)
CO2 SERPL-SCNC: 24 MMOL/L (ref 21–32)
CREAT SERPL-MCNC: 0.98 MG/DL (ref 0.5–1.3)
CRP SERPL-MCNC: 0.56 MG/DL
EGFRCR SERPLBLD CKD-EPI 2021: 79 ML/MIN/1.73M*2
EOSINOPHIL # BLD AUTO: 0.21 X10*3/UL (ref 0–0.4)
EOSINOPHIL NFR BLD AUTO: 4 %
ERYTHROCYTE [DISTWIDTH] IN BLOOD BY AUTOMATED COUNT: 23.3 % (ref 11.5–14.5)
ERYTHROCYTE [SEDIMENTATION RATE] IN BLOOD BY WESTERGREN METHOD: 39 MM/H (ref 0–20)
EST. AVERAGE GLUCOSE BLD GHB EST-MCNC: 94 MG/DL
GIANT PLATELETS BLD QL SMEAR: NORMAL
GLUCOSE SERPL-MCNC: 104 MG/DL (ref 74–99)
HBA1C MFR BLD: 4.9 % (ref ?–5.7)
HCT VFR BLD AUTO: 34.1 % (ref 41–52)
HGB BLD-MCNC: 10.2 G/DL (ref 13.5–17.5)
IMM GRANULOCYTES # BLD AUTO: 0.01 X10*3/UL (ref 0–0.5)
IMM GRANULOCYTES NFR BLD AUTO: 0.2 % (ref 0–0.9)
INR PPP: 1.1 (ref 0.9–1.1)
LYMPHOCYTES # BLD AUTO: 0.89 X10*3/UL (ref 0.8–3)
LYMPHOCYTES NFR BLD AUTO: 16.8 %
MAGNESIUM SERPL-MCNC: 2.17 MG/DL (ref 1.6–2.4)
MCH RBC QN AUTO: 26.7 PG (ref 26–34)
MCHC RBC AUTO-ENTMCNC: 29.9 G/DL (ref 32–36)
MCV RBC AUTO: 89 FL (ref 80–100)
MONOCYTES # BLD AUTO: 0.45 X10*3/UL (ref 0.05–0.8)
MONOCYTES NFR BLD AUTO: 8.5 %
NEUTROPHILS # BLD AUTO: 3.67 X10*3/UL (ref 1.6–5.5)
NEUTROPHILS NFR BLD AUTO: 69.4 %
NRBC BLD-RTO: 0 /100 WBCS (ref 0–0)
OVALOCYTES BLD QL SMEAR: NORMAL
PLATELET # BLD AUTO: 386 X10*3/UL (ref 150–450)
POLYCHROMASIA BLD QL SMEAR: NORMAL
POTASSIUM SERPL-SCNC: 4 MMOL/L (ref 3.5–5.3)
PROTHROMBIN TIME: 12 SECONDS (ref 9.8–12.4)
RBC # BLD AUTO: 3.82 X10*6/UL (ref 4.5–5.9)
RBC MORPH BLD: NORMAL
SODIUM SERPL-SCNC: 142 MMOL/L (ref 136–145)
WBC # BLD AUTO: 5.3 X10*3/UL (ref 4.4–11.3)

## 2025-04-20 PROCEDURE — 2500000004 HC RX 250 GENERAL PHARMACY W/ HCPCS (ALT 636 FOR OP/ED)

## 2025-04-20 PROCEDURE — 2500000001 HC RX 250 WO HCPCS SELF ADMINISTERED DRUGS (ALT 637 FOR MEDICARE OP)

## 2025-04-20 PROCEDURE — 36415 COLL VENOUS BLD VENIPUNCTURE: CPT

## 2025-04-20 PROCEDURE — G0378 HOSPITAL OBSERVATION PER HR: HCPCS

## 2025-04-20 PROCEDURE — 96375 TX/PRO/DX INJ NEW DRUG ADDON: CPT

## 2025-04-20 PROCEDURE — 85610 PROTHROMBIN TIME: CPT

## 2025-04-20 PROCEDURE — 99285 EMERGENCY DEPT VISIT HI MDM: CPT | Performed by: STUDENT IN AN ORGANIZED HEALTH CARE EDUCATION/TRAINING PROGRAM

## 2025-04-20 PROCEDURE — 70450 CT HEAD/BRAIN W/O DYE: CPT

## 2025-04-20 PROCEDURE — 83735 ASSAY OF MAGNESIUM: CPT

## 2025-04-20 PROCEDURE — 96376 TX/PRO/DX INJ SAME DRUG ADON: CPT

## 2025-04-20 PROCEDURE — 96374 THER/PROPH/DIAG INJ IV PUSH: CPT

## 2025-04-20 PROCEDURE — 83036 HEMOGLOBIN GLYCOSYLATED A1C: CPT | Mod: STJLAB

## 2025-04-20 PROCEDURE — 85652 RBC SED RATE AUTOMATED: CPT

## 2025-04-20 PROCEDURE — 2500000002 HC RX 250 W HCPCS SELF ADMINISTERED DRUGS (ALT 637 FOR MEDICARE OP, ALT 636 FOR OP/ED)

## 2025-04-20 PROCEDURE — 84484 ASSAY OF TROPONIN QUANT: CPT

## 2025-04-20 PROCEDURE — 86140 C-REACTIVE PROTEIN: CPT

## 2025-04-20 PROCEDURE — 96361 HYDRATE IV INFUSION ADD-ON: CPT

## 2025-04-20 PROCEDURE — 80048 BASIC METABOLIC PNL TOTAL CA: CPT

## 2025-04-20 PROCEDURE — 99285 EMERGENCY DEPT VISIT HI MDM: CPT | Mod: 25 | Performed by: STUDENT IN AN ORGANIZED HEALTH CARE EDUCATION/TRAINING PROGRAM

## 2025-04-20 PROCEDURE — 85025 COMPLETE CBC W/AUTO DIFF WBC: CPT

## 2025-04-20 RX ORDER — LABETALOL HYDROCHLORIDE 5 MG/ML
10 INJECTION, SOLUTION INTRAVENOUS EVERY 10 MIN PRN
Status: DISCONTINUED | OUTPATIENT
Start: 2025-04-20 | End: 2025-04-21

## 2025-04-20 RX ORDER — PRAVASTATIN SODIUM 20 MG/1
80 TABLET ORAL NIGHTLY
Status: DISCONTINUED | OUTPATIENT
Start: 2025-04-20 | End: 2025-04-26 | Stop reason: HOSPADM

## 2025-04-20 RX ORDER — HYDRALAZINE HYDROCHLORIDE 20 MG/ML
10 INJECTION INTRAMUSCULAR; INTRAVENOUS
Status: DISCONTINUED | OUTPATIENT
Start: 2025-04-20 | End: 2025-04-21

## 2025-04-20 RX ORDER — METOPROLOL SUCCINATE 25 MG/1
25 TABLET, EXTENDED RELEASE ORAL DAILY
Status: DISCONTINUED | OUTPATIENT
Start: 2025-04-21 | End: 2025-04-22

## 2025-04-20 RX ORDER — DONEPEZIL HYDROCHLORIDE 5 MG/1
5 TABLET, FILM COATED ORAL NIGHTLY
Status: DISCONTINUED | OUTPATIENT
Start: 2025-04-20 | End: 2025-04-26 | Stop reason: HOSPADM

## 2025-04-20 RX ORDER — QUETIAPINE FUMARATE 50 MG/1
50 TABLET, FILM COATED ORAL NIGHTLY
Status: DISCONTINUED | OUTPATIENT
Start: 2025-04-20 | End: 2025-04-26 | Stop reason: HOSPADM

## 2025-04-20 RX ORDER — CHOLECALCIFEROL (VITAMIN D3) 50 MCG
50 TABLET ORAL DAILY
COMMUNITY

## 2025-04-20 RX ORDER — DONEPEZIL HYDROCHLORIDE 5 MG/1
5 TABLET, FILM COATED ORAL NIGHTLY
COMMUNITY

## 2025-04-20 RX ORDER — IBUPROFEN 600 MG/1
600 TABLET ORAL ONCE
Status: COMPLETED | OUTPATIENT
Start: 2025-04-20 | End: 2025-04-20

## 2025-04-20 RX ORDER — DOCUSATE SODIUM 100 MG/1
100 CAPSULE, LIQUID FILLED ORAL 2 TIMES DAILY PRN
Status: DISCONTINUED | OUTPATIENT
Start: 2025-04-20 | End: 2025-04-26 | Stop reason: HOSPADM

## 2025-04-20 RX ORDER — CHOLECALCIFEROL (VITAMIN D3) 25 MCG
50 TABLET ORAL DAILY
Status: DISCONTINUED | OUTPATIENT
Start: 2025-04-21 | End: 2025-04-26 | Stop reason: HOSPADM

## 2025-04-20 RX ORDER — PANTOPRAZOLE SODIUM 40 MG/1
40 TABLET, DELAYED RELEASE ORAL 2 TIMES DAILY
Status: DISCONTINUED | OUTPATIENT
Start: 2025-04-20 | End: 2025-04-26 | Stop reason: HOSPADM

## 2025-04-20 RX ORDER — ACETAMINOPHEN 325 MG/1
650 TABLET ORAL ONCE
Status: COMPLETED | OUTPATIENT
Start: 2025-04-20 | End: 2025-04-20

## 2025-04-20 RX ORDER — BRIMONIDINE TARTRATE 2 MG/ML
1 SOLUTION/ DROPS OPHTHALMIC 2 TIMES DAILY
Status: DISCONTINUED | OUTPATIENT
Start: 2025-04-20 | End: 2025-04-26 | Stop reason: HOSPADM

## 2025-04-20 RX ORDER — LEVETIRACETAM 500 MG/1
1000 TABLET ORAL 2 TIMES DAILY
Status: DISCONTINUED | OUTPATIENT
Start: 2025-04-20 | End: 2025-04-26 | Stop reason: HOSPADM

## 2025-04-20 RX ORDER — KETOROLAC TROMETHAMINE 15 MG/ML
15 INJECTION, SOLUTION INTRAMUSCULAR; INTRAVENOUS ONCE
Status: DISCONTINUED | OUTPATIENT
Start: 2025-04-20 | End: 2025-04-20

## 2025-04-20 RX ORDER — ACETAMINOPHEN 325 MG/1
650 TABLET ORAL EVERY 8 HOURS PRN
Status: DISCONTINUED | OUTPATIENT
Start: 2025-04-20 | End: 2025-04-21

## 2025-04-20 RX ADMIN — SALINE NASAL SPRAY 2 SPRAY: 1.5 SOLUTION NASAL at 20:58

## 2025-04-20 RX ADMIN — QUETIAPINE FUMARATE 50 MG: 50 TABLET ORAL at 21:21

## 2025-04-20 RX ADMIN — ACETAMINOPHEN 650 MG: 325 TABLET, FILM COATED ORAL at 17:41

## 2025-04-20 RX ADMIN — LABETALOL HYDROCHLORIDE 10 MG: 5 INJECTION, SOLUTION INTRAVENOUS at 20:01

## 2025-04-20 RX ADMIN — IBUPROFEN 600 MG: 600 TABLET, FILM COATED ORAL at 12:18

## 2025-04-20 RX ADMIN — DONEPEZIL HYDROCHLORIDE 5 MG: 5 TABLET ORAL at 20:58

## 2025-04-20 RX ADMIN — ACETAMINOPHEN 650 MG: 325 TABLET, FILM COATED ORAL at 12:04

## 2025-04-20 RX ADMIN — LEVETIRACETAM 1000 MG: 500 TABLET, FILM COATED ORAL at 20:58

## 2025-04-20 RX ADMIN — HYDRALAZINE HYDROCHLORIDE 10 MG: 20 INJECTION INTRAMUSCULAR; INTRAVENOUS at 19:33

## 2025-04-20 RX ADMIN — PANTOPRAZOLE SODIUM 40 MG: 40 TABLET, DELAYED RELEASE ORAL at 20:58

## 2025-04-20 RX ADMIN — PRAVASTATIN SODIUM 80 MG: 20 TABLET ORAL at 20:58

## 2025-04-20 RX ADMIN — SODIUM CHLORIDE 1000 ML: 9 INJECTION, SOLUTION INTRAVENOUS at 23:18

## 2025-04-20 RX ADMIN — LABETALOL HYDROCHLORIDE 10 MG: 5 INJECTION, SOLUTION INTRAVENOUS at 18:04

## 2025-04-20 RX ADMIN — BRIMONIDINE TARTRATE 1 DROP: 2 SOLUTION/ DROPS OPHTHALMIC at 20:58

## 2025-04-20 SDOH — SOCIAL STABILITY: SOCIAL INSECURITY: HAS ANYONE EVER THREATENED TO HURT YOUR FAMILY OR YOUR PETS?: NO

## 2025-04-20 SDOH — ECONOMIC STABILITY: HOUSING INSECURITY: IN THE LAST 12 MONTHS, WAS THERE A TIME WHEN YOU WERE NOT ABLE TO PAY THE MORTGAGE OR RENT ON TIME?: NO

## 2025-04-20 SDOH — ECONOMIC STABILITY: INCOME INSECURITY: IN THE PAST 12 MONTHS HAS THE ELECTRIC, GAS, OIL, OR WATER COMPANY THREATENED TO SHUT OFF SERVICES IN YOUR HOME?: NO

## 2025-04-20 SDOH — SOCIAL STABILITY: SOCIAL INSECURITY: WITHIN THE LAST YEAR, HAVE YOU BEEN HUMILIATED OR EMOTIONALLY ABUSED IN OTHER WAYS BY YOUR PARTNER OR EX-PARTNER?: NO

## 2025-04-20 SDOH — ECONOMIC STABILITY: HOUSING INSECURITY: AT ANY TIME IN THE PAST 12 MONTHS, WERE YOU HOMELESS OR LIVING IN A SHELTER (INCLUDING NOW)?: NO

## 2025-04-20 SDOH — SOCIAL STABILITY: SOCIAL INSECURITY: HAVE YOU HAD THOUGHTS OF HARMING ANYONE ELSE?: YES

## 2025-04-20 SDOH — SOCIAL STABILITY: SOCIAL INSECURITY: ABUSE: ADULT

## 2025-04-20 SDOH — ECONOMIC STABILITY: FOOD INSECURITY: WITHIN THE PAST 12 MONTHS, YOU WORRIED THAT YOUR FOOD WOULD RUN OUT BEFORE YOU GOT THE MONEY TO BUY MORE.: NEVER TRUE

## 2025-04-20 SDOH — ECONOMIC STABILITY: FOOD INSECURITY: HOW HARD IS IT FOR YOU TO PAY FOR THE VERY BASICS LIKE FOOD, HOUSING, MEDICAL CARE, AND HEATING?: NOT HARD AT ALL

## 2025-04-20 SDOH — SOCIAL STABILITY: SOCIAL INSECURITY: HAVE YOU HAD ANY THOUGHTS OF HARMING ANYONE ELSE?: NO

## 2025-04-20 SDOH — SOCIAL STABILITY: SOCIAL INSECURITY: WITHIN THE LAST YEAR, HAVE YOU BEEN AFRAID OF YOUR PARTNER OR EX-PARTNER?: NO

## 2025-04-20 SDOH — ECONOMIC STABILITY: TRANSPORTATION INSECURITY: IN THE PAST 12 MONTHS, HAS LACK OF TRANSPORTATION KEPT YOU FROM MEDICAL APPOINTMENTS OR FROM GETTING MEDICATIONS?: NO

## 2025-04-20 SDOH — ECONOMIC STABILITY: HOUSING INSECURITY: IN THE PAST 12 MONTHS, HOW MANY TIMES HAVE YOU MOVED WHERE YOU WERE LIVING?: 1

## 2025-04-20 SDOH — ECONOMIC STABILITY: FOOD INSECURITY: WITHIN THE PAST 12 MONTHS, THE FOOD YOU BOUGHT JUST DIDN'T LAST AND YOU DIDN'T HAVE MONEY TO GET MORE.: NEVER TRUE

## 2025-04-20 SDOH — SOCIAL STABILITY: SOCIAL INSECURITY: ARE YOU OR HAVE YOU BEEN THREATENED OR ABUSED PHYSICALLY, EMOTIONALLY, OR SEXUALLY BY ANYONE?: NO

## 2025-04-20 SDOH — SOCIAL STABILITY: SOCIAL INSECURITY: DOES ANYONE TRY TO KEEP YOU FROM HAVING/CONTACTING OTHER FRIENDS OR DOING THINGS OUTSIDE YOUR HOME?: NO

## 2025-04-20 SDOH — SOCIAL STABILITY: SOCIAL INSECURITY: ARE THERE ANY APPARENT SIGNS OF INJURIES/BEHAVIORS THAT COULD BE RELATED TO ABUSE/NEGLECT?: NO

## 2025-04-20 SDOH — SOCIAL STABILITY: SOCIAL INSECURITY: DO YOU FEEL ANYONE HAS EXPLOITED OR TAKEN ADVANTAGE OF YOU FINANCIALLY OR OF YOUR PERSONAL PROPERTY?: NO

## 2025-04-20 SDOH — SOCIAL STABILITY: SOCIAL INSECURITY: DO YOU FEEL UNSAFE GOING BACK TO THE PLACE WHERE YOU ARE LIVING?: NO

## 2025-04-20 ASSESSMENT — ACTIVITIES OF DAILY LIVING (ADL)
LACK_OF_TRANSPORTATION: NO
PATIENT'S MEMORY ADEQUATE TO SAFELY COMPLETE DAILY ACTIVITIES?: YES
DRESSING YOURSELF: INDEPENDENT
JUDGMENT_ADEQUATE_SAFELY_COMPLETE_DAILY_ACTIVITIES: YES
GROOMING: INDEPENDENT
BATHING: INDEPENDENT
TOILETING: INDEPENDENT
HEARING - RIGHT EAR: FUNCTIONAL
WALKS IN HOME: INDEPENDENT
FEEDING YOURSELF: INDEPENDENT
ASSISTIVE_DEVICE: WALKER
ADEQUATE_TO_COMPLETE_ADL: YES
HEARING - LEFT EAR: FUNCTIONAL

## 2025-04-20 ASSESSMENT — COGNITIVE AND FUNCTIONAL STATUS - GENERAL
DAILY ACTIVITIY SCORE: 24
PATIENT BASELINE BEDBOUND: NO
MOBILITY SCORE: 24

## 2025-04-20 ASSESSMENT — LIFESTYLE VARIABLES
HOW OFTEN DO YOU HAVE A DRINK CONTAINING ALCOHOL: NEVER
SKIP TO QUESTIONS 9-10: 1
AUDIT-C TOTAL SCORE: 0
HOW OFTEN DO YOU HAVE 6 OR MORE DRINKS ON ONE OCCASION: NEVER
HOW MANY STANDARD DRINKS CONTAINING ALCOHOL DO YOU HAVE ON A TYPICAL DAY: PATIENT DOES NOT DRINK
AUDIT-C TOTAL SCORE: 0

## 2025-04-20 ASSESSMENT — PAIN - FUNCTIONAL ASSESSMENT
PAIN_FUNCTIONAL_ASSESSMENT: 0-10

## 2025-04-20 ASSESSMENT — PAIN DESCRIPTION - DESCRIPTORS
DESCRIPTORS: ACHING;SHARP;SHOOTING
DESCRIPTORS: ACHING

## 2025-04-20 ASSESSMENT — PAIN DESCRIPTION - ORIENTATION: ORIENTATION: LEFT

## 2025-04-20 ASSESSMENT — COLUMBIA-SUICIDE SEVERITY RATING SCALE - C-SSRS
6. HAVE YOU EVER DONE ANYTHING, STARTED TO DO ANYTHING, OR PREPARED TO DO ANYTHING TO END YOUR LIFE?: NO
2. HAVE YOU ACTUALLY HAD ANY THOUGHTS OF KILLING YOURSELF?: NO
1. IN THE PAST MONTH, HAVE YOU WISHED YOU WERE DEAD OR WISHED YOU COULD GO TO SLEEP AND NOT WAKE UP?: NO

## 2025-04-20 ASSESSMENT — PAIN SCALES - GENERAL
PAINLEVEL_OUTOF10: 2
PAINLEVEL_OUTOF10: 8
PAINLEVEL_OUTOF10: 5 - MODERATE PAIN
PAINLEVEL_OUTOF10: 9
PAINLEVEL_OUTOF10: 6

## 2025-04-20 ASSESSMENT — PATIENT HEALTH QUESTIONNAIRE - PHQ9
1. LITTLE INTEREST OR PLEASURE IN DOING THINGS: NOT AT ALL
SUM OF ALL RESPONSES TO PHQ9 QUESTIONS 1 & 2: 0
2. FEELING DOWN, DEPRESSED OR HOPELESS: NOT AT ALL

## 2025-04-20 ASSESSMENT — PAIN DESCRIPTION - LOCATION
LOCATION: HEAD
LOCATION: HEAD

## 2025-04-20 NOTE — CARE PLAN
Problem: Pain - Adult  Goal: Verbalizes/displays adequate comfort level or baseline comfort level  Outcome: Progressing     Problem: Safety - Adult  Goal: Free from fall injury  Outcome: Progressing     Problem: Discharge Planning  Goal: Discharge to home or other facility with appropriate resources  Outcome: Progressing     Problem: Chronic Conditions and Co-morbidities  Goal: Patient's chronic conditions and co-morbidity symptoms are monitored and maintained or improved  Outcome: Progressing     Problem: Nutrition  Goal: Nutrient intake appropriate for maintaining nutritional needs  Outcome: Progressing     Problem: Pain  Goal: Takes deep breaths with improved pain control throughout the shift  Outcome: Progressing  Goal: Turns in bed with improved pain control throughout the shift  Outcome: Progressing  Goal: Walks with improved pain control throughout the shift  Outcome: Progressing  Goal: Performs ADL's with improved pain control throughout shift  Outcome: Progressing  Goal: Participates in PT with improved pain control throughout the shift  Outcome: Progressing  Goal: Free from opioid side effects throughout the shift  Outcome: Progressing  Goal: Free from acute confusion related to pain meds throughout the shift  Outcome: Progressing   The patient's goals for the shift include  be free from headache    The clinical goals for the shift include Pt will have baseline neuro status this shift.    Over the shift, the patient remained hemodyamically stable with negative neuro checks showing no deficiencies.  We await the 7pm ct scan

## 2025-04-20 NOTE — ED TRIAGE NOTES
Pt reports left sided headache x2 days. Pt taking tylenol without relief. Pt is from [facility] and family brought pt here for further evaluation. Pt reports 9/10 headache currently.

## 2025-04-20 NOTE — H&P
Critical Care Medicine History and Physical        Subjective   Patient is a 79 y.o. male admitted on 4/20/2025 11:24 AM for left-sided headache worsening over the last 48 hours and was found to have subdural hemorrhage which requires ICU level of care for frequent neurochecks.    HPI Wayne Burkitt is a 79 y.o. year old male patient with PMHx of HTN, HLD, epilepsy on Keppra, moderate vascular dementia with hallucination, who presented to ED with complaints of left-sided headache that has been going on for a month and a half but intensified within the last 48 hours.  He describes his headache as sharp, stabbing pain in the left temporal area, intermittent in frequency and intensity, radiates to the top of the head, the right temporal area and sometimes to the back of the head.  Aggravated with certain postural changes, especially standing up.  Alleviated partially with Tylenol.  Patient denies any history of migraine or cluster headaches, no recent direct head trauma, however he had a fall 6 weeks ago and has been complaining of unsteadiness with his gait but denies head trauma at that time or LOC.  He is not on any AC, only takes baby aspirin.  Denies any fever, rigors, night sweats, recent illness, neck stiffness, blurry vision, double vision, facial droop, numbness, sensory changes, vertigo, upper/lower extremities weakness, dysarthria, nausea, vomiting, chest pain, dyspnea, abdominal pain, diarrhea, urinary signs and symptoms.    Of note, he was recently admitted to our hospital on 4/1/25 for chest pain, epistaxis and was found to be severely anemic with Hgb 5.1, was admitted to the IMCU with concerns for in-stent thrombosis, underwent EGD and colonoscopy which revealed 3 gastric AVMs treated with APC, extensive sigmoid diverticulosis.  Chest pain was decided to be 2/2 demand ischemia with a profound anemia.    Patient is non-smoker, no EtOH or illicit drug use.  He is retired, lives in assisted living.    ED  course:   Vital signs: /64, HR 61, RR 16, afebrile 36.8, SpO2 99% on RA  Labs: CBC with no leukocytosis, Hgb 10.2 (improved from baseline), MCV 89.  Chemistry with , CRP 0.56, ESR 39. PT 12, INR 1.1.    Imaging: CT head revealed subdural hematoma on the left through the frontal and temporal region with maximum thickness of 4 mm.  No significant mass effect, no overlying skull fracture, no intraparenchymal hemorrhage or evidence of stroke.  Interventions: He received Tylenol, ibuprofen in the ED. neurosurgery was consulted by ED, recommended admission to the ICU with every hour neurochecks then repeat CT head in 6 hours.    Medical History[1]  Surgical History[2]  Prescriptions Prior to Admission[3]  Alprazolam, Bepotastine besilate, Clopidogrel, Doxazosin, Lisinopril, Metronidazole, Amlodipine, Atorvastatin, Cephalexin, Ciprofloxacin, Diphenhydramine hcl, Guaifenesin, Hydrochlorothiazide, Hydrocodone, Methylprednisolone, Phenytoin, Sertraline, Telmisartan, and Topiramate  Social History[4]  Family History[5]    Review of Systems:  Review of Systems   Pertinent positives and negatives as per history of present illness. Remainder of 10 point review of systems is negative.  Objective     PHYSICAL EXAM     Physical Exam   Vitals:  Most Recent:  Vitals:    04/20/25 1600   BP: 175/76   Pulse: 60   Resp: 16   Temp:    SpO2: 100%   General: Elderly male, NAD, conversant, intermittently confused  HEENT: NCAT, no scalp tenderness or signs of trauma; PERRL, EOMI; no nystagmus  Neck: Supple, no rigidity  CV: RRR, no murmurs, gallops, or rubs  Resp: CTAB, no rales or wheezing  Abdomen: Soft, NT, ND, no organomegaly  Extremities: No edema, normal perfusion  Skin: Intact, no petechiae or bruising  Neuro:   AOx3   Cranial Nerves II-XII grossly intact   Motor: 5/5 strength bilaterally   Sensation: Grossly intact   Reflexes: 2+ throughout   Gait: Deferred  Psych: Stable mood, normal behavior    Scheduled Medications:    Scheduled Medications[6]     Continuous Medications:   Continuous Medications[7]     PRN Medications:   PRN Medications[8]    24hr Min/Max:  Temp  Min: 35.8 °C (96.4 °F)  Max: 36.8 °C (98.2 °F)  Pulse  Min: 60  Max: 64  BP  Min: 127/74  Max: 183/76  Resp  Min: 10  Max: 16  SpO2  Min: 97 %  Max: 100 %    Hemodynamic parameters for last 24 hours:     No intake or output data in the 24 hours ending 04/20/25 6396    Lab/Radiology/Diagnostic Review:  Results for orders placed or performed during the hospital encounter of 04/20/25 (from the past 24 hours)   Sedimentation rate, automated   Result Value Ref Range    Sedimentation Rate 39 (H) 0 - 20 mm/h   C-reactive protein   Result Value Ref Range    C-Reactive Protein 0.56 <1.00 mg/dL   CBC and Auto Differential   Result Value Ref Range    WBC 5.3 4.4 - 11.3 x10*3/uL    nRBC 0.0 0.0 - 0.0 /100 WBCs    RBC 3.82 (L) 4.50 - 5.90 x10*6/uL    Hemoglobin 10.2 (L) 13.5 - 17.5 g/dL    Hematocrit 34.1 (L) 41.0 - 52.0 %    MCV 89 80 - 100 fL    MCH 26.7 26.0 - 34.0 pg    MCHC 29.9 (L) 32.0 - 36.0 g/dL    RDW 23.3 (H) 11.5 - 14.5 %    Platelets 386 150 - 450 x10*3/uL    Neutrophils % 69.4 40.0 - 80.0 %    Immature Granulocytes %, Automated 0.2 0.0 - 0.9 %    Lymphocytes % 16.8 13.0 - 44.0 %    Monocytes % 8.5 2.0 - 10.0 %    Eosinophils % 4.0 0.0 - 6.0 %    Basophils % 1.1 0.0 - 2.0 %    Neutrophils Absolute 3.67 1.60 - 5.50 x10*3/uL    Immature Granulocytes Absolute, Automated 0.01 0.00 - 0.50 x10*3/uL    Lymphocytes Absolute 0.89 0.80 - 3.00 x10*3/uL    Monocytes Absolute 0.45 0.05 - 0.80 x10*3/uL    Eosinophils Absolute 0.21 0.00 - 0.40 x10*3/uL    Basophils Absolute 0.06 0.00 - 0.10 x10*3/uL   Basic metabolic panel   Result Value Ref Range    Glucose 104 (H) 74 - 99 mg/dL    Sodium 142 136 - 145 mmol/L    Potassium 4.0 3.5 - 5.3 mmol/L    Chloride 107 98 - 107 mmol/L    Bicarbonate 24 21 - 32 mmol/L    Anion Gap 15 10 - 20 mmol/L    Urea Nitrogen 17 6 - 23 mg/dL    Creatinine  0.98 0.50 - 1.30 mg/dL    eGFR 79 >60 mL/min/1.73m*2    Calcium 9.1 8.6 - 10.3 mg/dL   Protime-INR   Result Value Ref Range    Protime 12.0 9.8 - 12.4 seconds    INR 1.1 0.9 - 1.1   Morphology   Result Value Ref Range    RBC Morphology See Below     Polychromasia Mild     Ovalocytes Few     Giant Platelets Few    Magnesium   Result Value Ref Range    Magnesium 2.17 1.60 - 2.40 mg/dL   Troponin I, High Sensitivity   Result Value Ref Range    Troponin I, High Sensitivity 9 0 - 20 ng/L     Imaging  CT head wo IV contrast  Result Date: 4/20/2025  Subdural hematoma is present on the left through the frontal and temporal region with a maximum thickness of 4 mm. There is no significant mass effect. There is no overlying skull fracture.   No intraparenchymal hemorrhage or evidence of stroke   MACRO: Denisse Carmen discussed the significance and urgency of this critical finding by telephone with  BETTY ALCANTAR on 4/20/2025 at 12:52 pm.  (**-RCF-**) Findings:  See findings.   Signed by: Denisse Carmen 4/20/2025 12:53 PM Dictation workstation:   [id number]      Cardiology, Vascular, and Other Imaging  No other imaging results found for the past 2 days       Additional Labs:  Lab Results   Component Value Date    WBC 5.3 04/20/2025    WBC 6.2 04/06/2025    WBC 5.0 04/05/2025     04/20/2025     04/06/2025     04/05/2025     04/20/2025     04/06/2025     04/05/2025    K 4.0 04/20/2025    K 3.9 04/06/2025    K 3.7 04/05/2025    BUN 17 04/20/2025    BUN 14 04/06/2025    BUN 12 04/05/2025    CREATININE 0.98 04/20/2025    CREATININE 0.91 04/06/2025    CREATININE 0.97 04/05/2025    MG 2.17 04/20/2025    MG 1.93 04/06/2025    MG 2.21 04/05/2025        Assessment   Assessment & Plan  Subdural hematoma (Multi)      ASSESSMENT   79-year-old male with a history of HTN, HLD, epilepsy (on Keppra), and moderate vascular dementia with hallucinations, who presents with a progressively worsening headache  localized to the left temporal region, ongoing for ~6 weeks but acutely intensified over the past 48 hours. He did have a fall 6 weeks ago but no head trauma or LOC was reported at that time. In the ED, CT head was performed revealing a left-sided subdural hematoma involving the frontal and temporal regions (max thickness 4 mm) without mass effect or midline shift. Neurosurgery was consulted and recommended ICU admission for neuro-monitoring and repeat imaging.    Plan    PLAN     Neuro:  # Acute left-sided subdural hematoma without mass effect  # Headache likely 2/2 above  # Epilepsy on Keppra  # Moderate vascular dementia with hallucination  - Likely chronic subdural with acute on chronic component considering delayed onset.  - Neurologically stable with no mass effect or midline shift  - Admitted to ICU for close neuromonitoring  - Continue every hour neurochecks for 24 hours (or until repeat CT head resulted)  - Repeat CT head 6 hours after initial study ordered and pending/follow-up  - Neurosurgery on board, conservative management recommended  - Monitor for progression, any signs of deterioration, new focal deficit, decreased LOC, this will prompt repeat imaging and potential surgical intervention.  - Will hold home aspirin  - Tylenol as needed for headache  - Avoid NSAID  - Strict BP control, avoiding hypotension  - Maintain MAP> 65 for adequate cerebral perfusion  -Continue Keppra and donepezil  - Frequent reorientation  - Avoid deliriogenic medication (benzos, anticholinergic)  -[facility] ICU    Cardiac:  # History of CAD  # HTN  # HLD  -Continuous cardiac monitoring  -Monitor hemodynamics, maintain lines  -Maintain Goal MAP > 65  -Continue home meds appropriately  - Holding aspirin for now  -Monitor and optimize electrolytes, keep K > 4.0, Mag > 2.0    Pulmonary:  - No active issues  -Continuous pulse oximetry   -O2 PRN to maintain SpO2 > 92%, wean as tolerated  -OOB to chair, when  appropriate    Gastrointestinal:  # History of gastric AVMs/diverticulosis  -No current GI symptoms  - NPO until after repeat scan, then cardiac diet  - Monitor for GI bleed  - Continue home pantoprazole 40 mg twice daily  -Bowel regimen: Laxative as needed    Renal:  - No acute conditions  - Stable RFP's  -Daily RFP,Mg,Phos  -Maintain euvolemia  - Strict I's and O's  -Electrolytes: Replete per protocol, goal K>4 Phos >3 Mg >2  Net IO Since Admission: No IO data has been entered for this period [25 1756]  Results from last 72 hours   Lab Units 25  1346   BUN mg/dL 17   CREATININE mg/dL 0.98         Endocrine:  - No active conditions  -Monitor blood glucose  -Goal BS < 180  -Follow hypoglycemic protocol  Results from last 7 days   Lab Units 25  1346   GLUCOSE mg/dL 104*        Hematology:  # History of severe anemia 2/2 gastric AVMs-stable  - No signs of active GI bleeding  - Holding aspirin and avoiding NSAID  -Daily CBC, coags  - DVT Prophylaxis: SCDs  Results from last 7 days   Lab Units 25  1346   HEMOGLOBIN g/dL 10.2*   HEMATOCRIT % 34.1*   PLATELETS AUTO x10*3/uL 386       Infectious Disease:  - No signs of infection  - Afebrile, normal WBC, negative ROS  Results from last 7 days   Lab Units 25  1346   WBC AUTO x10*3/uL 5.3     Temp (24hrs), Av.3 °C (97.4 °F), Min:35.8 °C (96.4 °F), Max:36.8 °C (98.2 °F)     Musculoskeletal:  - No active complaints  -PT/OT to evaluate    Lines/Tubes/Drains:   PIV's    Code status: Full Code     Dispo: ICU for close neuromonitoring, if repeat CT is stable and patient neurologically intact, will transfer to stepdown within 24 hours    Patient discussed with attending physician Dr. Dayanna Drake..     Veronika Arredondo MD      This note has been transcribed using Dragon voice recognition system and there is a possibility of unintentional typing misprints.  Any information found to be copied from previous providers is done in the best interest of the  patient to provide accurate, quality, and continuity of care.       [1]   Past Medical History:  Diagnosis Date    Auditory hallucinations 09/28/2024    Conversion reaction 09/28/2024    Coronary artery disease involving native coronary artery of native heart without angina pectoris 09/28/2024    Epistaxis, recurrent 09/28/2024    Generalized ischemic myocardial dysfunction 09/28/2024    Ischemic cardiomyopathy 09/28/2024    Moderate vascular dementia with anxiety 09/28/2024   [2]   Past Surgical History:  Procedure Laterality Date    CARDIAC CATHETERIZATION N/A 12/20/2024    Procedure: Left Heart Cath, With LV;  Surgeon: Gabino Giron MD;  Location: [facility] Cardiac Cath Lab;  Service: Cardiovascular;  Laterality: N/A;    CARDIAC CATHETERIZATION N/A 12/20/2024    Procedure: PCI TATYANA Stent- Coronary;  Surgeon: Gabino Giron MD;  Location: [facility] Cardiac Cath Lab;  Service: Cardiovascular;  Laterality: N/A;    CORONARY ANGIOPLASTY WITH STENT PLACEMENT      states he has 16 stents after the CABG    CORONARY ARTERY BYPASS GRAFT  2010    PACEMAKER PLACEMENT      TOTAL HIP ARTHROPLASTY     [3]   Medications Prior to Admission   Medication Sig Dispense Refill Last Dose/Taking    acetaminophen (Tylenol) 325 mg tablet Take 2 tablets (650 mg) by mouth every 4 hours if needed for mild pain (1 - 3) or fever (temp greater than 38.0 C).   4/20/2025    aspirin 81 mg EC tablet Take 1 tablet (81 mg) by mouth once daily.   4/20/2025    brimonidine (AlphaGAN) 0.2 % ophthalmic solution Administer 1 drop into both eyes 2 times a day.   4/20/2025    cholecalciferol (Vitamin D-3) 50 mcg (2,000 units) tablet Take 1 tablet (50 mcg) by mouth once daily.   4/20/2025    docusate sodium (Colace) 100 mg capsule Take 1 capsule (100 mg) by mouth 2 times a day as needed for constipation.   4/20/2025    donepezil (Aricept) 5 mg tablet Take 1 tablet (5 mg) by mouth once daily at bedtime.   4/19/2025 Bedtime    levETIRAcetam (Keppra) 1,000  mg tablet Take 1 tablet (1,000 mg) by mouth 2 times a day.   2025    metoprolol succinate XL (Toprol-XL) 25 mg 24 hr tablet Take 1 tablet (25 mg) by mouth once daily. Do not crush or chew. 30 tablet 1 2025    oxymetazoline (Afrin) 0.05 % nasal spray Administer 2 sprays into each nostril every 12 hours if needed (For nose bleeds.). Do not use for more than 3 days consecutively. Use as needed to aid with stopping nose bleeds. 30 mL 0 Unknown    pantoprazole (ProtoNix) 40 mg EC tablet Take 1 tablet (40 mg) by mouth 2 times a day. Do not crush, chew, or split. 60 tablet 1 2025    pravastatin (Pravachol) 80 mg tablet Take 1 tablet (80 mg) by mouth once daily at bedtime.   2025 Bedtime    QUEtiapine (SEROquel) 50 mg tablet Take 1 tablet (50 mg) by mouth once daily at bedtime.   2025 Bedtime    sodium chloride (Saline Nasal Mist) 3 % mist Administer 2 Squirts into affected nostril(s) 2 times a day. 126 mL 0 2025    loperamide (Imodium A-D) 2 mg tablet Take 1 tablet (2 mg) by mouth 4 times a day as needed for diarrhea. For each additional loose stool   Unknown    loperamide (Imodium A-D) 2 mg tablet Take 2 tablets (4 mg) by mouth if needed for diarrhea. Take after first loose stool   Unknown    magnesium hydroxide (Milk of Magnesia) 400 mg/5 mL suspension Take 30 mL by mouth once daily as needed for constipation.   Unknown    nitroglycerin (Nitrostat) 0.4 mg SL tablet Place 1 tablet (0.4 mg) under the tongue every 5 minutes if needed for chest pain. 90 tablet 12 Unknown    traZODone (Desyrel) 50 mg tablet Take 0.5 tablets (25 mg) by mouth as needed at bedtime (dementia with anxiety).   Unknown   [4]   Social History  Tobacco Use    Smoking status: Former     Current packs/day: 0.00     Types: Cigarettes     Quit date:      Years since quittin.3    Smokeless tobacco: Never   Vaping Use    Vaping status: Never Used   Substance Use Topics    Alcohol use: Not Currently     Comment: Stopped  into 1984.    Drug use: Never   [5]   Family History  Family history unknown: Yes   [6] brimonidine, 1 drop, Both Eyes, BID  [START ON 4/21/2025] cholecalciferol, 50 mcg, oral, Daily  donepezil, 5 mg, oral, Nightly  levETIRAcetam, 1,000 mg, oral, BID  [START ON 4/21/2025] metoprolol succinate XL, 25 mg, oral, Daily  pantoprazole, 40 mg, oral, BID  pravastatin, 80 mg, oral, Nightly  QUEtiapine, 50 mg, oral, Nightly  sodium chloride, 2 spray, Each Nostril, q12h  [7]    [8] PRN medications: acetaminophen, docusate sodium, hydrALAZINE, labetaloL, oxygen

## 2025-04-20 NOTE — ED PROVIDER NOTES
EMERGENCY DEPARTMENT ENCOUNTER      Pt Name: Wayne Burkitt  MRN: [id number]  Birthdate 1945  Date of evaluation: 4/20/2025  Provider: Abdirashid Mahajan DO    CHIEF COMPLAINT       Chief Complaint   Patient presents with    Headache         HISTORY OF PRESENT ILLNESS    HPI    79-year-old male with past medical history significant for moderate vascular dementia with hallucinations, hypertension, hyperlipidemia, epilepsy on Keppra presenting to the emergency department for evaluation of left-sided headache for the past month and a half.  Patient denies prior history of migraine or cluster headaches.  No recent head trauma, patient states he did have a fall a 6 weeks ago but states he did not hit his head or lose consciousness and only has an abrasion to the left. patient is not on blood thinners.  Denies fevers, chills, night sweats, neck stiffness.  Denies blurry vision, double vision, loss of vision, facial droop, sensory changes, vertigo, weakness in the arms or legs, difficulty speaking.  Patient states he has been taking Tylenol which has improved his headache.  Only exacerbating factor is headache gets worse when standing up.  Describes his headache as a sharp stabbing pain that is constant but waxes and wanes in intensity.  Rates it at a 7 out of 10.     Nursing Notes were reviewed.    PAST MEDICAL HISTORY   Medical History[1]      SURGICAL HISTORY     Surgical History[2]      CURRENT MEDICATIONS       Current Discharge Medication List        CONTINUE these medications which have NOT CHANGED    Details   acetaminophen (Tylenol) 325 mg tablet Take 2 tablets (650 mg) by mouth every 4 hours if needed for mild pain (1 - 3) or fever (temp greater than 38.0 C).      aspirin 81 mg EC tablet Take 1 tablet (81 mg) by mouth once daily.      brimonidine (AlphaGAN) 0.2 % ophthalmic solution Administer 1 drop into both eyes 2 times a day.      cholecalciferol (Vitamin D-3) 50 mcg (2,000 units) tablet Take 1 tablet  (50 mcg) by mouth once daily.      docusate sodium (Colace) 100 mg capsule Take 1 capsule (100 mg) by mouth 2 times a day as needed for constipation.      donepezil (Aricept) 5 mg tablet Take 1 tablet (5 mg) by mouth once daily at bedtime.      levETIRAcetam (Keppra) 1,000 mg tablet Take 1 tablet (1,000 mg) by mouth 2 times a day.      metoprolol succinate XL (Toprol-XL) 25 mg 24 hr tablet Take 1 tablet (25 mg) by mouth once daily. Do not crush or chew.  Qty: 30 tablet, Refills: 1    Associated Diagnoses: 3-vessel coronary artery disease      oxymetazoline (Afrin) 0.05 % nasal spray Administer 2 sprays into each nostril every 12 hours if needed (For nose bleeds.). Do not use for more than 3 days consecutively. Use as needed to aid with stopping nose bleeds.  Qty: 30 mL, Refills: 0    Associated Diagnoses: Epistaxis, recurrent      pantoprazole (ProtoNix) 40 mg EC tablet Take 1 tablet (40 mg) by mouth 2 times a day. Do not crush, chew, or split.  Qty: 60 tablet, Refills: 1    Associated Diagnoses: Melena      pravastatin (Pravachol) 80 mg tablet Take 1 tablet (80 mg) by mouth once daily at bedtime.      QUEtiapine (SEROquel) 50 mg tablet Take 1 tablet (50 mg) by mouth once daily at bedtime.      sodium chloride (Saline Nasal Mist) 3 % mist Administer 2 Squirts into affected nostril(s) 2 times a day.  Qty: 126 mL, Refills: 0    Associated Diagnoses: Epistaxis, recurrent      !! loperamide (Imodium A-D) 2 mg tablet Take 1 tablet (2 mg) by mouth 4 times a day as needed for diarrhea. For each additional loose stool      !! loperamide (Imodium A-D) 2 mg tablet Take 2 tablets (4 mg) by mouth if needed for diarrhea. Take after first loose stool      magnesium hydroxide (Milk of Magnesia) 400 mg/5 mL suspension Take 30 mL by mouth once daily as needed for constipation.      nitroglycerin (Nitrostat) 0.4 mg SL tablet Place 1 tablet (0.4 mg) under the tongue every 5 minutes if needed for chest pain.  Qty: 90 tablet, Refills:  12    Associated Diagnoses: 3-vessel coronary artery disease      traZODone (Desyrel) 50 mg tablet Take 0.5 tablets (25 mg) by mouth as needed at bedtime (dementia with anxiety).       !! - Potential duplicate medications found. Please discuss with provider.          ALLERGIES     Alprazolam, Bepotastine besilate, Clopidogrel, Doxazosin, Lisinopril, Metronidazole, Amlodipine, Atorvastatin, Cephalexin, Ciprofloxacin, Diphenhydramine hcl, Guaifenesin, Hydrochlorothiazide, Hydrocodone, Methylprednisolone, Phenytoin, Sertraline, Telmisartan, and Topiramate    FAMILY HISTORY     Family History[3]       SOCIAL HISTORY     Social History[4]    SCREENINGS                        PHYSICAL EXAM    (up to 7 for level 4, 8 or more for level 5)     ED Triage Vitals [04/20/25 1013]   Temperature Heart Rate Respirations BP   36.8 °C (98.2 °F) 61 16 133/64      Pulse Ox Temp Source Heart Rate Source Patient Position   99 % Temporal Monitor Sitting      BP Location FiO2 (%)     Right arm --       Physical Exam  Vitals and nursing note reviewed.   Constitutional:       General: He is not in acute distress.     Appearance: He is well-developed. He is not ill-appearing, toxic-appearing or diaphoretic.   HENT:      Head: Normocephalic and atraumatic.      Mouth/Throat:      Mouth: Mucous membranes are moist.      Pharynx: Oropharynx is clear.   Eyes:      General: No visual field deficit or scleral icterus.     Extraocular Movements: Extraocular movements intact.      Right eye: No nystagmus.      Left eye: No nystagmus.      Pupils: Pupils are equal, round, and reactive to light.   Cardiovascular:      Rate and Rhythm: Normal rate and regular rhythm.      Heart sounds: Normal heart sounds.   Pulmonary:      Effort: Pulmonary effort is normal.      Breath sounds: Normal breath sounds.   Abdominal:      General: There is no distension.      Palpations: Abdomen is soft. There is no mass.      Tenderness: There is no abdominal tenderness.  There is no guarding.   Musculoskeletal:         General: Normal range of motion.      Cervical back: Normal range of motion and neck supple. No rigidity.   Lymphadenopathy:      Cervical: No cervical adenopathy.   Skin:     General: Skin is warm and dry.   Neurological:      Mental Status: He is alert and oriented to person, place, and time.      Cranial Nerves: No cranial nerve deficit, dysarthria or facial asymmetry.      Sensory: No sensory deficit.      Motor: No weakness.      Coordination: Romberg sign negative. Coordination normal.      Gait: Gait normal.   Psychiatric:         Mood and Affect: Mood normal.         Behavior: Behavior normal.          DIAGNOSTIC RESULTS     LABS:  Labs Reviewed   SEDIMENTATION RATE, AUTOMATED - Abnormal       Result Value    Sedimentation Rate 39 (*)    CBC WITH AUTO DIFFERENTIAL - Abnormal    WBC 5.3      nRBC 0.0      RBC 3.82 (*)     Hemoglobin 10.2 (*)     Hematocrit 34.1 (*)     MCV 89      MCH 26.7      MCHC 29.9 (*)     RDW 23.3 (*)     Platelets 386      Neutrophils % 69.4      Immature Granulocytes %, Automated 0.2      Lymphocytes % 16.8      Monocytes % 8.5      Eosinophils % 4.0      Basophils % 1.1      Neutrophils Absolute 3.67      Immature Granulocytes Absolute, Automated 0.01      Lymphocytes Absolute 0.89      Monocytes Absolute 0.45      Eosinophils Absolute 0.21      Basophils Absolute 0.06     BASIC METABOLIC PANEL - Abnormal    Glucose 104 (*)     Sodium 142      Potassium 4.0      Chloride 107      Bicarbonate 24      Anion Gap 15      Urea Nitrogen 17      Creatinine 0.98      eGFR 79      Calcium 9.1     C-REACTIVE PROTEIN - Normal    C-Reactive Protein 0.56     PROTIME-INR - Normal    Protime 12.0      INR 1.1     MAGNESIUM - Normal    Magnesium 2.17     TROPONIN I, HIGH SENSITIVITY - Normal    Troponin I, High Sensitivity 9      Narrative:     Less than 99th percentile of normal range cutoff-  Female and children under 18 years old <14 ng/L; Male  <21 ng/L: Negative  Repeat testing should be performed if clinically indicated.     Female and children under 18 years old 14-50 ng/L; Male 21-50 ng/L:  Consistent with possible cardiac damage and possible increased clinical   risk. Serial measurements may help to assess extent of myocardial damage.     >50 ng/L: Consistent with cardiac damage, increased clinical risk and  myocardial infarction. Serial measurements may help assess extent of   myocardial damage.      NOTE: Children less than 1 year old may have higher baseline troponin   levels and results should be interpreted in conjunction with the overall   clinical context.     NOTE: Troponin I testing is performed using a different   testing methodology at [facility] than at other   [facility]. Direct result comparisons should only   be made within the same method.   HEMOGLOBIN A1C   CBC   MAGNESIUM   RENAL FUNCTION PANEL   MORPHOLOGY    RBC Morphology See Below      Polychromasia Mild      Ovalocytes Few      Giant Platelets Few         All other labs were within normal range or not returned as of this dictation.    Imaging  CT head wo IV contrast   Final Result   Subdural hematoma is present on the left through the frontal and   temporal region with a maximum thickness of 4 mm. There is no   significant mass effect. There is no overlying skull fracture.        No intraparenchymal hemorrhage or evidence of stroke        MACRO:   Denisse Carmen discussed the significance and urgency of this   critical finding by telephone with  BETTY ALCANTAR on 4/20/2025 at 12:52   pm.  (**-RCF-**) Findings:  See findings.        Signed by: Denisse Carmen 4/20/2025 12:53 PM   Dictation workstation:   [id number]      CT head wo IV contrast    (Results Pending)        Procedures  Procedures     EMERGENCY DEPARTMENT COURSE/MDM:     ED Course as of 04/20/25 2029   Sun Apr 20, 2025   1247 Sed Rate(!): 39  Reassuring as less than 80 [JH]   1334 Patient case  discussed with neurosurgeon Dr. Childers who recommended admission to the ICU for every hour neurochecks and repeat stability scan in 6 hours. [CH]      ED Course User Index  [CH] Abdirashid Mahajan DO  [JH] Tad Collado MD         Diagnoses as of 04/20/25 2029   Subdural hematoma (Multi)        Medical Decision Making    79-year-old male with past medical history significant for moderate vascular dementia with hallucinations, hypertension, hyperlipidemia, epilepsy on Keppra presenting to the emergency department for evaluation of left-sided headache for the past month and a half.  Hemodynamically stable, no acute distress, nontoxic-appearing, afebrile.  Given no recent head trauma not on blood thinners with normal neurologic exam and pain improving with Tylenol I have low clinical concern for ICH, SDH, or SAH at this time.  Given the unilateral nature of patient's pain and patient's age ESR and CRP ordered to evaluate for temporal arteritis however given patient symptoms have been present for over a month and a half I think this is less likely in could potentially be indicative of an occipital neuralgia given the character of patient's pain.  Plan to order CT scan to evaluate for mass occupying lesions/masses.  Tylenol and Motrin ordered for pain.     CT scan shows a subdural hematoma of the left frontal lobe with a maximum thickness of 4 mm with no significant mass effect. ESR elevated at 39 with normal CRP. ESR is <80, which is reassuring from a giant cell arteritis standpoint. Patient case discussed with Dr. Childers with neurosurgery who advised admitting patient to the ICU for every hour neurochecks and repeat CT in 6 hours.  Patient case discussed with ICU attending Dr. Alan who agreed admit the patient to his service for further evaluation and treatment.    Repeat CT scan showing worsening bleed returned after patient was off floor in ICU, to be acted on by inpatient team.    Patient and or family in agreement and  understanding of treatment plan.  All questions answered.      I reviewed the case with the attending ED physician. The attending ED physician agrees with the plan. Patient and/or patient´s representative was counseled regarding labs, imaging, likely diagnosis, and plan. All questions were answered.    ED Medications administered this visit:    Medications   acetaminophen (Tylenol) tablet 650 mg (650 mg oral Given 4/20/25 1741)   brimonidine (AlphaGAN) 0.2 % ophthalmic solution 1 drop (has no administration in time range)   cholecalciferol (Vitamin D-3) tablet 50 mcg (has no administration in time range)   docusate sodium (Colace) capsule 100 mg (has no administration in time range)   donepezil (Aricept) tablet 5 mg (has no administration in time range)   levETIRAcetam (Keppra) tablet 1,000 mg (has no administration in time range)   metoprolol succinate XL (Toprol-XL) 24 hr tablet 25 mg (has no administration in time range)   pantoprazole (ProtoNix) EC tablet 40 mg (has no administration in time range)   pravastatin (Pravachol) tablet 80 mg (has no administration in time range)   QUEtiapine (SEROquel) tablet 50 mg (has no administration in time range)   sodium chloride (Ocean) 0.65 % nasal spray 2 spray (has no administration in time range)   oxygen (O2) therapy (has no administration in time range)   labetaloL (Normodyne,Trandate) injection 10 mg (10 mg intravenous Given 4/20/25 2001)   hydrALAZINE (Apresoline) injection 10 mg (10 mg intravenous Given 4/20/25 1933)   acetaminophen (Tylenol) tablet 650 mg (650 mg oral Given 4/20/25 1204)   ibuprofen tablet 600 mg (600 mg oral Given 4/20/25 1218)       New Prescriptions from this visit:    Current Discharge Medication List          Follow-up:  No follow-up provider specified.      Final Impression:   1. Subdural hematoma (Multi)          (Please note that portions of this note were completed with a voice recognition program.  Efforts were made to edit the dictations  but occasionally words are mis-transcribed.)       Abdirashid Mahajan, DO  Resident  25         [1]   Past Medical History:  Diagnosis Date    Auditory hallucinations 2024    Conversion reaction 2024    Coronary artery disease involving native coronary artery of native heart without angina pectoris 2024    Epistaxis, recurrent 2024    Generalized ischemic myocardial dysfunction 2024    Ischemic cardiomyopathy 2024    Moderate vascular dementia with anxiety 2024   [2]   Past Surgical History:  Procedure Laterality Date    CARDIAC CATHETERIZATION N/A 2024    Procedure: Left Heart Cath, With LV;  Surgeon: Gabino Giron MD;  Location: [facility] Cardiac Cath Lab;  Service: Cardiovascular;  Laterality: N/A;    CARDIAC CATHETERIZATION N/A 2024    Procedure: PCI TATYANA Stent- Coronary;  Surgeon: Gabino Giron MD;  Location: [facility] Cardiac Cath Lab;  Service: Cardiovascular;  Laterality: N/A;    CORONARY ANGIOPLASTY WITH STENT PLACEMENT      states he has 16 stents after the CABG    CORONARY ARTERY BYPASS GRAFT  2010    PACEMAKER PLACEMENT      TOTAL HIP ARTHROPLASTY     [3]   Family History  Family history unknown: Yes   [4]   Social History  Socioeconomic History    Marital status: Single   Tobacco Use    Smoking status: Former     Current packs/day: 0.00     Types: Cigarettes     Quit date:      Years since quittin.3    Smokeless tobacco: Never   Vaping Use    Vaping status: Never Used   Substance and Sexual Activity    Alcohol use: Not Currently     Comment: Stopped into .    Drug use: Never    Sexual activity: Defer     Social Drivers of Health     Financial Resource Strain: Low Risk  (2025)    Overall Financial Resource Strain (CARDIA)     Difficulty of Paying Living Expenses: Not hard at all   Recent Concern: Financial Resource Strain - High Risk (2/10/2025)    Overall Financial Resource Strain (CARDIA)     Difficulty of Paying  Living Expenses: Very hard   Food Insecurity: No Food Insecurity (4/20/2025)    Hunger Vital Sign     Worried About Running Out of Food in the Last Year: Never true     Ran Out of Food in the Last Year: Never true   Transportation Needs: No Transportation Needs (4/20/2025)    PRAPARE - Transportation     Lack of Transportation (Medical): No     Lack of Transportation (Non-Medical): No   Intimate Partner Violence: Not At Risk (4/20/2025)    Humiliation, Afraid, Rape, and Kick questionnaire     Fear of Current or Ex-Partner: No     Emotionally Abused: No     Physically Abused: No     Sexually Abused: No   Housing Stability: Low Risk  (4/20/2025)    Housing Stability Vital Sign     Unable to Pay for Housing in the Last Year: No     Number of Times Moved in the Last Year: 1     Homeless in the Last Year: No        Tad Collado MD  04/22/25 2287

## 2025-04-20 NOTE — HOSPITAL COURSE
79-year-old male with PMHx of HTN, HLD, epilepsy on Keppra, moderate vascular dementia with hallucination,  Who presented to ED with complaints of left-sided headache that has been going on for a month and a half but intensified within the last 48 hours.  He describes his headache as sharp, stabbing pain in the left temporal area, intermittent in frequency and intensity, radiates to the top of the head, the right temporal area and sometimes to the back of the head.  Aggravated with certain postural changes, especially standing up.  Alleviated partially with Tylenol.  Patient denies any history of migraine or cluster headaches, no recent direct head trauma, however he had a fall 6 weeks ago and has been complaining of unsteadiness with his gait but denies head trauma at that time or LOC.  He is not on any AC, only takes baby aspirin.  Denies any fever, rigors, night sweats, recent illness, neck stiffness, blurry vision, double vision, facial droop, numbness, sensory changes, vertigo, upper/lower extremities weakness, dysarthria, nausea, vomiting, chest pain, dyspnea, abdominal pain, diarrhea, urinary signs and symptoms.    Of note, he was recently admitted to our hospital on 4/1/25 for chest pain, epistaxis and was found to be severely anemic with Hgb 5.1, was admitted to the IMCU with concerns for in-stent thrombosis, underwent EGD and colonoscopy which revealed 3 gastric AVMs treated with APC, extensive sigmoid diverticulosis.  Chest pain was decided to be 2/2 demand ischemia with a profound anemia.    Patient is non-smoker, no EtOH or illicit drug use.  He is retired, lives in assisted living.    In ED,  VS: /64, HR 61, RR 16, afebrile 36.8, SpO2 99% on RA  Labs: CBC with no leukocytosis, Hgb 10.2 (improved from baseline), MCV 89.  Chemistry with , CRP 0.56, ESR 39. PT 12, INR 1.1.  Imaging: CT head revealed subdural hematoma on the left through the frontal and temporal region with maximum thickness of  4 mm.  No significant mass effect, no overlying skull fracture, no intraparenchymal hemorrhage or evidence of stroke.    He received Tylenol, ibuprofen in the ED. neurosurgery was consulted by ED, recommended admission to the ICU with every hour neurochecks then repeat CT head in 6 hours.

## 2025-04-21 ENCOUNTER — TELEPHONE (OUTPATIENT)
Dept: PRIMARY CARE | Facility: CLINIC | Age: 80
End: 2025-04-21

## 2025-04-21 ENCOUNTER — APPOINTMENT (OUTPATIENT)
Dept: CARDIOLOGY | Facility: HOSPITAL | Age: 80
DRG: 064 | End: 2025-04-21
Payer: MEDICARE

## 2025-04-21 ENCOUNTER — APPOINTMENT (OUTPATIENT)
Dept: RADIOLOGY | Facility: HOSPITAL | Age: 80
DRG: 064 | End: 2025-04-21
Payer: MEDICARE

## 2025-04-21 PROBLEM — I47.20 V TACH (MULTI): Status: ACTIVE | Noted: 2025-04-21

## 2025-04-21 LAB
ALBUMIN SERPL BCP-MCNC: 3.4 G/DL (ref 3.4–5)
ANION GAP SERPL CALC-SCNC: 11 MMOL/L (ref 10–20)
ANION GAP SERPL CALC-SCNC: 15 MMOL/L (ref 10–20)
BUN SERPL-MCNC: 16 MG/DL (ref 6–23)
BUN SERPL-MCNC: 16 MG/DL (ref 6–23)
CALCIUM SERPL-MCNC: 8.4 MG/DL (ref 8.6–10.3)
CALCIUM SERPL-MCNC: 9.5 MG/DL (ref 8.6–10.3)
CHLORIDE SERPL-SCNC: 106 MMOL/L (ref 98–107)
CHLORIDE SERPL-SCNC: 110 MMOL/L (ref 98–107)
CO2 SERPL-SCNC: 23 MMOL/L (ref 21–32)
CO2 SERPL-SCNC: 24 MMOL/L (ref 21–32)
CREAT SERPL-MCNC: 0.87 MG/DL (ref 0.5–1.3)
CREAT SERPL-MCNC: 0.96 MG/DL (ref 0.5–1.3)
EGFRCR SERPLBLD CKD-EPI 2021: 81 ML/MIN/1.73M*2
EGFRCR SERPLBLD CKD-EPI 2021: 89 ML/MIN/1.73M*2
ERYTHROCYTE [DISTWIDTH] IN BLOOD BY AUTOMATED COUNT: 23.1 % (ref 11.5–14.5)
GLUCOSE SERPL-MCNC: 100 MG/DL (ref 74–99)
GLUCOSE SERPL-MCNC: 125 MG/DL (ref 74–99)
HCT VFR BLD AUTO: 28.4 % (ref 41–52)
HGB BLD-MCNC: 8.7 G/DL (ref 13.5–17.5)
MAGNESIUM SERPL-MCNC: 2.03 MG/DL (ref 1.6–2.4)
MAGNESIUM SERPL-MCNC: 2.09 MG/DL (ref 1.6–2.4)
MCH RBC QN AUTO: 27.4 PG (ref 26–34)
MCHC RBC AUTO-ENTMCNC: 30.6 G/DL (ref 32–36)
MCV RBC AUTO: 89 FL (ref 80–100)
NRBC BLD-RTO: 0 /100 WBCS (ref 0–0)
PHOSPHATE SERPL-MCNC: 3.8 MG/DL (ref 2.5–4.9)
PLATELET # BLD AUTO: 300 X10*3/UL (ref 150–450)
POTASSIUM SERPL-SCNC: 3.9 MMOL/L (ref 3.5–5.3)
POTASSIUM SERPL-SCNC: 4.5 MMOL/L (ref 3.5–5.3)
RBC # BLD AUTO: 3.18 X10*6/UL (ref 4.5–5.9)
SODIUM SERPL-SCNC: 140 MMOL/L (ref 136–145)
SODIUM SERPL-SCNC: 140 MMOL/L (ref 136–145)
WBC # BLD AUTO: 4.2 X10*3/UL (ref 4.4–11.3)

## 2025-04-21 PROCEDURE — 85027 COMPLETE CBC AUTOMATED: CPT

## 2025-04-21 PROCEDURE — 83735 ASSAY OF MAGNESIUM: CPT

## 2025-04-21 PROCEDURE — 2500000001 HC RX 250 WO HCPCS SELF ADMINISTERED DRUGS (ALT 637 FOR MEDICARE OP): Performed by: NURSE PRACTITIONER

## 2025-04-21 PROCEDURE — 36415 COLL VENOUS BLD VENIPUNCTURE: CPT

## 2025-04-21 PROCEDURE — 2500000002 HC RX 250 W HCPCS SELF ADMINISTERED DRUGS (ALT 637 FOR MEDICARE OP, ALT 636 FOR OP/ED): Performed by: INTERNAL MEDICINE

## 2025-04-21 PROCEDURE — 2500000004 HC RX 250 GENERAL PHARMACY W/ HCPCS (ALT 636 FOR OP/ED): Mod: JZ | Performed by: INTERNAL MEDICINE

## 2025-04-21 PROCEDURE — 97166 OT EVAL MOD COMPLEX 45 MIN: CPT | Mod: GO

## 2025-04-21 PROCEDURE — 2500000001 HC RX 250 WO HCPCS SELF ADMINISTERED DRUGS (ALT 637 FOR MEDICARE OP): Performed by: INTERNAL MEDICINE

## 2025-04-21 PROCEDURE — 70450 CT HEAD/BRAIN W/O DYE: CPT | Performed by: RADIOLOGY

## 2025-04-21 PROCEDURE — 2500000001 HC RX 250 WO HCPCS SELF ADMINISTERED DRUGS (ALT 637 FOR MEDICARE OP)

## 2025-04-21 PROCEDURE — 93005 ELECTROCARDIOGRAM TRACING: CPT

## 2025-04-21 PROCEDURE — 70450 CT HEAD/BRAIN W/O DYE: CPT | Performed by: STUDENT IN AN ORGANIZED HEALTH CARE EDUCATION/TRAINING PROGRAM

## 2025-04-21 PROCEDURE — 80069 RENAL FUNCTION PANEL: CPT

## 2025-04-21 PROCEDURE — 99291 CRITICAL CARE FIRST HOUR: CPT | Performed by: NURSE PRACTITIONER

## 2025-04-21 PROCEDURE — 97162 PT EVAL MOD COMPLEX 30 MIN: CPT | Mod: GP

## 2025-04-21 PROCEDURE — 70450 CT HEAD/BRAIN W/O DYE: CPT

## 2025-04-21 PROCEDURE — 2060000001 HC INTERMEDIATE ICU ROOM DAILY

## 2025-04-21 PROCEDURE — 80048 BASIC METABOLIC PNL TOTAL CA: CPT | Mod: CCI

## 2025-04-21 PROCEDURE — 99223 1ST HOSP IP/OBS HIGH 75: CPT | Performed by: NURSE PRACTITIONER

## 2025-04-21 RX ORDER — METOPROLOL TARTRATE 1 MG/ML
2.5 INJECTION, SOLUTION INTRAVENOUS ONCE
Status: COMPLETED | OUTPATIENT
Start: 2025-04-21 | End: 2025-04-21

## 2025-04-21 RX ORDER — ACETAMINOPHEN 325 MG/1
650 TABLET ORAL EVERY 6 HOURS PRN
Status: DISCONTINUED | OUTPATIENT
Start: 2025-04-21 | End: 2025-04-26 | Stop reason: HOSPADM

## 2025-04-21 RX ADMIN — METOPROLOL SUCCINATE 25 MG: 25 TABLET, EXTENDED RELEASE ORAL at 09:50

## 2025-04-21 RX ADMIN — LEVETIRACETAM 1000 MG: 500 TABLET, FILM COATED ORAL at 21:23

## 2025-04-21 RX ADMIN — DONEPEZIL HYDROCHLORIDE 5 MG: 5 TABLET ORAL at 21:22

## 2025-04-21 RX ADMIN — SALINE NASAL SPRAY 2 SPRAY: 1.5 SOLUTION NASAL at 21:22

## 2025-04-21 RX ADMIN — PANTOPRAZOLE SODIUM 40 MG: 40 TABLET, DELAYED RELEASE ORAL at 21:22

## 2025-04-21 RX ADMIN — Medication 50 MCG: at 09:49

## 2025-04-21 RX ADMIN — PRAVASTATIN SODIUM 80 MG: 20 TABLET ORAL at 21:22

## 2025-04-21 RX ADMIN — LEVETIRACETAM 1000 MG: 500 TABLET, FILM COATED ORAL at 09:49

## 2025-04-21 RX ADMIN — QUETIAPINE FUMARATE 50 MG: 50 TABLET ORAL at 21:23

## 2025-04-21 RX ADMIN — ACETAMINOPHEN 650 MG: 325 TABLET, FILM COATED ORAL at 12:25

## 2025-04-21 RX ADMIN — BRIMONIDINE TARTRATE 1 DROP: 2 SOLUTION/ DROPS OPHTHALMIC at 21:23

## 2025-04-21 RX ADMIN — ACETAMINOPHEN 650 MG: 325 TABLET, FILM COATED ORAL at 18:16

## 2025-04-21 RX ADMIN — BRIMONIDINE TARTRATE 1 DROP: 2 SOLUTION/ DROPS OPHTHALMIC at 09:49

## 2025-04-21 RX ADMIN — METOPROLOL TARTRATE 2.5 MG: 5 INJECTION INTRAVENOUS at 18:09

## 2025-04-21 RX ADMIN — PANTOPRAZOLE SODIUM 40 MG: 40 TABLET, DELAYED RELEASE ORAL at 09:49

## 2025-04-21 RX ADMIN — ACETAMINOPHEN 650 MG: 325 TABLET, FILM COATED ORAL at 06:11

## 2025-04-21 RX ADMIN — SALINE NASAL SPRAY 2 SPRAY: 1.5 SOLUTION NASAL at 09:49

## 2025-04-21 ASSESSMENT — PAIN - FUNCTIONAL ASSESSMENT
PAIN_FUNCTIONAL_ASSESSMENT: 0-10

## 2025-04-21 ASSESSMENT — PAIN DESCRIPTION - ORIENTATION
ORIENTATION: LEFT
ORIENTATION: LEFT

## 2025-04-21 ASSESSMENT — COGNITIVE AND FUNCTIONAL STATUS - GENERAL
WALKING IN HOSPITAL ROOM: A LITTLE
DAILY ACTIVITIY SCORE: 18
DAILY ACTIVITIY SCORE: 24
CLIMB 3 TO 5 STEPS WITH RAILING: A LITTLE
CLIMB 3 TO 5 STEPS WITH RAILING: A LITTLE
MOVING FROM LYING ON BACK TO SITTING ON SIDE OF FLAT BED WITH BEDRAILS: A LITTLE
HELP NEEDED FOR BATHING: A LITTLE
WALKING IN HOSPITAL ROOM: A LITTLE
MOVING TO AND FROM BED TO CHAIR: A LITTLE
DAILY ACTIVITIY SCORE: 23
EATING MEALS: A LITTLE
TOILETING: A LITTLE
DRESSING REGULAR UPPER BODY CLOTHING: A LITTLE
STANDING UP FROM CHAIR USING ARMS: A LITTLE
MOVING TO AND FROM BED TO CHAIR: A LITTLE
STANDING UP FROM CHAIR USING ARMS: A LITTLE
TURNING FROM BACK TO SIDE WHILE IN FLAT BAD: A LITTLE
MOBILITY SCORE: 24
DRESSING REGULAR LOWER BODY CLOTHING: A LITTLE
MOBILITY SCORE: 18
TOILETING: A LITTLE
MOBILITY SCORE: 20
PERSONAL GROOMING: A LITTLE

## 2025-04-21 ASSESSMENT — PAIN DESCRIPTION - LOCATION
LOCATION: HEAD
LOCATION: HEAD

## 2025-04-21 ASSESSMENT — PAIN DESCRIPTION - DESCRIPTORS: DESCRIPTORS: SHARP;SHOOTING

## 2025-04-21 ASSESSMENT — PAIN SCALES - GENERAL
PAINLEVEL_OUTOF10: 3
PAINLEVEL_OUTOF10: 0 - NO PAIN
PAINLEVEL_OUTOF10: 9
PAINLEVEL_OUTOF10: 0 - NO PAIN
PAINLEVEL_OUTOF10: 0 - NO PAIN
PAINLEVEL_OUTOF10: 9
PAINLEVEL_OUTOF10: 0 - NO PAIN
PAINLEVEL_OUTOF10: 3
PAINLEVEL_OUTOF10: 9
PAINLEVEL_OUTOF10: 3

## 2025-04-21 ASSESSMENT — ACTIVITIES OF DAILY LIVING (ADL): BATHING_ASSISTANCE: STAND BY

## 2025-04-21 NOTE — PROGRESS NOTES
Critical Care Daily Progress Note        Subjective   Patient is a 79 y.o. male admitted on 4/20/2025 11:24 AM with the following indication(s) for ICU care left frontal lobe subdural hematoma and hourly neuro checks    Interval History/Brief Hospital Course:   79 year old male with hx of moderate vascular dementia with hallucinations, hypertension, hyperlipidemia, epilepsy on Keppra presents to the emergency department for evaluation of left-sided headache, sharp/stabbing in nature for the past month and a half, worsening over the last 48 hours. Pains intensifies when standing up. No recent head trauma however did sustain a fall 6 weeks ago but states he did not strike his head or loss consciousness. Denies blurry vision, double vision, loss of vision, facial droop, sensory changes, vertigo, weakness in extremities or difficulty speaking.  CT scan of head shows a subdural hematoma of the left frontal lobe with a maximum thickness of 4 mm with no significant mass affect.  Case was discussed with neurosurgery who recommended admitting patient to the ICU for hourly neurochecks and repeat stability scan in 6 hours.    Overnight Events: Repeat 6 hour stability scan completed at 1900 which showed slight enlargement, another CT scan was performed at 0122 which showed SDH as being stable.     Pt seen and examined, sitting up in bed in NAD, awake and alert, following commands. Oxygenating well on room air, hemodynamically stable    Complaints: has complaints intermittent left temporal pain.    Scheduled Medications:   Scheduled Medications[1]     Continuous Medications:   Continuous Medications[2]     PRN Medications:   PRN Medications[3]    Objective   Vitals:  Most Recent:  Vitals:    04/21/25 0800   BP: 103/67   Pulse: 60   Resp: 19   Temp:    SpO2: 99%       24hr Min/Max:  Temp  Min: 35.8 °C (96.4 °F)  Max: 36.8 °C (98.2 °F)  Pulse  Min: 58  Max: 70  BP  Min: 85/49  Max: 186/78  Resp  Min: 10  Max: 29  SpO2  Min: 97 %   Max: 100 %      I/O:    Intake/Output Summary (Last 24 hours) at 4/21/2025 0844  Last data filed at 4/21/2025 0800  Gross per 24 hour   Intake 1240 ml   Output 700 ml   Net 540 ml       Physical Exam  HENT:      Head: Normocephalic and atraumatic.      Nose: Nose normal.      Mouth/Throat:      Mouth: Mucous membranes are moist.   Eyes:      Extraocular Movements: Extraocular movements intact.      Pupils: Pupils are equal, round, and reactive to light.   Cardiovascular:      Rate and Rhythm: Normal rate and regular rhythm.      Pulses: Normal pulses.      Heart sounds: Normal heart sounds.   Pulmonary:      Effort: Pulmonary effort is normal.      Breath sounds: Normal breath sounds.   Abdominal:      General: Bowel sounds are normal.      Palpations: Abdomen is soft.   Musculoskeletal:         General: Normal range of motion.      Cervical back: Normal range of motion.   Skin:     General: Skin is warm and dry.      Capillary Refill: Capillary refill takes less than 2 seconds.   Neurological:      Mental Status: He is alert. Mental status is at baseline.   Psychiatric:         Mood and Affect: Mood normal.         Behavior: Behavior normal.         Lab/Radiology/Diagnostic Review:  Results for orders placed or performed during the hospital encounter of 04/20/25 (from the past 24 hours)   Sedimentation rate, automated   Result Value Ref Range    Sedimentation Rate 39 (H) 0 - 20 mm/h   C-reactive protein   Result Value Ref Range    C-Reactive Protein 0.56 <1.00 mg/dL   CBC and Auto Differential   Result Value Ref Range    WBC 5.3 4.4 - 11.3 x10*3/uL    nRBC 0.0 0.0 - 0.0 /100 WBCs    RBC 3.82 (L) 4.50 - 5.90 x10*6/uL    Hemoglobin 10.2 (L) 13.5 - 17.5 g/dL    Hematocrit 34.1 (L) 41.0 - 52.0 %    MCV 89 80 - 100 fL    MCH 26.7 26.0 - 34.0 pg    MCHC 29.9 (L) 32.0 - 36.0 g/dL    RDW 23.3 (H) 11.5 - 14.5 %    Platelets 386 150 - 450 x10*3/uL    Neutrophils % 69.4 40.0 - 80.0 %    Immature Granulocytes %, Automated 0.2  0.0 - 0.9 %    Lymphocytes % 16.8 13.0 - 44.0 %    Monocytes % 8.5 2.0 - 10.0 %    Eosinophils % 4.0 0.0 - 6.0 %    Basophils % 1.1 0.0 - 2.0 %    Neutrophils Absolute 3.67 1.60 - 5.50 x10*3/uL    Immature Granulocytes Absolute, Automated 0.01 0.00 - 0.50 x10*3/uL    Lymphocytes Absolute 0.89 0.80 - 3.00 x10*3/uL    Monocytes Absolute 0.45 0.05 - 0.80 x10*3/uL    Eosinophils Absolute 0.21 0.00 - 0.40 x10*3/uL    Basophils Absolute 0.06 0.00 - 0.10 x10*3/uL   Basic metabolic panel   Result Value Ref Range    Glucose 104 (H) 74 - 99 mg/dL    Sodium 142 136 - 145 mmol/L    Potassium 4.0 3.5 - 5.3 mmol/L    Chloride 107 98 - 107 mmol/L    Bicarbonate 24 21 - 32 mmol/L    Anion Gap 15 10 - 20 mmol/L    Urea Nitrogen 17 6 - 23 mg/dL    Creatinine 0.98 0.50 - 1.30 mg/dL    eGFR 79 >60 mL/min/1.73m*2    Calcium 9.1 8.6 - 10.3 mg/dL   Protime-INR   Result Value Ref Range    Protime 12.0 9.8 - 12.4 seconds    INR 1.1 0.9 - 1.1   Morphology   Result Value Ref Range    RBC Morphology See Below     Polychromasia Mild     Ovalocytes Few     Giant Platelets Few    Magnesium   Result Value Ref Range    Magnesium 2.17 1.60 - 2.40 mg/dL   Troponin I, High Sensitivity   Result Value Ref Range    Troponin I, High Sensitivity 9 0 - 20 ng/L   Hemoglobin A1C   Result Value Ref Range    Hemoglobin A1C 4.9 See comment %    Estimated Average Glucose 94 Not Established mg/dL   CBC   Result Value Ref Range    WBC 4.2 (L) 4.4 - 11.3 x10*3/uL    nRBC 0.0 0.0 - 0.0 /100 WBCs    RBC 3.18 (L) 4.50 - 5.90 x10*6/uL    Hemoglobin 8.7 (L) 13.5 - 17.5 g/dL    Hematocrit 28.4 (L) 41.0 - 52.0 %    MCV 89 80 - 100 fL    MCH 27.4 26.0 - 34.0 pg    MCHC 30.6 (L) 32.0 - 36.0 g/dL    RDW 23.1 (H) 11.5 - 14.5 %    Platelets 300 150 - 450 x10*3/uL   Magnesium   Result Value Ref Range    Magnesium 2.03 1.60 - 2.40 mg/dL   Renal Function Panel   Result Value Ref Range    Glucose 100 (H) 74 - 99 mg/dL    Sodium 140 136 - 145 mmol/L    Potassium 3.9 3.5 - 5.3 mmol/L     Chloride 110 (H) 98 - 107 mmol/L    Bicarbonate 23 21 - 32 mmol/L    Anion Gap 11 10 - 20 mmol/L    Urea Nitrogen 16 6 - 23 mg/dL    Creatinine 0.87 0.50 - 1.30 mg/dL    eGFR 89 >60 mL/min/1.73m*2    Calcium 8.4 (L) 8.6 - 10.3 mg/dL    Phosphorus 3.8 2.5 - 4.9 mg/dL    Albumin 3.4 3.4 - 5.0 g/dL     Imaging  CT head wo IV contrast  Result Date: 4/21/2025  Stable appearance of previously described left subdural hematoma, other previously described areas of subdural hematoma abutting right occipital lobe and along the posterior falx have diminished in appearance.     MACRO: None.   Signed by: Raúl Handy 4/21/2025 1:31 AM Dictation workstation:   [id number]    CT head wo IV contrast  Addendum Date: 4/21/2025  Interpreted By:  Aubrey Washington, ADDENDUM: Aubrey Washington discussed the significance and urgency of this critical finding by telephone with Dr. Ballesteros on 4/21/2025 at 12:44 am.  (**-RCF-**) Findings:  See findings.     Signed by: Aubrey Washington 4/21/2025 12:44 AM   -------- ORIGINAL REPORT -------- Dictation workstation:   [id number]    Result Date: 4/21/2025  1. Slight enlargement of small subdural hematoma overlying the right occipital lobe and posterior parietal lobe measuring 0.4 cm in thickness, previously 0.2 cm-0.3 cm in thickness.   2. Unchanged mixed density acute subdural hematoma overlying the left cerebral hemisphere measuring 0.4 cm in maximal thickness.   3. New acute subdural blood products along the posterior most false measuring 0.5 cm in maximal thickness.   MACRO: Aubrey Washington discussed the significance and urgency of this critical finding via [facility] with  BETTY ALCANTAR on 4/21/2025 at 12:37 am.  (**-RCF-**) Findings:  See findings. Documentation of verbal communication will be made once established.   Signed by: Aubrey Washington 4/21/2025 12:37 AM Dictation workstation:   [id number]    CT head wo IV contrast  Result Date: 4/20/2025  Subdural hematoma is present on the  left through the frontal and temporal region with a maximum thickness of 4 mm. There is no significant mass effect. There is no overlying skull fracture.   No intraparenchymal hemorrhage or evidence of stroke   MACRO: Denisse Carmen discussed the significance and urgency of this critical finding by telephone with  BETTY ALCANTAR on 4/20/2025 at 12:52 pm.  (**-RCF-**) Findings:  See findings.   Signed by: Denisse Carmen 4/20/2025 12:53 PM Dictation workstation:   [id number]      Cardiology, Vascular, and Other Imaging  No other imaging results found for the past 7 days    Assessment & Plan  Subdural hematoma (Multi)    Neuro:   #Acute left-sided subdural hematoma without mass effect  #Headache secondary to SDH  #Epilepsy on Keppra  #Moderate vascular dementia with hallucinations  -Hourly neuro checks per protocol  -Consult Neurosurgery, appreciate recommendations  -Initial CT scan 4/20/25 1242: Subdural hematoma present on the left frontal and temporal region with a maximum thickness of 4 mm, no significant mass effect, no intraparenchymal hemorrhage or evidence of stroke  -6 hour stability CT scan 4/20/2025 1903 : Slight enlargement of small subdural hematoma overlying the right occipital lobe and posterior parietal lobe  -Repeat CT Scan 4/21/2025 0122: Stable appearance of previously described left subdural hematoma  -Monitor CAM ICU  -Continue to hold home aspirin  -Continue home Keppra, Seroquel and donezepil  -PRN tylenol for headache/left temporal pain control    Cardiovascular:  #History of CAD  #Hypertension  #Hyperlipidemia  -Continuous cardiac monitoring  -Monitor hemodynamics, remains hemodynamically stable  -Maintain Goal MAP > 65  -Keep SBP <160  -2D echo 4/2/2025: Estimated EF 45 to 50%, global hypokinesis of the left ventricle with minor regional variations  -Monitor and optimize electrolytes, keep K > 4.0, Mag > 2.0    Pulmonary:   -Continuous pulse oximetry   -O2 PRN to maintain SpO2 > 94%, wean as  tolerated  -Aggressive chest physiotherapy/aggressive pulmonary toileting/bronchial hygiene  -OOB to chair    Gastrointestinal:   #History of gastric AVMs, diverticulosis  -Start diet as tolerated  -Prophylaxis: Continue home Protonix protonix  -Bowel regimen: Colace as needed  -Last BM: Last BM Date: 25    Renal:   -No indication for indwelling Strange catheter at this time  -Renal function stable  -Daily RFP,Mg,Phos  -Electrolytes: Replete per protocol, goal K>4 Phos >3 Mg >2  Net IO Since Admission: 540 mL [25 0844]  Results from last 72 hours   Lab Units 25  0310 25  1346   BUN mg/dL 16 17   CREATININE mg/dL 0.87 0.98     Endocrine:   -Monitor blood glucose, start Novolog SSC for glycemic control if BS elevated  -Goal BS < 180  -Follow hypoglycemic protocol  Results from last 7 days   Lab Units 25  0310 25  1346   GLUCOSE mg/dL 100* 104*      Hematology:   #Anemia  #Hx of gastric AVMs  -Monitor HH  -Daily CBC, coags  -No chemical VTE prophylaxis secondary to SDH  -Maintain bilateral SCDs  -Hold home ASA  Results from last 7 days   Lab Units 25  0310 25  1346   HEMOGLOBIN g/dL 8.7* 10.2*   HEMATOCRIT % 28.4* 34.1*   PLATELETS AUTO x10*3/uL 300 386     Infectious Disease:   -Afebrile  -No leukocytosis  -No indication for antimicrobial therapy at this time  -  Results from last 7 days   Lab Units 25  0310 25  1346   WBC AUTO x10*3/uL 4.2* 5.3     Temp (24hrs), Av.1 °C (97 °F), Min:35.8 °C (96.4 °F), Max:36.8 °C (98.2 °F)     -  Musculoskeletal/Integumentary   #Left knee abrasion  -PT/OT to evaluate and treat    Lines/tubes/drains  -PIV    CODE STATUS: Full Code    Disposition:   -May be downgraded to tele from CCM standpoint if ok with neurosurgery  -Consult TCC for discharge planning    ABCDEF Checklist  Analgesia: Spontaneous awakening trial to be pursued if clinically appropriate. RASS goal reviewed  Breathing: Spontaneous breathing trial to be  pursued if clinically appropriate. Mechanical power of assisted ventilation reviewed  Choice of analgesia/sedation: Analgesic and sedative agents adjusted per clinical context.   Delirium assessed by CAM, will avoid exacerbating factors  Early mobility and exercise: Physical and occupational therapy engaged  Family: Plan of care, overall trajectory of patient shared with family. Questions elicited and answered as appropriate.     Due to the high probability of life threatening clinical decompensation, the patient required critical care time evaluating and managing this patient.  Critical care time included obtaining a history, examining the patient, ordering and reviewing studies, discussing, developing, and implementing a management plan, evaluating the patient's response to treatment, and discussion with other care team providers. I saw and evaluated the patient myself.  Critical care time was performed exclusive of billable procedures.    RACHID Talbert-CNP  Critical Care Medicine      Critical care time > 32min         [1] brimonidine, 1 drop, Both Eyes, BID  cholecalciferol, 50 mcg, oral, Daily  donepezil, 5 mg, oral, Nightly  levETIRAcetam, 1,000 mg, oral, BID  metoprolol succinate XL, 25 mg, oral, Daily  pantoprazole, 40 mg, oral, BID  pravastatin, 80 mg, oral, Nightly  QUEtiapine, 50 mg, oral, Nightly  sodium chloride, 2 spray, Each Nostril, q12h  [2]    [3] PRN medications: acetaminophen, docusate sodium, hydrALAZINE, labetaloL, oxygen

## 2025-04-21 NOTE — NURSING NOTE
1715 pt arrived to 3N. Pt A&O, calm and cooperative. Tele & SCD's applied. Bed alarm active. Pt 1 assist w/o ambulatory aid. Call light w/I reach.   1738 EKG completed d/t pt having approx 3 min run of vtach. Dr Hermosillo aware.   1759 Dr Hermosillo notified pt back in vtach.  1817 pt medicated for 9/ left sided HA. Medical team aware.   1824 strip of vtach sent to dr Gaspar

## 2025-04-21 NOTE — CARE PLAN
The patient's goals for the shift include      The clinical goals for the shift include pt will remaion HDS this shift    Over the shift, the patient did not make progress toward the following goals. Barriers to progression include pt having several runs of vtach. Recommendations to address these barriers include continuous tele monitoring, cardiology consult.

## 2025-04-21 NOTE — PROGRESS NOTES
Occupational Therapy    Evaluation    Patient Name: Wayne Burkitt  MRN: [id number]  Department: [facility]  Room: [address] [place], [place] [id number]  Today's Date: 4/21/2025       Assessment  IP OT Assessment  OT Assessment: Patient would benefit from additional skilled rehab at a low intensity to increase independence in adls, activity tolerance for adls, functional mobility tasks for adls.  Prognosis: Good  Evaluation/Treatment Tolerance: Patient tolerated treatment well  End of Session Communication: Bedside nurse  End of Session Patient Position: Up in chair, Alarm on  Plan:  Treatment Interventions: ADL retraining, Functional transfer training  OT Frequency: 3 times per week  OT Discharge Recommendations: Low intensity level of continued care  Equipment Recommended upon Discharge: Wheeled walker  OT - OK to Discharge: Yes (to next level of care when cleared by medical team.)    Subjective   Current Problem:  1. Subdural hematoma (Multi)          General:  General  Reason for Referral: activities of daily living  Referred By: Landy  Past Medical History Relevant to Rehab: Admit with L side headache, (+) subdural hemorrhage, Head CT - L SDH. PMH: vascular dementia, hallucinations, HTN, HLD, epilepsy, anxiety, CAD, cardiomyopathy  Co-Treatment: PT  Co-Treatment Reason: patient safety  Prior to Session Communication: Bedside nurse  Patient Position Received: Bed, 3 rail up, Alarm on  General Comment: Agreeable to participate  Precautions:  Medical Precautions: Fall precautions     Date/Time Vitals Session Patient Position Pulse Resp SpO2 BP MAP (mmHg)    04/21/25 1200 --  --  60  15  99 %  --  --     04/21/25 1300 --  --  60  23  98 %  --  --                Pain:  Pain Assessment  Pain Assessment: 0-10  0-10 (Numeric) Pain Score: 9  Pain Type: Acute pain  Pain Location: Head  Pain Interventions: Medication (See MAR)    Objective   Cognition:  Overall Cognitive Status: Within Functional Limits  Orientation Level: Oriented X4           Home  Living:  Type of Home: Assisted living ([place])  Home Living Comments: Owns cane, RW   Prior Function:  Prior Function Comments: Per patient report, Independent with UE and LE dressing, ambulates to dining room for meals. Facility completes the remainder of home management pta.  IADL History:     ADL:  Eating Assistance: Stand by  Grooming Assistance: Stand by  Bathing Assistance: Stand by  UE Dressing Assistance: Stand by  LE Dressing Assistance: Stand by  Activity Tolerance:  Endurance: Tolerates 10 - 20 min exercise with multiple rests  Bed Mobility/Transfers: Bed Mobility  Bed Mobility: Yes (SBA sup to sit eob. SBA sit to stand. SBA amb w/o device in room and to bedside chair. SBA stand to sit in bedside chair. In chair at end, all needs within reach, chair check on.)       Sensation:  Light Touch: No apparent deficits  Strength:  Strength Comments: B UE strength functionally assessed, appears at least 3+/5 overall.  Perception:     Coordination:  Movements are Fluid and Coordinated: Yes   Hand Function:  Hand Function  Gross Grasp: Functional    Outcome Measures: [facility] Daily Activity  Putting on and taking off regular lower body clothing: A little  Bathing (including washing, rinsing, drying): A little  Putting on and taking off regular upper body clothing: A little  Toileting, which includes using toilet, bedpan or urinal: A little  Taking care of personal grooming such as brushing teeth: A little  Eating Meals: A little  Daily Activity - Total Score: 18         and    Education Documentation  No documentation found.  Education Comments  No comments found.      Goals:   Encounter Problems       Encounter Problems (Active)       OT Goals       Patient will complete UE adls with MOD I (Progressing)       Start:  04/21/25    Expected End:  05/05/25            Patient will complete LE adls with MOD I (Progressing)       Start:  04/21/25    Expected End:  05/05/25            Patient will complete transfers with  MOD I (Progressing)       Start:  04/21/25    Expected End:  05/05/25            Patient will complete functional mobility tasks with MOD I (Progressing)       Start:  04/21/25    Expected End:  05/05/25

## 2025-04-21 NOTE — CONSULTS
"Department of Neurological Surgery  Consult Note      Referral Diagnosis:   1. Subdural hematoma (Multi)         Subdural hematoma (Multi) [S06.5XAA]     History Of Present Illness  Wayne Burkitt is a 79 y.o. male with PMH significant for HTN, HLD, epilepsy on Keppra, vascular dementia with hallucinations, CAD, and cardiomyopathy who presented with complaints of progressively worsening left sided headache. Patient has a history of a fall about 6 weeks ago but denies any recent falls. CT imaging upon workup demonstrating a 4mm left sided subdural hematoma. Neurosurgery consulted for further evaluation. Patient is on ASA 81 mg.     Past Medical History  Medical History[1]    Surgical History  Surgical History[2]    Social History  Social History[3]    Allergies  Alprazolam, Bepotastine besilate, Clopidogrel, Doxazosin, Lisinopril, Metronidazole, Amlodipine, Atorvastatin, Cephalexin, Ciprofloxacin, Diphenhydramine hcl, Guaifenesin, Hydrochlorothiazide, Hydrocodone, Methylprednisolone, Phenytoin, Sertraline, Telmisartan, and Topiramate  Prescriptions Prior to Admission[4]    Review of Systems  14/14 systems reviewed and negative other than what is listed in the history of present illness    Physical Exam    General: Well developed, awake/alert/oriented x3, no distress, alert and cooperative. Forgetful at times. Speech is clear. EOMI. PERRL.   Skin: Warm and dry  ENMT: Mucous membranes moist  Head/Neck: Neck Supple  Respiratory/Thorax: Normal breath sounds with good chest expansion, thorax symmetric  Cardiovascular: No pitting edema, no JVD    Motor Strength: 5/5 Throughout all extremities, no pronator drift     Muscle Bulk: Normal and symmetric in all extremities    Sensation: intact to light touch    Last Recorded Vitals  Blood pressure 94/54, pulse 60, temperature 35.9 °C (96.6 °F), temperature source Temporal, resp. rate 15, height 1.803 m (5' 10.98\"), weight 75.2 kg (165 lb 12.6 oz), SpO2 99%.    Relevant " Results    Results for orders placed or performed during the hospital encounter of 04/20/25 (from the past 96 hours)   Sedimentation rate, automated   Result Value Ref Range    Sedimentation Rate 39 (H) 0 - 20 mm/h   C-reactive protein   Result Value Ref Range    C-Reactive Protein 0.56 <1.00 mg/dL   CBC and Auto Differential   Result Value Ref Range    WBC 5.3 4.4 - 11.3 x10*3/uL    nRBC 0.0 0.0 - 0.0 /100 WBCs    RBC 3.82 (L) 4.50 - 5.90 x10*6/uL    Hemoglobin 10.2 (L) 13.5 - 17.5 g/dL    Hematocrit 34.1 (L) 41.0 - 52.0 %    MCV 89 80 - 100 fL    MCH 26.7 26.0 - 34.0 pg    MCHC 29.9 (L) 32.0 - 36.0 g/dL    RDW 23.3 (H) 11.5 - 14.5 %    Platelets 386 150 - 450 x10*3/uL    Neutrophils % 69.4 40.0 - 80.0 %    Immature Granulocytes %, Automated 0.2 0.0 - 0.9 %    Lymphocytes % 16.8 13.0 - 44.0 %    Monocytes % 8.5 2.0 - 10.0 %    Eosinophils % 4.0 0.0 - 6.0 %    Basophils % 1.1 0.0 - 2.0 %    Neutrophils Absolute 3.67 1.60 - 5.50 x10*3/uL    Immature Granulocytes Absolute, Automated 0.01 0.00 - 0.50 x10*3/uL    Lymphocytes Absolute 0.89 0.80 - 3.00 x10*3/uL    Monocytes Absolute 0.45 0.05 - 0.80 x10*3/uL    Eosinophils Absolute 0.21 0.00 - 0.40 x10*3/uL    Basophils Absolute 0.06 0.00 - 0.10 x10*3/uL   Basic metabolic panel   Result Value Ref Range    Glucose 104 (H) 74 - 99 mg/dL    Sodium 142 136 - 145 mmol/L    Potassium 4.0 3.5 - 5.3 mmol/L    Chloride 107 98 - 107 mmol/L    Bicarbonate 24 21 - 32 mmol/L    Anion Gap 15 10 - 20 mmol/L    Urea Nitrogen 17 6 - 23 mg/dL    Creatinine 0.98 0.50 - 1.30 mg/dL    eGFR 79 >60 mL/min/1.73m*2    Calcium 9.1 8.6 - 10.3 mg/dL   Protime-INR   Result Value Ref Range    Protime 12.0 9.8 - 12.4 seconds    INR 1.1 0.9 - 1.1   Morphology   Result Value Ref Range    RBC Morphology See Below     Polychromasia Mild     Ovalocytes Few     Giant Platelets Few    Magnesium   Result Value Ref Range    Magnesium 2.17 1.60 - 2.40 mg/dL   Troponin I, High Sensitivity   Result Value Ref Range     Troponin I, High Sensitivity 9 0 - 20 ng/L   Hemoglobin A1C   Result Value Ref Range    Hemoglobin A1C 4.9 See comment %    Estimated Average Glucose 94 Not Established mg/dL   CBC   Result Value Ref Range    WBC 4.2 (L) 4.4 - 11.3 x10*3/uL    nRBC 0.0 0.0 - 0.0 /100 WBCs    RBC 3.18 (L) 4.50 - 5.90 x10*6/uL    Hemoglobin 8.7 (L) 13.5 - 17.5 g/dL    Hematocrit 28.4 (L) 41.0 - 52.0 %    MCV 89 80 - 100 fL    MCH 27.4 26.0 - 34.0 pg    MCHC 30.6 (L) 32.0 - 36.0 g/dL    RDW 23.1 (H) 11.5 - 14.5 %    Platelets 300 150 - 450 x10*3/uL   Magnesium   Result Value Ref Range    Magnesium 2.03 1.60 - 2.40 mg/dL   Renal Function Panel   Result Value Ref Range    Glucose 100 (H) 74 - 99 mg/dL    Sodium 140 136 - 145 mmol/L    Potassium 3.9 3.5 - 5.3 mmol/L    Chloride 110 (H) 98 - 107 mmol/L    Bicarbonate 23 21 - 32 mmol/L    Anion Gap 11 10 - 20 mmol/L    Urea Nitrogen 16 6 - 23 mg/dL    Creatinine 0.87 0.50 - 1.30 mg/dL    eGFR 89 >60 mL/min/1.73m*2    Calcium 8.4 (L) 8.6 - 10.3 mg/dL    Phosphorus 3.8 2.5 - 4.9 mg/dL    Albumin 3.4 3.4 - 5.0 g/dL         Intake/Output Summary (Last 24 hours) at 4/21/2025 1240  Last data filed at 4/21/2025 0800  Gross per 24 hour   Intake 1240 ml   Output 700 ml   Net 540 ml         Assessment and Plan     Assessment/Plan     Assessment & Plan  Subdural hematoma (Multi)      Imaging studies independently reviewed and interpreted. There is evidence of a left sided subdural hematoma, without shift of the midline or brain compression. Stable on repeat CT head this morning. There is no neurosurgical intervention warranted. Ok to transfer out of ICU, ok for Q4 neuro checks. Ok for PT/OT. SBP < 160. Hold ASA x 2 weeks.   Neurosurgery will sign off at this time. Follow up only if symptoms worsen.             Amy [facility]  [address]  [address]  [place] [id number]    [address] [facility]  [address]  [place] [id number]      Phone: [phone]  Fax: [phone]              [1]   Past Medical History:  Diagnosis Date    Auditory hallucinations 2024    Conversion reaction 2024    Coronary artery disease involving native coronary artery of native heart without angina pectoris 2024    Epistaxis, recurrent 2024    Generalized ischemic myocardial dysfunction 2024    Ischemic cardiomyopathy 2024    Moderate vascular dementia with anxiety 2024   [2]   Past Surgical History:  Procedure Laterality Date    CARDIAC CATHETERIZATION N/A 2024    Procedure: Left Heart Cath, With LV;  Surgeon: Gabino Giron MD;  Location: [facility] Cardiac Cath Lab;  Service: Cardiovascular;  Laterality: N/A;    CARDIAC CATHETERIZATION N/A 2024    Procedure: PCI TATYANA Stent- Coronary;  Surgeon: Gabino Giron MD;  Location: [facility] Cardiac Cath Lab;  Service: Cardiovascular;  Laterality: N/A;    CORONARY ANGIOPLASTY WITH STENT PLACEMENT      states he has 16 stents after the CABG    CORONARY ARTERY BYPASS GRAFT  2010    PACEMAKER PLACEMENT      TOTAL HIP ARTHROPLASTY     [3]   Social History  Tobacco Use    Smoking status: Former     Current packs/day: 0.00     Types: Cigarettes     Quit date:      Years since quittin.3    Smokeless tobacco: Never   Vaping Use    Vaping status: Never Used   Substance Use Topics    Alcohol use: Not Currently     Comment: Stopped into .    Drug use: Never   [4]   Medications Prior to Admission   Medication Sig Dispense Refill Last Dose/Taking    acetaminophen (Tylenol) 325 mg tablet Take 2 tablets (650 mg) by mouth every 4 hours if needed for mild pain (1 - 3) or fever (temp greater than 38.0 C).   2025    aspirin 81 mg EC tablet Take 1 tablet (81 mg) by mouth once daily.   2025    brimonidine (AlphaGAN) 0.2 % ophthalmic solution Administer 1 drop into both eyes 2 times a day.   2025    cholecalciferol (Vitamin D-3) 50 mcg (2,000 units) tablet  Take 1 tablet (50 mcg) by mouth once daily.   4/20/2025    docusate sodium (Colace) 100 mg capsule Take 1 capsule (100 mg) by mouth 2 times a day as needed for constipation.   4/20/2025    donepezil (Aricept) 5 mg tablet Take 1 tablet (5 mg) by mouth once daily at bedtime.   4/19/2025 Bedtime    levETIRAcetam (Keppra) 1,000 mg tablet Take 1 tablet (1,000 mg) by mouth 2 times a day.   4/20/2025    metoprolol succinate XL (Toprol-XL) 25 mg 24 hr tablet Take 1 tablet (25 mg) by mouth once daily. Do not crush or chew. 30 tablet 1 4/20/2025    oxymetazoline (Afrin) 0.05 % nasal spray Administer 2 sprays into each nostril every 12 hours if needed (For nose bleeds.). Do not use for more than 3 days consecutively. Use as needed to aid with stopping nose bleeds. 30 mL 0 Unknown    pantoprazole (ProtoNix) 40 mg EC tablet Take 1 tablet (40 mg) by mouth 2 times a day. Do not crush, chew, or split. 60 tablet 1 4/20/2025    pravastatin (Pravachol) 80 mg tablet Take 1 tablet (80 mg) by mouth once daily at bedtime.   4/19/2025 Bedtime    QUEtiapine (SEROquel) 50 mg tablet Take 1 tablet (50 mg) by mouth once daily at bedtime.   4/19/2025 Bedtime    sodium chloride (Saline Nasal Mist) 3 % mist Administer 2 Squirts into affected nostril(s) 2 times a day. 126 mL 0 4/20/2025    loperamide (Imodium A-D) 2 mg tablet Take 1 tablet (2 mg) by mouth 4 times a day as needed for diarrhea. For each additional loose stool   Unknown    loperamide (Imodium A-D) 2 mg tablet Take 2 tablets (4 mg) by mouth if needed for diarrhea. Take after first loose stool   Unknown    magnesium hydroxide (Milk of Magnesia) 400 mg/5 mL suspension Take 30 mL by mouth once daily as needed for constipation.   Unknown    nitroglycerin (Nitrostat) 0.4 mg SL tablet Place 1 tablet (0.4 mg) under the tongue every 5 minutes if needed for chest pain. 90 tablet 12 Unknown    traZODone (Desyrel) 50 mg tablet Take 0.5 tablets (25 mg) by mouth as needed at bedtime (dementia with  anxiety).   Unknown

## 2025-04-21 NOTE — PROGRESS NOTES
04/21/25 1207   Discharge Planning   Living Arrangements Alone   Support Systems Friends/neighbors   Assistance Needed receives assist with bathing   Type of Residence Assisted living   Care Facility Name [facility]   Home or Post Acute Services Post acute facilities (Rehab/SNF/etc)   Does the patient need discharge transport arranged? Yes   RoundTrip coordination needed? Yes   Has discharge transport been arranged? No   Stroke Family Assessment   Stroke Family Assessment Needed No   Intensity of Service   Intensity of Service 0-30 min     Received a call from Naz at [facility] in [place]. Per Naz at [phone], the patient was in their memory care unit. He received assistance with bathing, was ambulating independently. Will await therapy evaluations to see if they can accommodate his needs on discharge.   1:40 pm Spoke to the patient in the room, he is able to converse readily and discuss his care at [facility]. Currently, he plans to return to [facility] on discharge. Noted therapy evaluations, called Naz per telephone. She requested the H+P, therapy evaluations faced to [phone] so she can discuss with nursing at the facility. Faxed information as requested.   3:00 pm Per Naz she received the information, she sent to nursing at [facility] to see if they can accept, she has no reply yet.   4:30 Attempted to call Naz regarding facility acceptance, no answer. Left Naz a message.

## 2025-04-21 NOTE — PROGRESS NOTES
Physical Therapy    Physical Therapy Evaluation    Patient Name: Wayne Burkitt  MRN: [id number]  Department: [facility]  Room: [facility]9/2129-A  Today's Date: 4/21/2025   Time Calculation  Start Time: 1055  Stop Time: 1108  Time Calculation (min): 13 min    Assessment/Plan   PT Assessment  PT Assessment Results: Decreased mobility, Impaired balance, Pain  Rehab Prognosis: Good  Medical Staff Made Aware: Yes  End of Session Communication: Bedside nurse  Assessment Comment: Pt's impairments include generalized weakness, impaired balance and decreased activity tolerance. Pt's functional limitations include bed mobility, transfers, gait and elevations. Pt would benefit from continued acute care PT during hospital LOS and upon dc at a low level intensity.  End of Session Patient Position: Up in chair, Alarm on  IP OR SWING BED PT PLAN  Inpatient or Swing Bed: Inpatient  PT Plan  Treatment/Interventions: Bed mobility, Transfer training, Gait training, Stair training, Balance training, Neuromuscular re-education, Strengthening, Endurance training, Range of motion, Therapeutic exercise, Therapeutic activity, Home exercise program, Positioning, Postural re-education  PT Plan: Ongoing PT  PT Frequency: 4 times per week  PT Discharge Recommendations: Low intensity level of continued care  Equipment Recommended upon Discharge: Wheeled walker  PT Recommended Transfer Status: Stand by assist  PT - OK to Discharge: Yes (Pt ok to dc from acute care PT to next level of care once cleared by medical team.)    Subjective   General Visit Information:  General  Reason for Referral: 78 yo M dx with SDH.  Referred By: Melva Bill MD  Past Medical History Relevant to Rehab: Medical History[1]    Family/Caregiver Present: No  Co-Treatment: OT  Co-Treatment Reason: dc plan; ICU  Prior to Session Communication: Bedside nurse  Patient Position Received: Bed, 3 rail up, Alarm on  General Comment: Pt agreeable to PT assessment.  Home Living:  Home  Living  Type of Home: Assisted living ([facility])  Prior Level of Function:  Prior Function Per Pt/Caregiver Report  Prior Function Comments: Per pt, pt is typically ambulatory without a device. IND with ADLs. No recent falls.  Precautions:  Precautions  Medical Precautions: Fall precautions, Seizure precautions      Date/Time Vitals Session Patient Position Pulse Resp SpO2 BP MAP (mmHg)    04/21/25 1100 --  --  60  28  99 %  94/54  76         Objective   Pain:  Pain Assessment  Pain Assessment: 0-10  0-10 (Numeric) Pain Score: 9  Pain Type: Acute pain  Pain Location: Head  Cognition:  Cognition  Overall Cognitive Status: Within Functional Limits  Orientation Level: Oriented X4    General Assessments:  Sensation  Light Touch: No apparent deficits (Reports R thigh numbness/tingling from hip replacement.)    Static Sitting Balance  Static Sitting-Comment/Number of Minutes: SBA  Dynamic Sitting Balance  Dynamic Sitting-Comments: SBA to scoot    Static Standing Balance  Static Standing-Comment/Number of Minutes: SBA without UE support  Dynamic Standing Balance  Dynamic Standing-Comments: SBA without UE support during ambulation, no pathway deviation or overt LOB episodes.  Functional Assessments:  Bed Mobility  Bed Mobility: Yes  Bed Mobility 1  Bed Mobility 1: Supine to sitting  Bed Mobility Comments 1: SBA to transition to sitting on L side of bed.    Transfers  Transfer: Yes  Transfer 1  Technique 1: Sit to stand, Stand to sit  Trials/Comments 1: x2 trials STS from EOB, SBAx1 without AD.    Ambulation/Gait Training  Ambulation/Gait Training Performed: Yes  Ambulation/Gait Training 1  Surface 1: Level tile  Device 1: No device  Comments/Distance (ft) 1: Pt amb in room 1x20' without AD, SBAx1 with reciprocal gait, equal step length, decreased gait speed, flexed posture, no pathway deviation or overt LOB episodes. Pt navigated turns without LOB.  Extremity/Trunk Assessments:  RLE   RLE : Within Functional  Limits  LLE   LLE : Within Functional Limits  Outcome Measures:  [facility] Basic Mobility  Turning from your back to your side while in a flat bed without using bedrails: A little  Moving from lying on your back to sitting on the side of a flat bed without using bedrails: A little  Moving to and from bed to chair (including a wheelchair): A little  Standing up from a chair using your arms (e.g. wheelchair or bedside chair): A little  To walk in hospital room: A little  Climbing 3-5 steps with railing: A little  Basic Mobility - Total Score: 18    Encounter Problems       Encounter Problems (Active)       PT Problem       Bed mobility (Progressing)       Start:  04/21/25    Expected End:  05/05/25       Pt will perform supine<>sit with HOB flat, DELONTE.           Transfers (Progressing)       Start:  04/21/25    Expected End:  05/05/25       Pt will perform all transfers with LRAD, DELONTE.            Gait (Progressing)       Start:  04/21/25    Expected End:  05/05/25       Pt will amb 150' with LRAD, DELONTE with reciprocal gait, upright posture and improved activity tolerance as demonstrated by vitals.           Balance (Progressing)       Start:  04/21/25    Expected End:  05/05/25       Pt will tolerate standing x2 min without UE support during dynamic balance challenges, SBAx1.            Pain - Adult              Education Documentation  Mobility Training, taught by Benita Blank, PT at 4/21/2025 12:40 PM.  Learner: Patient  Readiness: Acceptance  Method: Explanation  Response: Verbalizes Understanding, Demonstrated Understanding    Education Comments  No comments found.                 [1]   Past Medical History:  Diagnosis Date    Auditory hallucinations 09/28/2024    Conversion reaction 09/28/2024    Coronary artery disease involving native coronary artery of native heart without angina pectoris 09/28/2024    Epistaxis, recurrent 09/28/2024    Generalized ischemic myocardial dysfunction 09/28/2024    Ischemic  cardiomyopathy 09/28/2024    Moderate vascular dementia with anxiety 09/28/2024

## 2025-04-22 LAB
ALBUMIN SERPL BCP-MCNC: 3.7 G/DL (ref 3.4–5)
ANION GAP SERPL CALC-SCNC: 13 MMOL/L (ref 10–20)
ATRIAL RATE: 65 BPM
BUN SERPL-MCNC: 17 MG/DL (ref 6–23)
CALCIUM SERPL-MCNC: 9.1 MG/DL (ref 8.6–10.3)
CHLORIDE SERPL-SCNC: 107 MMOL/L (ref 98–107)
CO2 SERPL-SCNC: 24 MMOL/L (ref 21–32)
CREAT SERPL-MCNC: 0.88 MG/DL (ref 0.5–1.3)
EGFRCR SERPLBLD CKD-EPI 2021: 89 ML/MIN/1.73M*2
ERYTHROCYTE [DISTWIDTH] IN BLOOD BY AUTOMATED COUNT: 23.5 % (ref 11.5–14.5)
GLUCOSE SERPL-MCNC: 103 MG/DL (ref 74–99)
HCT VFR BLD AUTO: 30 % (ref 41–52)
HGB BLD-MCNC: 9.1 G/DL (ref 13.5–17.5)
MAGNESIUM SERPL-MCNC: 1.97 MG/DL (ref 1.6–2.4)
MCH RBC QN AUTO: 26.8 PG (ref 26–34)
MCHC RBC AUTO-ENTMCNC: 30.3 G/DL (ref 32–36)
MCV RBC AUTO: 88 FL (ref 80–100)
NRBC BLD-RTO: 0 /100 WBCS (ref 0–0)
P OFFSET: 145 MS
P ONSET: 125 MS
PHOSPHATE SERPL-MCNC: 3.8 MG/DL (ref 2.5–4.9)
PLATELET # BLD AUTO: 317 X10*3/UL (ref 150–450)
POTASSIUM SERPL-SCNC: 3.7 MMOL/L (ref 3.5–5.3)
Q ONSET: 216 MS
QRS COUNT: 10 BEATS
QRS DURATION: 94 MS
QT INTERVAL: 428 MS
QTC CALCULATION(BAZETT): 428 MS
QTC FREDERICIA: 428 MS
R AXIS: 43 DEGREES
RBC # BLD AUTO: 3.4 X10*6/UL (ref 4.5–5.9)
SODIUM SERPL-SCNC: 140 MMOL/L (ref 136–145)
T AXIS: 247 DEGREES
T OFFSET: 430 MS
TSH SERPL-ACNC: 2.84 MIU/L (ref 0.44–3.98)
VENTRICULAR RATE: 60 BPM
WBC # BLD AUTO: 4.5 X10*3/UL (ref 4.4–11.3)

## 2025-04-22 PROCEDURE — 2500000004 HC RX 250 GENERAL PHARMACY W/ HCPCS (ALT 636 FOR OP/ED): Mod: JZ | Performed by: INTERNAL MEDICINE

## 2025-04-22 PROCEDURE — 85027 COMPLETE CBC AUTOMATED: CPT

## 2025-04-22 PROCEDURE — 2500000002 HC RX 250 W HCPCS SELF ADMINISTERED DRUGS (ALT 637 FOR MEDICARE OP, ALT 636 FOR OP/ED): Performed by: NURSE PRACTITIONER

## 2025-04-22 PROCEDURE — 2500000004 HC RX 250 GENERAL PHARMACY W/ HCPCS (ALT 636 FOR OP/ED): Performed by: NURSE PRACTITIONER

## 2025-04-22 PROCEDURE — 2500000001 HC RX 250 WO HCPCS SELF ADMINISTERED DRUGS (ALT 637 FOR MEDICARE OP): Performed by: INTERNAL MEDICINE

## 2025-04-22 PROCEDURE — 36415 COLL VENOUS BLD VENIPUNCTURE: CPT

## 2025-04-22 PROCEDURE — 84443 ASSAY THYROID STIM HORMONE: CPT

## 2025-04-22 PROCEDURE — 83735 ASSAY OF MAGNESIUM: CPT

## 2025-04-22 PROCEDURE — 80069 RENAL FUNCTION PANEL: CPT

## 2025-04-22 PROCEDURE — 2500000002 HC RX 250 W HCPCS SELF ADMINISTERED DRUGS (ALT 637 FOR MEDICARE OP, ALT 636 FOR OP/ED): Performed by: INTERNAL MEDICINE

## 2025-04-22 PROCEDURE — 2060000001 HC INTERMEDIATE ICU ROOM DAILY

## 2025-04-22 PROCEDURE — 2500000001 HC RX 250 WO HCPCS SELF ADMINISTERED DRUGS (ALT 637 FOR MEDICARE OP)

## 2025-04-22 PROCEDURE — 2500000005 HC RX 250 GENERAL PHARMACY W/O HCPCS: Performed by: INTERNAL MEDICINE

## 2025-04-22 PROCEDURE — 99223 1ST HOSP IP/OBS HIGH 75: CPT | Performed by: INTERNAL MEDICINE

## 2025-04-22 RX ORDER — POTASSIUM CHLORIDE 20 MEQ/1
20 TABLET, EXTENDED RELEASE ORAL ONCE
Status: COMPLETED | OUTPATIENT
Start: 2025-04-22 | End: 2025-04-22

## 2025-04-22 RX ORDER — OXYCODONE HYDROCHLORIDE 5 MG/1
5 TABLET ORAL ONCE
Refills: 0 | Status: COMPLETED | OUTPATIENT
Start: 2025-04-22 | End: 2025-04-22

## 2025-04-22 RX ORDER — ENALAPRILAT 1.25 MG/ML
1.25 INJECTION INTRAVENOUS ONCE
Status: COMPLETED | OUTPATIENT
Start: 2025-04-22 | End: 2025-04-22

## 2025-04-22 RX ORDER — LANOLIN ALCOHOL/MO/W.PET/CERES
400 CREAM (GRAM) TOPICAL DAILY
Status: COMPLETED | OUTPATIENT
Start: 2025-04-22 | End: 2025-04-22

## 2025-04-22 RX ORDER — METOPROLOL SUCCINATE 25 MG/1
25 TABLET, EXTENDED RELEASE ORAL 2 TIMES DAILY
Status: DISCONTINUED | OUTPATIENT
Start: 2025-04-22 | End: 2025-04-26 | Stop reason: HOSPADM

## 2025-04-22 RX ADMIN — LEVETIRACETAM 1000 MG: 500 TABLET, FILM COATED ORAL at 08:20

## 2025-04-22 RX ADMIN — PANTOPRAZOLE SODIUM 40 MG: 40 TABLET, DELAYED RELEASE ORAL at 08:21

## 2025-04-22 RX ADMIN — METOPROLOL SUCCINATE 25 MG: 25 TABLET, EXTENDED RELEASE ORAL at 20:52

## 2025-04-22 RX ADMIN — SALINE NASAL SPRAY 2 SPRAY: 1.5 SOLUTION NASAL at 08:21

## 2025-04-22 RX ADMIN — METOPROLOL SUCCINATE 25 MG: 25 TABLET, EXTENDED RELEASE ORAL at 08:20

## 2025-04-22 RX ADMIN — Medication 400 MG: at 08:21

## 2025-04-22 RX ADMIN — Medication 50 MCG: at 08:20

## 2025-04-22 RX ADMIN — ENALAPRILAT 1.25 MG: 1.25 INJECTION INTRAVENOUS at 09:06

## 2025-04-22 RX ADMIN — QUETIAPINE FUMARATE 50 MG: 50 TABLET ORAL at 20:52

## 2025-04-22 RX ADMIN — AMIODARONE HYDROCHLORIDE 150 MG: 1.5 INJECTION, SOLUTION INTRAVENOUS at 09:12

## 2025-04-22 RX ADMIN — OXYCODONE HYDROCHLORIDE 5 MG: 5 TABLET ORAL at 02:47

## 2025-04-22 RX ADMIN — BRIMONIDINE TARTRATE 1 DROP: 2 SOLUTION/ DROPS OPHTHALMIC at 08:21

## 2025-04-22 RX ADMIN — ACETAMINOPHEN 650 MG: 325 TABLET, FILM COATED ORAL at 06:07

## 2025-04-22 RX ADMIN — BRIMONIDINE TARTRATE 1 DROP: 2 SOLUTION/ DROPS OPHTHALMIC at 20:53

## 2025-04-22 RX ADMIN — PANTOPRAZOLE SODIUM 40 MG: 40 TABLET, DELAYED RELEASE ORAL at 20:52

## 2025-04-22 RX ADMIN — DONEPEZIL HYDROCHLORIDE 5 MG: 5 TABLET ORAL at 20:52

## 2025-04-22 RX ADMIN — SALINE NASAL SPRAY 2 SPRAY: 1.5 SOLUTION NASAL at 20:53

## 2025-04-22 RX ADMIN — AMIODARONE HYDROCHLORIDE 0.5 MG/MIN: 1.8 INJECTION, SOLUTION INTRAVENOUS at 19:20

## 2025-04-22 RX ADMIN — PRAVASTATIN SODIUM 80 MG: 20 TABLET ORAL at 20:52

## 2025-04-22 RX ADMIN — POTASSIUM CHLORIDE 20 MEQ: 1500 TABLET, EXTENDED RELEASE ORAL at 08:23

## 2025-04-22 RX ADMIN — LEVETIRACETAM 1000 MG: 500 TABLET, FILM COATED ORAL at 20:52

## 2025-04-22 RX ADMIN — AMIODARONE HYDROCHLORIDE 1 MG/MIN: 1.8 INJECTION, SOLUTION INTRAVENOUS at 09:50

## 2025-04-22 ASSESSMENT — PAIN - FUNCTIONAL ASSESSMENT
PAIN_FUNCTIONAL_ASSESSMENT: 0-10

## 2025-04-22 ASSESSMENT — PAIN SCALES - GENERAL
PAINLEVEL_OUTOF10: 3
PAINLEVEL_OUTOF10: 0 - NO PAIN
PAINLEVEL_OUTOF10: 0 - NO PAIN
PAINLEVEL_OUTOF10: 3
PAINLEVEL_OUTOF10: 0 - NO PAIN
PAINLEVEL_OUTOF10: 0 - NO PAIN
PAINLEVEL_OUTOF10: 9
PAINLEVEL_OUTOF10: 0 - NO PAIN
PAINLEVEL_OUTOF10: 3

## 2025-04-22 ASSESSMENT — COGNITIVE AND FUNCTIONAL STATUS - GENERAL
DRESSING REGULAR UPPER BODY CLOTHING: A LOT
DAILY ACTIVITIY SCORE: 14
PERSONAL GROOMING: A LOT
WALKING IN HOSPITAL ROOM: A LOT
MOVING FROM LYING ON BACK TO SITTING ON SIDE OF FLAT BED WITH BEDRAILS: A LITTLE
MOVING TO AND FROM BED TO CHAIR: A LITTLE
TOILETING: A LOT
STANDING UP FROM CHAIR USING ARMS: A LITTLE
CLIMB 3 TO 5 STEPS WITH RAILING: A LOT
TURNING FROM BACK TO SIDE WHILE IN FLAT BAD: A LITTLE
HELP NEEDED FOR BATHING: A LOT
DRESSING REGULAR LOWER BODY CLOTHING: A LOT
MOBILITY SCORE: 16

## 2025-04-22 ASSESSMENT — PAIN DESCRIPTION - ORIENTATION: ORIENTATION: LEFT

## 2025-04-22 ASSESSMENT — PAIN DESCRIPTION - DESCRIPTORS
DESCRIPTORS: THROBBING
DESCRIPTORS: THROBBING

## 2025-04-22 ASSESSMENT — PAIN DESCRIPTION - LOCATION: LOCATION: HEAD

## 2025-04-22 NOTE — PROGRESS NOTES
Spiritual Care Visit  Spiritual Care Request    Reason for Visit:  Routine Visit: Introduction     Request Received From:       Focus of Care:  Visited With: Patient         Refer to :          Spiritual Care Assessment    Spiritual Assessment:                      Care Provided:  Intended Effects: Promote sense of peace, Preserve dignity and respect, Meaning-making  Methods: Offer spiritual/Jew support  Interventions: Share words of hope and inspiration, [place]    Sense of Community and or Denominational Affiliation:  Natasha         Addressed Needs/Concerns and/or Sina Through:          Outcome:        Advance Directives:         Spiritual Care Annotation    Annotation:  Patient talked about his sister dying a year ago.   was a supportive presence and prayed.

## 2025-04-22 NOTE — CARE PLAN
Pt transferred to [facility] @ 0200 from 3N, he complained of 9/10 head pain, provider notified, see MAR for orders. This pt experienced cardiac changes on monitor, provider notified, no new orders at this time, He remained safe, call light in reach, pain improving, Plan of care on going.

## 2025-04-22 NOTE — CARE PLAN
The patient's goals for the shift include    Problem: Pain - Adult  Goal: Verbalizes/displays adequate comfort level or baseline comfort level  Outcome: Progressing     Problem: Safety - Adult  Goal: Free from fall injury  Outcome: Progressing     Problem: Discharge Planning  Goal: Discharge to home or other facility with appropriate resources  Outcome: Progressing     Problem: Chronic Conditions and Co-morbidities  Goal: Patient's chronic conditions and co-morbidity symptoms are monitored and maintained or improved  Outcome: Progressing     Problem: Nutrition  Goal: Nutrient intake appropriate for maintaining nutritional needs  Outcome: Progressing     Problem: Pain  Goal: Takes deep breaths with improved pain control throughout the shift  Outcome: Progressing  Goal: Turns in bed with improved pain control throughout the shift  Outcome: Progressing  Goal: Walks with improved pain control throughout the shift  Outcome: Progressing  Goal: Performs ADL's with improved pain control throughout shift  Outcome: Progressing  Goal: Participates in PT with improved pain control throughout the shift  Outcome: Progressing  Goal: Free from opioid side effects throughout the shift  Outcome: Progressing  Goal: Free from acute confusion related to pain meds throughout the shift  Outcome: Progressing     Problem: Acute Head Injury  Goal: Neuro status stable or improved  Outcome: Progressing       The clinical goals for the shift include see poc.

## 2025-04-22 NOTE — ED PROCEDURE NOTE
Procedure  Critical Care    Performed by: Tad Collado MD  Authorized by: Tad Collado MD    Critical care provider statement:     Critical care time (minutes):  10    Critical care was necessary to treat or prevent imminent or life-threatening deterioration of the following conditions:  CNS failure or compromise    Critical care was time spent personally by me on the following activities:  Ordering and performing treatments and interventions, development of treatment plan with patient or surrogate, ordering and review of laboratory studies, discussions with consultants, ordering and review of radiographic studies, pulse oximetry, evaluation of patient's response to treatment, re-evaluation of patient's condition, examination of patient and review of old charts    Care discussed with: admitting provider                 Tad Collado MD  04/22/25 [id number]

## 2025-04-22 NOTE — CONSULTS
[facility] Cardiology In-patient Consult Note    Consulting Cardiologist: Ashkan Gaspar MD  Primary Cardiologist: Dr. Lima     Reason for Consult: Nonsustained ventricular tachycardia    Assessment/Plan:    Nonsustained ventricular tachycardia: Patient has known ischemic cardiomyopathy and history of VT.  Given the length of the wide-complex tachycardia recommend IV amiodarone for 24 hours and then switch to oral.  Given he has a pacemaker I think we can also be more aggressive with his oral beta-blocker.  Increase to 25 mg twice daily.  2.  Hypertension: Need to get better control.  Will give 1 dose of IV enalapril given he has normal renal function.  If this helps we can switch to oral angiotensin receptor blocker later today.  Lisinopril is listed in his allergies as causing edema but I asked him and he has no recollection of angioedema from ACE inhibitor.  I think it is safe to give him a dose.  If blood pressure does not come down we may need to consider nicardipine drip.  3.  Intracranial hemorrhage: Management per neurosurgery  4.  Coronary artery disease with history of CABG and PCI to [facility] December 2024: He is just at 4 months out from his PCI.  At this point we have no choice but to hold dual antiplatelet therapy due to the intracranial hemorrhage.  Should be relatively safe but would want to resume at least aspirin as quickly as possible.  5.  Ischemic cardiomyopathy: No signs or symptoms of heart failure.  Volume status seems reasonable.      History Of Present Illness:    Wayne Burkitt is a 79 y.o. male presenting with headache and new intracranial hemorrhage.  Presumably this is from a fall several weeks ago.  Patient only complaint is headache.  On telemetry he has been noted to have some nonsustained to slightly sustained wide-complex tachycardia as long as 2 minutes in duration.  He has underlying intermittent ventricular pacing.  He denies any chest discomfort..       Past Medical  History:  He has a past medical history of Auditory hallucinations (09/28/2024), Conversion reaction (09/28/2024), Coronary artery disease involving native coronary artery of native heart without angina pectoris (09/28/2024), Epistaxis, recurrent (09/28/2024), Generalized ischemic myocardial dysfunction (09/28/2024), Ischemic cardiomyopathy (09/28/2024), and Moderate vascular dementia with anxiety (09/28/2024).    Past Surgical History:  He has a past surgical history that includes Coronary artery bypass graft (2010); Coronary angioplasty with stent; pacemaker placement; Total hip arthroplasty; Cardiac catheterization (N/A, 12/20/2024); and Cardiac catheterization (N/A, 12/20/2024).    Problem List:  Problem List[1]    Social History:  He reports that he quit smoking about 31 years ago. His smoking use included cigarettes. He has never used smokeless tobacco. He reports that he does not currently use alcohol. He reports that he does not use drugs.    Family History:  Family History[2]     Allergies:  Alprazolam, Bepotastine besilate, Clopidogrel, Doxazosin, Lisinopril, Metronidazole, Amlodipine, Atorvastatin, Cephalexin, Ciprofloxacin, Diphenhydramine hcl, Guaifenesin, Hydrochlorothiazide, Hydrocodone, Methylprednisolone, Phenytoin, Sertraline, Telmisartan, and Topiramate    Inpatient Medications:  Scheduled Medications[3]  PRN Medications[4]  Continuous Medications[5]    Outpatient Medications:  Current Outpatient Medications   Medication Instructions    acetaminophen (TYLENOL) 650 mg, Every 4 hours PRN    aspirin 81 mg, Daily    brimonidine (AlphaGAN) 0.2 % ophthalmic solution 1 drop, 2 times daily    cholecalciferol (VITAMIN D-3) 50 mcg, Daily    docusate sodium (COLACE) 100 mg, 2 times daily PRN    donepezil (ARICEPT) 5 mg, Nightly    levETIRAcetam (KEPPRA) 1,000 mg, 2 times daily    loperamide (IMODIUM A-D) 2 mg, 4 times daily PRN    loperamide (IMODIUM A-D) 4 mg, As needed    magnesium hydroxide (Milk of  Magnesia) 400 mg/5 mL suspension 30 mL, Daily PRN    metoprolol succinate XL (TOPROL-XL) 25 mg, oral, Daily, Do not crush or chew.    nitroglycerin (NITROSTAT) 0.4 mg, sublingual, Every 5 min PRN    oxymetazoline (Afrin) 0.05 % nasal spray 2 sprays, Each Nostril, Every 12 hours PRN, Do not use for more than 3 days consecutively. Use as needed to aid with stopping nose bleeds.    pantoprazole (PROTONIX) 40 mg, oral, 2 times daily, Do not crush, chew, or split.    pravastatin (PRAVACHOL) 80 mg, Nightly    QUEtiapine (SEROQUEL) 50 mg, Nightly    sodium chloride (Saline Nasal Mist) 3 % mist 2 Squirts, nasal, 2 times daily    traZODone (DESYREL) 25 mg, Nightly PRN       Last Recorded Vitals:  Vitals:    04/22/25 0317 04/22/25 0400 04/22/25 0600 04/22/25 0800   BP: 148/67 167/73 148/67 (!) 194/86   BP Location:  Right arm Right arm    Patient Position:  Lying     Pulse: 60 60 60 60   Resp: 10 16 18    Temp:  35.9 °C (96.6 °F)     TempSrc:  Temporal     SpO2: 98% 98% 98%    Weight:  77.3 kg (170 lb 6.7 oz)     Height:         Last I/O:  I/O last 3 completed shifts:  In: 1480 (19.1 mL/kg) [P.O.:480; IV Piggyback:1000]  Out: 1000 (12.9 mL/kg) [Urine:1000 (0.4 mL/kg/hr)]  Weight: 77.3 kg     Physical Exam  Vitals reviewed.   Constitutional:       Appearance: Normal appearance.   Eyes:      Pupils: Pupils are equal, round, and reactive to light.   Neck:      Vascular: No JVD.   Cardiovascular:      Rate and Rhythm: Normal rate and regular rhythm.      Pulses: Normal pulses.      Heart sounds: No murmur heard.     No gallop.   Pulmonary:      Effort: No respiratory distress.      Breath sounds: No wheezing or rales.   Abdominal:      General: Abdomen is flat. There is no distension.      Palpations: Abdomen is soft.   Musculoskeletal:         General: No swelling.      Right lower leg: No edema.      Left lower leg: No edema.   Neurological:      General: No focal deficit present.      Mental Status: He is alert.   Psychiatric:          Mood and Affect: Mood normal.            Last Labs:  CBC - 4/22/2025:  5:47 AM  4.5 9.1 317    30.0      CMP - 4/22/2025:  5:47 AM  9.1 7.0 24 --- 0.4   3.8 3.7 16 91      Troponin I, High Sensitivity   Date/Time Value Ref Range Status   04/20/2025 01:46 PM 9 0 - 20 ng/L Final   04/06/2025 07:36 AM 12 0 - 20 ng/L Final   04/01/2025 07:44 PM 17 0 - 20 ng/L Final     BNP   Date/Time Value Ref Range Status   04/01/2025 03:07  (H) 0 - 99 pg/mL Final   12/12/2024 10:18  (H) 0 - 99 pg/mL Final     Hemoglobin A1C   Date/Time Value Ref Range Status   04/20/2025 01:46 PM 4.9 See comment % Final       EKG:   Encounter Date: 04/01/25   ECG 12 lead   Result Value    Ventricular Rate 62    Atrial Rate 104    QRS Duration 94    QT Interval 428    QTC Calculation(Bazett) 434    R Axis 48    T Axis 196    QRS Count 10    Q Onset 213    T Offset 427    QTC Fredericia 432    Narrative    Electronic atrial pacemaker  Possible Inferior infarct (cited on or before 01-APR-2025)  ST & T wave abnormality, consider anterolateral ischemia  Abnormal ECG  When compared with ECG of 01-APR-2025 15:09,  Criteria for Anterior infarct are no longer Present  Criteria for Anterolateral infarct are no longer Present  T wave inversion now evident in Anterior leads     ECHO: Results for orders placed during the hospital encounter of 04/01/25    Transthoracic Echo (TTE) Limited    Narrative  [facility]  [address] [address]  Tel [phone] Fax [phone]    TRANSTHORACIC ECHOCARDIOGRAM REPORT    Patient Name:       WAYNE BURKITT Reading Physician:    [id number] Kavita Varner MD  Study Date:         4/2/2025             Ordering Provider:    [id number] LILIANE ANDERSON  MRN/PID:            [id number]             Fellow:  Accession#:         [id number]         Nurse:  Date of Birth/Age:  1945 / 79      Sonographer:          Amy chong  Gender Assigned at  M                    Additional  Staff:  Birth:  Height:             180.34 cm            Admit Date:  Weight:             78.93 kg             Admission Status:     Inpatient -  Priority  discharge  BSA / BMI:          1.99 m2 / 24.27      Department Location:  [facility] CCU SD  kg/m2  Blood Pressure: 165 /73 mmHg    Study Type:    TRANSTHORACIC ECHO (TTE) LIMITED  Diagnosis/ICD: Chest pain, unspecified-R07.9  Indication:    Chest Pain  CPT Codes:     Echo Limited-51133; Doppler Limited-67403; Color Doppler-72701  Study Detail: The following Echo studies were performed: 2D, M-Mode, color flow  and Doppler.      PHYSICIAN INTERPRETATION:  Left Ventricle: Left ventricular ejection fraction is mildly decreased, by visual estimate at 45-50%. There is global hypokinesis of the left ventricle with minor regional variations. The left ventricular cavity size is normal. There is no evidence of left ventricular hypertrophy. Spectral Doppler shows a Grade II (pseudonormal pattern) of left ventricular diastolic filling with an elevated left atrial pressure. The intraventricular septum appears intact without evidence of shunting or a ventricular septal defect.  Left Atrium: The left atrial size is normal.  Right Ventricle: The right ventricle is normal in size. There is normal right ventricular global systolic function. A device is visualized in the right ventricle.  Right Atrium: The right atrial size is normal.  Aortic Valve: The aortic valve was not assessed. There is no evidence of aortic valve regurgitation.  Mitral Valve: The mitral valve is mild to moderately thickened. There is no evidence of mitral valve stenosis. There is mild mitral annular calcification. There is no evidence of mitral valve regurgitation.  Tricuspid Valve: The tricuspid valve is structurally normal. There is no evidence of tricuspid valve stenosis. There is mild tricuspid regurgitation. The Doppler estimated RVSP is within normal limits at 32.4 mmHg.  Pulmonic Valve: The pulmonic valve  is not well visualized. The pulmonic valve regurgitation was not assessed.  Pericardium: Trivial pericardial effusion.  Aorta: The aortic root was not assessed.  Systemic Veins: The inferior vena cava appears normal in size, with IVC inspiratory collapse greater than 50%.  In comparison to the previous echocardiogram(s): Compared with study dated 12/5/2024,. LVEF has mildly declined.      CONCLUSIONS:  1. Left ventricular ejection fraction is mildly decreased, by visual estimate at 45-50%.  2. There is global hypokinesis of the left ventricle with minor regional variations.  3. Spectral Doppler shows a Grade II (pseudonormal pattern) of left ventricular diastolic filling with an elevated left atrial pressure.  4. Intact intraventricular septum without shunting or a ventricular septal defect.  5. There is no evidence of left ventricular hypertrophy.  6. There is normal right ventricular global systolic function.  7. There is No tricuspid stenosis.  8. Right ventricular systolic pressure is within normal limits.  9. Technically difficult study due to poor acoustic images - no contrasted images.    QUANTITATIVE DATA SUMMARY:    2D MEASUREMENTS:             Normal Ranges:  LAs:             3.40 cm     (2.7-4.0cm)  IVSd:            0.80 cm     (0.6-1.1cm)  LVPWd:           0.90 cm     (0.6-1.1cm)  LVIDd:           5.00 cm     (3.9-5.9cm)  LVIDs:           3.90 cm  LV Mass Index:   73.9 g/m2  LVEDV Index:     40.01 ml/m2  LV % FS          22.0 %      LEFT ATRIUM:                  Normal Ranges:  LA Vol A4C:        65.1 ml    (22+/-6mL/m2)  LA Vol A2C:        60.3 ml  LA Vol BP:         65.6 ml  LA Vol Index A4C:  32.7ml/m2  LA Vol Index A2C:  30.3 ml/m2  LA Vol Index BP:   33.0 ml/m2  LA Area A4C:       21.0 cm2  LA Area A2C:       19.3 cm2  LA Major Axis A4C: 5.8 cm  LA Major Axis A2C: 5.2 cm  LA Volume Index:   30.0 ml/m2  LA Vol A4C:        57.5 ml  LA Vol A2C:        56.9 ml  LA Vol Index BSA:  28.8 ml/m2      RIGHT  ATRIUM:                 Normal Ranges:  RA Vol A4C:        27.4 ml    (8.3-19.5ml)  RA Vol Index A4C:  13.8 ml/m2  RA Area A4C:       12.7 cm2  RA Major Axis A4C: 5.0 cm      LV SYSTOLIC FUNCTION:  Normal Ranges:  EF-A4C View:    45 % (>=55%)  EF-A2C View:    43 %  EF-Biplane:     46 %  EF-Visual:      48 %  LV EF Reported: 48 %      LV DIASTOLIC FUNCTION:           Normal Ranges:  MV Peak E:             0.87 m/s  (0.7-1.2 m/s)  MV Peak A:             0.49 m/s  (0.42-0.7 m/s)  E/A Ratio:             1.76      (1.0-2.2)  MV e'                  0.061 m/s (>8.0)  MV lateral e'          0.07 m/s  MV medial e'           0.05 m/s  E/e' Ratio:            14.27     (<8.0)      MITRAL VALVE:          Normal Ranges:  MV DT:        207 msec (150-240msec)      RIGHT VENTRICLE:  TAPSE: 16.1 mm  RV s'  0.08 m/s      TRICUSPID VALVE/RVSP:          Normal Ranges:  Peak TR Velocity:     2.71 m/s  Est. RA Pressure:     3 mmHg  RV Syst Pressure:     32 mmHg  (< 30mmHg)  IVC Diam:             1.50 cm      [id number] Kavita Varner MD  Electronically signed on 4/2/2025 at 6:12:47 PM        ** Final **     CT Results:  No results found for this or any previous visit from the past 365 days.          Ashkan Gaspar MD       [1]   Patient Active Problem List  Diagnosis    Moderate vascular dementia with anxiety    Auditory hallucinations    Coronary artery disease involving native coronary artery of native heart without angina pectoris    Epistaxis    Unstable angina pectoris (Multi)    Moderate vascular dementia with psychotic disturbance    Episodes of formed visual hallucinations    Frequent falls    Essential hypertension    Obstructive sleep apnea syndrome    Orthostatic hypotension    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    Seizure (Multi)    Angina pectoris, unstable (Multi)    Congestive heart failure    3-vessel coronary artery disease    Scrotal hematoma    Iron deficiency anemia due to chronic blood loss    Upper GI bleed    Physical  debility    Long term (current) use of antithrombotics/antiplatelets    Hyperlipidemia    Subdural hematoma (Multi)    V tach (Multi)   [2]   Family History  Family history unknown: Yes   [3]   Scheduled medications   Medication Dose Route Frequency    amiodarone  150 mg intravenous Once    brimonidine  1 drop Both Eyes BID    cholecalciferol  50 mcg oral Daily    donepezil  5 mg oral Nightly    enalaprilat  1.25 mg intravenous Once    levETIRAcetam  1,000 mg oral BID    metoprolol succinate XL  25 mg oral BID    pantoprazole  40 mg oral BID    pravastatin  80 mg oral Nightly    QUEtiapine  50 mg oral Nightly    sodium chloride  2 spray Each Nostril q12h   [4]   PRN medications   Medication    acetaminophen    docusate sodium    oxygen   [5]   Continuous Medications   Medication Dose Last Rate    amiodarone  1 mg/min      Followed by    amiodarone  0.5 mg/min

## 2025-04-22 NOTE — PROGRESS NOTES
Physical Therapy                 Therapy Communication Note    Patient Name: Wayne Burkitt  MRN: [id number]  Department: [facility] 2  Room: 2105/2105-A  Today's Date: 4/22/2025     Discipline: Physical Therapy    PT Missed Visit: Yes     Missed Visit Reason: Missed Visit Reason: Other (Comment)    Missed Time: Attempt    Comment: Pt currently in v-tach, nurse is addressing and would like to hold at this time.

## 2025-04-22 NOTE — PROGRESS NOTES
Wayne Burkitt is a 79 y.o. male on day 0 of admission presenting with Subdural hematoma (Multi).      Subjective   Patient is feeling well, no N/V.          Objective          Vitals 24HR  Heart Rate:  [60-76]   Temp:  [35.9 °C (96.6 °F)-36.5 °C (97.7 °F)]   Resp:  [10-33]   BP: ()/(49-95)   Weight:  [75.2 kg (165 lb 12.6 oz)]   SpO2:  [97 %-100 %]     Intake/Output last 3 Shifts:    Intake/Output Summary (Last 24 hours) at 4/21/2025 2005  Last data filed at 4/21/2025 0800  Gross per 24 hour   Intake 1240 ml   Output 700 ml   Net 540 ml       Physical Exam  GENERAL: No distress.   SKIN: No rashes or lesions.  EYES: PERRLA, EOMI  HEENT: Head: Normocephalic  Neck: supple and no adenopathy  LUNGS: Lungs clear to auscultation.   CARDIAC: Normal S1 and S2; no murmurs.   ABDOMEN: Soft, non-tender.   EXTREMITIES: No ulcers, Extremities normal.   NEURO: No focal deficit.     Relevant Results             Scheduled medications  Scheduled Medications[1]  Continuous medications  Continuous Medications[2]  PRN medications  PRN Medications[3]  Results for orders placed or performed during the hospital encounter of 04/20/25 (from the past 24 hours)   CBC   Result Value Ref Range    WBC 4.2 (L) 4.4 - 11.3 x10*3/uL    nRBC 0.0 0.0 - 0.0 /100 WBCs    RBC 3.18 (L) 4.50 - 5.90 x10*6/uL    Hemoglobin 8.7 (L) 13.5 - 17.5 g/dL    Hematocrit 28.4 (L) 41.0 - 52.0 %    MCV 89 80 - 100 fL    MCH 27.4 26.0 - 34.0 pg    MCHC 30.6 (L) 32.0 - 36.0 g/dL    RDW 23.1 (H) 11.5 - 14.5 %    Platelets 300 150 - 450 x10*3/uL   Magnesium   Result Value Ref Range    Magnesium 2.03 1.60 - 2.40 mg/dL   Renal Function Panel   Result Value Ref Range    Glucose 100 (H) 74 - 99 mg/dL    Sodium 140 136 - 145 mmol/L    Potassium 3.9 3.5 - 5.3 mmol/L    Chloride 110 (H) 98 - 107 mmol/L    Bicarbonate 23 21 - 32 mmol/L    Anion Gap 11 10 - 20 mmol/L    Urea Nitrogen 16 6 - 23 mg/dL    Creatinine 0.87 0.50 - 1.30 mg/dL    eGFR 89 >60 mL/min/1.73m*2    Calcium  8.4 (L) 8.6 - 10.3 mg/dL    Phosphorus 3.8 2.5 - 4.9 mg/dL    Albumin 3.4 3.4 - 5.0 g/dL                  Assessment & Plan  Subdural hematoma (Multi)    V tach (Multi)      79 male   PMHx of HTN, HLD, epilepsy on Keppra, moderate vascular dementia with hallucination.   Patient presented to ED with complaints of left-sided headache. CT head revealed subdural hematoma on the left through the frontal and temporal region with maximum thickness of 4 mm.  No significant mass effect, no overlying skull fracture, no intraparenchymal hemorrhage or evidence of stroke.  Neurosurgery was consulted by ED.       #Acute left-sided subdural hematoma without mass effect  #Headache secondary to SDH  #Epilepsy on Keppra  #Moderate vascular dementia with hallucinations  -Initial CT scan 4/20/25 1242: Subdural hematoma present on the left frontal and temporal region with a maximum thickness of 4 mm, no significant mass effect, no intraparenchymal hemorrhage or evidence of stroke  -6 hour stability CT scan 4/20/2025 1903 : Slight enlargement of small subdural hematoma overlying the right occipital lobe and posterior parietal lobe  -Repeat CT Scan 4/21/2025 0122: Stable appearance of previously described left subdural hematoma  -Continue home Keppra, Seroquel and donezepil    #History of CAD  #Hypertension  #Hyperlipidemia  -2D echo 4/2/2025: Estimated EF 45 to 50%, global hypokinesis of the left ventricle with minor regional variations    #History of gastric AVMs, diverticulosis  #Anemia  -No chemical VTE prophylaxis secondary to SDH  -Maintain bilateral SCDs  -Hold home ASA    #Left knee abrasion  -PT/OT to evaluate and treat     #Runs of VT   ? SVT with LBBB.   Metoprolol   Keep K > 4 amd Mg > 2   Consult cardiology.     I was contacted that patient having recurrence of severe headache 9/10, no focal neurologic deficit, stat CT of the brain was ordered, previous CT from earlier today was reviewed and is stable.  I was also called  about patient having runs of VT, 3 minutes, K is 3.9 and magnesium is above 2, strips were reviewed and possibly suggestive of SVT with left bundle branch block, a dose of metoprolol 2.5 mg IV was given, cardiology was consulted.    Due to the high probability of life threatening clinical decompensation, the patient required critical care time evaluating and managing this patient.  Critical care time included obtaining a history, examining the patient, ordering and reviewing studies, discussing, developing, and implementing a management plan, evaluating the patient's response to treatment, and discussion with other care team providers. I saw and evaluated the patient myself. Critical care time was performed exclusive of billable procedures.     Overall, I have provided 50 minutes of critical care time, not including separately  billed procedures. Care includes review of laboratory data, review of radiographic studies, discussion of care with specialists or primary team.         Gulshan Hermosillo MD           [1] brimonidine, 1 drop, Both Eyes, BID  cholecalciferol, 50 mcg, oral, Daily  donepezil, 5 mg, oral, Nightly  levETIRAcetam, 1,000 mg, oral, BID  metoprolol succinate XL, 25 mg, oral, Daily  pantoprazole, 40 mg, oral, BID  pravastatin, 80 mg, oral, Nightly  QUEtiapine, 50 mg, oral, Nightly  sodium chloride, 2 spray, Each Nostril, q12h  [2]    [3] PRN medications: acetaminophen, docusate sodium, oxygen

## 2025-04-22 NOTE — CARE PLAN
Problem: Pain - Adult  Goal: Verbalizes/displays adequate comfort level or baseline comfort level  Outcome: Progressing   The patient's goals for the shift include      The clinical goals for the shift include see poc

## 2025-04-22 NOTE — PROGRESS NOTES
Wayne Burkitt is a 79 y.o. male on day 1 of admission presenting with Subdural hematoma (Multi).      Subjective   Patient is feeling well, no N/V.        Objective          Vitals 24HR  Heart Rate:  [59-76]   Temp:  [35.9 °C (96.6 °F)-36.5 °C (97.7 °F)]   Resp:  [10-33]   BP: ()/(54-91)   Weight:  [77.3 kg (170 lb 6.7 oz)]   SpO2:  [98 %-100 %]     Intake/Output last 3 Shifts:    Intake/Output Summary (Last 24 hours) at 4/22/2025 0901  Last data filed at 4/22/2025 0400  Gross per 24 hour   Intake 240 ml   Output 300 ml   Net -60 ml       Physical Exam  GENERAL: No distress.   SKIN: No rashes or lesions.  EYES: PERRLA, EOMI  HEENT: Head: Normocephalic  Neck: supple and no adenopathy  LUNGS: Lungs clear to auscultation.   CARDIAC: Normal S1 and S2; no murmurs.   ABDOMEN: Soft, non-tender.   EXTREMITIES: No ulcers, Extremities normal.   NEURO: No focal deficit.     Relevant Results             Scheduled medications  Scheduled Medications[1]  Continuous medications  Continuous Medications[2]  PRN medications  PRN Medications[3]  Results for orders placed or performed during the hospital encounter of 04/20/25 (from the past 24 hours)   Basic Metabolic Panel   Result Value Ref Range    Glucose 125 (H) 74 - 99 mg/dL    Sodium 140 136 - 145 mmol/L    Potassium 4.5 3.5 - 5.3 mmol/L    Chloride 106 98 - 107 mmol/L    Bicarbonate 24 21 - 32 mmol/L    Anion Gap 15 10 - 20 mmol/L    Urea Nitrogen 16 6 - 23 mg/dL    Creatinine 0.96 0.50 - 1.30 mg/dL    eGFR 81 >60 mL/min/1.73m*2    Calcium 9.5 8.6 - 10.3 mg/dL   Magnesium   Result Value Ref Range    Magnesium 2.09 1.60 - 2.40 mg/dL   Renal Function Panel   Result Value Ref Range    Glucose 103 (H) 74 - 99 mg/dL    Sodium 140 136 - 145 mmol/L    Potassium 3.7 3.5 - 5.3 mmol/L    Chloride 107 98 - 107 mmol/L    Bicarbonate 24 21 - 32 mmol/L    Anion Gap 13 10 - 20 mmol/L    Urea Nitrogen 17 6 - 23 mg/dL    Creatinine 0.88 0.50 - 1.30 mg/dL    eGFR 89 >60 mL/min/1.73m*2     Calcium 9.1 8.6 - 10.3 mg/dL    Phosphorus 3.8 2.5 - 4.9 mg/dL    Albumin 3.7 3.4 - 5.0 g/dL   Magnesium   Result Value Ref Range    Magnesium 1.97 1.60 - 2.40 mg/dL   CBC   Result Value Ref Range    WBC 4.5 4.4 - 11.3 x10*3/uL    nRBC 0.0 0.0 - 0.0 /100 WBCs    RBC 3.40 (L) 4.50 - 5.90 x10*6/uL    Hemoglobin 9.1 (L) 13.5 - 17.5 g/dL    Hematocrit 30.0 (L) 41.0 - 52.0 %    MCV 88 80 - 100 fL    MCH 26.8 26.0 - 34.0 pg    MCHC 30.3 (L) 32.0 - 36.0 g/dL    RDW 23.5 (H) 11.5 - 14.5 %    Platelets 317 150 - 450 x10*3/uL   TSH with reflex to Free T4 if abnormal   Result Value Ref Range    Thyroid Stimulating Hormone 2.84 0.44 - 3.98 mIU/L       This patient currently has cardiac telemetry ordered; if you would like to modify or discontinue the telemetry order, click here to go to the orders activity to modify/discontinue the order.          Assessment & Plan  Subdural hematoma (Multi)    V tach (Multi)      79 male   PMHx of HTN, HLD, epilepsy on Keppra, moderate vascular dementia with hallucination.   Patient presented to ED with complaints of left-sided headache. CT head revealed subdural hematoma on the left through the frontal and temporal region with maximum thickness of 4 mm.  No significant mass effect, no overlying skull fracture, no intraparenchymal hemorrhage or evidence of stroke.  Neurosurgery was consulted by ED.       #Acute left-sided subdural hematoma without mass effect  #Headache secondary to SDH  #Epilepsy on Keppra  #Moderate vascular dementia with hallucinations  -Initial CT scan 4/20/25 1242: Subdural hematoma present on the left frontal and temporal region with a maximum thickness of 4 mm, no significant mass effect, no intraparenchymal hemorrhage or evidence of stroke  -6 hour stability CT scan 4/20/2025 1903 : Slight enlargement of small subdural hematoma overlying the right occipital lobe and posterior parietal lobe  -Repeat CT Scan 4/21/2025 0122: Stable appearance of previously described left  subdural hematoma  -Continue home Keppra, Seroquel and donezepil    #History of CAD  #Hypertension  #Hyperlipidemia  -2D echo 4/2/2025: Estimated EF 45 to 50%, global hypokinesis of the left ventricle with minor regional variations    #History of gastric AVMs, diverticulosis  #Anemia  -No chemical VTE prophylaxis secondary to SDH  -Maintain bilateral SCDs  -Hold home ASA    #Left knee abrasion  -PT/OT to evaluate and treat     #Runs of VT   Metoprolol   Keep K > 4 amd Mg > 2   Amiodarone per cardiology.         Gulshan Hermosillo MD           [1] amiodarone, 150 mg, intravenous, Once  brimonidine, 1 drop, Both Eyes, BID  cholecalciferol, 50 mcg, oral, Daily  donepezil, 5 mg, oral, Nightly  enalaprilat, 1.25 mg, intravenous, Once  levETIRAcetam, 1,000 mg, oral, BID  metoprolol succinate XL, 25 mg, oral, BID  pantoprazole, 40 mg, oral, BID  pravastatin, 80 mg, oral, Nightly  QUEtiapine, 50 mg, oral, Nightly  sodium chloride, 2 spray, Each Nostril, q12h     [2] amiodarone, 1 mg/min   Followed by  amiodarone, 0.5 mg/min     [3] PRN medications: acetaminophen, docusate sodium, oxygen

## 2025-04-23 ENCOUNTER — APPOINTMENT (OUTPATIENT)
Dept: RADIOLOGY | Facility: HOSPITAL | Age: 80
DRG: 064 | End: 2025-04-23
Payer: MEDICARE

## 2025-04-23 ENCOUNTER — APPOINTMENT (OUTPATIENT)
Dept: NEUROLOGY | Facility: HOSPITAL | Age: 80
DRG: 064 | End: 2025-04-23
Payer: MEDICARE

## 2025-04-23 LAB
ALBUMIN SERPL BCP-MCNC: 3.7 G/DL (ref 3.4–5)
ANION GAP SERPL CALC-SCNC: 13 MMOL/L (ref 10–20)
BUN SERPL-MCNC: 21 MG/DL (ref 6–23)
CALCIUM SERPL-MCNC: 9.2 MG/DL (ref 8.6–10.3)
CHLORIDE SERPL-SCNC: 105 MMOL/L (ref 98–107)
CO2 SERPL-SCNC: 24 MMOL/L (ref 21–32)
CREAT SERPL-MCNC: 0.93 MG/DL (ref 0.5–1.3)
EGFRCR SERPLBLD CKD-EPI 2021: 85 ML/MIN/1.73M*2
ERYTHROCYTE [DISTWIDTH] IN BLOOD BY AUTOMATED COUNT: 23.1 % (ref 11.5–14.5)
GLUCOSE SERPL-MCNC: 108 MG/DL (ref 74–99)
HCT VFR BLD AUTO: 30.2 % (ref 41–52)
HGB BLD-MCNC: 9.3 G/DL (ref 13.5–17.5)
MAGNESIUM SERPL-MCNC: 1.9 MG/DL (ref 1.6–2.4)
MCH RBC QN AUTO: 26.9 PG (ref 26–34)
MCHC RBC AUTO-ENTMCNC: 30.8 G/DL (ref 32–36)
MCV RBC AUTO: 87 FL (ref 80–100)
NRBC BLD-RTO: 0 /100 WBCS (ref 0–0)
PHOSPHATE SERPL-MCNC: 3.3 MG/DL (ref 2.5–4.9)
PLATELET # BLD AUTO: 323 X10*3/UL (ref 150–450)
POTASSIUM SERPL-SCNC: 4.3 MMOL/L (ref 3.5–5.3)
RBC # BLD AUTO: 3.46 X10*6/UL (ref 4.5–5.9)
SODIUM SERPL-SCNC: 138 MMOL/L (ref 136–145)
WBC # BLD AUTO: 9 X10*3/UL (ref 4.4–11.3)

## 2025-04-23 PROCEDURE — 2060000001 HC INTERMEDIATE ICU ROOM DAILY

## 2025-04-23 PROCEDURE — 99232 SBSQ HOSP IP/OBS MODERATE 35: CPT | Performed by: INTERNAL MEDICINE

## 2025-04-23 PROCEDURE — 70450 CT HEAD/BRAIN W/O DYE: CPT

## 2025-04-23 PROCEDURE — 85027 COMPLETE CBC AUTOMATED: CPT

## 2025-04-23 PROCEDURE — 2500000001 HC RX 250 WO HCPCS SELF ADMINISTERED DRUGS (ALT 637 FOR MEDICARE OP): Performed by: INTERNAL MEDICINE

## 2025-04-23 PROCEDURE — 2500000004 HC RX 250 GENERAL PHARMACY W/ HCPCS (ALT 636 FOR OP/ED): Mod: JZ | Performed by: NURSE PRACTITIONER

## 2025-04-23 PROCEDURE — 2500000004 HC RX 250 GENERAL PHARMACY W/ HCPCS (ALT 636 FOR OP/ED): Performed by: NURSE PRACTITIONER

## 2025-04-23 PROCEDURE — 2500000002 HC RX 250 W HCPCS SELF ADMINISTERED DRUGS (ALT 637 FOR MEDICARE OP, ALT 636 FOR OP/ED): Performed by: INTERNAL MEDICINE

## 2025-04-23 PROCEDURE — 70450 CT HEAD/BRAIN W/O DYE: CPT | Performed by: RADIOLOGY

## 2025-04-23 PROCEDURE — 2500000001 HC RX 250 WO HCPCS SELF ADMINISTERED DRUGS (ALT 637 FOR MEDICARE OP)

## 2025-04-23 PROCEDURE — 99223 1ST HOSP IP/OBS HIGH 75: CPT

## 2025-04-23 PROCEDURE — 36415 COLL VENOUS BLD VENIPUNCTURE: CPT

## 2025-04-23 PROCEDURE — 80069 RENAL FUNCTION PANEL: CPT

## 2025-04-23 PROCEDURE — 83735 ASSAY OF MAGNESIUM: CPT

## 2025-04-23 PROCEDURE — 2500000004 HC RX 250 GENERAL PHARMACY W/ HCPCS (ALT 636 FOR OP/ED): Mod: JZ | Performed by: INTERNAL MEDICINE

## 2025-04-23 PROCEDURE — 99231 SBSQ HOSP IP/OBS SF/LOW 25: CPT | Performed by: NURSE PRACTITIONER

## 2025-04-23 RX ORDER — AMIODARONE HYDROCHLORIDE 200 MG/1
200 TABLET ORAL 2 TIMES DAILY
Status: DISCONTINUED | OUTPATIENT
Start: 2025-04-23 | End: 2025-04-26 | Stop reason: HOSPADM

## 2025-04-23 RX ORDER — OXYCODONE HYDROCHLORIDE 5 MG/1
5 TABLET ORAL ONCE
Refills: 0 | Status: COMPLETED | OUTPATIENT
Start: 2025-04-23 | End: 2025-04-23

## 2025-04-23 RX ORDER — DEXAMETHASONE 4 MG/1
2 TABLET ORAL EVERY 8 HOURS SCHEDULED
Status: DISCONTINUED | OUTPATIENT
Start: 2025-04-27 | End: 2025-04-26 | Stop reason: HOSPADM

## 2025-04-23 RX ORDER — DEXAMETHASONE 4 MG/1
2 TABLET ORAL DAILY
Status: DISCONTINUED | OUTPATIENT
Start: 2025-04-29 | End: 2025-04-26 | Stop reason: HOSPADM

## 2025-04-23 RX ORDER — DEXAMETHASONE 6 MG/1
3 TABLET ORAL EVERY 8 HOURS SCHEDULED
Status: DISCONTINUED | OUTPATIENT
Start: 2025-04-25 | End: 2025-04-26 | Stop reason: HOSPADM

## 2025-04-23 RX ORDER — DEXAMETHASONE 4 MG/1
4 TABLET ORAL EVERY 6 HOURS SCHEDULED
Status: DISPENSED | OUTPATIENT
Start: 2025-04-23 | End: 2025-04-25

## 2025-04-23 RX ADMIN — SALINE NASAL SPRAY 2 SPRAY: 1.5 SOLUTION NASAL at 21:35

## 2025-04-23 RX ADMIN — OXYCODONE HYDROCHLORIDE 5 MG: 5 TABLET ORAL at 04:38

## 2025-04-23 RX ADMIN — DEXAMETHASONE SODIUM PHOSPHATE 4 MG: 4 INJECTION, SOLUTION INTRAMUSCULAR; INTRAVENOUS at 12:37

## 2025-04-23 RX ADMIN — PRAVASTATIN SODIUM 80 MG: 20 TABLET ORAL at 21:35

## 2025-04-23 RX ADMIN — DEXAMETHASONE 4 MG: 4 TABLET ORAL at 18:41

## 2025-04-23 RX ADMIN — SODIUM CHLORIDE 250 ML: 0.9 INJECTION, SOLUTION INTRAVENOUS at 12:37

## 2025-04-23 RX ADMIN — AMIODARONE HYDROCHLORIDE 0.5 MG/MIN: 1.8 INJECTION, SOLUTION INTRAVENOUS at 06:26

## 2025-04-23 RX ADMIN — OXYCODONE HYDROCHLORIDE 5 MG: 5 TABLET ORAL at 02:16

## 2025-04-23 RX ADMIN — BRIMONIDINE TARTRATE 1 DROP: 2 SOLUTION/ DROPS OPHTHALMIC at 21:37

## 2025-04-23 RX ADMIN — AMIODARONE HYDROCHLORIDE 200 MG: 200 TABLET ORAL at 21:36

## 2025-04-23 RX ADMIN — LEVETIRACETAM 1000 MG: 500 TABLET, FILM COATED ORAL at 09:33

## 2025-04-23 RX ADMIN — ACETAMINOPHEN 650 MG: 325 TABLET, FILM COATED ORAL at 09:24

## 2025-04-23 RX ADMIN — QUETIAPINE FUMARATE 50 MG: 50 TABLET ORAL at 21:36

## 2025-04-23 RX ADMIN — LEVETIRACETAM 1000 MG: 500 TABLET, FILM COATED ORAL at 21:35

## 2025-04-23 RX ADMIN — Medication 50 MCG: at 09:33

## 2025-04-23 RX ADMIN — BRIMONIDINE TARTRATE 1 DROP: 2 SOLUTION/ DROPS OPHTHALMIC at 09:33

## 2025-04-23 RX ADMIN — PANTOPRAZOLE SODIUM 40 MG: 40 TABLET, DELAYED RELEASE ORAL at 09:33

## 2025-04-23 RX ADMIN — SALINE NASAL SPRAY 2 SPRAY: 1.5 SOLUTION NASAL at 09:33

## 2025-04-23 RX ADMIN — DONEPEZIL HYDROCHLORIDE 5 MG: 5 TABLET ORAL at 21:35

## 2025-04-23 RX ADMIN — PANTOPRAZOLE SODIUM 40 MG: 40 TABLET, DELAYED RELEASE ORAL at 21:36

## 2025-04-23 ASSESSMENT — PAIN - FUNCTIONAL ASSESSMENT
PAIN_FUNCTIONAL_ASSESSMENT: 0-10

## 2025-04-23 ASSESSMENT — PAIN SCALES - GENERAL
PAINLEVEL_OUTOF10: 0 - NO PAIN
PAINLEVEL_OUTOF10: 0 - NO PAIN
PAINLEVEL_OUTOF10: 3
PAINLEVEL_OUTOF10: 0 - NO PAIN
PAINLEVEL_OUTOF10: 6
PAINLEVEL_OUTOF10: 8
PAINLEVEL_OUTOF10: 0 - NO PAIN
PAINLEVEL_OUTOF10: 8

## 2025-04-23 ASSESSMENT — PAIN DESCRIPTION - LOCATION
LOCATION: HEAD
LOCATION: HEAD

## 2025-04-23 ASSESSMENT — COGNITIVE AND FUNCTIONAL STATUS - GENERAL
MOBILITY SCORE: 17
TOILETING: A LOT
STANDING UP FROM CHAIR USING ARMS: A LITTLE
DAILY ACTIVITIY SCORE: 15
MOVING TO AND FROM BED TO CHAIR: A LITTLE
HELP NEEDED FOR BATHING: A LOT
DRESSING REGULAR LOWER BODY CLOTHING: A LOT
PERSONAL GROOMING: A LITTLE
WALKING IN HOSPITAL ROOM: A LOT
TURNING FROM BACK TO SIDE WHILE IN FLAT BAD: A LITTLE
DRESSING REGULAR UPPER BODY CLOTHING: A LOT
CLIMB 3 TO 5 STEPS WITH RAILING: A LOT

## 2025-04-23 ASSESSMENT — PAIN DESCRIPTION - ORIENTATION: ORIENTATION: OTHER (COMMENT)

## 2025-04-23 NOTE — CONSULTS
Inpatient consult to Neurology  Consult performed by: Minerva Boogie DO  Consult ordered by: RACHID Petty-CNP          History Of Present Illness  Wayne Burkitt is a 79 y.o. male with a past medical history of HTN, HLD, epilepsy on Keppra, moderate vascular dementia with hallucinations who presented to the [facility] emergency department on 4/20 with a complaint of a left-sided headache ongoing for 1.5 months with worsening intensity over the last 48 hours.  In the emergency department CT head noted subdural hematoma left through the frontal and temporal regions with maximum thickness of 3 mm.  No significant mass effect, overlying skull fracture, intraparenchymal hemorrhage, or evidence of stroke.  He was initially admitted to the intensive care unit for every hour neurochecks.    Stability scan after 6 hours noting slight enlargement of small subdural hematoma overlying the right occipital lobe.  In posterior parietal lobe measuring 4mm in thickness, previously 2-3mm.  There was also new acute subdural blood products along the posterior most false Measuring about 5mm in maximal thickness.  Repeat CTH on 4/21 noting stable appearance of previously described L subdural hematoma, other previously described areas of subdural heamtoma abutting R occipital lobe and along the posterior falx have diminished.   He was downgraded from the ICU on 4/21 with q4h neuro checks. ASA recommended to be held for 2 weeks per NSGY. Patient maintained on home Keppra as well as Seroquel and Donepezil.   Significant event noted on evening of 4/21 due to severe headache 9/10 without any focal neurologic deficit. STAT CTH obtained with only minor changes including predominantly hypodense subdural collection overlying L cerebral hemisphere minimally increased from 4mm to 5mm. Other findings unchanged from prior. He also had episode of VT, cardiology was consulted and the patient was started on Amiodarone  infusion.   Additional significant event on 4/23 for intractable headache without n/v or neurologic deficits. Per nursing staff, orientation waxes and wanes. Repeat CTH noting additional increase in SDH from 5mm to 6mm with new 1mm rightward midline shift.     The patient reports seizure history since he was a child. States he was diagnosed with absence (petite mal) seizures as a child however has had grand mal seizures as an adult. He was previously on Dilantin however had breakthrough seizures. He has since been on Keppra and has been seizure free for over 15 years.     Past Medical History  Medical History[1]  Surgical History  Surgical History[2]  Social History  Social History[3]  Allergies  Alprazolam, Bepotastine besilate, Clopidogrel, Doxazosin, Lisinopril, Metronidazole, Amlodipine, Atorvastatin, Cephalexin, Ciprofloxacin, Diphenhydramine hcl, Guaifenesin, Hydrochlorothiazide, Hydrocodone, Methylprednisolone, Phenytoin, Sertraline, Telmisartan, and Topiramate  Prescriptions Prior to Admission[4]    Review of Systems    -----------------------------------------------------------------  Review of Systems     Constitutional: Denies  Fever, Chills    Eyes: Denies Blurry Vision, Vision Loss/ Change    Gastrointestinal: Denies Nausea, Vomiting, Diarrhea, Constipation, Abdominal Pain    Neurological:  Denies Dizziness, Confusion, Headache, Syncope    Skin: Denies Pain, Rash, Ulcer    Neurological Exam  Mental Status  Awake, alert and oriented to person, place and time. Level of consciousness: Patient aware that he is in the hospital however unable to state the name of the hospital. Speech is normal.    Cranial Nerves  CN II: Visual fields full to confrontation.  CN III, IV, VI: Extraocular movements intact bilaterally.  CN V: Facial sensation is normal.  CN VII: Full and symmetric facial movement.  CN IX, X: Palate elevates symmetrically  CN XI: Shoulder shrug strength is normal.  CN XII: Tongue midline without  "atrophy or fasciculations.    Motor   Normal muscle tone. Strength is 5/5 throughout all four extremities.    Sensory  Sensation is intact to light touch, pinprick, vibration and proprioception in all four extremities.    Coordination    Finger-to-nose, rapid alternating movements and heel-to-shin normal bilaterally without dysmetria.    Physical Exam  Eyes:      Extraocular Movements: Extraocular movements intact.   Neurological:      Motor: Motor strength is normal.     Coordination: Coordination is intact.   Psychiatric:         Speech: Speech normal.       Last Recorded Vitals  Blood pressure 102/52, pulse 60, temperature 35.8 °C (96.4 °F), temperature source Temporal, resp. rate 19, height 1.803 m (5' 10.98\"), weight 78.3 kg (172 lb 9.9 oz), SpO2 95%.    Relevant Results          [place] Coma Scale  Best Eye Response: Spontaneous  Best Verbal Response: Oriented  Best Motor Response: Follows commands  Ray Coma Scale Score: 15                 I have personally reviewed the following imaging results:   Imaging  CT head wo IV contrast  Result Date: 4/23/2025  Known left subdural hematoma has minimally increased in thickness from 5 mm to 6 mm compared to 35 hours prior. There is resultant new 1 mm rightward midline shift.   Otherwise no new sites of intracranial hemorrhage identified.     Signed by: Minh Felipe 4/23/2025 8:50 AM Dictation workstation:   [id number]    CT head wo IV contrast  Result Date: 4/21/2025  1.  Mixed attenuation, predominantly hypodense subdural collection overlying the left cerebral hemisphere is minimally increased in size, now measuring up to 5 mm compared to 4 mm on previous exam. Foci of hyperdense attenuation within the subdural collection, likely representing more acute hemorrhage is are unchanged to prior study. There is unchanged minimal mass effect on the adjacent brain parenchyma without associated midline shift. 2. No evidence of new intracranial abnormality in the interim " since prior exam.   MACRO: None   Signed by: Armani Person 4/21/2025 11:18 PM Dictation workstation:   [id number]    CT head wo IV contrast  Result Date: 4/21/2025  Stable appearance of previously described left subdural hematoma, other previously described areas of subdural hematoma abutting right occipital lobe and along the posterior falx have diminished in appearance.     MACRO: None.   Signed by: Raúl Handy 4/21/2025 1:31 AM Dictation workstation:   [id number]    CT head wo IV contrast  Addendum Date: 4/21/2025  Interpreted By:  Aubrey Washington, ADDENDUM: Aubrey Washington discussed the significance and urgency of this critical finding by telephone with Dr. Ballesteros on 4/21/2025 at 12:44 am.  (**-RCF-**) Findings:  See findings.     Signed by: Aubrey Washington 4/21/2025 12:44 AM   -------- ORIGINAL REPORT -------- Dictation workstation:   [id number]    Result Date: 4/21/2025  1. Slight enlargement of small subdural hematoma overlying the right occipital lobe and posterior parietal lobe measuring 0.4 cm in thickness, previously 0.2 cm-0.3 cm in thickness.   2. Unchanged mixed density acute subdural hematoma overlying the left cerebral hemisphere measuring 0.4 cm in maximal thickness.   3. New acute subdural blood products along the posterior most false measuring 0.5 cm in maximal thickness.   MACRO: Aubrey Washington discussed the significance and urgency of this critical finding [facility]  BETTY ALCANTAR on 4/21/2025 at 12:37 am.  (**-RCF-**) Findings:  See findings. Documentation of verbal communication will be made once established.   Signed by: Aubrey Washington 4/21/2025 12:37 AM Dictation workstation:   [id number]    CT head wo IV contrast  Result Date: 4/20/2025  Subdural hematoma is present on the left through the frontal and temporal region with a maximum thickness of 4 mm. There is no significant mass effect. There is no overlying skull fracture.   No intraparenchymal hemorrhage or  evidence of stroke   MACRO: Denisse Carmen discussed the significance and urgency of this critical finding by telephone with  BETTY ALCANTAR on 4/20/2025 at 12:52 pm.  (**-RCF-**) Findings:  See findings.   Signed by: Denisse Carmen 4/20/2025 12:53 PM Dictation workstation:   [id number]      Cardiology, Vascular, and Other Imaging  Electrocardiogram, 12-lead PRN ACS symptoms  Result Date: 4/22/2025  Electronic atrial pacemaker Inferior-posterior infarct (cited on or before 01-APR-2025) ST & T wave abnormality, consider lateral ischemia Abnormal ECG When compared with ECG of 06-APR-2025 02:17, T wave inversion more evident in Inferior leads T wave inversion more evident in Anterior leads       Assessment/Plan   Assessment & Plan  Subdural hematoma (Multi)    V tach (Multi)      Acute left subdural hematoma, increased in size with new rightward midline shift  Hx seizures, controlled on Keppra  -Per CTH on 4/23, subdural hematoma has minimally increased in thickness from 5mm to 6mm with new 1mm rightward midline shift.   -Per nursing staff, patient has had intermittent episodes of altered mental status and hypotension  -Recommend starting continuous video EEG to rule out seizure activity secondary to new SDH   -Continue Keppra 100mg BID  -Agree with Decadron per neurosurgery recommendations         Assessment and plan discussed with Dr. Bambi Boogie, DO  Internal medicine, PGY3         [1]   Past Medical History:  Diagnosis Date    Auditory hallucinations 09/28/2024    Conversion reaction 09/28/2024    Coronary artery disease involving native coronary artery of native heart without angina pectoris 09/28/2024    Epistaxis, recurrent 09/28/2024    Generalized ischemic myocardial dysfunction 09/28/2024    Ischemic cardiomyopathy 09/28/2024    Moderate vascular dementia with anxiety 09/28/2024   [2]   Past Surgical History:  Procedure Laterality Date    CARDIAC CATHETERIZATION N/A 12/20/2024    Procedure:  Left Heart Cath, With LV;  Surgeon: Gabino Giron MD;  Location: [facility] Cardiac Cath Lab;  Service: Cardiovascular;  Laterality: N/A;    CARDIAC CATHETERIZATION N/A 2024    Procedure: PCI TATYANA Stent- Coronary;  Surgeon: Gabino Giron MD;  Location: [facility] Cardiac Cath Lab;  Service: Cardiovascular;  Laterality: N/A;    CORONARY ANGIOPLASTY WITH STENT PLACEMENT      states he has 16 stents after the CABG    CORONARY ARTERY BYPASS GRAFT  2010    PACEMAKER PLACEMENT      TOTAL HIP ARTHROPLASTY     [3]   Social History  Tobacco Use    Smoking status: Former     Current packs/day: 0.00     Types: Cigarettes     Quit date:      Years since quittin.3    Smokeless tobacco: Never   Vaping Use    Vaping status: Never Used   Substance Use Topics    Alcohol use: Not Currently     Comment: Stopped into .    Drug use: Never   [4]   Medications Prior to Admission   Medication Sig Dispense Refill Last Dose/Taking    acetaminophen (Tylenol) 325 mg tablet Take 2 tablets (650 mg) by mouth every 4 hours if needed for mild pain (1 - 3) or fever (temp greater than 38.0 C).   2025    aspirin 81 mg EC tablet Take 1 tablet (81 mg) by mouth once daily.   2025    brimonidine (AlphaGAN) 0.2 % ophthalmic solution Administer 1 drop into both eyes 2 times a day.   2025    cholecalciferol (Vitamin D-3) 50 mcg (2,000 units) tablet Take 1 tablet (50 mcg) by mouth once daily.   2025    docusate sodium (Colace) 100 mg capsule Take 1 capsule (100 mg) by mouth 2 times a day as needed for constipation.   2025    donepezil (Aricept) 5 mg tablet Take 1 tablet (5 mg) by mouth once daily at bedtime.   2025 Bedtime    levETIRAcetam (Keppra) 1,000 mg tablet Take 1 tablet (1,000 mg) by mouth 2 times a day.   2025    metoprolol succinate XL (Toprol-XL) 25 mg 24 hr tablet Take 1 tablet (25 mg) by mouth once daily. Do not crush or chew. 30 tablet 1 2025    oxymetazoline (Afrin) 0.05 % nasal  spray Administer 2 sprays into each nostril every 12 hours if needed (For nose bleeds.). Do not use for more than 3 days consecutively. Use as needed to aid with stopping nose bleeds. 30 mL 0 Unknown    pantoprazole (ProtoNix) 40 mg EC tablet Take 1 tablet (40 mg) by mouth 2 times a day. Do not crush, chew, or split. 60 tablet 1 4/20/2025    pravastatin (Pravachol) 80 mg tablet Take 1 tablet (80 mg) by mouth once daily at bedtime.   4/19/2025 Bedtime    QUEtiapine (SEROquel) 50 mg tablet Take 1 tablet (50 mg) by mouth once daily at bedtime.   4/19/2025 Bedtime    sodium chloride (Saline Nasal Mist) 3 % mist Administer 2 Squirts into affected nostril(s) 2 times a day. 126 mL 0 4/20/2025    loperamide (Imodium A-D) 2 mg tablet Take 1 tablet (2 mg) by mouth 4 times a day as needed for diarrhea. For each additional loose stool   Unknown    loperamide (Imodium A-D) 2 mg tablet Take 2 tablets (4 mg) by mouth if needed for diarrhea. Take after first loose stool   Unknown    magnesium hydroxide (Milk of Magnesia) 400 mg/5 mL suspension Take 30 mL by mouth once daily as needed for constipation.   Unknown    nitroglycerin (Nitrostat) 0.4 mg SL tablet Place 1 tablet (0.4 mg) under the tongue every 5 minutes if needed for chest pain. 90 tablet 12 Unknown    traZODone (Desyrel) 50 mg tablet Take 0.5 tablets (25 mg) by mouth as needed at bedtime (dementia with anxiety).   Unknown

## 2025-04-23 NOTE — PROGRESS NOTES
"Wayne Burkitt is a 79 y.o. male on day 2 of admission presenting with Subdural hematoma (Multi).    Subjective   Repeat CT imaging was obtained due to headache unrelieved with medications.          Last Recorded Vitals  Blood pressure 101/54, pulse 60, temperature 36 °C (96.8 °F), temperature source Temporal, resp. rate 16, height 1.803 m (5' 10.98\"), weight 78.3 kg (172 lb 9.9 oz), SpO2 95%.  Intake/Output last 3 Shifts:  I/O last 3 completed shifts:  In: 240 (3.1 mL/kg) [P.O.:240]  Out: 2200 (28.1 mL/kg) [Urine:2200 (0.8 mL/kg/hr)]  Weight: 78.3 kg     Relevant Results  CT Head: 4/23/25 7:40 am  Known left subdural hematoma has minimally increased in thickness  from 5 mm to 6 mm compared to 35 hours prior. There is resultant new  1 mm rightward midline shift.      Otherwise no new sites of intracranial hemorrhage identified.    CT Head: 1:46 pm  There has been no significant interval change when compared with the  prior study dated 04/23/2025 as described above.               Assessment & Plan  Subdural hematoma (Multi)    Imaging studies have been independently reviewed and interpreted. Scans are relatively similar in comparison to prior CT imaging. There is no neurosurgical intervention warranted. Recommend Decadron taper for headache, orders placed. This afternoon patient had an episode of decreased orientation and hypotension. Recommend evaluation by Neurology and EEG to rule out seizure activity.       Amy [place] [address] [address] [place] [address]  [address]  [place] [address]     Phone: [phone]  Fax: [phone]             "

## 2025-04-23 NOTE — CARE PLAN
Pt systolic b/p remained <160 this shift, he c/o aching pain in head that radiated to left and right ears, provider notified, See MAR for orders. Pain was not relieved by medication, provider notified, see orders. He remained safe, call light in reach, Q4 neuros, Plan of care ongoing

## 2025-04-23 NOTE — PROGRESS NOTES
Wayne Burkitt is a 79 y.o. male on day 2 of admission presenting with Subdural hematoma (Multi).      Subjective   Patient is feeling well, no N/V.        Objective          Vitals 24HR  Heart Rate:  []   Temp:  [35.8 °C (96.4 °F)-36.2 °C (97.2 °F)]   Resp:  [16-20]   BP: (102-152)/(52-96)   Weight:  [78.3 kg (172 lb 9.9 oz)]   SpO2:  [95 %-100 %]     Intake/Output last 3 Shifts:    Intake/Output Summary (Last 24 hours) at 4/23/2025 1257  Last data filed at 4/23/2025 0500  Gross per 24 hour   Intake --   Output 1900 ml   Net -1900 ml       Physical Exam  GENERAL: No distress.   SKIN: No rashes or lesions.  EYES: PERRLA, EOMI  HEENT: Head: Normocephalic  Neck: supple and no adenopathy  LUNGS: Lungs clear to auscultation.   CARDIAC: Normal S1 and S2; no murmurs.   ABDOMEN: Soft, non-tender.   EXTREMITIES: No ulcers, Extremities normal.   NEURO: No focal deficit.     Relevant Results             Scheduled medications  Scheduled Medications[1]  Continuous medications  Continuous Medications[2]  PRN medications  PRN Medications[3]  Results for orders placed or performed during the hospital encounter of 04/20/25 (from the past 24 hours)   Renal Function Panel   Result Value Ref Range    Glucose 108 (H) 74 - 99 mg/dL    Sodium 138 136 - 145 mmol/L    Potassium 4.3 3.5 - 5.3 mmol/L    Chloride 105 98 - 107 mmol/L    Bicarbonate 24 21 - 32 mmol/L    Anion Gap 13 10 - 20 mmol/L    Urea Nitrogen 21 6 - 23 mg/dL    Creatinine 0.93 0.50 - 1.30 mg/dL    eGFR 85 >60 mL/min/1.73m*2    Calcium 9.2 8.6 - 10.3 mg/dL    Phosphorus 3.3 2.5 - 4.9 mg/dL    Albumin 3.7 3.4 - 5.0 g/dL   Magnesium   Result Value Ref Range    Magnesium 1.90 1.60 - 2.40 mg/dL   CBC   Result Value Ref Range    WBC 9.0 4.4 - 11.3 x10*3/uL    nRBC 0.0 0.0 - 0.0 /100 WBCs    RBC 3.46 (L) 4.50 - 5.90 x10*6/uL    Hemoglobin 9.3 (L) 13.5 - 17.5 g/dL    Hematocrit 30.2 (L) 41.0 - 52.0 %    MCV 87 80 - 100 fL    MCH 26.9 26.0 - 34.0 pg    MCHC 30.8 (L) 32.0 -  36.0 g/dL    RDW 23.1 (H) 11.5 - 14.5 %    Platelets 323 150 - 450 x10*3/uL       This patient currently has cardiac telemetry ordered; if you would like to modify or discontinue the telemetry order, click here to go to the orders activity to modify/discontinue the order.          Assessment & Plan  Subdural hematoma (Multi)    V tach (Multi)      79 male   PMHx of HTN, HLD, epilepsy on Keppra, moderate vascular dementia with hallucination.   Patient presented to ED with complaints of left-sided headache. CT head revealed subdural hematoma on the left through the frontal and temporal region with maximum thickness of 4 mm.  No significant mass effect, no overlying skull fracture, no intraparenchymal hemorrhage or evidence of stroke.  Neurosurgery was consulted by ED.       #Acute left-sided subdural hematoma without mass effect  #Headache secondary to SDH  #Epilepsy on Keppra  #Moderate vascular dementia with hallucinations  -Initial CT scan 4/20/25 1242: Subdural hematoma present on the left frontal and temporal region with a maximum thickness of 4 mm, no significant mass effect, no intraparenchymal hemorrhage or evidence of stroke  -6 hour stability CT scan 4/20/2025 1903 : Slight enlargement of small subdural hematoma overlying the right occipital lobe and posterior parietal lobe  -Repeat CT Scan 4/21/2025 0122: Stable appearance of previously described left subdural hematoma  -Continue home Keppra, Seroquel and donezepil    #History of CAD  #Hypertension  #Hyperlipidemia  -2D echo 4/2/2025: Estimated EF 45 to 50%, global hypokinesis of the left ventricle with minor regional variations    #History of gastric AVMs, diverticulosis  #Anemia  -No chemical VTE prophylaxis secondary to SDH  -Maintain bilateral SCDs  -Hold home ASA    #Left knee abrasion  -PT/OT to evaluate and treat     #Runs of VT   Metoprolol   Keep K > 4 amd Mg > 2   Amiodarone per cardiology.     Another episode of increased headache this morning  for which repeat CT was done and showed increased size of hematoma to 6 mm.  It was reviewed by neurosurgery who recommended neurology consult and conservative management for now.  The patient was still on amiodarone drip, his heart rate is 60 and paced, his blood pressure dropped.  Amio was stopped, we will postpone p.o. amnio until the evening.  A bolus of fluids was ordered.  Neurology is considering video EEG.  Case was discussed with other consultants, and nursing staff.   The case was discussed with the POA over the phone, he stated that the patient told the emergency room physician that he wants to be resuscitated in case something happens.    Due to the high probability of life threatening clinical decompensation, the patient required critical care time evaluating and managing this patient.  Critical care time included obtaining a history, examining the patient, ordering and reviewing studies, discussing, developing, and implementing a management plan, evaluating the patient's response to treatment, and discussion with other care team providers. I saw and evaluated the patient myself. Critical care time was performed exclusive of billable procedures.     Overall, I have provided 60 minutes of critical care time, not including separately  billed procedures. Care includes review of laboratory data, review of radiographic studies, discussion of care with specialists or primary team.         Gulshan Hermosillo MD           [1] amiodarone, 200 mg, oral, BID  brimonidine, 1 drop, Both Eyes, BID  cholecalciferol, 50 mcg, oral, Daily  dexAMETHasone, 4 mg, oral, q6h PATRICIA   Followed by  [START ON 4/25/2025] dexAMETHasone, 3 mg, oral, q8h PATRICIA   Followed by  [START ON 4/27/2025] dexAMETHasone, 2 mg, oral, q8h PATRICIA   Followed by  [START ON 4/29/2025] dexAMETHasone, 2 mg, oral, Daily  donepezil, 5 mg, oral, Nightly  levETIRAcetam, 1,000 mg, oral, BID  metoprolol succinate XL, 25 mg, oral, BID  pantoprazole, 40 mg, oral,  BID  pravastatin, 80 mg, oral, Nightly  QUEtiapine, 50 mg, oral, Nightly  sodium chloride, 2 spray, Each Nostril, q12h  sodium chloride, 250 mL, intravenous, Once     [2]    [3] PRN medications: acetaminophen, docusate sodium, oxygen

## 2025-04-23 NOTE — PROGRESS NOTES
Occupational Therapy                 Therapy Communication Note    Patient Name: Wayne Burkitt  MRN: [id number]  Department: [facility]  Room: 2105/2105-A  Today's Date: 4/23/2025     Discipline: Occupational Therapy    OT Missed Visit: Yes     Missed Visit Reason: Missed Visit Reason: Patient placed on medical hold    Missed Time: Attempt    Comment: Nursing deferred tx this date as pt is not medically appropriate for participation. Will continue to follow and attempt at next opportunity as appropriate.

## 2025-04-23 NOTE — PROGRESS NOTES
Received a call from Naz Salgado from [facility] asking for updates on patient. Gave updates and asked if nurse from facility would be coming to do an assessment for return. She states that if needed it would be done closer to discharge. Will follow patient and keep Naz updated on patient's progress.

## 2025-04-23 NOTE — PROGRESS NOTES
Physical Therapy                 Therapy Communication Note    Patient Name: Wayne Burkitt  MRN: [id number]  Department: Psychiatric  Room: 2105/2105-A  Today's Date: 4/23/2025     Discipline: Physical Therapy          Missed Visit Reason: Missed Visit Reason: Patient placed on medical hold    Missed Time: Attempt    Comment: Patient on hold per nursing  secondary to medical issues

## 2025-04-23 NOTE — PROGRESS NOTES
"  Patient: Wayne Burkitt  : 1945  MRN: [id number]    Today's Date: 25  Hospital Day: #2    Primary Cardiologist:    ASSESSMENT/PLAN:  Ventricular tachycardia: None seen with IV amiodarone.  Okay to switch to oral.  Would treat with 200 mg twice daily for a month and then cut back to 200 mg daily.  Hypertension: Rather dramatic response to IV enalapril.  Blood pressure is well-controlled now.  Continue metoprolol.  Can add low-dose angiotensin receptor blocker if needed in the future.      SUBJECTIVE:  Patient without any  cardiac complaints.  More confused overnight.  Still complain of headache.    OBJECTIVE:  VITALS: /52   Pulse 60   Temp 35.8 °C (96.4 °F) (Temporal)   Resp 19   Ht 1.803 m (5' 10.98\")   Wt 78.3 kg (172 lb 9.9 oz)   SpO2 95%   BMI 24.09 kg/m²       Intake/Output Summary (Last 24 hours) at 2025 1020  Last data filed at 2025 0500  Gross per 24 hour   Intake --   Output 1900 ml   Net -1900 ml       Physical Exam  Vitals reviewed.   Constitutional:       Appearance: Normal appearance.   Eyes:      Pupils: Pupils are equal, round, and reactive to light.   Neck:      Vascular: No JVD.   Cardiovascular:      Rate and Rhythm: Normal rate and regular rhythm.      Pulses: Normal pulses.      Heart sounds: No murmur heard.     No gallop.   Pulmonary:      Effort: No respiratory distress.      Breath sounds: No wheezing or rales.   Abdominal:      General: Abdomen is flat. There is no distension.      Palpations: Abdomen is soft.   Musculoskeletal:         General: No swelling.      Right lower leg: No edema.      Left lower leg: No edema.   Neurological:      General: No focal deficit present.      Mental Status: He is alert.   Psychiatric:         Mood and Affect: Mood normal.          Meds:Scheduled medications  Scheduled Medications[1]  Continuous medications  Continuous Medications[2]  PRN medications  PRN Medications[3]    Infusions:Continuous " Medications[4]    DIAGNOSTIC DATA:  Results for orders placed or performed during the hospital encounter of 04/20/25 (from the past 24 hours)   Renal Function Panel   Result Value Ref Range    Glucose 108 (H) 74 - 99 mg/dL    Sodium 138 136 - 145 mmol/L    Potassium 4.3 3.5 - 5.3 mmol/L    Chloride 105 98 - 107 mmol/L    Bicarbonate 24 21 - 32 mmol/L    Anion Gap 13 10 - 20 mmol/L    Urea Nitrogen 21 6 - 23 mg/dL    Creatinine 0.93 0.50 - 1.30 mg/dL    eGFR 85 >60 mL/min/1.73m*2    Calcium 9.2 8.6 - 10.3 mg/dL    Phosphorus 3.3 2.5 - 4.9 mg/dL    Albumin 3.7 3.4 - 5.0 g/dL   Magnesium   Result Value Ref Range    Magnesium 1.90 1.60 - 2.40 mg/dL   CBC   Result Value Ref Range    WBC 9.0 4.4 - 11.3 x10*3/uL    nRBC 0.0 0.0 - 0.0 /100 WBCs    RBC 3.46 (L) 4.50 - 5.90 x10*6/uL    Hemoglobin 9.3 (L) 13.5 - 17.5 g/dL    Hematocrit 30.2 (L) 41.0 - 52.0 %    MCV 87 80 - 100 fL    MCH 26.9 26.0 - 34.0 pg    MCHC 30.8 (L) 32.0 - 36.0 g/dL    RDW 23.1 (H) 11.5 - 14.5 %    Platelets 323 150 - 450 x10*3/uL        Results for orders placed during the hospital encounter of 04/01/25    Transthoracic Echo (TTE) Limited    [facility]  [id number] [id number]  Tel [phone] Fax [phone]    TRANSTHORACIC ECHOCARDIOGRAM REPORT    Patient Name:       WAYNE BURKITT Reading Physician:    [id number] Kavita Varner MD  Study Date:         4/2/2025             Ordering Provider:    [id number] LILIANE ANDERSON  MRN/PID:            [id number]             Fellow:  Accession#:         [id number]         Nurse:  Date of Birth/Age:  1945 / 79      Sonographer:          Amy chong  Gender Assigned at                      Additional Staff:  Birth:  Height:             180.34 cm            Admit Date:  Weight:             78.93 kg             Admission Status:     Inpatient -  Priority  discharge  BSA / BMI:          1.99 m2 / 24.27      Department Location:  [facility] CCU SD  kg/m2  Blood Pressure: 165  /73 mmHg    Study Type:    TRANSTHORACIC ECHO (TTE) LIMITED  Diagnosis/ICD: Chest pain, unspecified-R07.9  Indication:    Chest Pain  CPT Codes:     Echo Limited-50139; Doppler Limited-12589; Color Doppler-67521  Study Detail: The following Echo studies were performed: 2D, M-Mode, color flow  and Doppler.      PHYSICIAN INTERPRETATION:  Left Ventricle: Left ventricular ejection fraction is mildly decreased, by visual estimate at 45-50%. There is global hypokinesis of the left ventricle with minor regional variations. The left ventricular cavity size is normal. There is no evidence of left ventricular hypertrophy. Spectral Doppler shows a Grade II (pseudonormal pattern) of left ventricular diastolic filling with an elevated left atrial pressure. The intraventricular septum appears intact without evidence of shunting or a ventricular septal defect.  Left Atrium: The left atrial size is normal.  Right Ventricle: The right ventricle is normal in size. There is normal right ventricular global systolic function. A device is visualized in the right ventricle.  Right Atrium: The right atrial size is normal.  Aortic Valve: The aortic valve was not assessed. There is no evidence of aortic valve regurgitation.  Mitral Valve: The mitral valve is mild to moderately thickened. There is no evidence of mitral valve stenosis. There is mild mitral annular calcification. There is no evidence of mitral valve regurgitation.  Tricuspid Valve: The tricuspid valve is structurally normal. There is no evidence of tricuspid valve stenosis. There is mild tricuspid regurgitation. The Doppler estimated RVSP is within normal limits at 32.4 mmHg.  Pulmonic Valve: The pulmonic valve is not well visualized. The pulmonic valve regurgitation was not assessed.  Pericardium: Trivial pericardial effusion.  Aorta: The aortic root was not assessed.  Systemic Veins: The inferior vena cava appears normal in size, with IVC inspiratory collapse greater than  50%.  In comparison to the previous echocardiogram(s): Compared with study dated 12/5/2024,. LVEF has mildly declined.      CONCLUSIONS:  1. Left ventricular ejection fraction is mildly decreased, by visual estimate at 45-50%.  2. There is global hypokinesis of the left ventricle with minor regional variations.  3. Spectral Doppler shows a Grade II (pseudonormal pattern) of left ventricular diastolic filling with an elevated left atrial pressure.  4. Intact intraventricular septum without shunting or a ventricular septal defect.  5. There is no evidence of left ventricular hypertrophy.  6. There is normal right ventricular global systolic function.  7. There is No tricuspid stenosis.  8. Right ventricular systolic pressure is within normal limits.  9. Technically difficult study due to poor acoustic images - no contrasted images.    QUANTITATIVE DATA SUMMARY:    2D MEASUREMENTS:             Normal Ranges:  LAs:             3.40 cm     (2.7-4.0cm)  IVSd:            0.80 cm     (0.6-1.1cm)  LVPWd:           0.90 cm     (0.6-1.1cm)  LVIDd:           5.00 cm     (3.9-5.9cm)  LVIDs:           3.90 cm  LV Mass Index:   73.9 g/m2  LVEDV Index:     40.01 ml/m2  LV % FS          22.0 %      LEFT ATRIUM:                  Normal Ranges:  LA Vol A4C:        65.1 ml    (22+/-6mL/m2)  LA Vol A2C:        60.3 ml  LA Vol BP:         65.6 ml  LA Vol Index A4C:  32.7ml/m2  LA Vol Index A2C:  30.3 ml/m2  LA Vol Index BP:   33.0 ml/m2  LA Area A4C:       21.0 cm2  LA Area A2C:       19.3 cm2  LA Major Axis A4C: 5.8 cm  LA Major Axis A2C: 5.2 cm  LA Volume Index:   30.0 ml/m2  LA Vol A4C:        57.5 ml  LA Vol A2C:        56.9 ml  LA Vol Index BSA:  28.8 ml/m2      RIGHT ATRIUM:                 Normal Ranges:  RA Vol A4C:        27.4 ml    (8.3-19.5ml)  RA Vol Index A4C:  13.8 ml/m2  RA Area A4C:       12.7 cm2  RA Major Axis A4C: 5.0 cm      LV SYSTOLIC FUNCTION:  Normal Ranges:  EF-A4C View:    45 % (>=55%)  EF-A2C View:    43  %  EF-Biplane:     46 %  EF-Visual:      48 %  LV EF Reported: 48 %      LV DIASTOLIC FUNCTION:           Normal Ranges:  MV Peak E:             0.87 m/s  (0.7-1.2 m/s)  MV Peak A:             0.49 m/s  (0.42-0.7 m/s)  E/A Ratio:             1.76      (1.0-2.2)  MV e'                  0.061 m/s (>8.0)  MV lateral e'          0.07 m/s  MV medial e'           0.05 m/s  E/e' Ratio:            14.27     (<8.0)      MITRAL VALVE:          Normal Ranges:  MV DT:        207 msec (150-240msec)      RIGHT VENTRICLE:  TAPSE: 16.1 mm  RV s'  0.08 m/s      TRICUSPID VALVE/RVSP:          Normal Ranges:  Peak TR Velocity:     2.71 m/s  Est. RA Pressure:     3 mmHg  RV Syst Pressure:     32 mmHg  (< 30mmHg)  IVC Diam:             1.50 cm      [id number] Kavita Varner MD  Electronically signed on 4/2/2025 at 6:12:47 PM        ** Final **       No results found for this or any previous visit from the past 365 days.        Ashkan Gaspar MD                 [1] amiodarone, 200 mg, oral, BID  brimonidine, 1 drop, Both Eyes, BID  cholecalciferol, 50 mcg, oral, Daily  donepezil, 5 mg, oral, Nightly  levETIRAcetam, 1,000 mg, oral, BID  metoprolol succinate XL, 25 mg, oral, BID  pantoprazole, 40 mg, oral, BID  pravastatin, 80 mg, oral, Nightly  QUEtiapine, 50 mg, oral, Nightly  sodium chloride, 2 spray, Each Nostril, q12h    [2]    [3] PRN medications: acetaminophen, docusate sodium, oxygen  [4]

## 2025-04-23 NOTE — SIGNIFICANT EVENT
Preliminary/Baseline EEG Report:    - This vEEG is indicative of a mild to moderate diffuse encephalopathy. No epileptiform activity is recorded.      This EEG was read until 16:12 on 04/23/25 .       The final impression will be available tomorrow under Chart Review in the Media tab.     Drew Shaw DO  Adult Epilepsy Fellow  [facility] [place]

## 2025-04-23 NOTE — SIGNIFICANT EVENT
Nightshift NP ordered repeat head CT for intractable headache.    Impression read:  Known left subdural hematoma has minimally increased in thickness  from 5 mm to 6 mm compared to 35 hours prior. There is resultant new  1 mm rightward midline shift.      Otherwise no new sites of intracranial hemorrhage identified.    Assessment/Plan    Up to the floor to see the patient. Nursing notes that his orientation waxes and wanes. At the time of this assessment he is alert and oriented at x4.  C/o Left parietal headache, rated at 3/10 post tylenol. Denies any changes in vision. Denies any nausea or vomiting. No neuro deficits noted. See NIH below. Neurosurgery has been re consulted.  Will cont to hold any blood thinners or antiplatelet medications. No NSAIDs for headache. Repeat CT ordered for 6 hours after this morning image. Will change  back to Q2 neuro checks.     1a. LOC   0 = Alert; keenly responsive.      1B. LOC QUESTIONS  0 = Answers both questions correctly.      1C. LOC COMMANDS  0 = Performs both tasks correctly.    2. BEST GAZE  0 = Normal.      3. VISUAL  0 = No visual loss.      4. FACIAL PALSY  0 = Normal symmetrical movements.      5. MOTOR ARM  5a. Left arm 0 = No drift; limb holds 90 (or 45) degrees for full 10 seconds.  5b. Right arm 0 = No drift; limb holds 90 (or 45) degrees for full 10 seconds.    6. MOTOR LEG  6a. Left leg -0 = No drift; leg holds 30-degree position for full 5 seconds.  6b. Right leg- 1 = Drift; leg falls by the end of the 5-second period but does not hit bed.    7.  LIMB ATAXIA  0 = Absent.    8.  SENSORY  0 = Normal; no sensory loss.    9.  BEST LANGUAGE  0 = No aphasia; normal.      10. DYSARTHRIA  0 = Normal.      11.  EXTINCTION AND INATTENTION  0 = No abnormality.    NIHSS total POINTS = 1 Right leg drift with some weakness, Patient states this is not new and has been an issue since surgery.

## 2025-04-23 NOTE — DOCUMENTATION CLARIFICATION NOTE
"    PATIENT:               BURKITT, WAYNE  ACCT #:                  [id number]  MRN:                       [id number]  :                       1945  ADMIT DATE:       2025 11:24 AM  DISCH DATE:  RESPONDING PROVIDER #:        [id number]          PROVIDER RESPONSE TEXT:    Non-traumatic SDH    CDI QUERY TEXT:    Clarification        Instruction:    Based on your assessment of the patient and the clinical information, please provide the requested documentation by clicking on the appropriate radio button and enter any additional information if prompted.    Question: Is there a diagnosis indicative of the clinical information    When answering this query, please exercise your independent professional judgment. The fact that a question is being asked, does not imply that any particular answer is desired or expected.    The patient's clinical indicators include:  Clinical Information:  79 year old male presented with worsening headache.    Clinical Indicators:  2025  H&P:  \"presents with a progressively worsening headache localized to the left temporal region, ongoing for <about> 6 weeks but acutely intensified over the past 48 hours. He did have a fall 6 weeks ago but no head trauma or LOC was reported at that time....  Acute left-sided subdural hematoma without mass effect...  Headache likely 2/2 above  Epilepsy on Keppra  Moderate vascular dementia with hallucination  - Likely chronic subdural with acute on chronic component considering delayed onset.\"      Treatment:  - Neurology consult  - Admitted to ICU for close neuromonitoring  - every hour neurochecks x 24 hours  - Repeat CT head 6 hours after initial study ordered and pending/follow-up  - Tylenol as needed for headache  -  Avoid NSAID  -  Avoid deliriogenic medication    Risk Factors:  fall 6 weeks ago, home med:  aspirin 81 mg EC tablet  Options provided:  -- Traumatic SDH  -- Non-traumatic SDH  -- Unable to Determine  -- Other - I will add my own " diagnosis  -- Refer to Clinical Documentation Reviewer    Query created by: Karen Delgado on 4/23/2025 12:07 PM      Electronically signed by:  ROMEO FRENCH MD 4/23/2025 2:02 PM

## 2025-04-23 NOTE — CARE PLAN
The patient's goals for the shift include      The clinical goals for the shift include pt will maintain a b/p <160    Problem: Safety - Adult  Goal: Free from fall injury  Outcome: Progressing

## 2025-04-24 ENCOUNTER — APPOINTMENT (OUTPATIENT)
Dept: CARDIOLOGY | Facility: CLINIC | Age: 80
End: 2025-04-24
Payer: MEDICARE

## 2025-04-24 LAB
ALBUMIN SERPL BCP-MCNC: 4 G/DL (ref 3.4–5)
ANION GAP SERPL CALC-SCNC: 12 MMOL/L (ref 10–20)
BUN SERPL-MCNC: 29 MG/DL (ref 6–23)
CALCIUM SERPL-MCNC: 9.3 MG/DL (ref 8.6–10.3)
CHLORIDE SERPL-SCNC: 105 MMOL/L (ref 98–107)
CO2 SERPL-SCNC: 23 MMOL/L (ref 21–32)
CREAT SERPL-MCNC: 1.09 MG/DL (ref 0.5–1.3)
EGFRCR SERPLBLD CKD-EPI 2021: 70 ML/MIN/1.73M*2
ERYTHROCYTE [DISTWIDTH] IN BLOOD BY AUTOMATED COUNT: 22.5 % (ref 11.5–14.5)
GLUCOSE SERPL-MCNC: 152 MG/DL (ref 74–99)
HCT VFR BLD AUTO: 30.5 % (ref 41–52)
HGB BLD-MCNC: 9.6 G/DL (ref 13.5–17.5)
MAGNESIUM SERPL-MCNC: 1.94 MG/DL (ref 1.6–2.4)
MCH RBC QN AUTO: 27.1 PG (ref 26–34)
MCHC RBC AUTO-ENTMCNC: 31.5 G/DL (ref 32–36)
MCV RBC AUTO: 86 FL (ref 80–100)
NRBC BLD-RTO: 0 /100 WBCS (ref 0–0)
PHOSPHATE SERPL-MCNC: 4.2 MG/DL (ref 2.5–4.9)
PLATELET # BLD AUTO: 290 X10*3/UL (ref 150–450)
POTASSIUM SERPL-SCNC: 4.3 MMOL/L (ref 3.5–5.3)
RBC # BLD AUTO: 3.54 X10*6/UL (ref 4.5–5.9)
SODIUM SERPL-SCNC: 136 MMOL/L (ref 136–145)
WBC # BLD AUTO: 6.1 X10*3/UL (ref 4.4–11.3)

## 2025-04-24 PROCEDURE — 2500000002 HC RX 250 W HCPCS SELF ADMINISTERED DRUGS (ALT 637 FOR MEDICARE OP, ALT 636 FOR OP/ED): Performed by: INTERNAL MEDICINE

## 2025-04-24 PROCEDURE — 2500000004 HC RX 250 GENERAL PHARMACY W/ HCPCS (ALT 636 FOR OP/ED): Performed by: NURSE PRACTITIONER

## 2025-04-24 PROCEDURE — 99233 SBSQ HOSP IP/OBS HIGH 50: CPT | Performed by: INTERNAL MEDICINE

## 2025-04-24 PROCEDURE — 95720 EEG PHY/QHP EA INCR W/VEEG: CPT | Performed by: STUDENT IN AN ORGANIZED HEALTH CARE EDUCATION/TRAINING PROGRAM

## 2025-04-24 PROCEDURE — 83735 ASSAY OF MAGNESIUM: CPT

## 2025-04-24 PROCEDURE — 85027 COMPLETE CBC AUTOMATED: CPT

## 2025-04-24 PROCEDURE — 80069 RENAL FUNCTION PANEL: CPT

## 2025-04-24 PROCEDURE — 95700 EEG CONT REC W/VID EEG TECH: CPT

## 2025-04-24 PROCEDURE — 2500000001 HC RX 250 WO HCPCS SELF ADMINISTERED DRUGS (ALT 637 FOR MEDICARE OP): Performed by: INTERNAL MEDICINE

## 2025-04-24 PROCEDURE — 2060000001 HC INTERMEDIATE ICU ROOM DAILY

## 2025-04-24 PROCEDURE — 99232 SBSQ HOSP IP/OBS MODERATE 35: CPT | Performed by: NURSE PRACTITIONER

## 2025-04-24 PROCEDURE — 36415 COLL VENOUS BLD VENIPUNCTURE: CPT

## 2025-04-24 PROCEDURE — 95714 VEEG EA 12-26 HR UNMNTR: CPT

## 2025-04-24 RX ORDER — AMIODARONE HYDROCHLORIDE 200 MG/1
200 TABLET ORAL 2 TIMES DAILY
Qty: 58 TABLET | Refills: 0 | Status: SHIPPED | OUTPATIENT
Start: 2025-04-24 | End: 2025-05-23

## 2025-04-24 RX ORDER — OLANZAPINE 10 MG/2ML
2.5 INJECTION, POWDER, FOR SOLUTION INTRAMUSCULAR ONCE AS NEEDED
Status: DISCONTINUED | OUTPATIENT
Start: 2025-04-24 | End: 2025-04-26 | Stop reason: HOSPADM

## 2025-04-24 RX ORDER — AMIODARONE HYDROCHLORIDE 200 MG/1
200 TABLET ORAL DAILY
Qty: 30 TABLET | Refills: 0 | Status: SHIPPED | OUTPATIENT
Start: 2025-05-24 | End: 2025-06-23

## 2025-04-24 RX ORDER — OLANZAPINE 10 MG/2ML
2.5 INJECTION, POWDER, FOR SOLUTION INTRAMUSCULAR ONCE
Status: DISCONTINUED | OUTPATIENT
Start: 2025-04-24 | End: 2025-04-24

## 2025-04-24 RX ORDER — OXYMETAZOLINE HCL 0.05 %
2 SPRAY, NON-AEROSOL (ML) NASAL EVERY 12 HOURS PRN
Status: DISCONTINUED | OUTPATIENT
Start: 2025-04-24 | End: 2025-04-26 | Stop reason: HOSPADM

## 2025-04-24 RX ADMIN — Medication 50 MCG: at 09:54

## 2025-04-24 RX ADMIN — SALINE NASAL SPRAY 2 SPRAY: 1.5 SOLUTION NASAL at 09:54

## 2025-04-24 RX ADMIN — DEXAMETHASONE 4 MG: 4 TABLET ORAL at 18:52

## 2025-04-24 RX ADMIN — LEVETIRACETAM 1000 MG: 500 TABLET, FILM COATED ORAL at 21:27

## 2025-04-24 RX ADMIN — DOCUSATE SODIUM 100 MG: 100 CAPSULE, LIQUID FILLED ORAL at 21:28

## 2025-04-24 RX ADMIN — AMIODARONE HYDROCHLORIDE 200 MG: 200 TABLET ORAL at 21:26

## 2025-04-24 RX ADMIN — QUETIAPINE FUMARATE 50 MG: 50 TABLET ORAL at 21:30

## 2025-04-24 RX ADMIN — PANTOPRAZOLE SODIUM 40 MG: 40 TABLET, DELAYED RELEASE ORAL at 21:28

## 2025-04-24 RX ADMIN — ACETAMINOPHEN 650 MG: 325 TABLET, FILM COATED ORAL at 16:53

## 2025-04-24 RX ADMIN — METOPROLOL SUCCINATE 25 MG: 25 TABLET, EXTENDED RELEASE ORAL at 00:32

## 2025-04-24 RX ADMIN — DEXAMETHASONE 4 MG: 4 TABLET ORAL at 00:32

## 2025-04-24 RX ADMIN — LEVETIRACETAM 1000 MG: 500 TABLET, FILM COATED ORAL at 09:54

## 2025-04-24 RX ADMIN — PRAVASTATIN SODIUM 80 MG: 20 TABLET ORAL at 21:26

## 2025-04-24 RX ADMIN — DONEPEZIL HYDROCHLORIDE 5 MG: 5 TABLET ORAL at 21:27

## 2025-04-24 RX ADMIN — BRIMONIDINE TARTRATE 1 DROP: 2 SOLUTION/ DROPS OPHTHALMIC at 09:54

## 2025-04-24 RX ADMIN — SALINE NASAL SPRAY 2 SPRAY: 1.5 SOLUTION NASAL at 21:26

## 2025-04-24 RX ADMIN — BRIMONIDINE TARTRATE 1 DROP: 2 SOLUTION/ DROPS OPHTHALMIC at 21:25

## 2025-04-24 RX ADMIN — PANTOPRAZOLE SODIUM 40 MG: 40 TABLET, DELAYED RELEASE ORAL at 09:54

## 2025-04-24 RX ADMIN — AMIODARONE HYDROCHLORIDE 200 MG: 200 TABLET ORAL at 09:54

## 2025-04-24 RX ADMIN — DEXAMETHASONE 4 MG: 4 TABLET ORAL at 14:40

## 2025-04-24 ASSESSMENT — COGNITIVE AND FUNCTIONAL STATUS - GENERAL
MOVING FROM LYING ON BACK TO SITTING ON SIDE OF FLAT BED WITH BEDRAILS: A LITTLE
EATING MEALS: A LITTLE
PERSONAL GROOMING: A LITTLE
MOBILITY SCORE: 19
TOILETING: A LITTLE
DRESSING REGULAR UPPER BODY CLOTHING: A LITTLE
TURNING FROM BACK TO SIDE WHILE IN FLAT BAD: A LITTLE
DAILY ACTIVITIY SCORE: 18
STANDING UP FROM CHAIR USING ARMS: A LITTLE
HELP NEEDED FOR BATHING: A LITTLE
DRESSING REGULAR LOWER BODY CLOTHING: A LITTLE
WALKING IN HOSPITAL ROOM: A LITTLE
MOVING TO AND FROM BED TO CHAIR: A LITTLE

## 2025-04-24 ASSESSMENT — PAIN SCALES - GENERAL
PAINLEVEL_OUTOF10: 0 - NO PAIN
PAINLEVEL_OUTOF10: 1
PAINLEVEL_OUTOF10: 3
PAINLEVEL_OUTOF10: 0 - NO PAIN

## 2025-04-24 ASSESSMENT — PAIN DESCRIPTION - DESCRIPTORS
DESCRIPTORS: ACHING

## 2025-04-24 ASSESSMENT — PAIN DESCRIPTION - LOCATION: LOCATION: HEAD

## 2025-04-24 ASSESSMENT — PAIN - FUNCTIONAL ASSESSMENT
PAIN_FUNCTIONAL_ASSESSMENT: 0-10

## 2025-04-24 NOTE — CARE PLAN
The patient's goals for the shift include  get some sleep    The clinical goals for the shift include the pt will be hds, have no seizures;  ha pain will be controlled within acceptable parameters and there will be no neuro changes in the pts presentation throughout this shift

## 2025-04-24 NOTE — NURSING NOTE
THE PT REMAINS ON CONTINUOUS  EEG VIDEO MONITORING. NO SEIZURE ACTIVITY OBSERVED. THE PT IS NEURO INTACT. ORIENTED X4. PLEASANT, CONVERSATIONAL AND APPROPRIATE TO CONTACT. . DENIES HAVING ANY HALLUCINATIONS, DENIES HAVING ANY HEADACHE PAIN. BOTH GROSS AND FINE MOTOR ABILITIES ARE INTACT. OOB TO STAND TO VOID , GAIT IS STEADY; COORDINATION IS INTACT. VOICES RT HIP DISCOMFORT TO THE RT LEG TO LIFT  BUT IS W/O DRIFTING X4 OR ATAXIA BY EXAM. . HIS FACE IS SYMMETRICAL. VISION INTACT. NO BLURRING. EOMS INTACT. TONGUE IS MIDLINE. JENAE. NO FACIAL NUMBNESS. THE PTS EKG IS AV PACED. BP WNL. TEMP AFEBRILE. THE PT DOES HAVE SOME FINE HAND TREMORS AND VOICED N/T IN BILAT FEET. BS ARE CTA; ON RA. APPEARS DIMINISHED IN BLL. T THE PT DENIES HAVING ANY CP, SOB, DIZZINESS OR NAUSEA. HE PT IS VOIDING CLEAR YELLOW URINE; IS CONTINENT VIA THE URINAL . ABLE TO VOICE NEEDS EASILY. TAKING PO WELL. NO CHOKING OBSERVED. HOB ELLEVATED 30 DEGREES. NO DISTRESS OBSERVED.   0000- NEURO THE PT REMAINS INTACT. JUST A LITTLE BIT DELAYED IN WAKING UP WITH A CLEAR HEAD. ORIENTED TO SELF, MONTH AND PLACE. YR IF CUED 20.. HE WAS ABLE TO SAY 2025. OTHERWISE THE PT STATED HE HAS A SL HEAD ACHE. NOT A PAIN. VSS. NO VENT. ECTOPY OBSERVED. BP WNL. PATRICIA. DECADRON GIVEN. NO DISTRESS OBSERVED. REED X4 WELL. OOB TO VOID . PO FLUIDS ENCOURAGED.NO DISTRESS OBSERVED.  0200- CUED TO PLACE BY LISTING HOUSE, HOSPITAL OR SCHOOL- PT WAS ABLE TO IDENTIFY HOSPITAL. NO HALLUCINATIONS OR SEIZURE ACTIVITY OBSERVED. THE PT DENIES ANY HEAD DISCOMFORT.  0400-  THE PT WOKE UP AGITATED AND ANGRY. STATING WE WERE TRYING TO KILL HIM. REFUSING TO FOLLOW COMMANDS. WITHDRAWS X4 EXTREMITIES TO NOXIOUS STIMULI; DENIES HAVING A HEADACHE. REFUSING PO. SPEECH IS CLEAR; PUPILS APPEAR 2. UNABLE TO USE A LIGHT. THE PT SPONT REED X4. HAS BEEN WITHOUT AGITATION UP TO NOW. HAS KEPT HIS EEG LEADS ON AND MADE NO ATTEMPTS TO GET OOB. VOICING FATIGUE. STATED HE WANTED TO BE LEFT ALONE TO SLEEP.  THOUGHT HE WAS IN THE BASEMENT SOMEWHERE.  0550 S. DELONTE NOTIFIED OF THE PTS AGITATION. PSYCHOSIS.THREATENED TO KILL LAB IF THEY CAME BACK INTO HIS ROOM. REFUSED AM LABS; REFUSED PO DECADRON. CONTINUES TO REFUSE TO FOLLOW COMMANDS. UNABLE TO REASON WITH. THE PT IS  VERBALLY AGITATED. NO ATTEMPTS MADE TO PHYSICALLY HURT PEOPLE. NO ATTEMPTS MADE TO GET OOB.  THINKS WE ARE TRYING TO POSION HIM. HE IS QUIET WHEN LEFT ALONE.  0620- HOLDING THE ZYPREXIA FOR NOW AS LONG AS THE PT IS QUIET IN BED. PER S. DELONTE .  TO RECLARIFY GIVING ANY THE DECADRON WITH NEURO THIS AM. TRUNG MACKAY, DANIEL HUDSON, JANA CHRISTOPHER RN ,ALL ADDED TO THE STATCHAT CONVERSATION REGARDING POC.  0730- THE PT IS BACK TO NEURO BASELINE. ORIENTED X4. PLEASANT AND COOPERATIVE DEMEANOR. FOLLOWING COMMANDS. REMEMBERS THREATENING SOMEOINE AND APOLOGIZED FOR HIS BEHAVIOR.  NEURO NO DEFECITS OBSERVED. STRONG X4 EXT. OOB TO URINATE. MORE DIFFICULT THAN EARLIER TO INITIATE BUT ONCE DONE GOOD OUTPUTS NOTED. DENIES ANY BLURRED VISION. VOICED AN ACHE LT OCCIPITAL AREA BUT STATED IT WAS NOT A PAIN.  TRUNG HUDSON AND REDDY MACKAY NOTIFIED VIA STATCHAT. THE PT IS HUNGRY FOR BKFT AT THIS TIME. VSS.

## 2025-04-24 NOTE — CARE PLAN
The patient's goals for the shift include      The clinical goals for the shift include pox will remain 92% or greater t/o shift      Problem: Safety - Adult  Goal: Free from fall injury  Outcome: Progressing

## 2025-04-24 NOTE — PROGRESS NOTES
"Wayne Burkitt is a 79 y.o. male on day 3 of admission presenting with Subdural hematoma (Multi).      Subjective   Agitated with psychosis early this morning, see nurse's note.    At this time, his friends are at bedside and feel patient is acting normal. He is able to answer questions appropriately.        Objective     Last Recorded Vitals  Blood pressure 132/75, pulse 60, temperature 36.2 °C (97.2 °F), resp. rate 16, height 1.803 m (5' 10.98\"), weight 75 kg (165 lb 5.5 oz), SpO2 97%.    Physical Exam  Neurological Exam  Mental Status  Awake, alert and oriented to person, place and time. Speech clear. Follows commands. Naming and repetition intact.     Cranial Nerves  CN II: Visual fields full to confrontation.  CN III, IV, VI: Extraocular movements intact bilaterally. PERRL  CN V: Facial sensation is normal.  CN VII: Full and symmetric facial movement.  CN IX, X: Palate elevates symmetrically  CN XI: Shoulder shrug strength is normal.  CN XII: Tongue midline without atrophy or fasciculations.     Motor   Normal muscle tone. Strength is 5/5 throughout all four extremities.     Sensory  Sensation is intact to light touch in all four extremities.     Coordination     Finger-to-nose intact    Relevant Results  Scheduled medications  Scheduled Medications[1]  Continuous medications  Continuous Medications[2]  PRN medications  PRN Medications[3]  Results for orders placed or performed during the hospital encounter of 04/20/25 (from the past 24 hours)   Renal Function Panel   Result Value Ref Range    Glucose 152 (H) 74 - 99 mg/dL    Sodium 136 136 - 145 mmol/L    Potassium 4.3 3.5 - 5.3 mmol/L    Chloride 105 98 - 107 mmol/L    Bicarbonate 23 21 - 32 mmol/L    Anion Gap 12 10 - 20 mmol/L    Urea Nitrogen 29 (H) 6 - 23 mg/dL    Creatinine 1.09 0.50 - 1.30 mg/dL    eGFR 70 >60 mL/min/1.73m*2    Calcium 9.3 8.6 - 10.3 mg/dL    Phosphorus 4.2 2.5 - 4.9 mg/dL    Albumin 4.0 3.4 - 5.0 g/dL   Magnesium   Result Value Ref " Range    Magnesium 1.94 1.60 - 2.40 mg/dL   CBC   Result Value Ref Range    WBC 6.1 4.4 - 11.3 x10*3/uL    nRBC 0.0 0.0 - 0.0 /100 WBCs    RBC 3.54 (L) 4.50 - 5.90 x10*6/uL    Hemoglobin 9.6 (L) 13.5 - 17.5 g/dL    Hematocrit 30.5 (L) 41.0 - 52.0 %    MCV 86 80 - 100 fL    MCH 27.1 26.0 - 34.0 pg    MCHC 31.5 (L) 32.0 - 36.0 g/dL    RDW 22.5 (H) 11.5 - 14.5 %    Platelets 290 150 - 450 x10*3/uL     CT head wo IV contrast  Result Date: 4/23/2025    There has been no significant interval change when compared with the prior study dated 04/23/2025 as described above.   MACRO: None.   Signed by: Marc Everett 4/23/2025 2:05 PM Dictation workstation:   [id number]    CT head wo IV contrast  Result Date: 4/23/2025    Known left subdural hematoma has minimally increased in thickness from 5 mm to 6 mm compared to 35 hours prior. There is resultant new 1 mm rightward midline shift.   Otherwise no new sites of intracranial hemorrhage identified.     Signed by: Minh Felipe 4/23/2025 8:50 AM Dictation workstation:   [id number]    CT head wo IV contrast  Result Date: 4/21/2025    1.  Mixed attenuation, predominantly hypodense subdural collection overlying the left cerebral hemisphere is minimally increased in size, now measuring up to 5 mm compared to 4 mm on previous exam. Foci of hyperdense attenuation within the subdural collection, likely representing more acute hemorrhage is are unchanged to prior study. There is unchanged minimal mass effect on the adjacent brain parenchyma without associated midline shift. 2. No evidence of new intracranial abnormality in the interim since prior exam.   MACRO: None   Signed by: Armani Person 4/21/2025 11:18 PM Dictation workstation:   [id number]    CT head wo IV contrast  Result Date: 4/21/2025  Stable appearance of previously described left subdural hematoma, other previously described areas of subdural hematoma abutting right occipital lobe and along the posterior falx have  diminished in appearance.     MACRO: None.   Signed by: Raúl Handy 4/21/2025 1:31 AM Dictation workstation:   [id number]    CT head wo IV contrast  Addendum Date: 4/21/2025  Interpreted By:  Aubrey Washington, ADDENDUM: Aubrey Washington discussed the significance and urgency of this critical finding by telephone with Dr. Ballesteros on 4/21/2025 at 12:44 am.  (**-RCF-**) Findings:  See findings.     Signed by: Aubrey Washington 4/21/2025 12:44 AM   -------- ORIGINAL REPORT -------- Dictation workstation:   [id number]    1. Slight enlargement of small subdural hematoma overlying the right occipital lobe and posterior parietal lobe measuring 0.4 cm in thickness, previously 0.2 cm-0.3 cm in thickness.   2. Unchanged mixed density acute subdural hematoma overlying the left cerebral hemisphere measuring 0.4 cm in maximal thickness.   3. New acute subdural blood products along the posterior most false measuring 0.5 cm in maximal thickness.   MACRO: Aubrey Washington discussed the significance and urgency of this critical finding via [facility] with  BETTY KATHARINE on 4/21/2025 at 12:37 am.  (**-RCF-**) Findings:  See findings. Documentation of verbal communication will be made once established.   Signed by: Aubrey Washington 4/21/2025 12:37 AM Dictation workstation:   [id number]    CT head wo IV contrast  Result Date: 4/20/2025    Subdural hematoma is present on the left through the frontal and temporal region with a maximum thickness of 4 mm. There is no significant mass effect. There is no overlying skull fracture.   No intraparenchymal hemorrhage or evidence of stroke   MACRO: Denisse Carmen discussed the significance and urgency of this critical finding by telephone with  BETTY ALCANTAR on 4/20/2025 at 12:52 pm.  (**-RCF-**) Findings:  See findings.   Signed by: Denisse Carmen 4/20/2025 12:53 PM Dictation workstation:   [id number]    Assessment & Plan  Subdural hematoma (Multi)    V tach (Multi)    Transient episodes of  encephalopathy: nocturnal hallucinations and agitation, likely 2/2 to left-sided subdural hematoma. No evidence of focal seizures on video EEG.   Epilepsy- last seizure 15 years ago (petite and grand mal per patient)    Recommend:    Continue Keppra 1gm every 12 hours  Seizure precautions.   Every 2 hour neuro checks    Continue supportive care.    Consider psychiatry consult.    Case/plan discussed with Dr. Lacy.   No further needs from neurology; okay for transfer or discharge as per primary team. Please contact if condition changes for re-eval.        Evelyne Garcia, APRN-CNP         [1] amiodarone, 200 mg, oral, BID  brimonidine, 1 drop, Both Eyes, BID  cholecalciferol, 50 mcg, oral, Daily  dexAMETHasone, 4 mg, oral, q6h PATRICIA   Followed by  [START ON 4/25/2025] dexAMETHasone, 3 mg, oral, q8h PATRICIA   Followed by  [START ON 4/27/2025] dexAMETHasone, 2 mg, oral, q8h PATRICIA   Followed by  [START ON 4/29/2025] dexAMETHasone, 2 mg, oral, Daily  donepezil, 5 mg, oral, Nightly  levETIRAcetam, 1,000 mg, oral, BID  metoprolol succinate XL, 25 mg, oral, BID  pantoprazole, 40 mg, oral, BID  pravastatin, 80 mg, oral, Nightly  QUEtiapine, 50 mg, oral, Nightly  sodium chloride, 2 spray, Each Nostril, q12h  [2]    [3] PRN medications: acetaminophen, docusate sodium, OLANZapine, oxygen, oxymetazoline

## 2025-04-24 NOTE — PROGRESS NOTES
Occupational Therapy                 Therapy Communication Note    Patient Name: Wayne Burkitt  MRN: [id number]  Department: [facility] NEURO  Room: 2105/2105-A  Today's Date: 4/24/2025     Discipline: Occupational Therapy    OT Missed Visit: Yes     Missed Visit Reason: Missed Visit Reason: Patient placed on medical hold    Missed Time: Attempt    Comment: Pt remains on continuous vEEG at this time. Will continue to follow and attempt at next opportunity as appropriate.

## 2025-04-24 NOTE — PROGRESS NOTES
"  Patient: Wayne Burkitt  : 1945  MRN: [id number]    Today's Date: 25  Hospital Day: #3    Primary Cardiologist:    ASSESSMENT/PLAN:  Ventricular tachycardia: None seen on telemetry.  Continue oral amiodarone 200 mg twice daily for 1 month and then cut back to 20 mg daily.  Hypertension: Continue metoprolol.  Patient has numerous allergies.  There is no issues with of bradycardia given he has a pacemaker so perhaps the easiest thing to do is increase the metoprolol if his blood pressure rises further.  Alternatively could use hydralazine.  I am shocked hives would be listed as a allergy to amlodipine.  Have never seen that.  It may be reasonable to rechallenge him with that if needed or try to explore the actual accuracy of that allergy.  Intracranial hemorrhage: Stable CT.  Mental status seems to be fairly good right now.  Apparently had some psychotic features last night probably related to a combination of the hemorrhage and the Decadron.  Continue PPI prophylaxis with the Decadron.  Disp: Cardiology will sign off.  Please call with questions.      SUBJECTIVE:  Patient had some combative features last night but clinically is much improved this morning.  He recognized everybody and was conversing well.    OBJECTIVE:  VITALS: /79 (BP Location: Left arm, Patient Position: Lying)   Pulse 60   Temp 36.5 °C (97.7 °F) (Temporal)   Resp 11   Ht 1.803 m (5' 10.98\")   Wt 75 kg (165 lb 5.5 oz)   SpO2 94%   BMI 23.07 kg/m²       Intake/Output Summary (Last 24 hours) at 2025 0800  Last data filed at 2025 0200  Gross per 24 hour   Intake 580 ml   Output 500 ml   Net 80 ml       Physical Exam  Vitals reviewed.   Constitutional:       Appearance: Normal appearance.   Eyes:      Pupils: Pupils are equal, round, and reactive to light.   Neck:      Vascular: No JVD.   Cardiovascular:      Rate and Rhythm: Normal rate and regular rhythm.      Pulses: Normal pulses.      Heart sounds: No murmur " heard.     No gallop.   Pulmonary:      Effort: No respiratory distress.      Breath sounds: No wheezing or rales.   Abdominal:      General: Abdomen is flat. There is no distension.      Palpations: Abdomen is soft.   Musculoskeletal:         General: No swelling.      Right lower leg: No edema.      Left lower leg: No edema.   Neurological:      General: No focal deficit present.      Mental Status: He is alert.   Psychiatric:         Mood and Affect: Mood normal.          Meds:Scheduled medications  Scheduled Medications[1]  Continuous medications  Continuous Medications[2]  PRN medications  PRN Medications[3]    Infusions:Continuous Medications[4]    DIAGNOSTIC DATA:  No results found for this or any previous visit (from the past 24 hours).     Results for orders placed during the hospital encounter of 04/01/25    Transthoracic Echo (TTE) Limited    Narrative  [facility]  [id number] [address] [id number]  Tel [phone] Fax [phone]    TRANSTHORACIC ECHOCARDIOGRAM REPORT    Patient Name:       LA NENA GAMBINOSALLY Ivory Physician:    [id number] Kavita Varner MD  Study Date:         4/2/2025             Ordering Provider:    [id number] LILIANE ANDERSON  MRN/PID:            [id number]             Fellow:  Accession#:         [id number]         Nurse:  Date of Birth/Age:  1945 / 79      Sonographer:          Amy chong  Gender Assigned at  M                    Additional Staff:  Birth:  Height:             180.34 cm            Admit Date:  Weight:             78.93 kg             Admission Status:     Inpatient -  Priority  discharge  BSA / BMI:          1.99 m2 / 24.27      Department Location:  [facility] CCU SD  kg/m2  Blood Pressure: 165 /73 mmHg    Study Type:    TRANSTHORACIC ECHO (TTE) LIMITED  Diagnosis/ICD: Chest pain, unspecified-R07.9  Indication:    Chest Pain  CPT Codes:     Echo Limited-77173; Doppler Limited-11293; Color Doppler-41210  Study Detail: The following Echo studies  were performed: 2D, M-Mode, color flow  and Doppler.      PHYSICIAN INTERPRETATION:  Left Ventricle: Left ventricular ejection fraction is mildly decreased, by visual estimate at 45-50%. There is global hypokinesis of the left ventricle with minor regional variations. The left ventricular cavity size is normal. There is no evidence of left ventricular hypertrophy. Spectral Doppler shows a Grade II (pseudonormal pattern) of left ventricular diastolic filling with an elevated left atrial pressure. The intraventricular septum appears intact without evidence of shunting or a ventricular septal defect.  Left Atrium: The left atrial size is normal.  Right Ventricle: The right ventricle is normal in size. There is normal right ventricular global systolic function. A device is visualized in the right ventricle.  Right Atrium: The right atrial size is normal.  Aortic Valve: The aortic valve was not assessed. There is no evidence of aortic valve regurgitation.  Mitral Valve: The mitral valve is mild to moderately thickened. There is no evidence of mitral valve stenosis. There is mild mitral annular calcification. There is no evidence of mitral valve regurgitation.  Tricuspid Valve: The tricuspid valve is structurally normal. There is no evidence of tricuspid valve stenosis. There is mild tricuspid regurgitation. The Doppler estimated RVSP is within normal limits at 32.4 mmHg.  Pulmonic Valve: The pulmonic valve is not well visualized. The pulmonic valve regurgitation was not assessed.  Pericardium: Trivial pericardial effusion.  Aorta: The aortic root was not assessed.  Systemic Veins: The inferior vena cava appears normal in size, with IVC inspiratory collapse greater than 50%.  In comparison to the previous echocardiogram(s): Compared with study dated 12/5/2024,. LVEF has mildly declined.      CONCLUSIONS:  1. Left ventricular ejection fraction is mildly decreased, by visual estimate at 45-50%.  2. There is global  hypokinesis of the left ventricle with minor regional variations.  3. Spectral Doppler shows a Grade II (pseudonormal pattern) of left ventricular diastolic filling with an elevated left atrial pressure.  4. Intact intraventricular septum without shunting or a ventricular septal defect.  5. There is no evidence of left ventricular hypertrophy.  6. There is normal right ventricular global systolic function.  7. There is No tricuspid stenosis.  8. Right ventricular systolic pressure is within normal limits.  9. Technically difficult study due to poor acoustic images - no contrasted images.    QUANTITATIVE DATA SUMMARY:    2D MEASUREMENTS:             Normal Ranges:  LAs:             3.40 cm     (2.7-4.0cm)  IVSd:            0.80 cm     (0.6-1.1cm)  LVPWd:           0.90 cm     (0.6-1.1cm)  LVIDd:           5.00 cm     (3.9-5.9cm)  LVIDs:           3.90 cm  LV Mass Index:   73.9 g/m2  LVEDV Index:     40.01 ml/m2  LV % FS          22.0 %      LEFT ATRIUM:                  Normal Ranges:  LA Vol A4C:        65.1 ml    (22+/-6mL/m2)  LA Vol A2C:        60.3 ml  LA Vol BP:         65.6 ml  LA Vol Index A4C:  32.7ml/m2  LA Vol Index A2C:  30.3 ml/m2  LA Vol Index BP:   33.0 ml/m2  LA Area A4C:       21.0 cm2  LA Area A2C:       19.3 cm2  LA Major Axis A4C: 5.8 cm  LA Major Axis A2C: 5.2 cm  LA Volume Index:   30.0 ml/m2  LA Vol A4C:        57.5 ml  LA Vol A2C:        56.9 ml  LA Vol Index BSA:  28.8 ml/m2      RIGHT ATRIUM:                 Normal Ranges:  RA Vol A4C:        27.4 ml    (8.3-19.5ml)  RA Vol Index A4C:  13.8 ml/m2  RA Area A4C:       12.7 cm2  RA Major Axis A4C: 5.0 cm      LV SYSTOLIC FUNCTION:  Normal Ranges:  EF-A4C View:    45 % (>=55%)  EF-A2C View:    43 %  EF-Biplane:     46 %  EF-Visual:      48 %  LV EF Reported: 48 %      LV DIASTOLIC FUNCTION:           Normal Ranges:  MV Peak E:             0.87 m/s  (0.7-1.2 m/s)  MV Peak A:             0.49 m/s  (0.42-0.7 m/s)  E/A Ratio:             1.76       (1.0-2.2)  MV e'                  0.061 m/s (>8.0)  MV lateral e'          0.07 m/s  MV medial e'           0.05 m/s  E/e' Ratio:            14.27     (<8.0)      MITRAL VALVE:          Normal Ranges:  MV DT:        207 msec (150-240msec)      RIGHT VENTRICLE:  TAPSE: 16.1 mm  RV s'  0.08 m/s      TRICUSPID VALVE/RVSP:          Normal Ranges:  Peak TR Velocity:     2.71 m/s  Est. RA Pressure:     3 mmHg  RV Syst Pressure:     32 mmHg  (< 30mmHg)  IVC Diam:             1.50 cm      [id number] Kavita Varner MD  Electronically signed on 4/2/2025 at 6:12:47 PM        ** Final **       No results found for this or any previous visit from the past 365 days.        Ashkan Gaspar MD                 [1] amiodarone, 200 mg, oral, BID  brimonidine, 1 drop, Both Eyes, BID  cholecalciferol, 50 mcg, oral, Daily  dexAMETHasone, 4 mg, oral, q6h PATRICIA   Followed by  [START ON 4/25/2025] dexAMETHasone, 3 mg, oral, q8h PATRICIA   Followed by  [START ON 4/27/2025] dexAMETHasone, 2 mg, oral, q8h PATRICIA   Followed by  [START ON 4/29/2025] dexAMETHasone, 2 mg, oral, Daily  donepezil, 5 mg, oral, Nightly  levETIRAcetam, 1,000 mg, oral, BID  metoprolol succinate XL, 25 mg, oral, BID  pantoprazole, 40 mg, oral, BID  pravastatin, 80 mg, oral, Nightly  QUEtiapine, 50 mg, oral, Nightly  sodium chloride, 2 spray, Each Nostril, q12h    [2]    [3] PRN medications: acetaminophen, docusate sodium, OLANZapine, oxygen  [4]

## 2025-04-24 NOTE — PROGRESS NOTES
Physical Therapy                 Therapy Communication Note    Patient Name: Wayne Burkitt  MRN: [id number]  Department: [facility] NEURO  Room: 2105/2105-A  Today's Date: 4/24/2025     Discipline: Physical Therapy    PT Missed Visit: Yes     Missed Visit Reason: Missed Visit Reason: Patient placed on medical hold    Missed Time: Attempt    Comment: Patient on 24 hour EEG will continue to follow and see as it is approppriate

## 2025-04-24 NOTE — DOCUMENTATION CLARIFICATION NOTE
"    PATIENT:               BURKITT, WAYNE  ACCT #:                  [id number]  MRN:                       [id number]  :                       1945  ADMIT DATE:       2025 11:24 AM  DISCH DATE:  RESPONDING PROVIDER #:        [id number]          PROVIDER RESPONSE TEXT:    Brain compression non-traumatic    CDI QUERY TEXT:    Clarification        Instruction:    Based on your assessment of the patient and the clinical information, please provide the requested documentation by clicking on the appropriate radio button and enter any additional information if prompted.    Question: Please further clarify if there is a diagnosis related to the clinical information    When answering this query, please exercise your independent professional judgment. The fact that a question is being asked, does not imply that any particular answer is desired or expected.    The patient's clinical indicators include:  Clinical Information:  79 year old male presented with worsening headache.    Clinical Indicators:  2025 Neurology Consult:  \"Additional significant event on  for intractable headache without n/v or neurologic deficits. Per nursing staff, orientation waxes and wanes. Repeat CTH noting additional increase in SDH from 5mm to 6mm with new 1mm rightward midline shift...  Acute left subdural hematoma, increased in size with new rightward midline shift  Hx seizures, controlled on Keppra  -Per CTH on , subdural hematoma has minimally increased in thickness from 5mm to 6mm with new 1mm rightward midline shift.  -Per nursing staff, patient has had intermittent episodes of altered mental status and hypotension  -Recommend starting continuous video EEG to rule out seizure activity secondary to new SDH  -Continue Keppra 100mg BID  -Agree with Decadron per neurosurgery recommendations\"      Treatment:  - Neurology re-consult  - Neurosurgery consult  - CT of head  -   4mg IV Decadron x1  -  - 4mg p.o. Decadron " Q6hrs    Risk Factors:  Non-traumatic SDH  Options provided:  -- Brain compression non-traumatic  -- Brain compression traumatic  -- Midline shift or mass effect without brain compression  -- Other - I will add my own diagnosis  -- Refer to Clinical Documentation Reviewer    Query created by: Karen Delgado on 4/24/2025 12:42 PM      Electronically signed by:  ROMEO FRENCH MD 4/24/2025 12:57 PM

## 2025-04-24 NOTE — PROGRESS NOTES
Wayne Burkitt is a 79 y.o. male on day 3 of admission presenting with Subdural hematoma (Multi).      Subjective   Patient is feeling well, no N/V.        Objective          Vitals 24HR  Heart Rate:  [58-72]   Temp:  [35.9 °C (96.6 °F)-36.5 °C (97.7 °F)]   Resp:  [11-18]   BP: ()/(53-79)   Weight:  [75 kg (165 lb 5.5 oz)]   SpO2:  [93 %-97 %]     Intake/Output last 3 Shifts:    Intake/Output Summary (Last 24 hours) at 4/24/2025 1258  Last data filed at 4/24/2025 0730  Gross per 24 hour   Intake 700 ml   Output 800 ml   Net -100 ml       Physical Exam  GENERAL: No distress.   SKIN: No rashes or lesions.  EYES: PERRLA, EOMI  HEENT: Head: Normocephalic  Neck: supple and no adenopathy  LUNGS: Lungs clear to auscultation.   CARDIAC: Normal S1 and S2; no murmurs.   ABDOMEN: Soft, non-tender.   EXTREMITIES: No ulcers, Extremities normal.   NEURO: No focal deficit.     Relevant Results             Scheduled medications  Scheduled Medications[1]  Continuous medications  Continuous Medications[2]  PRN medications  PRN Medications[3]  Results for orders placed or performed during the hospital encounter of 04/20/25 (from the past 24 hours)   Renal Function Panel   Result Value Ref Range    Glucose 152 (H) 74 - 99 mg/dL    Sodium 136 136 - 145 mmol/L    Potassium 4.3 3.5 - 5.3 mmol/L    Chloride 105 98 - 107 mmol/L    Bicarbonate 23 21 - 32 mmol/L    Anion Gap 12 10 - 20 mmol/L    Urea Nitrogen 29 (H) 6 - 23 mg/dL    Creatinine 1.09 0.50 - 1.30 mg/dL    eGFR 70 >60 mL/min/1.73m*2    Calcium 9.3 8.6 - 10.3 mg/dL    Phosphorus 4.2 2.5 - 4.9 mg/dL    Albumin 4.0 3.4 - 5.0 g/dL   Magnesium   Result Value Ref Range    Magnesium 1.94 1.60 - 2.40 mg/dL   CBC   Result Value Ref Range    WBC 6.1 4.4 - 11.3 x10*3/uL    nRBC 0.0 0.0 - 0.0 /100 WBCs    RBC 3.54 (L) 4.50 - 5.90 x10*6/uL    Hemoglobin 9.6 (L) 13.5 - 17.5 g/dL    Hematocrit 30.5 (L) 41.0 - 52.0 %    MCV 86 80 - 100 fL    MCH 27.1 26.0 - 34.0 pg    MCHC 31.5 (L) 32.0 -  36.0 g/dL    RDW 22.5 (H) 11.5 - 14.5 %    Platelets 290 150 - 450 x10*3/uL       This patient currently has cardiac telemetry ordered; if you would like to modify or discontinue the telemetry order, click here to go to the orders activity to modify/discontinue the order.          Assessment & Plan  Subdural hematoma (Multi)    V tach (Multi)      79 male   PMHx of HTN, HLD, epilepsy on Keppra, moderate vascular dementia with hallucination.   Patient presented to ED with complaints of left-sided headache. CT head revealed subdural hematoma on the left through the frontal and temporal region with maximum thickness of 4 mm.  No significant mass effect, no overlying skull fracture, no intraparenchymal hemorrhage or evidence of stroke.  Neurosurgery was consulted by ED.       #Acute left-sided subdural hematoma without mass effect  #Headache secondary to SDH  #Epilepsy on Keppra  #Moderate vascular dementia with hallucinations  -Initial CT scan 4/20/25 1242: Subdural hematoma present on the left frontal and temporal region with a maximum thickness of 4 mm, no significant mass effect, no intraparenchymal hemorrhage or evidence of stroke  -6 hour stability CT scan 4/20/2025 1903 : Slight enlargement of small subdural hematoma overlying the right occipital lobe and posterior parietal lobe  -Repeat CT Scan 4/21/2025 0122: Stable appearance of previously described left subdural hematoma  -Continue home Keppra, Seroquel and donezepil    #History of CAD  #Hypertension  #Hyperlipidemia  -2D echo 4/2/2025: Estimated EF 45 to 50%, global hypokinesis of the left ventricle with minor regional variations    #History of gastric AVMs, diverticulosis  #Anemia  -No chemical VTE prophylaxis secondary to SDH  -Maintain bilateral SCDs  -Hold home ASA    #Left knee abrasion  -PT/OT to evaluate and treat     #Runs of VT   Metoprolol   Keep K > 4 amd Mg > 2   Amiodarone per cardiology.     Update 4/23  Another episode of increased headache  this morning for which repeat CT was done and showed increased size of hematoma to 6 mm.  It was reviewed by neurosurgery who recommended neurology consult and conservative management for now.  The patient was still on amiodarone drip, his heart rate is 60 and paced, his blood pressure dropped.  Amio was stopped, we will postpone p.o. amnio until the evening.  A bolus of fluids was ordered.  Neurology is considering video EEG.  Case was discussed with other consultants, and nursing staff.   The case was discussed with the POA over the phone, he stated that the patient told the emergency room physician that he wants to be resuscitated in case something happens.    Update 4/24  Video EEG, will finish around 4 pm.   A+O X 2-3 but confused   Much more alert today.  Denies any headache.  Heart rate is stable.  On oral amiodarone.      Gulshan Hermosillo MD           [1] amiodarone, 200 mg, oral, BID  brimonidine, 1 drop, Both Eyes, BID  cholecalciferol, 50 mcg, oral, Daily  dexAMETHasone, 4 mg, oral, q6h PATRICIA   Followed by  [START ON 4/25/2025] dexAMETHasone, 3 mg, oral, q8h PATRICIA   Followed by  [START ON 4/27/2025] dexAMETHasone, 2 mg, oral, q8h PATRICIA   Followed by  [START ON 4/29/2025] dexAMETHasone, 2 mg, oral, Daily  donepezil, 5 mg, oral, Nightly  levETIRAcetam, 1,000 mg, oral, BID  metoprolol succinate XL, 25 mg, oral, BID  pantoprazole, 40 mg, oral, BID  pravastatin, 80 mg, oral, Nightly  QUEtiapine, 50 mg, oral, Nightly  sodium chloride, 2 spray, Each Nostril, q12h     [2]    [3] PRN medications: acetaminophen, docusate sodium, OLANZapine, oxygen, oxymetazoline

## 2025-04-24 NOTE — CARE PLAN
Brief Neurosurgery Note  Recommend follow up CT head in 2 weeks. Order has been placed. Neurosurgery will sign off at this time.       Amy RASHID-[address] [place], [place] [address]    [address] [place] [address]  [address] [place] [id number]     Phone: [phone]  Fax: [phone]

## 2025-04-25 LAB
ALBUMIN SERPL BCP-MCNC: 3.7 G/DL (ref 3.4–5)
ANION GAP SERPL CALC-SCNC: 11 MMOL/L (ref 10–20)
BUN SERPL-MCNC: 32 MG/DL (ref 6–23)
CALCIUM SERPL-MCNC: 9.1 MG/DL (ref 8.6–10.3)
CHLORIDE SERPL-SCNC: 106 MMOL/L (ref 98–107)
CO2 SERPL-SCNC: 25 MMOL/L (ref 21–32)
CREAT SERPL-MCNC: 1.09 MG/DL (ref 0.5–1.3)
EGFRCR SERPLBLD CKD-EPI 2021: 70 ML/MIN/1.73M*2
ERYTHROCYTE [DISTWIDTH] IN BLOOD BY AUTOMATED COUNT: 22.8 % (ref 11.5–14.5)
GLUCOSE SERPL-MCNC: 137 MG/DL (ref 74–99)
HCT VFR BLD AUTO: 27.7 % (ref 41–52)
HCT VFR BLD AUTO: 28.8 % (ref 41–52)
HGB BLD-MCNC: 8.4 G/DL (ref 13.5–17.5)
HGB BLD-MCNC: 9.1 G/DL (ref 13.5–17.5)
MAGNESIUM SERPL-MCNC: 2.09 MG/DL (ref 1.6–2.4)
MCH RBC QN AUTO: 26.7 PG (ref 26–34)
MCHC RBC AUTO-ENTMCNC: 30.3 G/DL (ref 32–36)
MCV RBC AUTO: 88 FL (ref 80–100)
NRBC BLD-RTO: 0 /100 WBCS (ref 0–0)
PHOSPHATE SERPL-MCNC: 3.3 MG/DL (ref 2.5–4.9)
PLATELET # BLD AUTO: 284 X10*3/UL (ref 150–450)
POTASSIUM SERPL-SCNC: 4.4 MMOL/L (ref 3.5–5.3)
RBC # BLD AUTO: 3.15 X10*6/UL (ref 4.5–5.9)
SODIUM SERPL-SCNC: 138 MMOL/L (ref 136–145)
WBC # BLD AUTO: 7.2 X10*3/UL (ref 4.4–11.3)

## 2025-04-25 PROCEDURE — 36415 COLL VENOUS BLD VENIPUNCTURE: CPT | Performed by: NURSE PRACTITIONER

## 2025-04-25 PROCEDURE — 2500000001 HC RX 250 WO HCPCS SELF ADMINISTERED DRUGS (ALT 637 FOR MEDICARE OP): Performed by: INTERNAL MEDICINE

## 2025-04-25 PROCEDURE — 97110 THERAPEUTIC EXERCISES: CPT | Mod: GP,CQ

## 2025-04-25 PROCEDURE — 85014 HEMATOCRIT: CPT | Performed by: NURSE PRACTITIONER

## 2025-04-25 PROCEDURE — 97535 SELF CARE MNGMENT TRAINING: CPT | Mod: GO,CO

## 2025-04-25 PROCEDURE — 85027 COMPLETE CBC AUTOMATED: CPT

## 2025-04-25 PROCEDURE — 2500000004 HC RX 250 GENERAL PHARMACY W/ HCPCS (ALT 636 FOR OP/ED): Performed by: NURSE PRACTITIONER

## 2025-04-25 PROCEDURE — 2500000002 HC RX 250 W HCPCS SELF ADMINISTERED DRUGS (ALT 637 FOR MEDICARE OP, ALT 636 FOR OP/ED): Performed by: INTERNAL MEDICINE

## 2025-04-25 PROCEDURE — 80069 RENAL FUNCTION PANEL: CPT

## 2025-04-25 PROCEDURE — 1200000002 HC GENERAL ROOM WITH TELEMETRY DAILY

## 2025-04-25 PROCEDURE — 97530 THERAPEUTIC ACTIVITIES: CPT | Mod: GO,CO

## 2025-04-25 PROCEDURE — 97116 GAIT TRAINING THERAPY: CPT | Mod: GP,CQ

## 2025-04-25 PROCEDURE — 83735 ASSAY OF MAGNESIUM: CPT

## 2025-04-25 RX ORDER — METOPROLOL SUCCINATE 25 MG/1
25 TABLET, EXTENDED RELEASE ORAL 2 TIMES DAILY
Start: 2025-04-25

## 2025-04-25 RX ORDER — ASPIRIN 81 MG/1
81 TABLET ORAL DAILY
Start: 2025-05-05

## 2025-04-25 RX ORDER — DEXAMETHASONE 2 MG/1
TABLET ORAL
Qty: 25 TABLET | Refills: 0 | Status: SHIPPED | OUTPATIENT
Start: 2025-04-25 | End: 2025-05-02

## 2025-04-25 RX ADMIN — METOPROLOL SUCCINATE 25 MG: 25 TABLET, EXTENDED RELEASE ORAL at 10:13

## 2025-04-25 RX ADMIN — PANTOPRAZOLE SODIUM 40 MG: 40 TABLET, DELAYED RELEASE ORAL at 21:19

## 2025-04-25 RX ADMIN — DEXAMETHASONE 4 MG: 4 TABLET ORAL at 00:29

## 2025-04-25 RX ADMIN — DEXAMETHASONE 3 MG: 6 TABLET ORAL at 14:00

## 2025-04-25 RX ADMIN — AMIODARONE HYDROCHLORIDE 200 MG: 200 TABLET ORAL at 10:13

## 2025-04-25 RX ADMIN — LEVETIRACETAM 1000 MG: 500 TABLET, FILM COATED ORAL at 10:13

## 2025-04-25 RX ADMIN — PANTOPRAZOLE SODIUM 40 MG: 40 TABLET, DELAYED RELEASE ORAL at 10:13

## 2025-04-25 RX ADMIN — QUETIAPINE FUMARATE 50 MG: 50 TABLET ORAL at 21:27

## 2025-04-25 RX ADMIN — Medication 50 MCG: at 10:13

## 2025-04-25 RX ADMIN — BRIMONIDINE TARTRATE 1 DROP: 2 SOLUTION/ DROPS OPHTHALMIC at 21:25

## 2025-04-25 RX ADMIN — BRIMONIDINE TARTRATE 1 DROP: 2 SOLUTION/ DROPS OPHTHALMIC at 10:15

## 2025-04-25 RX ADMIN — SALINE NASAL SPRAY 2 SPRAY: 1.5 SOLUTION NASAL at 10:16

## 2025-04-25 RX ADMIN — LEVETIRACETAM 1000 MG: 500 TABLET, FILM COATED ORAL at 21:19

## 2025-04-25 RX ADMIN — SALINE NASAL SPRAY 2 SPRAY: 1.5 SOLUTION NASAL at 21:25

## 2025-04-25 RX ADMIN — AMIODARONE HYDROCHLORIDE 200 MG: 200 TABLET ORAL at 21:19

## 2025-04-25 RX ADMIN — DEXAMETHASONE 3 MG: 6 TABLET ORAL at 21:18

## 2025-04-25 RX ADMIN — PRAVASTATIN SODIUM 80 MG: 20 TABLET ORAL at 21:19

## 2025-04-25 RX ADMIN — METOPROLOL SUCCINATE 25 MG: 25 TABLET, EXTENDED RELEASE ORAL at 21:19

## 2025-04-25 RX ADMIN — METOPROLOL SUCCINATE 25 MG: 25 TABLET, EXTENDED RELEASE ORAL at 00:29

## 2025-04-25 RX ADMIN — DONEPEZIL HYDROCHLORIDE 5 MG: 5 TABLET ORAL at 21:20

## 2025-04-25 RX ADMIN — DEXAMETHASONE 4 MG: 4 TABLET ORAL at 06:49

## 2025-04-25 ASSESSMENT — COGNITIVE AND FUNCTIONAL STATUS - GENERAL
MOVING TO AND FROM BED TO CHAIR: A LITTLE
STANDING UP FROM CHAIR USING ARMS: A LITTLE
HELP NEEDED FOR BATHING: A LITTLE
DAILY ACTIVITIY SCORE: 21
CLIMB 3 TO 5 STEPS WITH RAILING: A LITTLE
WALKING IN HOSPITAL ROOM: A LITTLE
WALKING IN HOSPITAL ROOM: A LITTLE
PERSONAL GROOMING: A LITTLE
STANDING UP FROM CHAIR USING ARMS: A LITTLE
TOILETING: A LITTLE
DAILY ACTIVITIY SCORE: 22
MOVING FROM LYING ON BACK TO SITTING ON SIDE OF FLAT BED WITH BEDRAILS: A LITTLE
MOBILITY SCORE: 18
DRESSING REGULAR LOWER BODY CLOTHING: A LITTLE
TURNING FROM BACK TO SIDE WHILE IN FLAT BAD: A LITTLE
CLIMB 3 TO 5 STEPS WITH RAILING: A LITTLE
MOBILITY SCORE: 21
TOILETING: A LITTLE

## 2025-04-25 ASSESSMENT — PAIN SCALES - GENERAL
PAINLEVEL_OUTOF10: 0 - NO PAIN
PAINLEVEL_OUTOF10: 2
PAINLEVEL_OUTOF10: 0 - NO PAIN
PAINLEVEL_OUTOF10: 2
PAINLEVEL_OUTOF10: 0 - NO PAIN

## 2025-04-25 ASSESSMENT — PAIN - FUNCTIONAL ASSESSMENT
PAIN_FUNCTIONAL_ASSESSMENT: 0-10

## 2025-04-25 ASSESSMENT — ACTIVITIES OF DAILY LIVING (ADL): HOME_MANAGEMENT_TIME_ENTRY: 15

## 2025-04-25 ASSESSMENT — PAIN DESCRIPTION - DESCRIPTORS
DESCRIPTORS: ACHING
DESCRIPTORS: ACHING

## 2025-04-25 NOTE — CARE PLAN
The patient's goals for the shift include  WATCH THE BASKETBALL GAME    The clinical goals for the shift include THE PT WILL BE NEURO INTACT, BE SEIZURE FREE AND BE HDS BY THE END OF THIS SHIFT.

## 2025-04-25 NOTE — DISCHARGE INSTRUCTIONS
You may resume 81 mg Asprin on 5/6/2025.   Please hold all NSAID's until after 5/5/2025.     Neurosurgery has ordered an outpatient CT of your brain to follow up on the subdural hematoma you sustained after your fall.

## 2025-04-25 NOTE — PROGRESS NOTES
Spoke with Naz from [facility] and gave updates on this patient. When patient is to be discharged, Naz is requesting that clinicals be faxed to her prior to discharge. Fax to [phone]. 1500- Patient has a discharge order. Clinicals faxed to Naz to make sure patient is able to return to the Memory Care Unit at [facility]. Naz did receive the clinicals and now waiting to make sure with facility that patient can return.

## 2025-04-25 NOTE — NURSING NOTE
THE PT  HAD AN UNEVENTFUL NIGHT. WAS NEURO INTACT ALL SHIFT.  GLASCOW 15. WAS PLEASANT, COOPERATIVE AND INTERACTIVE. HAD NO NEURO DEFECITS ASIDE FROM OCC FORGETFULNESS UPON AWAKENING BUT REMAINED ORIENTED  X4 AND BEHAVIORALLY APPROPRIATE. NO HALLUCINATIONS OR SEIZURE ACTIVITY OBSERVED. THE PT COMMUNICATES NEEDS EASILY AND APPROPRIATELY. HAS A STEADY GAIT WITH THE AIDE OF A WALKER. HAS NO N/V DEFECITS. DENIED HAVING ANY BLURRED VISION.  LT OCCIPITAL HEADACHES  WERE CONTROLLED WITH HIS Q 6HR SCHEDULED  DECADRON. NO PSYCHOTIC  EVENTS OBSERVED. VSS. TEMP AFEBRILE. VOIDING AND STOOLING W/O DIFFICULTY. STILL REMAINS VERY PALE IN APPEARANCE. STRONG X4 EXTREMITIES. THE PT WAS PLEASANT AND COOPERATIVE ALL SHIFT. SLEPT VERY LITTLE BUT LAID QUIETLY IN BED. NO DISTRESS OBSERVED. PT SAFETY WAS MAINTAINED.

## 2025-04-25 NOTE — PROGRESS NOTES
Occupational Therapy    OT Treatment    Patient Name: Wayne Burkitt  MRN: [id number]  Today's Date: 4/25/2025  Time Calculation  Start Time: 1301  Stop Time: 1332  Time Calculation (min): 31 min       2105/2105-A    Assessment:  OT Assessment: Patient could benefit from continued OT services upon return to his assisted living facility.  End of Session Communication: Bedside nurse  End of Session Patient Position: Up in chair, Alarm on       Plan:  OT Frequency: 3 times per week     Subjective     Current Problem:  Problem List[1]    General:  OT Received On: 04/25/25  Reason for Referral: 78 yo M dx with SDH.  Referred By: Melva Bill MD  Prior to Session Communication: Bedside nurse  Patient Position Received: Bed, 3 rail up, Alarm on  General Comment: Patient was agreeable to working with therapy.    Vital Signs:       Pain:  Pain Assessment  Pain Assessment: 0-10  0-10 (Numeric) Pain Score: 0 - No pain  Objective      Activities of Daily Living:    Grooming  Grooming Level of Assistance: Close supervision  Grooming Where Assessed: Standing sinkside  Grooming Comments: Following walk in hallway patient walked up to sink using wheeled walker. Patient is not used to using a walker and required verbal cues for safe walker use when approaching sink and when turning to sit in recliner. (Patient stood for hand washing task with good balance.)                   Functional Standing Tolerance:       Bed Mobility/Transfers: Bed Mobility  Bed Mobility: Yes  Bed Mobility 1  Bed Mobility 1: Supine to sitting  Level of Assistance 1: Close supervision  Transfers  Transfer: Yes  Transfer 1  Transfer From 1: Sit to  Transfer to 1: Stand  Transfer Device 1: Gait belt, Walker  Transfer Level of Assistance 1: Contact guard  Trials/Comments 1: Patient walked out into hallway and stood at sink for ADL task when returning to his room.  Transfer to recliner.                Therapy/Activity:               Strength:       Other  Activity:       Outcome Measures:[facility] Daily Activity  Putting on and taking off regular lower body clothing: A little  Bathing (including washing, rinsing, drying): A little  Putting on and taking off regular upper body clothing: None  Toileting, which includes using toilet, bedpan or urinal: A little  Taking care of personal grooming such as brushing teeth: None  Eating Meals: None  Daily Activity - Total Score: 21  Education Documentation  No documentation found.  Education Comments  No comments found.           EDUCATION:       Goals:  Encounter Problems       Encounter Problems (Active)       OT Goals       Patient will complete UE adls with MOD I (Progressing)       Start:  04/21/25    Expected End:  05/05/25            Patient will complete LE adls with MOD I (Progressing)       Start:  04/21/25    Expected End:  05/05/25            Patient will complete transfers with MOD I (Progressing)       Start:  04/21/25    Expected End:  05/05/25            Patient will complete functional mobility tasks with MOD I (Progressing)       Start:  04/21/25    Expected End:  05/05/25                              [1]   Patient Active Problem List  Diagnosis    Moderate vascular dementia with anxiety    Auditory hallucinations    Coronary artery disease involving native coronary artery of native heart without angina pectoris    Epistaxis    Unstable angina pectoris (Multi)    Moderate vascular dementia with psychotic disturbance    Episodes of formed visual hallucinations    Frequent falls    Essential hypertension    Obstructive sleep apnea syndrome    Orthostatic hypotension    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    Seizure (Multi)    Angina pectoris, unstable (Multi)    Congestive heart failure    3-vessel coronary artery disease    Scrotal hematoma    Iron deficiency anemia due to chronic blood loss    Upper GI bleed    Physical debility    Long term (current) use of antithrombotics/antiplatelets    Hyperlipidemia     Subdural hematoma (Multi)    V tach (Multi)

## 2025-04-25 NOTE — PROGRESS NOTES
Wayne Burkitt is a 79 y.o. male on day 4 of admission presenting with Subdural hematoma (Multi).      Subjective   Patient is feeling well, no N/V.        Objective          Vitals 24HR  Heart Rate:  [60-62]   Temp:  [36.4 °C (97.5 °F)-36.9 °C (98.4 °F)]   Resp:  [16-25]   BP: (128-162)/(60-91)   Weight:  [74.5 kg (164 lb 3.9 oz)]   SpO2:  [95 %-98 %]     Intake/Output last 3 Shifts:    Intake/Output Summary (Last 24 hours) at 4/25/2025 1246  Last data filed at 4/25/2025 1040  Gross per 24 hour   Intake 450 ml   Output 1850 ml   Net -1400 ml       Physical Exam  GENERAL: No distress.   SKIN: No rashes or lesions.  EYES: PERRLA, EOMI  HEENT: Head: Normocephalic  Neck: supple and no adenopathy  LUNGS: Lungs clear to auscultation.   CARDIAC: Normal S1 and S2; no murmurs.   ABDOMEN: Soft, non-tender.   EXTREMITIES: No ulcers, Extremities normal.   NEURO: No focal deficit.     Relevant Results             Scheduled medications  Scheduled Medications[1]  Continuous medications  Continuous Medications[2]  PRN medications  PRN Medications[3]  Results for orders placed or performed during the hospital encounter of 04/20/25 (from the past 24 hours)   Renal Function Panel   Result Value Ref Range    Glucose 137 (H) 74 - 99 mg/dL    Sodium 138 136 - 145 mmol/L    Potassium 4.4 3.5 - 5.3 mmol/L    Chloride 106 98 - 107 mmol/L    Bicarbonate 25 21 - 32 mmol/L    Anion Gap 11 10 - 20 mmol/L    Urea Nitrogen 32 (H) 6 - 23 mg/dL    Creatinine 1.09 0.50 - 1.30 mg/dL    eGFR 70 >60 mL/min/1.73m*2    Calcium 9.1 8.6 - 10.3 mg/dL    Phosphorus 3.3 2.5 - 4.9 mg/dL    Albumin 3.7 3.4 - 5.0 g/dL   Magnesium   Result Value Ref Range    Magnesium 2.09 1.60 - 2.40 mg/dL   CBC   Result Value Ref Range    WBC 7.2 4.4 - 11.3 x10*3/uL    nRBC 0.0 0.0 - 0.0 /100 WBCs    RBC 3.15 (L) 4.50 - 5.90 x10*6/uL    Hemoglobin 8.4 (L) 13.5 - 17.5 g/dL    Hematocrit 27.7 (L) 41.0 - 52.0 %    MCV 88 80 - 100 fL    MCH 26.7 26.0 - 34.0 pg    MCHC 30.3 (L) 32.0  - 36.0 g/dL    RDW 22.8 (H) 11.5 - 14.5 %    Platelets 284 150 - 450 x10*3/uL       This patient currently has cardiac telemetry ordered; if you would like to modify or discontinue the telemetry order, click here to go to the orders activity to modify/discontinue the order.          Assessment & Plan  Subdural hematoma (Multi)    V tach (Multi)      79 male   PMHx of HTN, HLD, epilepsy on Keppra, moderate vascular dementia with hallucination.   Patient presented to ED with complaints of left-sided headache. CT head revealed subdural hematoma on the left through the frontal and temporal region with maximum thickness of 4 mm.  No significant mass effect, no overlying skull fracture, no intraparenchymal hemorrhage or evidence of stroke.  Neurosurgery was consulted by ED.       #Acute left-sided subdural hematoma without mass effect  #Headache secondary to SDH  #Epilepsy on Keppra  #Moderate vascular dementia with hallucinations  -Initial CT scan 4/20/25 1242: Subdural hematoma present on the left frontal and temporal region with a maximum thickness of 4 mm, no significant mass effect, no intraparenchymal hemorrhage or evidence of stroke  -6 hour stability CT scan 4/20/2025 1903 : Slight enlargement of small subdural hematoma overlying the right occipital lobe and posterior parietal lobe  -Repeat CT Scan 4/21/2025 0122: Stable appearance of previously described left subdural hematoma  -Continue home Keppra, Seroquel and donezepil    #History of CAD  #Hypertension  #Hyperlipidemia  -2D echo 4/2/2025: Estimated EF 45 to 50%, global hypokinesis of the left ventricle with minor regional variations    #History of gastric AVMs, diverticulosis  #Anemia  -No chemical VTE prophylaxis secondary to SDH  -Maintain bilateral SCDs  -Hold home ASA    #Left knee abrasion  -PT/OT to evaluate and treat     #Runs of VT   Metoprolol   Keep K > 4 amd Mg > 2   Amiodarone per cardiology.     Update 4/23  Another episode of increased headache  this morning for which repeat CT was done and showed increased size of hematoma to 6 mm.  It was reviewed by neurosurgery who recommended neurology consult and conservative management for now.  The patient was still on amiodarone drip, his heart rate is 60 and paced, his blood pressure dropped.  Amio was stopped, we will postpone p.o. amnio until the evening.  A bolus of fluids was ordered.  Neurology is considering video EEG.  Case was discussed with other consultants, and nursing staff.   The case was discussed with the POA over the phone, he stated that the patient told the emergency room physician that he wants to be resuscitated in case something happens.    Update 4/24  Video EEG, will finish around 4 pm.   A+O X 2-3 but confused   Much more alert today.  Denies any headache.  Heart rate is stable.  On oral amiodarone.    Update 4/25  Denies any headache.  Heart rate is stable.  Repeat CT yesterday was stable.  Okay to discharge to memory unit.      Gulshan Hermosillo MD           [1] amiodarone, 200 mg, oral, BID  brimonidine, 1 drop, Both Eyes, BID  cholecalciferol, 50 mcg, oral, Daily  dexAMETHasone, 3 mg, oral, q8h PATRICIA   Followed by  [START ON 4/27/2025] dexAMETHasone, 2 mg, oral, q8h PATRICIA   Followed by  [START ON 4/29/2025] dexAMETHasone, 2 mg, oral, Daily  donepezil, 5 mg, oral, Nightly  levETIRAcetam, 1,000 mg, oral, BID  metoprolol succinate XL, 25 mg, oral, BID  pantoprazole, 40 mg, oral, BID  pravastatin, 80 mg, oral, Nightly  QUEtiapine, 50 mg, oral, Nightly  sodium chloride, 2 spray, Each Nostril, q12h     [2]    [3] PRN medications: acetaminophen, docusate sodium, OLANZapine, oxygen, oxymetazoline

## 2025-04-25 NOTE — NURSING NOTE
Regarding Decadron taper started at 1400 today:    Patient began today continuing with Decadron 4 mg every six hours. First dose of 3 mg was scheduled for 1400 but unable to be documented.    Pharmacist contacted - due to refused dose of 4 mg on 4/24, linked order was not marked as complete. Overridden to accurately document that the taper was initiated as ordered.

## 2025-04-25 NOTE — PROGRESS NOTES
Physical Therapy    Physical Therapy    Physical Therapy Treatment    Patient Name: Wayne Burkitt  MRN: [id number]  Today's Date: 4/25/2025  Time Calculation  Start Time: 1302  Stop Time: 1332  Time Calculation (min): 30 min     2105/2105-A    Assessment/Plan   PT Assessment  PT Assessment Results: Decreased mobility, Impaired balance, Pain  Rehab Prognosis: Good  End of Session Communication: Bedside nurse  End of Session Patient Position: Up in chair, Alarm on  PT Plan  Inpatient/Swing Bed or Outpatient: Inpatient  PT Plan  Treatment/Interventions: Bed mobility, Transfer training, Gait training, Stair training, Balance training, Neuromuscular re-education, Strengthening, Endurance training, Range of motion, Therapeutic exercise, Therapeutic activity, Home exercise program, Positioning, Postural re-education  PT Plan: Ongoing PT  PT Frequency: 4 times per week  PT Discharge Recommendations: Low intensity level of continued care  Equipment Recommended upon Discharge: Wheeled walker  PT Recommended Transfer Status: Stand by assist  PT - OK to Discharge: Yes (Pt ok to dc from acute care PT to next level of care once cleared by medical team.)     04/25/25 1302   PT  Visit   PT Received On 04/25/25   General   Reason for Referral 80 yo M dx with SDH.   Referred By Melva Bill MD   Prior to Session Communication Bedside nurse   Patient Position Received Bed, 3 rail up;Alarm on   General Comment Ptinet agreebale to PT this pm  denies any headach or lightheadedness   Pain Assessment   Pain Assessment 0-10   0-10 (Numeric) Pain Score 0 - No pain   Cognition   Overall Cognitive Status WFL   Orientation Level Oriented X4   General Observation   General Observation Patient is motivated   Therapeutic Exercise   Therapeutic Exercise Activity 1 Heel Raises, Toe Raises, LAQ, Marching, Hip abduction x 20 each B   Bed Mobility   Bed Mobility Yes   Bed Mobility 1   Bed Mobility 1 Supine to sitting   Level of Assistance 1 Close  supervision   Ambulation/Gait Training   Ambulation/Gait Training Performed Yes   Ambulation/Gait Training 1   Surface 1 Level tile   Device 1 Rolling walker   Gait Support Devices Gait belt   Assistance 1 Contact guard   Quality of Gait 1 NBOS;Diminished heel strike;Forward flexed posture   Comments/Distance (ft) 1 150  (Patient demos grossly steady reciprocaitng gait)   Transfers   Transfer Yes   Transfer 1   Technique 1 Sit to stand;Stand to sit   Trials/Comments 1 x2   Activity Tolerance   Endurance Tolerates 10 - 20 min exercise with multiple rests   PT Assessment   PT Assessment Results Decreased mobility;Impaired balance;Pain   Rehab Prognosis Good   End of Session Communication Bedside nurse   End of Session Patient Position Up in chair;Alarm on   PT Plan   Inpatient/Swing Bed or Outpatient Inpatient     Outcome Measures:  [facility] Basic Mobility  Turning from your back to your side while in a flat bed without using bedrails: A little  Moving from lying on your back to sitting on the side of a flat bed without using bedrails: A little  Moving to and from bed to chair (including a wheelchair): A little  Standing up from a chair using your arms (e.g. wheelchair or bedside chair): A little  To walk in hospital room: A little  Climbing 3-5 steps with railing: A little  Basic Mobility - Total Score: 18                             EDUCATION:     Education Documentation  No documentation found.  Education Comments  No comments found.        GOALS:  Encounter Problems       Encounter Problems (Active)       PT Problem       Bed mobility (Progressing)       Start:  04/21/25    Expected End:  05/05/25       Pt will perform supine<>sit with HOB flat, DELONTE.           Transfers (Progressing)       Start:  04/21/25    Expected End:  05/05/25       Pt will perform all transfers with LRAD, DELONTE.            Gait (Progressing)       Start:  04/21/25    Expected End:  05/05/25       Pt will amb 150' with LRAD, DELONTE with  reciprocal gait, upright posture and improved activity tolerance as demonstrated by vitals.           Balance (Progressing)       Start:  04/21/25    Expected End:  05/05/25       Pt will tolerate standing x2 min without UE support during dynamic balance challenges, SBAx1.            Pain - Adult

## 2025-04-25 NOTE — CARE PLAN
The patient's goals for the shift include      The clinical goals for the shift include Patient safety will be maintained.      Problem: Pain - Adult  Goal: Verbalizes/displays adequate comfort level or baseline comfort level  Outcome: Progressing     Problem: Safety - Adult  Goal: Free from fall injury  Outcome: Progressing     Problem: Discharge Planning  Goal: Discharge to home or other facility with appropriate resources  Outcome: Progressing     Problem: Chronic Conditions and Co-morbidities  Goal: Patient's chronic conditions and co-morbidity symptoms are monitored and maintained or improved  Outcome: Progressing     Problem: Nutrition  Goal: Nutrient intake appropriate for maintaining nutritional needs  Outcome: Progressing     Problem: Pain  Goal: Takes deep breaths with improved pain control throughout the shift  Outcome: Progressing  Goal: Turns in bed with improved pain control throughout the shift  Outcome: Progressing  Goal: Walks with improved pain control throughout the shift  Outcome: Progressing  Goal: Performs ADL's with improved pain control throughout shift  Outcome: Progressing  Goal: Participates in PT with improved pain control throughout the shift  Outcome: Progressing  Goal: Free from opioid side effects throughout the shift  Outcome: Progressing  Goal: Free from acute confusion related to pain meds throughout the shift  Outcome: Progressing     Problem: Acute Head Injury  Goal: Neuro status stable or improved  Outcome: Progressing  Goal: Ansence or control of seizures  Outcome: Progressing  Goal: Absence or control of storming symptoms  Outcome: Progressing  Goal: ICP/CPP within goal  Outcome: Progressing  Goal: No signs of respiratory compromise  Outcome: Progressing  Goal: Stabilization of hemodynamic status  Outcome: Progressing  Goal: Tolerates invasive procedures w/o compromise  Outcome: Progressing  Goal: Tolerates osmotherapy  Outcome: Progressing     Problem: Seizures  Goal: Absence  or minimized seizure activity  Outcome: Progressing  Goal: Freedom from injury  Outcome: Progressing  Goal: Intact skin surrounding leads  Outcome: Progressing  Goal: No signs of respiratory or cardiac compromise  Outcome: Progressing  Goal: Protection of airway  Outcome: Progressing

## 2025-04-25 NOTE — NURSING NOTE
PT INCONTINENT OF LIQUID WATERY STOOL; NO ODOR; NO CONTROL OVER BOWELS. A SAMPLE FOR CDIFF WAS OBTAINED. DR GOLDMAN NOTIFIED THE PTS HR IS FREQ IN -143 BTS/MIN N    AFIB RHYTM PER THE MONITOR EVENTS SINCE ADMISSION. PRESENTLY LABILE -124.  DR GOLDMAN MADE AWARE. BETAPACE TO BE GIVEN. THE PT DENIES ANY CP, SOB OR PALPITATIONS. VSS. TEMP AFEBRILE. CONTACT PLUS  PRECAUTIONS MAINTAINED. COMPLETE PM CARE WAS GIVEN. LINENS CHANGED. THE UROSTOMY WAS HOOKED UP TO THE CARNEY BAG. DRAINING YELLOW CLEAR URINE WITH MUCOUS SHREDS. THE STOMA IS RED. BUROSTOMY BAG INTACT.   2115- THE PT IS VOICING A HERRON. DR GOLDMAN CONSULTED FOR TYLENOL. NO ABD PAIN OR NAUSEA VOICED. BS + HYPERACTIVE.

## 2025-04-26 VITALS
HEIGHT: 71 IN | SYSTOLIC BLOOD PRESSURE: 154 MMHG | WEIGHT: 169.31 LBS | BODY MASS INDEX: 23.7 KG/M2 | OXYGEN SATURATION: 98 % | DIASTOLIC BLOOD PRESSURE: 81 MMHG | HEART RATE: 89 BPM | TEMPERATURE: 96.3 F | RESPIRATION RATE: 16 BRPM

## 2025-04-26 LAB
ALBUMIN SERPL BCP-MCNC: 3.6 G/DL (ref 3.4–5)
ANION GAP SERPL CALC-SCNC: 13 MMOL/L (ref 10–20)
BUN SERPL-MCNC: 36 MG/DL (ref 6–23)
CALCIUM SERPL-MCNC: 8.9 MG/DL (ref 8.6–10.3)
CHLORIDE SERPL-SCNC: 107 MMOL/L (ref 98–107)
CO2 SERPL-SCNC: 23 MMOL/L (ref 21–32)
CREAT SERPL-MCNC: 1.01 MG/DL (ref 0.5–1.3)
EGFRCR SERPLBLD CKD-EPI 2021: 77 ML/MIN/1.73M*2
ERYTHROCYTE [DISTWIDTH] IN BLOOD BY AUTOMATED COUNT: 22.6 % (ref 11.5–14.5)
GLUCOSE SERPL-MCNC: 129 MG/DL (ref 74–99)
HCT VFR BLD AUTO: 27.7 % (ref 41–52)
HGB BLD-MCNC: 8.4 G/DL (ref 13.5–17.5)
MAGNESIUM SERPL-MCNC: 2.09 MG/DL (ref 1.6–2.4)
MCH RBC QN AUTO: 27 PG (ref 26–34)
MCHC RBC AUTO-ENTMCNC: 30.3 G/DL (ref 32–36)
MCV RBC AUTO: 89 FL (ref 80–100)
NRBC BLD-RTO: 0 /100 WBCS (ref 0–0)
PHOSPHATE SERPL-MCNC: 2.9 MG/DL (ref 2.5–4.9)
PLATELET # BLD AUTO: 269 X10*3/UL (ref 150–450)
POTASSIUM SERPL-SCNC: 4.2 MMOL/L (ref 3.5–5.3)
RBC # BLD AUTO: 3.11 X10*6/UL (ref 4.5–5.9)
SODIUM SERPL-SCNC: 139 MMOL/L (ref 136–145)
WBC # BLD AUTO: 7.4 X10*3/UL (ref 4.4–11.3)

## 2025-04-26 PROCEDURE — 2500000004 HC RX 250 GENERAL PHARMACY W/ HCPCS (ALT 636 FOR OP/ED): Performed by: NURSE PRACTITIONER

## 2025-04-26 PROCEDURE — 2500000002 HC RX 250 W HCPCS SELF ADMINISTERED DRUGS (ALT 637 FOR MEDICARE OP, ALT 636 FOR OP/ED): Performed by: INTERNAL MEDICINE

## 2025-04-26 PROCEDURE — 2500000001 HC RX 250 WO HCPCS SELF ADMINISTERED DRUGS (ALT 637 FOR MEDICARE OP): Performed by: INTERNAL MEDICINE

## 2025-04-26 PROCEDURE — 36415 COLL VENOUS BLD VENIPUNCTURE: CPT

## 2025-04-26 PROCEDURE — 83735 ASSAY OF MAGNESIUM: CPT

## 2025-04-26 PROCEDURE — 85027 COMPLETE CBC AUTOMATED: CPT

## 2025-04-26 PROCEDURE — 80069 RENAL FUNCTION PANEL: CPT

## 2025-04-26 RX ADMIN — Medication 50 MCG: at 08:33

## 2025-04-26 RX ADMIN — METOPROLOL SUCCINATE 25 MG: 25 TABLET, EXTENDED RELEASE ORAL at 08:33

## 2025-04-26 RX ADMIN — BRIMONIDINE TARTRATE 1 DROP: 2 SOLUTION/ DROPS OPHTHALMIC at 08:33

## 2025-04-26 RX ADMIN — PANTOPRAZOLE SODIUM 40 MG: 40 TABLET, DELAYED RELEASE ORAL at 08:33

## 2025-04-26 RX ADMIN — LEVETIRACETAM 1000 MG: 500 TABLET, FILM COATED ORAL at 08:33

## 2025-04-26 RX ADMIN — SALINE NASAL SPRAY 2 SPRAY: 1.5 SOLUTION NASAL at 08:33

## 2025-04-26 RX ADMIN — DEXAMETHASONE 3 MG: 6 TABLET ORAL at 06:34

## 2025-04-26 RX ADMIN — AMIODARONE HYDROCHLORIDE 200 MG: 200 TABLET ORAL at 08:33

## 2025-04-26 ASSESSMENT — PAIN - FUNCTIONAL ASSESSMENT
PAIN_FUNCTIONAL_ASSESSMENT: 0-10

## 2025-04-26 ASSESSMENT — PAIN SCALES - GENERAL
PAINLEVEL_OUTOF10: 0 - NO PAIN

## 2025-04-26 NOTE — PROGRESS NOTES
"Wayne Burkitt is a 79 y.o. male on day 5 of admission presenting with Subdural hematoma (Multi).      Subjective   Patient is feeling well, no N/V.        Objective          Vitals 24HR  Heart Rate:  [60-65]   Temp:  [36.2 °C (97.2 °F)-36.6 °C (97.9 °F)]   Resp:  [18-20]   BP: (113-156)/()   Height:  [180.3 cm (5' 11\")]   Weight:  [76.8 kg (169 lb 5 oz)-76.9 kg (169 lb 8.5 oz)]   SpO2:  [95 %-98 %]     Intake/Output last 3 Shifts:    Intake/Output Summary (Last 24 hours) at 4/26/2025 0738  Last data filed at 4/25/2025 2330  Gross per 24 hour   Intake 250 ml   Output 1350 ml   Net -1100 ml       Physical Exam  GENERAL: No distress.   SKIN: No rashes or lesions.  EYES: PERRLA, EOMI  HEENT: Head: Normocephalic  Neck: supple and no adenopathy  LUNGS: Lungs clear to auscultation.   CARDIAC: Normal S1 and S2; no murmurs.   ABDOMEN: Soft, non-tender.   EXTREMITIES: No ulcers, Extremities normal.   NEURO: No focal deficit.     Relevant Results  {If you would like to pull in Medications, type .meds     If you would like to pull in Lab results for the last 24 hours, type .[id number]    If you would like to pull in Imaging results, type .imgrslt :99}      {Link to Stroke Scoring tools - Link :99}     Scheduled medications  Scheduled Medications[1]  Continuous medications  Continuous Medications[2]  PRN medications  PRN Medications[3]  Results for orders placed or performed during the hospital encounter of 04/20/25 (from the past 24 hours)   Hemoglobin and hematocrit, blood   Result Value Ref Range    Hemoglobin 9.1 (L) 13.5 - 17.5 g/dL    Hematocrit 28.8 (L) 41.0 - 52.0 %   Renal Function Panel   Result Value Ref Range    Glucose 129 (H) 74 - 99 mg/dL    Sodium 139 136 - 145 mmol/L    Potassium 4.2 3.5 - 5.3 mmol/L    Chloride 107 98 - 107 mmol/L    Bicarbonate 23 21 - 32 mmol/L    Anion Gap 13 10 - 20 mmol/L    Urea Nitrogen 36 (H) 6 - 23 mg/dL    Creatinine 1.01 0.50 - 1.30 mg/dL    eGFR 77 >60 mL/min/1.73m*2    Calcium " 8.9 8.6 - 10.3 mg/dL    Phosphorus 2.9 2.5 - 4.9 mg/dL    Albumin 3.6 3.4 - 5.0 g/dL   Magnesium   Result Value Ref Range    Magnesium 2.09 1.60 - 2.40 mg/dL   CBC   Result Value Ref Range    WBC 7.4 4.4 - 11.3 x10*3/uL    nRBC 0.0 0.0 - 0.0 /100 WBCs    RBC 3.11 (L) 4.50 - 5.90 x10*6/uL    Hemoglobin 8.4 (L) 13.5 - 17.5 g/dL    Hematocrit 27.7 (L) 41.0 - 52.0 %    MCV 89 80 - 100 fL    MCH 27.0 26.0 - 34.0 pg    MCHC 30.3 (L) 32.0 - 36.0 g/dL    RDW 22.6 (H) 11.5 - 14.5 %    Platelets 269 150 - 450 x10*3/uL                  Assessment & Plan  Subdural hematoma (Multi)    V tach (Multi)      79 male   PMHx of HTN, HLD, epilepsy on Keppra, moderate vascular dementia with hallucination.   Patient presented to ED with complaints of left-sided headache. CT head revealed subdural hematoma on the left through the frontal and temporal region with maximum thickness of 4 mm.  No significant mass effect, no overlying skull fracture, no intraparenchymal hemorrhage or evidence of stroke.  Neurosurgery was consulted by ED.       #Acute left-sided subdural hematoma without mass effect  #Headache secondary to SDH  #Epilepsy on Keppra  #Moderate vascular dementia with hallucinations  -Initial CT scan 4/20/25 1242: Subdural hematoma present on the left frontal and temporal region with a maximum thickness of 4 mm, no significant mass effect, no intraparenchymal hemorrhage or evidence of stroke  -6 hour stability CT scan 4/20/2025 1903 : Slight enlargement of small subdural hematoma overlying the right occipital lobe and posterior parietal lobe  -Repeat CT Scan 4/21/2025 0122: Stable appearance of previously described left subdural hematoma  -Continue home Keppra, Seroquel and donezepil    #History of CAD  #Hypertension  #Hyperlipidemia  -2D echo 4/2/2025: Estimated EF 45 to 50%, global hypokinesis of the left ventricle with minor regional variations    #History of gastric AVMs, diverticulosis  #Anemia  -No chemical VTE prophylaxis  secondary to SDH  -Maintain bilateral SCDs  -Hold home ASA    #Left knee abrasion  -PT/OT to evaluate and treat     #Runs of VT   Metoprolol   Keep K > 4 amd Mg > 2   Amiodarone per cardiology.     Update 4/23  Another episode of increased headache this morning for which repeat CT was done and showed increased size of hematoma to 6 mm.  It was reviewed by neurosurgery who recommended neurology consult and conservative management for now.  The patient was still on amiodarone drip, his heart rate is 60 and paced, his blood pressure dropped.  Amio was stopped, we will postpone p.o. amnio until the evening.  A bolus of fluids was ordered.  Neurology is considering video EEG.  Case was discussed with other consultants, and nursing staff.   The case was discussed with the POA over the phone, he stated that the patient told the emergency room physician that he wants to be resuscitated in case something happens.    Update 4/24  Video EEG, will finish around 4 pm.   A+O X 2-3 but confused   Much more alert today.  Denies any headache.  Heart rate is stable.  On oral amiodarone.    Update 4/25  Denies any headache.  Heart rate is stable.  Repeat CT yesterday was stable.  Okay to discharge to memory unit.    Update 4/26  Ok to dc back to his memory unit.       Gulshan Hermosillo MD           [1] amiodarone, 200 mg, oral, BID  brimonidine, 1 drop, Both Eyes, BID  cholecalciferol, 50 mcg, oral, Daily  dexAMETHasone, 3 mg, oral, q8h PATRICIA   Followed by  [START ON 4/27/2025] dexAMETHasone, 2 mg, oral, q8h PATRICIA   Followed by  [START ON 4/29/2025] dexAMETHasone, 2 mg, oral, Daily  donepezil, 5 mg, oral, Nightly  levETIRAcetam, 1,000 mg, oral, BID  metoprolol succinate XL, 25 mg, oral, BID  pantoprazole, 40 mg, oral, BID  pravastatin, 80 mg, oral, Nightly  QUEtiapine, 50 mg, oral, Nightly  sodium chloride, 2 spray, Each Nostril, q12h     [2]    [3] PRN medications: acetaminophen, docusate sodium, OLANZapine, oxygen, oxymetazoline

## 2025-04-26 NOTE — PROGRESS NOTES
04/26/25 1028   Discharge Planning   Home or Post Acute Services In home services   Type of Home Care Services Home OT;Home PT   Expected Discharge Disposition Home H  ([facility])   Patient Choice   Provider Choice list and CMS website (https://medicare.gov/care-compare#search) for post-acute Quality and Resource Measure Data were provided and reviewed with: Patient   Intensity of Service   Intensity of Service >30 min     Spoke with Candance from [facility]. She cleared pt for return.  Homecare is recommended. Discussed homecare recommendation with pt. He is agreement and agreed to a referral to [facility], since they are facility provided at [facility]. Referral placed. Awaiting [facility] orders to finalize the referral.     12:20 Spoke with Marion at [facility]. She reports they will review the referral on Monday and confirm acceptance. She states they do service that building. SW to agency confirm placement on Monday. C Orders were sent.

## 2025-04-26 NOTE — NURSING NOTE
Patient arrived on unit at 0620, patient is A&Ox4 pain is 2/10, neuro assessment completed, patient safety maintained call light within reach.

## 2025-04-26 NOTE — NURSING NOTE
0700: Assumed care of pt     1220: Pt IV and tele removed. Discharge education provided. Pt denies any further questions at this time. [employer]an[employer] wheeled pt tp private vehicle safely at this time.        04/26/25 1202   Vital Signs   Temp 35.7 °C (96.3 °F)   Temp Source Temporal   Heart Rate 89   Heart Rate Source Monitor   Resp 16   /81   BP Location Left arm   BP Method Automatic   Patient Position Sitting   Medical Gas Therapy   SpO2 98 %

## 2025-04-26 NOTE — SIGNIFICANT EVENT
"Patient was very upset that he was being transferred to another floor. \"I'm supposed to go home today!\" I explained to the patient that he will probably be discharged later today, but there was more preparation needed prior to his discharge. He was also told that he was doing much better and no longer required close observation. The patient's BP was elevated due to his being very angry. After further explanation the patient was pleasant and cooperative with the transfer. Vital signs stable. Patient's neuro status has remained stable. He is alert, oriented. NIK  score 15  "

## 2025-05-10 ENCOUNTER — HOSPITAL ENCOUNTER (OUTPATIENT)
Dept: RADIOLOGY | Facility: HOSPITAL | Age: 80
Discharge: HOME | End: 2025-05-10
Payer: MEDICARE

## 2025-05-10 DIAGNOSIS — S06.5XAA SUBDURAL HEMATOMA (MULTI): ICD-10-CM

## 2025-05-10 LAB
ATRIAL RATE: 65 BPM
P OFFSET: 145 MS
P ONSET: 125 MS
Q ONSET: 216 MS
QRS COUNT: 10 BEATS
QRS DURATION: 94 MS
QT INTERVAL: 428 MS
QTC CALCULATION(BAZETT): 428 MS
QTC FREDERICIA: 428 MS
R AXIS: 43 DEGREES
T AXIS: 247 DEGREES
T OFFSET: 430 MS
VENTRICULAR RATE: 60 BPM

## 2025-05-10 PROCEDURE — 70450 CT HEAD/BRAIN W/O DYE: CPT

## 2025-05-10 PROCEDURE — 70450 CT HEAD/BRAIN W/O DYE: CPT | Performed by: RADIOLOGY

## 2025-05-21 NOTE — PROGRESS NOTES
"Subjective   Patient ID: Wayne Burkitt is a 79 y.o. male who presents for Anemia (Pt referred for anemia. EGD/Colon performed on 4/20/2025-suggested VCE as outpatient. Pt reports having some constipation at times. Pt POA noted pancreas abnormality on 02/04/2025 CT-wants to discuss next steps. ).  HPI  Patient is seen in office for follow-up.  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  Denies any overt GIB.    02/09/25  Pancreas: The pancreas is there is an enhancing 8 mm lesion within the  distal body of the pancreas (image 58, series 502). The pancreas is  otherwise unremarkable with no ductal distention or focal lesion.  However a subsequent CT scan 1/1/2025 showed normal pancreas.    Medications Ordered Prior to Encounter[1]     Review of Systems   Constitutional:  Positive for fatigue.   Respiratory: Negative.     Cardiovascular: Negative.    Gastrointestinal: Negative.    Endocrine: Negative.    Genitourinary: Negative.    Skin: Negative.    Neurological: Negative.        Objective   Physical Exam  Constitutional:       Appearance: He is ill-appearing.   HENT:      Head: Normocephalic and atraumatic.   Cardiovascular:      Rate and Rhythm: Normal rate and regular rhythm.   Pulmonary:      Effort: Pulmonary effort is normal.      Breath sounds: Normal breath sounds.   Abdominal:      General: Abdomen is flat. Bowel sounds are normal.      Palpations: Abdomen is soft.      Tenderness: There is no abdominal tenderness.   Musculoskeletal:         General: Normal range of motion.   Skin:     General: Skin is warm.   Neurological:      General: No focal deficit present.      Mental Status: He is alert.       /76 (BP Location: Right arm, Patient Position: Sitting, BP Cuff Size: Adult)   Pulse 60   Resp 18   Ht 1.803 m (5' 11\")   Wt 81 kg (178 lb 8 oz)   SpO2 98%   BMI 24.90 kg/m²      Lab Results   Component " "Value Date    WBC 4.5 05/22/2025    HGB 7.9 (L) 05/22/2025    HCT 25.7 (L) 05/22/2025    MCV 87.7 05/22/2025     (H) 05/22/2025           No lab exists for component: \"LABALBU\"    No results found for: \"AFP\"  Lab Results   Component Value Date    TSH 2.84 04/22/2025     Colonoscopy  Order# [id number]  Reading physician: Duran Garcia MD Ordering provider: ERICK Thorne Study date: 4/3/25     Result Information    Status: Final result (Exam End: 4/3/2025 15:27) Provider Status: Open     Result Text       Impression  Extensive diverticulosis of moderate severity and causing mild luminal narrowing in the sigmoid colon  The ileocecal valve, appendiceal orifice, cecum, ascending colon, hepatic flexure, transverse colon, splenic flexure, descending colon and rectum appeared normal.        Findings  Multiple medium, extensive diverticula of moderate severity with no inflammation containing no content and causing mild luminal narrowing (traversable) in the sigmoid colon; no bleeding was observed  The ileocecal valve, appendiceal orifice, cecum, ascending colon, hepatic flexure, transverse colon, splenic flexure, descending colon and rectum appeared normal.        Recommendation  VCE as outpatient           Esophagogastroduodenoscopy (EGD)  Order# [id number]  Reading physician: Duran Garcia MD Ordering provider: ERICK Thorne Study date: 4/3/25     Result Information    Status: Final result (Exam End: 4/3/2025 15:27) Provider Status: Open     Result Text       Impression  The cricopharynx, upper third of the esophagus, middle third of the esophagus, lower third of the esophagus and GE junction appeared normal.  3 angioectasias measuring from 4 mm up to 5 mm in the body of the stomach; there was stigmata of recent hemorrhage; tissue was ablated with argon plasma coagulation  The duodenal bulb appeared normal.        Findings  The cricopharynx, upper third of the esophagus, middle third of the " esophagus, lower third of the esophagus and GE junction appeared normal.  3 angioectasias measuring from 4 mm up to 5 mm in the body of the stomach; there was stigmata of recent hemorrhage; 3 lesions were ablated with 3 applications of argon plasma coagulation (30 W and 1.1 mL/min flow rate), coagulum was removed after ablation was completed  The duodenal bulb appeared normal.        Recommendation  Ok to start full liquid diet  Pantoprazole 40 mg po daily   Proceed to colonoscopy         CT angio abdomen pelvis w and or wo IV IV contrast  Status: Final result     PACS Images     Show images for CT angio abdomen pelvis w and or wo IV IV contrast  Signed by    Signed Time Phone Pager   Gokul Huffman MD 4/02/2025 04:43 [phone]      Exam Information    Status Exam Begun Exam Ended   Final 4/02/2025 03:04 4/02/2025 03:34     Study Result    Narrative & Impression   STUDY:  CT CTA ABDOMEN and PELVIS w + wo CONTRAST; 4/2/2025 3:35 AM  INDICATION:  Low hemoglobin, occult blood positive.  TECHNIQUE:  Helical CT is performed from the aortic diaphragmatic  hiatus through the symphysis pubis before and after bolus  administration of 90 mL of Omnipaque 350.  Images were reviewed and  processed at a workstation according to the CT angiogram protocol with  3-D and/or MIP post processing imaging generated.  1862 DICOM images  received.  Automated mA/kV exposure control was utilized and patient examination  was performed in strict accordance with principles of ALARA.  COMPARISON:  CTA abdomen and pelvis 2/9/2025.  FINDINGS:   Vascular:  Mesenteric: The celiac, SMA, and GIUSEPPE demonstrate no significant  stenosis.     Right renal: Single vessel demonstrates no significant stenosis.     Left renal: Single vessel demonstrates no significant stenosis.  Abdominal aorta: The abdominal aorta is not aneurysmal and  demonstrates no significant occlusive disease.  Iliacs: The common, external, and internal iliac vessels demonstrate  no  aneurysmal disease or significant stenosis.  Abdomen:  The lung bases show mild septal thickening trace atelectasis.  Small  right and trace left pleural effusions.  The liver is shows no acute finding.  A few tiny calcified gallstones.   No signs of acute cholecystitis.  The pancreas, spleen, adrenal  glands, and kidneys demonstrate no acute pathology.  There is no free air or lymph node enlargement.  Pelvis:  There is no bowel wall thickening or obstruction.  Scattered  diverticula.  Normal appendix.  There is no free fluid.  Lymph nodes  are not enlarged.  Urinary bladder is unremarkable.  Skeleton:    There are no acute fractures.  No suspicious bony lesions.  Right hip  Arthroplasty noted.  IMPRESSION:  No evidence of active arterial bleeding.  Several scattered colonic diverticula.  Signed by Gokul Huffman MD     CT angio abdomen pelvis w and or wo IV IV contrast  Status: Final result     PACS Images     Show images for CT angio abdomen pelvis w and or wo IV IV contrast  Signed by    Signed Time Phone Pager   Mikhail Freitas MD 2/09/2025 23:39 [phone]      Exam Information    Status Exam Begun Exam Ended   Final 2/09/2025 21:05 2/09/2025 21:25     Study Result    Narrative & Impression   STUDY:  CT Angiogram of the Abdomen and Pelvis; 2/9/2025 at 11:11 p.m.  INDICATION:  GI bleed.  Abdominal pain.  COMPARISON:  US scrotum 12/21/2024.  CT AP 12/20/2024.  ACCESSION NUMBER(S):  [id number]  ORDERING CLINICIAN:  BIANCA LANGE  TECHNIQUE:  Helical CT is performed from the aortic diaphragmatic  hiatus through the symphysis pubis before and after bolus  administration of intravenous contrast.  Images are reviewed and  processed at a workstation according to the CT angiogram protocol with  3-D and/or MIP post processing imaging generated.  Automated mA/kV exposure control was utilized and patient examination  was performed in strict accordance with principles of ALARA.  FINDINGS:   Lower thorax:  Lung  bases:The lung bases are clear.  Heart: The heart is partially imaged. Heart size is normal. AICD leads  are partially imaged within the right atrium and right ventricle.  Abdomen:  Liver: Hepatic density is heterogeneous. No focal hepatic lesion is  seen.  Gallbladder: Small layering gallstones are seen. No gallbladder  distention or wall thickening.   Pancreas: The pancreas is there is an enhancing 8 mm lesion within the  distal body of the pancreas (image 58, series 502). The pancreas is  otherwise unremarkable with no ductal distention or focal lesion.  Spleen: The spleen appears normal.  Adrenal glands: Adrenal glands are unremarkable.  Kidneys:  The kidneys are symmetric in appearance with no  nephrolithiasis or obstruction. No solid or cystic lesions.  Gastrointestinal tract: The stomach appears normal.  No large or small  bowel distention or wall thickening. Mild colonic diverticulosis  without inflammatory stranding. No evidence of GI bleed. The appendix  appears normal.  Vasculature: There is mild calcific atherosclerosis of the aortoiliac  tree and branch vessels. The aorta is not enlarged.   Lymph nodes: No pathologically enlarged lymph nodes are seen.  Peritoneum: No free fluid within the peritoneum. No free air.  Abdominal wall: No abdominal wall hernias are seen. Mild inflammatory  changes are seen within the right inguinal canal, significantly  improved from prior exam.  Pelvis:  Reproductive: Unremarkable.  Urinary bladder: The bladder is partially distended and unremarkable  in appearance.  Lymph nodes: No pathologically enlarged lymph nodes are seen.  Musculoskeletal:  Moderate spondylitic changes involving the spine. No suspicious bony  lesions.  IMPRESSION:  1.No evidence of active GI bleed.  2.8 mm enhancing lesion within the distal body of the pancreas.  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  Recommend further evaluation with pancreatic protocol MRI  or  scintigraphy.  3.Mild colonic diverticulosis without evidence of diverticulitis.  4.Cholelithiasis without evidence of cholecystitis.  Signed by Mikhail Freitas MD     Assessment/Plan   Diagnoses and all orders for this visit:  Upper GI bleed  -     CBC; Future  Lesion of pancreas ([facility]HS-HCC)  -     CT abdomen pelvis w IV contrast; Future  AVM (arteriovenous malformation) of stomach, acquired  Iron deficiency anemia due to chronic blood loss  -     Capsule Endoscopy Small Intestine; Future  Acute post-hemorrhagic anemia  -     Capsule Endoscopy Small Intestine; Future    WE will check blood work today and if blood counts are stable then no further testing. However if they are low then we can consider a capsules endoscopy.   Addendum  Repeat blood counts are trending down hemoglobin is 7.9 and 25.7.  Will order capsule endoscopy              [1]   Current Outpatient Medications on File Prior to Visit   Medication Sig Dispense Refill    acetaminophen (Tylenol) 325 mg tablet Take 2 tablets (650 mg) by mouth every 4 hours if needed for mild pain (1 - 3) or fever (temp greater than 38.0 C).      amiodarone (Pacerone) 200 mg tablet Take 1 tablet (200 mg) by mouth 2 times a day. 58 tablet 0    amiodarone (Pacerone) 200 mg tablet Take 1 tablet (200 mg) by mouth once daily. Do not fill before May 24, 2025. 30 tablet 0    aspirin 81 mg EC tablet Take 1 tablet (81 mg) by mouth once daily. Do not fill before May 5, 2025.      brimonidine (AlphaGAN) 0.2 % ophthalmic solution Administer 1 drop into both eyes 2 times a day.      cholecalciferol (Vitamin D-3) 50 mcg (2,000 units) tablet Take 1 tablet (50 mcg) by mouth once daily.      docusate sodium (Colace) 100 mg capsule Take 1 capsule (100 mg) by mouth 2 times a day as needed for constipation.      donepezil (Aricept) 5 mg tablet Take 1 tablet (5 mg) by mouth once daily at bedtime.      levETIRAcetam (Keppra) 1,000 mg tablet Take 1 tablet (1,000 mg) by mouth 2 times a day.       metoprolol succinate XL (Toprol-XL) 25 mg 24 hr tablet Take 1 tablet (25 mg) by mouth 2 times a day. Do not crush or chew.      nitroglycerin (Nitrostat) 0.4 mg SL tablet Place 1 tablet (0.4 mg) under the tongue every 5 minutes if needed for chest pain. 90 tablet 12    oxymetazoline (Afrin) 0.05 % nasal spray Administer 2 sprays into each nostril every 12 hours if needed (For nose bleeds.). Do not use for more than 3 days consecutively. Use as needed to aid with stopping nose bleeds. 30 mL 0    pantoprazole (ProtoNix) 40 mg EC tablet Take 1 tablet (40 mg) by mouth 2 times a day. Do not crush, chew, or split. 60 tablet 1    pravastatin (Pravachol) 80 mg tablet Take 1 tablet (80 mg) by mouth once daily at bedtime.      QUEtiapine (SEROquel) 50 mg tablet Take 1 tablet (50 mg) by mouth once daily at bedtime.      sodium chloride (Saline Nasal Mist) 3 % mist Administer 2 Squirts into affected nostril(s) 2 times a day. 126 mL 0    dexAMETHasone (Decadron) 2 mg tablet Take 2 tablets (4 mg) by mouth every 6 hours for 1 day, THEN 1.5 tablets (3 mg) every 8 hours for 2 days, THEN 1 tablet (2 mg) every 8 hours for 2 days, THEN 1 tablet (2 mg) once daily for 2 days. 25 tablet 0     No current facility-administered medications on file prior to visit.

## 2025-05-22 ENCOUNTER — APPOINTMENT (OUTPATIENT)
Dept: GASTROENTEROLOGY | Facility: CLINIC | Age: 80
End: 2025-05-22
Payer: MEDICARE

## 2025-05-22 VITALS
HEART RATE: 60 BPM | BODY MASS INDEX: 24.99 KG/M2 | WEIGHT: 178.5 LBS | SYSTOLIC BLOOD PRESSURE: 144 MMHG | OXYGEN SATURATION: 98 % | HEIGHT: 71 IN | DIASTOLIC BLOOD PRESSURE: 76 MMHG | RESPIRATION RATE: 18 BRPM

## 2025-05-22 DIAGNOSIS — K31.819 AVM (ARTERIOVENOUS MALFORMATION) OF STOMACH, ACQUIRED: ICD-10-CM

## 2025-05-22 DIAGNOSIS — D62 ACUTE POST-HEMORRHAGIC ANEMIA: ICD-10-CM

## 2025-05-22 DIAGNOSIS — D50.0 IRON DEFICIENCY ANEMIA DUE TO CHRONIC BLOOD LOSS: ICD-10-CM

## 2025-05-22 DIAGNOSIS — K86.9 LESION OF PANCREAS (HHS-HCC): ICD-10-CM

## 2025-05-22 DIAGNOSIS — K92.2 UPPER GI BLEED: Primary | ICD-10-CM

## 2025-05-22 PROCEDURE — 1157F ADVNC CARE PLAN IN RCRD: CPT | Performed by: INTERNAL MEDICINE

## 2025-05-22 PROCEDURE — 99215 OFFICE O/P EST HI 40 MIN: CPT | Performed by: INTERNAL MEDICINE

## 2025-05-22 PROCEDURE — 3077F SYST BP >= 140 MM HG: CPT | Performed by: INTERNAL MEDICINE

## 2025-05-22 PROCEDURE — 1111F DSCHRG MED/CURRENT MED MERGE: CPT | Performed by: INTERNAL MEDICINE

## 2025-05-22 PROCEDURE — 1159F MED LIST DOCD IN RCRD: CPT | Performed by: INTERNAL MEDICINE

## 2025-05-22 PROCEDURE — 3078F DIAST BP <80 MM HG: CPT | Performed by: INTERNAL MEDICINE

## 2025-05-22 NOTE — PATIENT INSTRUCTIONS
WE will check blood work today and if blood counts are stable then no further testing. However if they are low then we can consider a capsules endoscopy.   Upper GI bleed  -     CBC; Future  Lesion of pancreas ([facility]HS-HCC)  -     CT abdomen pelvis w IV contrast; Future    WE will check blood work today and if blood counts are stable then no further testing. However if they are low then we can consider a capsules endoscopy.

## 2025-05-23 LAB
ERYTHROCYTE [DISTWIDTH] IN BLOOD BY AUTOMATED COUNT: 19.6 % (ref 11–15)
HCT VFR BLD AUTO: 25.7 % (ref 38.5–50)
HGB BLD-MCNC: 7.9 G/DL (ref 13.2–17.1)
MCH RBC QN AUTO: 27 PG (ref 27–33)
MCHC RBC AUTO-ENTMCNC: 30.7 G/DL (ref 32–36)
MCV RBC AUTO: 87.7 FL (ref 80–100)
PLATELET # BLD AUTO: 559 THOUSAND/UL (ref 140–400)
PMV BLD REES-ECKER: 9.8 FL (ref 7.5–12.5)
RBC # BLD AUTO: 2.93 MILLION/UL (ref 4.2–5.8)
WBC # BLD AUTO: 4.5 THOUSAND/UL (ref 3.8–10.8)

## 2025-06-02 ASSESSMENT — ENCOUNTER SYMPTOMS
ENDOCRINE NEGATIVE: 1
NEUROLOGICAL NEGATIVE: 1
GASTROINTESTINAL NEGATIVE: 1
RESPIRATORY NEGATIVE: 1
FATIGUE: 1
CARDIOVASCULAR NEGATIVE: 1

## 2025-06-06 NOTE — RESULT ENCOUNTER NOTE
Hi Mr. Burkitt,   Repeat blood counts have shown that hemoglobin has dropped to 7.9 from 9.1 within 2 weeks.  Based on this results you will need a capsule endoscopy.  My office will reach out to you to help with scheduling.  Do not hesitate to contact me if you have any questions or concerns.

## 2025-06-08 NOTE — CARE PLAN
Problem: Pain - Adult  Goal: Verbalizes/displays adequate comfort level or baseline comfort level  Outcome: Progressing     Problem: Safety - Adult  Goal: Free from fall injury  Outcome: Progressing     Problem: Discharge Planning  Goal: Discharge to home or other facility with appropriate resources  Outcome: Progressing     Problem: Chronic Conditions and Co-morbidities  Goal: Patient's chronic conditions and co-morbidity symptoms are monitored and maintained or improved  Outcome: Progressing     Problem: Skin  Goal: Decreased wound size/increased tissue granulation at next dressing change  Outcome: Progressing  Goal: Participates in plan/prevention/treatment measures  Outcome: Progressing  Goal: Prevent/manage excess moisture  Outcome: Progressing  Goal: Prevent/minimize sheer/friction injuries  Outcome: Progressing  Goal: Promote/optimize nutrition  Outcome: Progressing  Goal: Promote skin healing  Outcome: Progressing     Problem: Heart Failure  Goal: Improved gas exchange this shift  Outcome: Progressing  Goal: Improved urinary output this shift  Outcome: Progressing  Goal: Reduction in peripheral edema within 24 hours  Outcome: Progressing  Goal: Report improvement of dyspnea/breathlessness this shift  Outcome: Progressing  Goal: Weight from fluid excess reduced over 2-3 days, then stabilize  Outcome: Progressing  Goal: Increase self care and/or family involvement in 24 hours  Outcome: Progressing     Problem: Pain  Goal: Takes deep breaths with improved pain control throughout the shift  Outcome: Progressing  Goal: Turns in bed with improved pain control throughout the shift  Outcome: Progressing  Goal: Walks with improved pain control throughout the shift  Outcome: Progressing  Goal: Performs ADL's with improved pain control throughout shift  Outcome: Progressing  Goal: Participates in PT with improved pain control throughout the shift  Outcome: Progressing  Goal: Free from opioid side effects throughout  RN gave report to transport team. Pt discharged with all paperwork. Pt now en route back to facility.   the shift  Outcome: Progressing  Goal: Free from acute confusion related to pain meds throughout the shift  Outcome: Progressing

## 2025-06-10 ENCOUNTER — APPOINTMENT (OUTPATIENT)
Dept: GASTROENTEROLOGY | Facility: HOSPITAL | Age: 80
DRG: 378 | End: 2025-06-10
Payer: MEDICARE

## 2025-06-10 ENCOUNTER — APPOINTMENT (OUTPATIENT)
Dept: CARDIOLOGY | Facility: HOSPITAL | Age: 80
DRG: 378 | End: 2025-06-10
Payer: MEDICARE

## 2025-06-10 ENCOUNTER — APPOINTMENT (OUTPATIENT)
Dept: GASTROENTEROLOGY | Facility: HOSPITAL | Age: 80
End: 2025-06-10
Payer: MEDICARE

## 2025-06-10 ENCOUNTER — APPOINTMENT (OUTPATIENT)
Dept: RADIOLOGY | Facility: HOSPITAL | Age: 80
DRG: 378 | End: 2025-06-10
Payer: MEDICARE

## 2025-06-10 ENCOUNTER — HOSPITAL ENCOUNTER (INPATIENT)
Facility: HOSPITAL | Age: 80
Discharge: HOME | DRG: 378 | End: 2025-06-10
Attending: STUDENT IN AN ORGANIZED HEALTH CARE EDUCATION/TRAINING PROGRAM | Admitting: INTERNAL MEDICINE
Payer: MEDICARE

## 2025-06-10 DIAGNOSIS — I47.20 V TACH (MULTI): ICD-10-CM

## 2025-06-10 DIAGNOSIS — I50.9 CHRONIC CONGESTIVE HEART FAILURE, UNSPECIFIED HEART FAILURE TYPE: ICD-10-CM

## 2025-06-10 DIAGNOSIS — F01.B4 MODERATE VASCULAR DEMENTIA WITH ANXIETY: ICD-10-CM

## 2025-06-10 DIAGNOSIS — D64.9 ANEMIA, UNSPECIFIED TYPE: Primary | ICD-10-CM

## 2025-06-10 DIAGNOSIS — R60.0 BILATERAL LOWER EXTREMITY EDEMA: ICD-10-CM

## 2025-06-10 DIAGNOSIS — K92.2 UPPER GI BLEED: ICD-10-CM

## 2025-06-10 DIAGNOSIS — J34.0 NASAL SEPTUM ULCERATION: ICD-10-CM

## 2025-06-10 DIAGNOSIS — D50.0 IRON DEFICIENCY ANEMIA DUE TO CHRONIC BLOOD LOSS: ICD-10-CM

## 2025-06-10 DIAGNOSIS — I25.118 CORONARY ARTERY DISEASE OF NATIVE ARTERY OF NATIVE HEART WITH STABLE ANGINA PECTORIS: ICD-10-CM

## 2025-06-10 DIAGNOSIS — I20.0 ANGINA PECTORIS, UNSTABLE (MULTI): ICD-10-CM

## 2025-06-10 LAB
ABO GROUP (TYPE) IN BLOOD: NORMAL
ALBUMIN SERPL BCP-MCNC: 3.9 G/DL (ref 3.4–5)
ALBUMIN SERPL BCP-MCNC: 4.2 G/DL (ref 3.4–5)
ALP SERPL-CCNC: 106 U/L (ref 33–136)
ALT SERPL W P-5'-P-CCNC: 32 U/L (ref 10–52)
ANION GAP SERPL CALC-SCNC: 12 MMOL/L (ref 10–20)
ANION GAP SERPL CALC-SCNC: 13 MMOL/L (ref 10–20)
ANTIBODY SCREEN: NORMAL
AST SERPL W P-5'-P-CCNC: 40 U/L (ref 9–39)
BASOPHILS # BLD MANUAL: 0.05 X10*3/UL (ref 0–0.1)
BASOPHILS NFR BLD MANUAL: 1 %
BILIRUB SERPL-MCNC: 0.6 MG/DL (ref 0–1.2)
BLOOD EXPIRATION DATE: NORMAL
BNP SERPL-MCNC: 278 PG/ML (ref 0–99)
BUN SERPL-MCNC: 20 MG/DL (ref 6–23)
BUN SERPL-MCNC: 21 MG/DL (ref 6–23)
CALCIUM SERPL-MCNC: 8.4 MG/DL (ref 8.6–10.3)
CALCIUM SERPL-MCNC: 8.5 MG/DL (ref 8.6–10.3)
CARDIAC TROPONIN I PNL SERPL HS: 11 NG/L (ref 0–20)
CARDIAC TROPONIN I PNL SERPL HS: 12 NG/L (ref 0–20)
CARDIAC TROPONIN I PNL SERPL HS: 9 NG/L (ref 0–20)
CHLORIDE SERPL-SCNC: 108 MMOL/L (ref 98–107)
CHLORIDE SERPL-SCNC: 108 MMOL/L (ref 98–107)
CO2 SERPL-SCNC: 24 MMOL/L (ref 21–32)
CO2 SERPL-SCNC: 24 MMOL/L (ref 21–32)
CREAT SERPL-MCNC: 1.14 MG/DL (ref 0.5–1.3)
CREAT SERPL-MCNC: 1.23 MG/DL (ref 0.5–1.3)
DACRYOCYTES BLD QL SMEAR: ABNORMAL
DISPENSE STATUS: NORMAL
EGFRCR SERPLBLD CKD-EPI 2021: 60 ML/MIN/1.73M*2
EGFRCR SERPLBLD CKD-EPI 2021: 66 ML/MIN/1.73M*2
EOSINOPHIL # BLD MANUAL: 0.1 X10*3/UL (ref 0–0.4)
EOSINOPHIL NFR BLD MANUAL: 2 %
ERYTHROCYTE [DISTWIDTH] IN BLOOD BY AUTOMATED COUNT: 19.4 % (ref 11.5–14.5)
ERYTHROCYTE [DISTWIDTH] IN BLOOD BY AUTOMATED COUNT: 21.3 % (ref 11.5–14.5)
FERRITIN SERPL-MCNC: 10 NG/ML (ref 20–300)
GLUCOSE BLD MANUAL STRIP-MCNC: 93 MG/DL (ref 74–99)
GLUCOSE SERPL-MCNC: 107 MG/DL (ref 74–99)
GLUCOSE SERPL-MCNC: 129 MG/DL (ref 74–99)
HCT VFR BLD AUTO: 22.1 % (ref 41–52)
HCT VFR BLD AUTO: 26.1 % (ref 41–52)
HCT VFR BLD AUTO: 27.8 % (ref 41–52)
HGB BLD-MCNC: 6.3 G/DL (ref 13.5–17.5)
HGB BLD-MCNC: 7.7 G/DL (ref 13.5–17.5)
HGB BLD-MCNC: 8.1 G/DL (ref 13.5–17.5)
IMM GRANULOCYTES # BLD AUTO: 0.02 X10*3/UL (ref 0–0.5)
IMM GRANULOCYTES NFR BLD AUTO: 0.4 % (ref 0–0.9)
INR PPP: 1.2 (ref 0.9–1.1)
IRON SATN MFR SERPL: 3 % (ref 25–45)
IRON SERPL-MCNC: 13 UG/DL (ref 35–150)
LYMPHOCYTES # BLD MANUAL: 0.75 X10*3/UL (ref 0.8–3)
LYMPHOCYTES NFR BLD MANUAL: 15 %
MAGNESIUM SERPL-MCNC: 2.15 MG/DL (ref 1.6–2.4)
MCH RBC QN AUTO: 23.9 PG (ref 26–34)
MCH RBC QN AUTO: 24.8 PG (ref 26–34)
MCHC RBC AUTO-ENTMCNC: 28.5 G/DL (ref 32–36)
MCHC RBC AUTO-ENTMCNC: 29.1 G/DL (ref 32–36)
MCV RBC AUTO: 84 FL (ref 80–100)
MCV RBC AUTO: 85 FL (ref 80–100)
MONOCYTES # BLD MANUAL: 0.15 X10*3/UL (ref 0.05–0.8)
MONOCYTES NFR BLD MANUAL: 3 %
NEUTROPHILS # BLD MANUAL: 3.95 X10*3/UL (ref 1.6–5.5)
NEUTS BAND # BLD MANUAL: 0.05 X10*3/UL (ref 0–0.5)
NEUTS BAND NFR BLD MANUAL: 1 %
NEUTS SEG # BLD MANUAL: 3.9 X10*3/UL (ref 1.6–5)
NEUTS SEG NFR BLD MANUAL: 78 %
NRBC BLD-RTO: 0 /100 WBCS (ref 0–0)
NRBC BLD-RTO: 0 /100 WBCS (ref 0–0)
OVALOCYTES BLD QL SMEAR: ABNORMAL
PHOSPHATE SERPL-MCNC: 3 MG/DL (ref 2.5–4.9)
PLATELET # BLD AUTO: 273 X10*3/UL (ref 150–450)
PLATELET # BLD AUTO: 287 X10*3/UL (ref 150–450)
POTASSIUM SERPL-SCNC: 4.1 MMOL/L (ref 3.5–5.3)
POTASSIUM SERPL-SCNC: 4.5 MMOL/L (ref 3.5–5.3)
PRODUCT BLOOD TYPE: 1700
PRODUCT CODE: NORMAL
PROT SERPL-MCNC: 6.8 G/DL (ref 6.4–8.2)
PROTHROMBIN TIME: 12.9 SECONDS (ref 9.8–12.4)
RBC # BLD AUTO: 2.64 X10*6/UL (ref 4.5–5.9)
RBC # BLD AUTO: 3.26 X10*6/UL (ref 4.5–5.9)
RBC MORPH BLD: ABNORMAL
RH FACTOR (ANTIGEN D): NORMAL
SCHISTOCYTES BLD QL SMEAR: ABNORMAL
SODIUM SERPL-SCNC: 139 MMOL/L (ref 136–145)
SODIUM SERPL-SCNC: 141 MMOL/L (ref 136–145)
TARGETS BLD QL SMEAR: ABNORMAL
TIBC SERPL-MCNC: 426 UG/DL (ref 240–445)
TOTAL CELLS COUNTED BLD: 100
UIBC SERPL-MCNC: 413 UG/DL (ref 110–370)
UNIT ABO: NORMAL
UNIT NUMBER: NORMAL
UNIT RH: NORMAL
UNIT VOLUME: 350
WBC # BLD AUTO: 5 X10*3/UL (ref 4.4–11.3)
WBC # BLD AUTO: 7.9 X10*3/UL (ref 4.4–11.3)
XM INTEP: NORMAL

## 2025-06-10 PROCEDURE — 86923 COMPATIBILITY TEST ELECTRIC: CPT

## 2025-06-10 PROCEDURE — 93005 ELECTROCARDIOGRAM TRACING: CPT

## 2025-06-10 PROCEDURE — 2500000004 HC RX 250 GENERAL PHARMACY W/ HCPCS (ALT 636 FOR OP/ED)

## 2025-06-10 PROCEDURE — 84484 ASSAY OF TROPONIN QUANT: CPT

## 2025-06-10 PROCEDURE — 2500000004 HC RX 250 GENERAL PHARMACY W/ HCPCS (ALT 636 FOR OP/ED): Performed by: PHYSICIAN ASSISTANT

## 2025-06-10 PROCEDURE — 71045 X-RAY EXAM CHEST 1 VIEW: CPT | Performed by: RADIOLOGY

## 2025-06-10 PROCEDURE — 83880 ASSAY OF NATRIURETIC PEPTIDE: CPT | Performed by: PHYSICIAN ASSISTANT

## 2025-06-10 PROCEDURE — 36415 COLL VENOUS BLD VENIPUNCTURE: CPT

## 2025-06-10 PROCEDURE — 82947 ASSAY GLUCOSE BLOOD QUANT: CPT

## 2025-06-10 PROCEDURE — 2500000001 HC RX 250 WO HCPCS SELF ADMINISTERED DRUGS (ALT 637 FOR MEDICARE OP): Performed by: PHYSICIAN ASSISTANT

## 2025-06-10 PROCEDURE — 83550 IRON BINDING TEST: CPT | Performed by: STUDENT IN AN ORGANIZED HEALTH CARE EDUCATION/TRAINING PROGRAM

## 2025-06-10 PROCEDURE — 2500000002 HC RX 250 W HCPCS SELF ADMINISTERED DRUGS (ALT 637 FOR MEDICARE OP, ALT 636 FOR OP/ED): Performed by: PHYSICIAN ASSISTANT

## 2025-06-10 PROCEDURE — 0DJ07ZZ INSPECTION OF UPPER INTESTINAL TRACT, VIA NATURAL OR ARTIFICIAL OPENING: ICD-10-PCS | Performed by: INTERNAL MEDICINE

## 2025-06-10 PROCEDURE — 85027 COMPLETE CBC AUTOMATED: CPT | Performed by: PHYSICIAN ASSISTANT

## 2025-06-10 PROCEDURE — 99285 EMERGENCY DEPT VISIT HI MDM: CPT | Mod: 25 | Performed by: STUDENT IN AN ORGANIZED HEALTH CARE EDUCATION/TRAINING PROGRAM

## 2025-06-10 PROCEDURE — 85007 BL SMEAR W/DIFF WBC COUNT: CPT

## 2025-06-10 PROCEDURE — 2020000001 HC ICU ROOM DAILY

## 2025-06-10 PROCEDURE — 80069 RENAL FUNCTION PANEL: CPT | Mod: CCI | Performed by: PHYSICIAN ASSISTANT

## 2025-06-10 PROCEDURE — 0W3P8ZZ CONTROL BLEEDING IN GASTROINTESTINAL TRACT, VIA NATURAL OR ARTIFICIAL OPENING ENDOSCOPIC: ICD-10-PCS | Performed by: INTERNAL MEDICINE

## 2025-06-10 PROCEDURE — 36430 TRANSFUSION BLD/BLD COMPNT: CPT

## 2025-06-10 PROCEDURE — 99236 HOSP IP/OBS SAME DATE HI 85: CPT | Performed by: PHYSICIAN ASSISTANT

## 2025-06-10 PROCEDURE — P9016 RBC LEUKOCYTES REDUCED: HCPCS

## 2025-06-10 PROCEDURE — 82728 ASSAY OF FERRITIN: CPT | Performed by: STUDENT IN AN ORGANIZED HEALTH CARE EDUCATION/TRAINING PROGRAM

## 2025-06-10 PROCEDURE — 99223 1ST HOSP IP/OBS HIGH 75: CPT

## 2025-06-10 PROCEDURE — 99291 CRITICAL CARE FIRST HOUR: CPT | Performed by: STUDENT IN AN ORGANIZED HEALTH CARE EDUCATION/TRAINING PROGRAM

## 2025-06-10 PROCEDURE — 85027 COMPLETE CBC AUTOMATED: CPT

## 2025-06-10 PROCEDURE — 80053 COMPREHEN METABOLIC PANEL: CPT

## 2025-06-10 PROCEDURE — 83735 ASSAY OF MAGNESIUM: CPT | Performed by: PHYSICIAN ASSISTANT

## 2025-06-10 PROCEDURE — 86901 BLOOD TYPING SEROLOGIC RH(D): CPT | Performed by: STUDENT IN AN ORGANIZED HEALTH CARE EDUCATION/TRAINING PROGRAM

## 2025-06-10 PROCEDURE — 84484 ASSAY OF TROPONIN QUANT: CPT | Performed by: PHYSICIAN ASSISTANT

## 2025-06-10 PROCEDURE — 85014 HEMATOCRIT: CPT | Performed by: PHYSICIAN ASSISTANT

## 2025-06-10 PROCEDURE — 96374 THER/PROPH/DIAG INJ IV PUSH: CPT

## 2025-06-10 PROCEDURE — 71045 X-RAY EXAM CHEST 1 VIEW: CPT

## 2025-06-10 PROCEDURE — 91110 GI TRC IMG INTRAL ESOPH-ILE: CPT

## 2025-06-10 PROCEDURE — 85610 PROTHROMBIN TIME: CPT

## 2025-06-10 RX ORDER — HYDRALAZINE HYDROCHLORIDE 10 MG/1
10 TABLET, FILM COATED ORAL ONCE
Status: DISCONTINUED | OUTPATIENT
Start: 2025-06-10 | End: 2025-06-10

## 2025-06-10 RX ORDER — LEVETIRACETAM 500 MG/1
1000 TABLET ORAL 2 TIMES DAILY
Status: DISCONTINUED | OUTPATIENT
Start: 2025-06-10 | End: 2025-06-16 | Stop reason: HOSPADM

## 2025-06-10 RX ORDER — ASPIRIN 81 MG/1
81 TABLET ORAL DAILY
Status: DISCONTINUED | OUTPATIENT
Start: 2025-06-11 | End: 2025-06-16 | Stop reason: HOSPADM

## 2025-06-10 RX ORDER — BRIMONIDINE TARTRATE 2 MG/ML
1 SOLUTION/ DROPS OPHTHALMIC 2 TIMES DAILY
Status: DISCONTINUED | OUTPATIENT
Start: 2025-06-10 | End: 2025-06-16 | Stop reason: HOSPADM

## 2025-06-10 RX ORDER — PANTOPRAZOLE SODIUM 40 MG/1
40 TABLET, DELAYED RELEASE ORAL
Status: DISCONTINUED | OUTPATIENT
Start: 2025-06-13 | End: 2025-06-16 | Stop reason: HOSPADM

## 2025-06-10 RX ORDER — PANTOPRAZOLE SODIUM 40 MG/10ML
80 INJECTION, POWDER, LYOPHILIZED, FOR SOLUTION INTRAVENOUS ONCE
Status: COMPLETED | OUTPATIENT
Start: 2025-06-10 | End: 2025-06-10

## 2025-06-10 RX ORDER — QUETIAPINE FUMARATE 50 MG/1
50 TABLET, FILM COATED ORAL NIGHTLY
Status: DISCONTINUED | OUTPATIENT
Start: 2025-06-10 | End: 2025-06-16 | Stop reason: HOSPADM

## 2025-06-10 RX ORDER — ACETAMINOPHEN 650 MG/1
650 SUPPOSITORY RECTAL EVERY 4 HOURS PRN
Status: DISCONTINUED | OUTPATIENT
Start: 2025-06-10 | End: 2025-06-16 | Stop reason: HOSPADM

## 2025-06-10 RX ORDER — PANTOPRAZOLE SODIUM 40 MG/10ML
40 INJECTION, POWDER, LYOPHILIZED, FOR SOLUTION INTRAVENOUS 2 TIMES DAILY
Status: COMPLETED | OUTPATIENT
Start: 2025-06-10 | End: 2025-06-12

## 2025-06-10 RX ORDER — OXYMETAZOLINE HCL 0.05 %
2 SPRAY, NON-AEROSOL (ML) NASAL EVERY 12 HOURS PRN
Status: DISCONTINUED | OUTPATIENT
Start: 2025-06-10 | End: 2025-06-10

## 2025-06-10 RX ORDER — ACETAMINOPHEN 325 MG/1
650 TABLET ORAL EVERY 4 HOURS PRN
Status: DISCONTINUED | OUTPATIENT
Start: 2025-06-10 | End: 2025-06-16 | Stop reason: HOSPADM

## 2025-06-10 RX ORDER — CHOLECALCIFEROL (VITAMIN D3) 25 MCG
50 TABLET ORAL DAILY
Status: DISCONTINUED | OUTPATIENT
Start: 2025-06-11 | End: 2025-06-16 | Stop reason: HOSPADM

## 2025-06-10 RX ORDER — PRAVASTATIN SODIUM 20 MG/1
80 TABLET ORAL NIGHTLY
Status: DISCONTINUED | OUTPATIENT
Start: 2025-06-10 | End: 2025-06-16 | Stop reason: HOSPADM

## 2025-06-10 RX ORDER — METOPROLOL SUCCINATE 25 MG/1
25 TABLET, EXTENDED RELEASE ORAL 2 TIMES DAILY
Status: DISCONTINUED | OUTPATIENT
Start: 2025-06-10 | End: 2025-06-16 | Stop reason: HOSPADM

## 2025-06-10 RX ORDER — ENALAPRILAT 1.25 MG/ML
0.62 INJECTION INTRAVENOUS ONCE
Status: DISCONTINUED | OUTPATIENT
Start: 2025-06-10 | End: 2025-06-11

## 2025-06-10 RX ORDER — AMIODARONE HYDROCHLORIDE 200 MG/1
200 TABLET ORAL DAILY
Status: DISCONTINUED | OUTPATIENT
Start: 2025-06-11 | End: 2025-06-11

## 2025-06-10 RX ORDER — TALC
3 POWDER (GRAM) TOPICAL NIGHTLY PRN
Status: DISCONTINUED | OUTPATIENT
Start: 2025-06-10 | End: 2025-06-16 | Stop reason: HOSPADM

## 2025-06-10 RX ORDER — NITROGLYCERIN 0.4 MG/1
0.4 TABLET SUBLINGUAL EVERY 5 MIN PRN
Status: DISCONTINUED | OUTPATIENT
Start: 2025-06-10 | End: 2025-06-16 | Stop reason: HOSPADM

## 2025-06-10 RX ORDER — ONDANSETRON 4 MG/1
4 TABLET, ORALLY DISINTEGRATING ORAL EVERY 8 HOURS PRN
Status: DISCONTINUED | OUTPATIENT
Start: 2025-06-10 | End: 2025-06-16 | Stop reason: HOSPADM

## 2025-06-10 RX ORDER — DOCUSATE SODIUM 100 MG/1
100 CAPSULE, LIQUID FILLED ORAL 2 TIMES DAILY
Status: DISCONTINUED | OUTPATIENT
Start: 2025-06-10 | End: 2025-06-16 | Stop reason: HOSPADM

## 2025-06-10 RX ORDER — FERROUS SULFATE 325(65) MG
65 TABLET ORAL EVERY OTHER DAY
Status: DISCONTINUED | OUTPATIENT
Start: 2025-06-10 | End: 2025-06-11

## 2025-06-10 RX ORDER — DONEPEZIL HYDROCHLORIDE 5 MG/1
5 TABLET, FILM COATED ORAL NIGHTLY
Status: DISCONTINUED | OUTPATIENT
Start: 2025-06-10 | End: 2025-06-16 | Stop reason: HOSPADM

## 2025-06-10 RX ORDER — ADHESIVE BANDAGE
30 BANDAGE TOPICAL DAILY PRN
COMMUNITY

## 2025-06-10 RX ORDER — ONDANSETRON HYDROCHLORIDE 2 MG/ML
4 INJECTION, SOLUTION INTRAVENOUS EVERY 8 HOURS PRN
Status: DISCONTINUED | OUTPATIENT
Start: 2025-06-10 | End: 2025-06-16 | Stop reason: HOSPADM

## 2025-06-10 RX ORDER — ADHESIVE BANDAGE
30 BANDAGE TOPICAL DAILY PRN
Status: DISCONTINUED | OUTPATIENT
Start: 2025-06-10 | End: 2025-06-16 | Stop reason: HOSPADM

## 2025-06-10 RX ADMIN — PANTOPRAZOLE SODIUM 40 MG: 40 INJECTION, POWDER, FOR SOLUTION INTRAVENOUS at 21:02

## 2025-06-10 RX ADMIN — METOPROLOL SUCCINATE 25 MG: 25 TABLET, EXTENDED RELEASE ORAL at 14:26

## 2025-06-10 RX ADMIN — DOCUSATE SODIUM 100 MG: 100 CAPSULE, LIQUID FILLED ORAL at 20:12

## 2025-06-10 RX ADMIN — METOPROLOL SUCCINATE 25 MG: 25 TABLET, EXTENDED RELEASE ORAL at 20:12

## 2025-06-10 RX ADMIN — DONEPEZIL HYDROCHLORIDE 5 MG: 5 TABLET ORAL at 20:11

## 2025-06-10 RX ADMIN — LEVETIRACETAM 1000 MG: 500 TABLET, FILM COATED ORAL at 20:12

## 2025-06-10 RX ADMIN — PRAVASTATIN SODIUM 80 MG: 20 TABLET ORAL at 20:12

## 2025-06-10 RX ADMIN — PANTOPRAZOLE SODIUM 40 MG: 40 INJECTION, POWDER, FOR SOLUTION INTRAVENOUS at 14:25

## 2025-06-10 RX ADMIN — SALINE NASAL SPRAY 2 SPRAY: 1.5 SOLUTION NASAL at 20:52

## 2025-06-10 RX ADMIN — FERROUS SULFATE TAB 325 MG (65 MG ELEMENTAL FE) 1 TABLET: 325 (65 FE) TAB at 15:46

## 2025-06-10 RX ADMIN — QUETIAPINE FUMARATE 50 MG: 50 TABLET ORAL at 20:12

## 2025-06-10 RX ADMIN — PANTOPRAZOLE SODIUM 80 MG: 40 INJECTION, POWDER, FOR SOLUTION INTRAVENOUS at 09:21

## 2025-06-10 RX ADMIN — BRIMONIDINE TARTRATE 1 DROP: 2 SOLUTION/ DROPS OPHTHALMIC at 20:53

## 2025-06-10 SDOH — ECONOMIC STABILITY: INCOME INSECURITY: IN THE PAST 12 MONTHS HAS THE ELECTRIC, GAS, OIL, OR WATER COMPANY THREATENED TO SHUT OFF SERVICES IN YOUR HOME?: NO

## 2025-06-10 SDOH — SOCIAL STABILITY: SOCIAL INSECURITY: ARE YOU OR HAVE YOU BEEN THREATENED OR ABUSED PHYSICALLY, EMOTIONALLY, OR SEXUALLY BY ANYONE?: NO

## 2025-06-10 SDOH — SOCIAL STABILITY: SOCIAL INSECURITY: WITHIN THE LAST YEAR, HAVE YOU BEEN AFRAID OF YOUR PARTNER OR EX-PARTNER?: NO

## 2025-06-10 SDOH — SOCIAL STABILITY: SOCIAL INSECURITY: WITHIN THE LAST YEAR, HAVE YOU BEEN HUMILIATED OR EMOTIONALLY ABUSED IN OTHER WAYS BY YOUR PARTNER OR EX-PARTNER?: NO

## 2025-06-10 SDOH — SOCIAL STABILITY: SOCIAL INSECURITY: ABUSE: ADULT

## 2025-06-10 SDOH — ECONOMIC STABILITY: HOUSING INSECURITY: IN THE LAST 12 MONTHS, WAS THERE A TIME WHEN YOU WERE NOT ABLE TO PAY THE MORTGAGE OR RENT ON TIME?: NO

## 2025-06-10 SDOH — ECONOMIC STABILITY: FOOD INSECURITY: HOW HARD IS IT FOR YOU TO PAY FOR THE VERY BASICS LIKE FOOD, HOUSING, MEDICAL CARE, AND HEATING?: NOT VERY HARD

## 2025-06-10 SDOH — ECONOMIC STABILITY: TRANSPORTATION INSECURITY: IN THE PAST 12 MONTHS, HAS LACK OF TRANSPORTATION KEPT YOU FROM MEDICAL APPOINTMENTS OR FROM GETTING MEDICATIONS?: NO

## 2025-06-10 SDOH — SOCIAL STABILITY: SOCIAL INSECURITY: HAVE YOU HAD THOUGHTS OF HARMING ANYONE ELSE?: NO

## 2025-06-10 SDOH — ECONOMIC STABILITY: HOUSING INSECURITY: AT ANY TIME IN THE PAST 12 MONTHS, WERE YOU HOMELESS OR LIVING IN A SHELTER (INCLUDING NOW)?: NO

## 2025-06-10 SDOH — ECONOMIC STABILITY: FOOD INSECURITY: WITHIN THE PAST 12 MONTHS, THE FOOD YOU BOUGHT JUST DIDN'T LAST AND YOU DIDN'T HAVE MONEY TO GET MORE.: NEVER TRUE

## 2025-06-10 SDOH — ECONOMIC STABILITY: FOOD INSECURITY: WITHIN THE PAST 12 MONTHS, YOU WORRIED THAT YOUR FOOD WOULD RUN OUT BEFORE YOU GOT THE MONEY TO BUY MORE.: NEVER TRUE

## 2025-06-10 SDOH — SOCIAL STABILITY: SOCIAL INSECURITY: DO YOU FEEL UNSAFE GOING BACK TO THE PLACE WHERE YOU ARE LIVING?: NO

## 2025-06-10 SDOH — SOCIAL STABILITY: SOCIAL INSECURITY: DOES ANYONE TRY TO KEEP YOU FROM HAVING/CONTACTING OTHER FRIENDS OR DOING THINGS OUTSIDE YOUR HOME?: NO

## 2025-06-10 SDOH — SOCIAL STABILITY: SOCIAL INSECURITY: HAS ANYONE EVER THREATENED TO HURT YOUR FAMILY OR YOUR PETS?: NO

## 2025-06-10 SDOH — SOCIAL STABILITY: SOCIAL INSECURITY: DO YOU FEEL ANYONE HAS EXPLOITED OR TAKEN ADVANTAGE OF YOU FINANCIALLY OR OF YOUR PERSONAL PROPERTY?: NO

## 2025-06-10 SDOH — ECONOMIC STABILITY: HOUSING INSECURITY: IN THE PAST 12 MONTHS, HOW MANY TIMES HAVE YOU MOVED WHERE YOU WERE LIVING?: 1

## 2025-06-10 SDOH — SOCIAL STABILITY: SOCIAL INSECURITY: ARE THERE ANY APPARENT SIGNS OF INJURIES/BEHAVIORS THAT COULD BE RELATED TO ABUSE/NEGLECT?: NO

## 2025-06-10 SDOH — SOCIAL STABILITY: SOCIAL INSECURITY: WERE YOU ABLE TO COMPLETE ALL THE BEHAVIORAL HEALTH SCREENINGS?: YES

## 2025-06-10 ASSESSMENT — PAIN SCALES - GENERAL
PAINLEVEL_OUTOF10: 0 - NO PAIN
PAINLEVEL_OUTOF10: 1
PAINLEVEL_OUTOF10: 0 - NO PAIN

## 2025-06-10 ASSESSMENT — HEART SCORE
RISK FACTORS: >2 RISK FACTORS OR HX OF ATHEROSCLEROTIC DISEASE
TROPONIN: LESS THAN OR EQUAL TO NORMAL LIMIT
ECG: NORMAL
AGE: 65+
ECG: NON-SPECIFIC REPOLARIZATION DISTURBANCE
RISK FACTORS: >2 RISK FACTORS OR HX OF ATHEROSCLEROTIC DISEASE
HEART SCORE: 4
HISTORY: SLIGHTLY SUSPICIOUS
AGE: 65+
HISTORY: SLIGHTLY SUSPICIOUS

## 2025-06-10 ASSESSMENT — COGNITIVE AND FUNCTIONAL STATUS - GENERAL
CLIMB 3 TO 5 STEPS WITH RAILING: A LITTLE
STANDING UP FROM CHAIR USING ARMS: A LITTLE
MOVING TO AND FROM BED TO CHAIR: A LITTLE
MOBILITY SCORE: 18
TURNING FROM BACK TO SIDE WHILE IN FLAT BAD: A LITTLE
CLIMB 3 TO 5 STEPS WITH RAILING: A LITTLE
WALKING IN HOSPITAL ROOM: A LITTLE
HELP NEEDED FOR BATHING: A LITTLE
WALKING IN HOSPITAL ROOM: A LITTLE
DAILY ACTIVITIY SCORE: 20
DRESSING REGULAR UPPER BODY CLOTHING: A LITTLE
EATING MEALS: A LITTLE
PATIENT BASELINE BEDBOUND: NO
DRESSING REGULAR LOWER BODY CLOTHING: A LITTLE
PERSONAL GROOMING: A LITTLE
HELP NEEDED FOR BATHING: A LITTLE
DRESSING REGULAR UPPER BODY CLOTHING: A LITTLE
DRESSING REGULAR LOWER BODY CLOTHING: A LITTLE
STANDING UP FROM CHAIR USING ARMS: A LITTLE
TOILETING: A LITTLE
MOVING FROM LYING ON BACK TO SITTING ON SIDE OF FLAT BED WITH BEDRAILS: A LITTLE
MOBILITY SCORE: 20
DAILY ACTIVITIY SCORE: 18
MOVING TO AND FROM BED TO CHAIR: A LITTLE
TOILETING: A LITTLE

## 2025-06-10 ASSESSMENT — PAIN - FUNCTIONAL ASSESSMENT
PAIN_FUNCTIONAL_ASSESSMENT: 0-10

## 2025-06-10 ASSESSMENT — LIFESTYLE VARIABLES
AUDIT-C TOTAL SCORE: 0
HOW MANY STANDARD DRINKS CONTAINING ALCOHOL DO YOU HAVE ON A TYPICAL DAY: PATIENT DOES NOT DRINK
SKIP TO QUESTIONS 9-10: 1
HOW OFTEN DO YOU HAVE A DRINK CONTAINING ALCOHOL: NEVER
HOW OFTEN DO YOU HAVE 6 OR MORE DRINKS ON ONE OCCASION: NEVER
AUDIT-C TOTAL SCORE: 0

## 2025-06-10 ASSESSMENT — ACTIVITIES OF DAILY LIVING (ADL)
GROOMING: INDEPENDENT
LACK_OF_TRANSPORTATION: NO
DRESSING YOURSELF: INDEPENDENT
FEEDING YOURSELF: INDEPENDENT
TOILETING: INDEPENDENT
LACK_OF_TRANSPORTATION: NO
HEARING - LEFT EAR: FUNCTIONAL
ASSISTIVE_DEVICE: WALKER
JUDGMENT_ADEQUATE_SAFELY_COMPLETE_DAILY_ACTIVITIES: YES
WALKS IN HOME: NEEDS ASSISTANCE
ADEQUATE_TO_COMPLETE_ADL: YES
BATHING: INDEPENDENT
HEARING - RIGHT EAR: FUNCTIONAL
LACK_OF_TRANSPORTATION: NO
PATIENT'S MEMORY ADEQUATE TO SAFELY COMPLETE DAILY ACTIVITIES?: YES

## 2025-06-10 ASSESSMENT — PAIN DESCRIPTION - ONSET: ONSET: SUDDEN

## 2025-06-10 ASSESSMENT — PAIN DESCRIPTION - LOCATION: LOCATION: CHEST

## 2025-06-10 ASSESSMENT — PAIN DESCRIPTION - FREQUENCY: FREQUENCY: CONSTANT/CONTINUOUS

## 2025-06-10 ASSESSMENT — PAIN DESCRIPTION - DESCRIPTORS
DESCRIPTORS: ACHING
DESCRIPTORS: ACHING

## 2025-06-10 ASSESSMENT — PAIN DESCRIPTION - PAIN TYPE: TYPE: ACUTE PAIN

## 2025-06-10 ASSESSMENT — PAIN DESCRIPTION - ORIENTATION: ORIENTATION: MID

## 2025-06-10 ASSESSMENT — PAIN DESCRIPTION - PROGRESSION: CLINICAL_PROGRESSION: RAPIDLY IMPROVING

## 2025-06-10 NOTE — CARE PLAN
The patient's goals for the shift include      The clinical goals for the shift include Monitor patient BP to be hemodynamically stable      Problem: Pain - Adult  Goal: Verbalizes/displays adequate comfort level or baseline comfort level  Outcome: Progressing     Problem: Safety - Adult  Goal: Free from fall injury  Outcome: Progressing     Problem: Discharge Planning  Goal: Discharge to home or other facility with appropriate resources  Outcome: Progressing     Problem: Chronic Conditions and Co-morbidities  Goal: Patient's chronic conditions and co-morbidity symptoms are monitored and maintained or improved  Outcome: Progressing     Problem: Nutrition  Goal: Nutrient intake appropriate for maintaining nutritional needs  Outcome: Progressing     Problem: Fall/Injury  Goal: Not fall by end of shift  Outcome: Progressing  Goal: Be free from injury by end of the shift  Outcome: Progressing  Goal: Verbalize understanding of personal risk factors for fall in the hospital  Outcome: Progressing  Goal: Verbalize understanding of risk factor reduction measures to prevent injury from fall in the home  Outcome: Progressing  Goal: Use assistive devices by end of the shift  Outcome: Progressing  Goal: Pace activities to prevent fatigue by end of the shift  Outcome: Progressing

## 2025-06-10 NOTE — NURSING NOTE
Still waiting on vasotec from pharmacy. Pharmacy states will be up on current round. Patient having runs of vtach. Provider notifed

## 2025-06-10 NOTE — CARE PLAN
1330 - Pt arrived to room 3033 from ER. All belongings with patient    1610 - Endo at bedside for capsule study. Able to advance to liquids after 2 hours.    EOS note: Pt oriented x4.. Ambulates as a SBA using the walker as needed. He was able to ambulate to with assistance to the bathroom. No reports of pain. Pt educated on capsule study. BP remains elevated, even with manual readings. Patient is asymptomatic. 1x dose vasotec ordered for this. Pt on telemetry, has pacemaker in place. He did receive 1u of blood in the ER and prior to coming up to floor. Repeat hgb 7.7. [place] duplex done, pending results at this time    Pt resting at this time. Bed is in lowest position and locked with alarm on. Call light and personal possesions are within reach.

## 2025-06-10 NOTE — ASSESSMENT & PLAN NOTE
Chest pain   Acute on chronic anemia   Iron deficiency   Bilateral lower extremity edema   History of Gastric AVMs  History of Sigmoid diverticulosis  HTN

## 2025-06-10 NOTE — ED PROVIDER NOTES
EMERGENCY DEPARTMENT ENCOUNTER      Pt Name: Wayne Burkitt  MRN: [id number]  Birthdate 1945  Date of evaluation: 6/10/2025  Provider: Grant Knight DO    CHIEF COMPLAINT       Chief Complaint   Patient presents with    Chest Pain     Pt c/o epigastric/mid sternal aching CP that started at 0530 this morning and has now improved upon arrival to ED         HISTORY OF PRESENT ILLNESS    79-year-old male, show coronary disease, hypertension, V. tach, hyperlipidemia, comes emergency room today with chest pain, lower chest radiating upwards, started this morning when he was in the car going to an endoscopy, lasted about 20 minutes, feels better now.  No specific exacerbating remitting factors, no nausea, vomiting, shortness of breath currently, no abdominal pain, urinary symptoms, or any other symptoms.  Does have a significant cardiac history.  Is not on any blood thinners.  Does take amiodarone at home for V. tach.  Has a pacemaker.  Has history of CABG as well.  No other symptoms.      History provided by:  Patient      Nursing Notes were reviewed.    PAST MEDICAL HISTORY   Medical History[1]      SURGICAL HISTORY     Surgical History[2]      CURRENT MEDICATIONS       Previous Medications    ACETAMINOPHEN (TYLENOL) 325 MG TABLET    Take 2 tablets (650 mg) by mouth every 4 hours if needed for mild pain (1 - 3) or fever (temp greater than 38.0 C).    AMIODARONE (PACERONE) 200 MG TABLET    Take 1 tablet (200 mg) by mouth 2 times a day.    AMIODARONE (PACERONE) 200 MG TABLET    Take 1 tablet (200 mg) by mouth once daily. Do not fill before May 24, 2025.    ASPIRIN 81 MG EC TABLET    Take 1 tablet (81 mg) by mouth once daily. Do not fill before May 5, 2025.    BRIMONIDINE (ALPHAGAN) 0.2 % OPHTHALMIC SOLUTION    Administer 1 drop into both eyes 2 times a day.    CHOLECALCIFEROL (VITAMIN D-3) 50 MCG (2,000 UNITS) TABLET    Take 1 tablet (50 mcg) by mouth once daily.    DEXAMETHASONE (DECADRON) 2 MG TABLET    Take 2 tablets  (4 mg) by mouth every 6 hours for 1 day, THEN 1.5 tablets (3 mg) every 8 hours for 2 days, THEN 1 tablet (2 mg) every 8 hours for 2 days, THEN 1 tablet (2 mg) once daily for 2 days.    DOCUSATE SODIUM (COLACE) 100 MG CAPSULE    Take 1 capsule (100 mg) by mouth 2 times a day as needed for constipation.    DONEPEZIL (ARICEPT) 5 MG TABLET    Take 1 tablet (5 mg) by mouth once daily at bedtime.    LEVETIRACETAM (KEPPRA) 1,000 MG TABLET    Take 1 tablet (1,000 mg) by mouth 2 times a day.    METOPROLOL SUCCINATE XL (TOPROL-XL) 25 MG 24 HR TABLET    Take 1 tablet (25 mg) by mouth 2 times a day. Do not crush or chew.    NITROGLYCERIN (NITROSTAT) 0.4 MG SL TABLET    Place 1 tablet (0.4 mg) under the tongue every 5 minutes if needed for chest pain.    OXYMETAZOLINE (AFRIN) 0.05 % NASAL SPRAY    Administer 2 sprays into each nostril every 12 hours if needed (For nose bleeds.). Do not use for more than 3 days consecutively. Use as needed to aid with stopping nose bleeds.    PANTOPRAZOLE (PROTONIX) 40 MG EC TABLET    Take 1 tablet (40 mg) by mouth 2 times a day. Do not crush, chew, or split.    PRAVASTATIN (PRAVACHOL) 80 MG TABLET    Take 1 tablet (80 mg) by mouth once daily at bedtime.    QUETIAPINE (SEROQUEL) 50 MG TABLET    Take 1 tablet (50 mg) by mouth once daily at bedtime.    SODIUM CHLORIDE (SALINE NASAL MIST) 3 % MIST    Administer 2 Squirts into affected nostril(s) 2 times a day.       ALLERGIES     Alprazolam, Bepotastine besilate, Clopidogrel, Doxazosin, Lisinopril, Metronidazole, Amlodipine, Atorvastatin, Cephalexin, Ciprofloxacin, Diphenhydramine hcl, Guaifenesin, Hydrochlorothiazide, Hydrocodone, Methylprednisolone, Phenytoin, Sertraline, Telmisartan, and Topiramate    FAMILY HISTORY     Family History[3]       SOCIAL HISTORY     Social History[4]    SCREENINGS                        PHYSICAL EXAM    (up to 7 for level 4, 8 or more for level 5)     ED Triage Vitals [06/10/25 0718]   Temperature Heart Rate  Respirations BP   36 °C (96.8 °F) 61 18 115/55      Pulse Ox Temp Source Heart Rate Source Patient Position   100 % Temporal Monitor Sitting      BP Location FiO2 (%)     Right arm --       Physical Exam  Vitals and nursing note reviewed.   Constitutional:       General: He is not in acute distress.  HENT:      Head: Normocephalic and atraumatic.   Eyes:      General: No scleral icterus.        Right eye: No discharge.         Left eye: No discharge.      Conjunctiva/sclera: Conjunctivae normal.   Cardiovascular:      Rate and Rhythm: Normal rate and regular rhythm.      Pulses: Normal pulses.   Pulmonary:      Effort: Pulmonary effort is normal.   Abdominal:      General: Abdomen is flat.      Palpations: Abdomen is soft.      Tenderness: There is no abdominal tenderness. There is no guarding or rebound.   Musculoskeletal:         General: No deformity.      Right lower leg: No edema.      Left lower leg: No edema.   Skin:     General: Skin is warm and dry.      Coloration: Skin is pale.   Neurological:      Mental Status: He is alert and oriented to person, place, and time. Mental status is at baseline.   Psychiatric:         Mood and Affect: Mood normal.         Behavior: Behavior normal.          DIAGNOSTIC RESULTS     LABS:  Labs Reviewed   PROTIME-INR - Abnormal       Result Value    Protime 12.9 (*)     INR 1.2 (*)    CBC WITH AUTO DIFFERENTIAL - Abnormal    WBC 5.0      nRBC 0.0      RBC 2.64 (*)     Hemoglobin 6.3 (*)     Hematocrit 22.1 (*)     MCV 84      MCH 23.9 (*)     MCHC 28.5 (*)     RDW 21.3 (*)     Platelets 273      Immature Granulocytes %, Automated 0.4      Immature Granulocytes Absolute, Automated 0.02      Narrative:     The previously reported component Neutrophils % is no longer being reported.  The previously reported component Lymphocytes % is no longer being reported.  The previously reported component Monocytes % is no longer being reported.  The previously   reported component  Eosinophils % is no longer being reported.  The previously reported component Basophils % is no longer being reported.  The previously reported component Absolute Neutrophils is no longer being reported.  The previously reported   component Absolute Lymphocytes is no longer being reported.  The previously reported component Absolute Monocytes is no longer being reported.  The previously reported component Absolute Eosinophils is no longer being reported.  The previously reported   component Absolute Basophils is no longer being reported.   COMPREHENSIVE METABOLIC PANEL - Abnormal    Glucose 107 (*)     Sodium 141      Potassium 4.1      Chloride 108 (*)     Bicarbonate 24      Anion Gap 13      Urea Nitrogen 21      Creatinine 1.23      eGFR 60 (*)     Calcium 8.5 (*)     Albumin 4.2      Alkaline Phosphatase 106      Total Protein 6.8      AST 40 (*)     Bilirubin, Total 0.6      ALT 32     MANUAL DIFFERENTIAL - Abnormal    Neutrophils %, Manual 78.0      Bands %, Manual 1.0      Lymphocytes %, Manual 15.0      Monocytes %, Manual 3.0      Eosinophils %, Manual 2.0      Basophils %, Manual 1.0      Seg Neutrophils Absolute, Manual 3.90      Bands Absolute, Manual 0.05      Lymphocytes Absolute, Manual 0.75 (*)     Monocytes Absolute, Manual 0.15      Eosinophils Absolute, Manual 0.10      Basophils Absolute, Manual 0.05      Total Cells Counted 100      Neutrophils Absolute, Manual 3.95      RBC Morphology See Below      RBC Fragments Few      Target Cells Few      Ovalocytes Few      Teardrop Cells Few     SERIAL TROPONIN-INITIAL - Normal    Troponin I, High Sensitivity 9      Narrative:     Less than 99th percentile of normal range cutoff-  Female and children under 18 years old <14 ng/L; Male <21 ng/L: Negative  Repeat testing should be performed if clinically indicated.     Female and children under 18 years old 14-50 ng/L; Male 21-50 ng/L:  Consistent with possible cardiac damage and possible increased  clinical   risk. Serial measurements may help to assess extent of myocardial damage.     >50 ng/L: Consistent with cardiac damage, increased clinical risk and  myocardial infarction. Serial measurements may help assess extent of   myocardial damage.      NOTE: Children less than 1 year old may have higher baseline troponin   levels and results should be interpreted in conjunction with the overall   clinical context.     NOTE: Troponin I testing is performed using a different   testing methodology at [facility] than at other   [facility]. Direct result comparisons should only   be made within the same method.   SERIAL TROPONIN, 1 HOUR - Normal    Troponin I, High Sensitivity 11      Narrative:     Less than 99th percentile of normal range cutoff-  Female and children under 18 years old <14 ng/L; Male <21 ng/L: Negative  Repeat testing should be performed if clinically indicated.     Female and children under 18 years old 14-50 ng/L; Male 21-50 ng/L:  Consistent with possible cardiac damage and possible increased clinical   risk. Serial measurements may help to assess extent of myocardial damage.     >50 ng/L: Consistent with cardiac damage, increased clinical risk and  myocardial infarction. Serial measurements may help assess extent of   myocardial damage.      NOTE: Children less than 1 year old may have higher baseline troponin   levels and results should be interpreted in conjunction with the overall   clinical context.     NOTE: Troponin I testing is performed using a different   testing methodology at [facility] than at other   [facility]. Direct result comparisons should only   be made within the same method.   TROPONIN SERIES- (INITIAL, 1 HR)    Narrative:     The following orders were created for panel order Troponin I Series, High Sensitivity (0, 1 HR).  Procedure                               Abnormality         Status                     ---------                                -----------         ------                     Troponin I, High Sensiti...[[id number]]  Normal              Final result               Troponin, High Sensitivi...[[id number]]  Normal              Final result                 Please view results for these tests on the individual orders.   TYPE AND SCREEN    ABO TYPE B      Rh TYPE POS      ANTIBODY SCREEN NEG     VERAB/VERIFY ABORH   PREPARE RBC    PRODUCT CODE [id number]      Unit Number [id number]      Unit ABO B      Unit RH NEG      XM INTEP COMP      Dispense Status XM      Blood Expiration Date 7/5/2025 11:59:00 PM EDT      PRODUCT BLOOD TYPE 1700      UNIT VOLUME 350         All other labs were within normal range or not returned as of this dictation.    Imaging  XR chest 1 view   Final Result   No acute cardiopulmonary process radiographically.        MACRO:   None.        Signed by: Gerda Rainey 6/10/2025 8:24 AM   Dictation workstation:   [id number]           Procedures  Critical Care    Performed by: Maribel Ram MD  Authorized by: Maribel Ram MD    Critical care provider statement:     Critical care time (minutes):  35    Critical care time was exclusive of:  Separately billable procedures and treating other patients    Critical care was necessary to treat or prevent imminent or life-threatening deterioration of the following conditions: symptomatic anemia.    Critical care was time spent personally by me on the following activities:  Blood draw for specimens, obtaining history from patient or surrogate, examination of patient, discussions with consultants, development of treatment plan with patient or surrogate, ordering and performing treatments and interventions, ordering and review of laboratory studies, ordering and review of radiographic studies, pulse oximetry and re-evaluation of patient's condition    Care discussed with: admitting provider    Comments:      Patient presented with chest pain, had symptomatic anemia necessitating  blood transfusion given hemoglobin is at critically low level less than 7       EMERGENCY DEPARTMENT COURSE/MDM:     ED Course as of 06/10/25 1015   Tue Richie 10, 2025   0723 Independently interpreted EKG shows 60 bpm atrial paced, QTc 464, no acute injury pattern.  There are inverted T waves in the inferior leads, lateral leads. This is appear to be similar to previous EKG from April 21, 2025 [RD]   0806 External records reviewed  Last catheterization report from 12/12/2024 shows left main coronary mild disease, significant obstruction of the LAD with a patent stent, left circumflex obstructive with patent stent, RCA obstructed with patent stent  Discharge summary 4/26/2025-admission for subdural hematoma.  Patient was eventually discharged to a memory care unit.  History obtained from the patient but limited due to his baseline dementia.   [FN]   0807 On my interpretation ECG obtained at 713 shows atrial paced rhythm with vent rate 60.  Nonspecific ST segment and T wave changes in the inferior lateral leads are similar to prior from April 21, 2025.  Not ischemic [FN]   0812 On my interpretation chest x-ray does not show pneumothorax or consolidation. [FN]   0812 Attending note  79-year-old male history of hypertension, hyperlipidemia, dementia, epilepsy on Keppra, CAD with stents and CABG, diabetes, pacemaker presents with chest pain for about 10 minutes this morning.  Now completely resolved since arriving in the ED.  Occurred at rest and was not associated with diaphoresis, shortness of breath, exertion.  On exam NAD, VSS MDM  Given significant cardiac history at high risk for atypical chest pain and ACS.  Will get workup to evaluate for ACS, pneumothorax, heart failure exacerbation, pulmonary edema, pneumonia, electrolyte/metabolic abnormalities (hyper/hypoglycemia, hyper/hypokalemia, hyper/hyponatremia, etc.) electrolyte abnormalities concerning for adrenal dysfunction (sodium/potassium abnormalities), BLANQUITA,  anemia, leukocytosis [FN]   0816 HEMOGLOBIN(!!): 6.3  Patient has been found to have anemia.  When compared to his prior blood test from 2 weeks ago his hemoglobin level was 7.9.  He has been having epistaxis that has since resolved.  However given his complaint of chest pain he will likely require blood transfusion as there is concern for symptomatic anemia causing his chest pain symptoms [FN]   0817 On external record review patient did have EGD last month that showed 3 gastric AVMs that were treated with APC.  He was recommended for hemoglobin goal of greater than 7 [FN]   0818 On external record review his echo from last month showed EF 45-50. [FN]   0827 Troponin I, High Sensitivity: 9 [FN]   1004 On my interpretation ECG obtained at 957 for chest pain shows paced rhythm with vent rate 60.  Not ischemic. [FN]      ED Course User Index  [FN] Maribel Ram MD  [RD] Grant Knight DO         Diagnoses as of 06/10/25 1015   Anemia, unspecified type        Medical Decision Making  79-year-old male comes emergency room with chest pain, now resolved on arrival here.  Was going to have a capsule endoscopy done by his GI doctor today for possible small intestinal GI bleeding.  Has been having recurrent epistaxis which he is following up with ENT for as an outpatient as well.  Differential today was for symptomatic anemia, ACS, pneumonia, pneumothorax.    On my independent review of labs, CBC does show an acute hemoglobin drop, 6.3 likely secondary to blood loss from epistaxis versus lower GI bleeding.  Patient is however denying any black or bloody stools currently.  Platelets are normal, white blood cell count is normal.  Chemistry shows essentially normal electrolytes, BUN and creatinine are within acceptable range, hepatic function essentially unremarkable.  EKG shows no acute injury pattern, similar to prior, troponin is negative x 2, chest pain is now resolved, low suspicion for ACS at this time.  No signs of  pneumonia or pneumothorax when I reviewed the chest x-ray.  Did consent patient for blood transfusion, gave him 1 unit of packed red blood cells for his acute hemoglobin less than 7.  Did give him 80 mg IV Protonix.  Did reach out to his GI doctor, Dr. Birch over [employer] chat who recommended a capsule endoscopy today here in the hospital.  Patient will be admitted for further management.        Patient and or family in agreement and understanding of treatment plan.  All questions answered.      I reviewed the case with the attending ED physician. The attending ED physician agrees with the plan. Patient and/or patient´s representative was counseled regarding labs, imaging, likely diagnosis, and plan. All questions were answered.    ED Medications administered this visit:    Medications   pantoprazole (Protonix) injection 80 mg (80 mg intravenous Given 6/10/25 0921)     Final Impression:   1. Anemia, unspecified type          (Please note that portions of this note were completed with a voice recognition program.  Efforts were made to edit the dictations but occasionally words are mis-transcribed.)       [1]   Past Medical History:  Diagnosis Date    Auditory hallucinations 09/28/2024    Conversion reaction 09/28/2024    Coronary artery disease involving native coronary artery of native heart without angina pectoris 09/28/2024    Epistaxis, recurrent 09/28/2024    Generalized ischemic myocardial dysfunction 09/28/2024    Ischemic cardiomyopathy 09/28/2024    Moderate vascular dementia with anxiety 09/28/2024   [2]   Past Surgical History:  Procedure Laterality Date    CARDIAC CATHETERIZATION N/A 12/20/2024    Procedure: Left Heart Cath, With LV;  Surgeon: Gabino Giron MD;  Location: [facility] Cardiac Cath Lab;  Service: Cardiovascular;  Laterality: N/A;    CARDIAC CATHETERIZATION N/A 12/20/2024    Procedure: PCI TATYANA Stent- Coronary;  Surgeon: Gabino Giron MD;  Location: [facility] Cardiac Cath Lab;  Service:  Cardiovascular;  Laterality: N/A;    CORONARY ANGIOPLASTY WITH STENT PLACEMENT      states he has 16 stents after the CABG    CORONARY ARTERY BYPASS GRAFT  2010    PACEMAKER PLACEMENT      TOTAL HIP ARTHROPLASTY     [3]   Family History  Family history unknown: Yes   [4]   Social History  Socioeconomic History    Marital status: Single   Tobacco Use    Smoking status: Former     Current packs/day: 0.00     Types: Cigarettes     Quit date:      Years since quittin.4    Smokeless tobacco: Never   Vaping Use    Vaping status: Never Used   Substance and Sexual Activity    Alcohol use: Not Currently     Comment: Stopped into .    Drug use: Never    Sexual activity: Defer     Social Drivers of Health     Financial Resource Strain: Low Risk  (2025)    Overall Financial Resource Strain (CARDIA)     Difficulty of Paying Living Expenses: Not hard at all   Recent Concern: Financial Resource Strain - High Risk (2/10/2025)    Overall Financial Resource Strain (CARDIA)     Difficulty of Paying Living Expenses: Very hard   Food Insecurity: No Food Insecurity (2025)    Hunger Vital Sign     Worried About Running Out of Food in the Last Year: Never true     Ran Out of Food in the Last Year: Never true   Transportation Needs: No Transportation Needs (2025)    PRAPARE - Transportation     Lack of Transportation (Medical): No     Lack of Transportation (Non-Medical): No   Intimate Partner Violence: Not At Risk (2025)    Humiliation, Afraid, Rape, and Kick questionnaire     Fear of Current or Ex-Partner: No     Emotionally Abused: No     Physically Abused: No     Sexually Abused: No   Housing Stability: Low Risk  (2025)    Housing Stability Vital Sign     Unable to Pay for Housing in the Last Year: No     Number of Times Moved in the Last Year: 1     Homeless in the Last Year: No        Grant Knight DO  Resident  06/10/25 1015

## 2025-06-10 NOTE — PROGRESS NOTES
Advanced Practice Admission Note    History Of Present Illness  Wayne Burkitt is a 79 y.o. male [employer]  presented to [facility] on 6/10/2025 from [facility]  with a chief complaint of chest pain.    His past medical history is remarkable for CAD status post PCI (says he has 17 stents), PCI to RCA December 2024, CABG x3, MI in 2000, HFpEF (EF 45-50% April 2025), V-tach on amiodarone, angina pectoris, pacemaker placement, seizure, 3 gastric AVMs treated with APC, acute anemia requiring blood transfusion, left subdural hematoma April 2025, upper GI bleed, scrotal hematoma, iron deficiency, HLD, HTN, CKD II, suspected sleep apnea (patient denies), vascular dementia??(Patient denies), orthostatic hypotension, extensive sigmoid diverticulosis, skin cancer on nose, recurrent epistaxis from the left nostril since 1992 per the patient, auditory  & visual hallucinations, conversion reaction, osteoarthritis of the right hip, glaucoma, cataracts, melena secondary to Effient use, panic disorder, frequent falls, and ambulation with a walker.    During my evaluation, the patient is alert and oriented to place, time, person, and situation.  He tells me he was on his way to [facility] to undergo a capsule endoscopy.  He was the passenger in the car while his friend and healthcare power of  was driving.  He tells me he was experiencing 8-9/10 pain/heaviness in his lower sternum with radiation up his chest and down to his left arm accompanied with shortness of breath.  He says that episode last about 25 minutes and resolved when he got to the emergency department.  He says he has been experiencing this type of pain for 2 years.  He says walking even as little as 150 feet or when he is sitting in the car.  Driving over bumpy roads and the movement of the car seems to aggravate his pain.  He describes feeling a 50 pound sandbag type weight on his chest during these episodes.  Currently, his  symptoms have resolved.  He does report some constipation, right lower extremity edema which he says is new and thinks may be due to his right hip, and weight gain which he attributes to eating 3 meals a day at his new facility.  He also reports a history of epistaxis that has been occurring since 1992.    He denies any personal history of CVA, blood clots, chronic lung disease, diabetes, liver disease, kidney disease, or dementia. He denies any fever, chills, headache, visual disturbance, dizziness, lightheadedness, cough, palpitations, abdominal pain, urinary complaints, dysuria, hematuria, melena, hematochezia, sweating, nausea, or vomiting.     All 13 systems were reviewed and are within normal levels except as noted per HPI.       Past Medical History  Medical History[1]    Surgical History  Surgical History[2]     Social History  He reports that he quit smoking about 35 years ago. His smoking use included cigarettes. He started smoking about 41 years ago. He has a 0.9 pack-year smoking history. He has never used smokeless tobacco. He reports that he does not currently use alcohol. He reports that he does not use drugs.  Was in the [employer] on [employer] for 2 years until his seizures  He then worked for a company as an  for 37 years. He is retired.   Family History  Family History[3]     Allergies  Alprazolam, Bepotastine besilate, Clopidogrel, Doxazosin, Lisinopril, Metronidazole, Amlodipine, Atorvastatin, Cephalexin, Ciprofloxacin, Diphenhydramine hcl, Guaifenesin, Hydrochlorothiazide, Hydrocodone, Methylprednisolone, Phenytoin, Sertraline, Telmisartan, and Topiramate    Review of Systems     Physical Exam  Constitutional:       General: He is awake. He is not in acute distress.     Appearance: He is well-developed. He is not ill-appearing, toxic-appearing or diaphoretic.   HENT:      Head: Normocephalic and atraumatic.      Nose: Nose normal.      Mouth/Throat:      Mouth: Mucous membranes are  "moist.   Eyes:      General: No scleral icterus.  Cardiovascular:      Heart sounds: Heart sounds are distant. No murmur heard.  Pulmonary:      Effort: No respiratory distress.      Breath sounds: No stridor. No wheezing or rhonchi.   Abdominal:      General: There is no distension.      Palpations: Abdomen is soft.      Tenderness: There is no abdominal tenderness.      Comments: + bowel sounds   Musculoskeletal:         General: Swelling present.      Cervical back: Neck supple.      Right lower leg: Edema present.      Left lower leg: Edema present.      Comments: +3 pitting edema RLE  +2 pitting edema LLE  Pain with RLE straight leg raise, able to raise LLE easier and higher off bed than right leg   Skin:     General: Skin is warm and dry.   Neurological:      General: No focal deficit present.      Mental Status: He is alert and oriented to person, place, and time. Mental status is at baseline.   Psychiatric:         Mood and Affect: Mood normal.         Behavior: Behavior normal. Behavior is cooperative.          Last Recorded Vitals  Visit Vitals  /72   Pulse 60   Temp 36.1 °C (97 °F) (Temporal)   Resp 16   Ht 1.803 m (5' 11\")   Wt 81.6 kg (180 lb)   SpO2 99%   BMI 25.10 kg/m²   Smoking Status Former   BSA 2.02 m²        Relevant Results  Results for orders placed or performed during the hospital encounter of 06/10/25 (from the past 24 hours)   ECG 12 lead   Result Value Ref Range    Ventricular Rate 60 BPM    Atrial Rate 60 BPM    QRS Duration 104 ms    QT Interval 464 ms    QTC Calculation(Bazett) 464 ms    R Axis 37 degrees    T Axis 226 degrees    QRS Count 10 beats    Q Onset 212 ms    T Offset 444 ms    QTC Fredericia 464 ms   Protime-INR   Result Value Ref Range    Protime 12.9 (H) 9.8 - 12.4 seconds    INR 1.2 (H) 0.9 - 1.1   CBC and Auto Differential   Result Value Ref Range    WBC 5.0 4.4 - 11.3 x10*3/uL    nRBC 0.0 0.0 - 0.0 /100 WBCs    RBC 2.64 (L) 4.50 - 5.90 x10*6/uL    Hemoglobin 6.3 " (LL) 13.5 - 17.5 g/dL    Hematocrit 22.1 (L) 41.0 - 52.0 %    MCV 84 80 - 100 fL    MCH 23.9 (L) 26.0 - 34.0 pg    MCHC 28.5 (L) 32.0 - 36.0 g/dL    RDW 21.3 (H) 11.5 - 14.5 %    Platelets 273 150 - 450 x10*3/uL    Immature Granulocytes %, Automated 0.4 0.0 - 0.9 %    Immature Granulocytes Absolute, Automated 0.02 0.00 - 0.50 x10*3/uL   Comprehensive Metabolic Panel   Result Value Ref Range    Glucose 107 (H) 74 - 99 mg/dL    Sodium 141 136 - 145 mmol/L    Potassium 4.1 3.5 - 5.3 mmol/L    Chloride 108 (H) 98 - 107 mmol/L    Bicarbonate 24 21 - 32 mmol/L    Anion Gap 13 10 - 20 mmol/L    Urea Nitrogen 21 6 - 23 mg/dL    Creatinine 1.23 0.50 - 1.30 mg/dL    eGFR 60 (L) >60 mL/min/1.73m*2    Calcium 8.5 (L) 8.6 - 10.3 mg/dL    Albumin 4.2 3.4 - 5.0 g/dL    Alkaline Phosphatase 106 33 - 136 U/L    Total Protein 6.8 6.4 - 8.2 g/dL    AST 40 (H) 9 - 39 U/L    Bilirubin, Total 0.6 0.0 - 1.2 mg/dL    ALT 32 10 - 52 U/L   Troponin I, High Sensitivity, Initial   Result Value Ref Range    Troponin I, High Sensitivity 9 0 - 20 ng/L   Manual Differential   Result Value Ref Range    Neutrophils %, Manual 78.0 40.0 - 80.0 %    Bands %, Manual 1.0 0.0 - 5.0 %    Lymphocytes %, Manual 15.0 13.0 - 44.0 %    Monocytes %, Manual 3.0 2.0 - 10.0 %    Eosinophils %, Manual 2.0 0.0 - 6.0 %    Basophils %, Manual 1.0 0.0 - 2.0 %    Seg Neutrophils Absolute, Manual 3.90 1.60 - 5.00 x10*3/uL    Bands Absolute, Manual 0.05 0.00 - 0.50 x10*3/uL    Lymphocytes Absolute, Manual 0.75 (L) 0.80 - 3.00 x10*3/uL    Monocytes Absolute, Manual 0.15 0.05 - 0.80 x10*3/uL    Eosinophils Absolute, Manual 0.10 0.00 - 0.40 x10*3/uL    Basophils Absolute, Manual 0.05 0.00 - 0.10 x10*3/uL    Total Cells Counted 100     Neutrophils Absolute, Manual 3.95 1.60 - 5.50 x10*3/uL    RBC Morphology See Below     RBC Fragments Few     Target Cells Few     Ovalocytes Few     Teardrop Cells Few    Ferritin   Result Value Ref Range    Ferritin 10 (L) 20 - 300 ng/mL   Iron  and TIBC   Result Value Ref Range    Iron 13 (L) 35 - 150 ug/dL    UIBC 413 (H) 110 - 370 ug/dL    TIBC 426 240 - 445 ug/dL    % Saturation 3 (L) 25 - 45 %   Prepare RBC: 1 Units   Result Value Ref Range    PRODUCT CODE C2948K08     Unit Number [id number]     Unit ABO B     Unit RH NEG     XM INTEP COMP     Dispense Status TR     Blood Expiration Date 7/5/2025 11:59:00 PM EDT     PRODUCT BLOOD TYPE 1700     UNIT VOLUME 350    Troponin, High Sensitivity, 1 Hour   Result Value Ref Range    Troponin I, High Sensitivity 11 0 - 20 ng/L   Type And Screen   Result Value Ref Range    ABO TYPE B     Rh TYPE POS     ANTIBODY SCREEN NEG    ECG 12 lead   Result Value Ref Range    Ventricular Rate 60 BPM    Atrial Rate 60 BPM    AL Interval 298 ms    QRS Duration 170 ms    QT Interval 540 ms    QTC Calculation(Bazett) 540 ms    R Axis -75 degrees    T Axis 95 degrees    QRS Count 10 beats    Q Onset 182 ms    T Offset 452 ms    QTC Fredericia 540 ms        Home Medications  Prior to Admission medications    Medication Sig Start Date End Date Taking? Authorizing Provider   acetaminophen (Tylenol) 325 mg tablet Take 2 tablets (650 mg) by mouth every 4 hours if needed for mild pain (1 - 3) or fever (temp greater than 38.0 C).   Yes Historical Provider, MD   amiodarone (Pacerone) 200 mg tablet Take 1 tablet (200 mg) by mouth once daily. Do not fill before May 24, 2025. 5/24/25 6/23/25 Yes ERICK Petty   aspirin 81 mg EC tablet Take 1 tablet (81 mg) by mouth once daily. Do not fill before May 5, 2025. 5/5/25  Yes ERICK Petty   brimonidine (AlphaGAN) 0.2 % ophthalmic solution Administer 1 drop into both eyes 2 times a day.   Yes Historical Provider, MD   cholecalciferol (Vitamin D-3) 50 mcg (2,000 units) tablet Take 1 tablet (50 mcg) by mouth once daily.   Yes Historical Provider, MD   docusate sodium (Colace) 100 mg capsule Take 1 capsule (100 mg) by mouth 2 times a day as needed for constipation.    Yes Historical Provider, MD   donepezil (Aricept) 5 mg tablet Take 1 tablet (5 mg) by mouth once daily at bedtime.   Yes Historical Provider, MD   levETIRAcetam (Keppra) 1,000 mg tablet Take 1 tablet (1,000 mg) by mouth 2 times a day.   Yes Historical Provider, MD   metoprolol succinate XL (Toprol-XL) 25 mg 24 hr tablet Take 1 tablet (25 mg) by mouth 2 times a day. Do not crush or chew. 4/25/25  Yes ERICK Petty   nitroglycerin (Nitrostat) 0.4 mg SL tablet Place 1 tablet (0.4 mg) under the tongue every 5 minutes if needed for chest pain. 12/24/24  Yes ERICK Barcenas   oxymetazoline (Afrin) 0.05 % nasal spray Administer 2 sprays into each nostril every 12 hours if needed (For nose bleeds.). Do not use for more than 3 days consecutively. Use as needed to aid with stopping nose bleeds. 4/5/25  Yes Olvin Morales, DO   pantoprazole (ProtoNix) 40 mg EC tablet Take 1 tablet (40 mg) by mouth 2 times a day. Do not crush, chew, or split. 2/12/25  Yes Jose M Salazar, DO   pravastatin (Pravachol) 80 mg tablet Take 1 tablet (80 mg) by mouth once daily at bedtime.   Yes Historical Provider, MD   QUEtiapine (SEROquel) 50 mg tablet Take 1 tablet (50 mg) by mouth once daily at bedtime.   Yes Historical Provider, MD   sodium chloride (Saline Nasal Mist) 3 % mist Administer 2 Squirts into affected nostril(s) 2 times a day. 4/5/25  Yes Olvin Morales, DO   amiodarone (Pacerone) 200 mg tablet Take 1 tablet (200 mg) by mouth 2 times a day.  Patient not taking: Reported on 6/10/2025 4/24/25   ERICK Petty   dexAMETHasone (Decadron) 2 mg tablet Take 2 tablets (4 mg) by mouth every 6 hours for 1 day, THEN 1.5 tablets (3 mg) every 8 hours for 2 days, THEN 1 tablet (2 mg) every 8 hours for 2 days, THEN 1 tablet (2 mg) once daily for 2 days.  Patient not taking: Reported on 6/10/2025 4/25/25   RACHID Petty-CNP   magnesium hydroxide (Milk of Magnesia) 400 mg/5 mL suspension Take 30 mL by mouth once daily as  needed for constipation.    Historical Provider, MD       Medications  Scheduled medications  Scheduled Medications[4]  Continuous medications  Continuous Medications[5]  PRN medications  acetaminophen, 650 mg, q4h PRN   Or  acetaminophen, 650 mg, q4h PRN  magnesium hydroxide, 30 mL, Daily PRN  melatonin, 3 mg, Nightly PRN  nitroglycerin, 0.4 mg, q5 min PRN  ondansetron ODT, 4 mg, q8h PRN   Or  ondansetron, 4 mg, q8h PRN        Imaging  Imaging  XR chest 1 view  Result Date: 6/10/2025  No acute cardiopulmonary process radiographically.   MACRO: None.   Signed by: Gerda Rainey 6/10/2025 8:24 AM Dictation workstation:   [id number]      Cardiology, Vascular, and Other Imaging  ECG 12 lead  Result Date: 6/10/2025  AV dual-paced rhythm with prolonged AV conduction Abnormal ECG When compared with ECG of 10-GREG-2025 07:13, (unconfirmed) Electronic ventricular pacemaker has replaced Electronic atrial pacemaker    ECG 12 lead  Result Date: 6/10/2025  Electronic atrial pacemaker Inferior infarct (cited on or before 01-APR-2025) ST & T wave abnormality, consider anterolateral ischemia Abnormal ECG When compared with ECG of 21-APR-2025 17:38, No significant change was found           Assessment/Plan   Assessment & Plan  Anemia, unspecified type    Bilateral lower extremity edema  Chest pain   Acute on chronic anemia   Iron deficiency   Bilateral lower extremity edema   History of Gastric AVMs  History of Sigmoid diverticulosis  HTN    Plan:  GI Consult   Telemetry  AM labs  CBC q6hr  Strict I's and O's   Daily weight  Fall, aspiration, skin care precautions   NPO at midnight per GI   Clear liquid diet for now per GI  Bilateral lower extremity venous duplex  Monitor Qtc  Iron replacement   EKG  BNP  Troponin    VTE prophylaxis:   DVT prophylaxis: SCDs    Code Status  Full code      Plan to resume home medications as appropriate when list is available from pharmacy, see orders for additional plan elements, management deferred  to attending and consult physicians.     I spent a total of 65 minutes on the date of the service which included preparing to see the patient, face-to-face patient care, completing clinical documentation, obtaining and/or reviewing separately obtained history, performing a medically appropriate examination, counseling and educating the patient/family/caregiver, ordering medications, tests, or procedures, communicating with other HCPs (not separately reported), independently interpreting results (not separately reported), communicating results to the patient/family/caregiver, and care coordination (not separately reported).     Corey Feliz PA-C  [facility] [phone]  Attending physician Bradley Morel MD     This note has been transcribed using Dragon voice recognition system and there is a possibility of unintentional typing misprints.  Any information found to be copied from previous providers is done in the best interest of the patient to provide accurate, quality, and continuity of care.      Plan was explained to patient, and the patient verbalized understanding and agreement with the plan.           *Please note this report has been produced using speech recognition software and may contain errors related to that system including grammar, punctuation and spelling as well as words and phrases that may be inappropriate. If there are questions or concerns, please feel free to contact me to clarify.         [1]   Past Medical History:  Diagnosis Date    Acute blood loss anemia     Anemia     Angina pectoris     Auditory hallucinations 09/28/2024    CAD (coronary artery disease)     CKD (chronic kidney disease), stage II     Conversion reaction 09/28/2024    Coronary artery disease involving native coronary artery of native heart without angina pectoris 09/28/2024    Epilepsy     Epistaxis, recurrent 09/28/2024    Frequent falls     Gastric AVM     3 treated with APC    Generalized ischemic  myocardial dysfunction 09/28/2024    Glaucoma     Hallucinations     auditory and visual    HFrEF (heart failure with reduced ejection fraction)     HLD (hyperlipidemia)     HTN (hypertension)     Iron deficiency     Ischemic cardiomyopathy 09/28/2024    Melena     Moderate vascular dementia with anxiety 09/28/2024    Patient denies    Myocardial infarct (Multi)     in 2000    Nonsustained ventricular tachycardia (Multi)     Orthostatic hypotension     Osteoarthritis of right hip     Panic disorder     Scrotal edema     Seizure (Multi)     Sigmoid diverticulosis     Skin cancer of nose     Subdural hematoma (Multi)     Suspected sleep apnea     Upper GI bleed     Walker as ambulation aid    [2]   Past Surgical History:  Procedure Laterality Date    CARDIAC CATHETERIZATION N/A 12/20/2024    Procedure: Left Heart Cath, With LV;  Surgeon: Gabino Giron MD;  Location: [facility] Cardiac Cath Lab;  Service: Cardiovascular;  Laterality: N/A;    CARDIAC CATHETERIZATION N/A 12/20/2024    Procedure: PCI TATYANA Stent- Coronary;  Surgeon: Gabino Giron MD;  Location: [facility] Cardiac Cath Lab;  Service: Cardiovascular;  Laterality: N/A;    COLONOSCOPY      CORONARY ANGIOPLASTY WITH STENT PLACEMENT      states he has 16 stents after the CABG    CORONARY ARTERY BYPASS GRAFT  2010    Triple Bypass    ESOPHAGOGASTRODUODENOSCOPY      PACEMAKER PLACEMENT      TOTAL HIP ARTHROPLASTY Right    [3]   Family History  Problem Relation Name Age of Onset    Other (open heart surgery) Mother      Multiple sclerosis Father      Asthma Sister      Brain Aneurysm Sister     [4] [START ON 6/11/2025] amiodarone, 200 mg, oral, Daily  [Held by provider] aspirin, 81 mg, oral, Daily  brimonidine, 1 drop, Both Eyes, BID  [START ON 6/11/2025] cholecalciferol, 50 mcg, oral, Daily  docusate sodium, 100 mg, oral, BID  donepezil, 5 mg, oral, Nightly  ferrous sulfate, 65 mg of elemental iron, oral, Every other day  levETIRAcetam, 1,000 mg, oral,  BID  metoprolol succinate XL, 25 mg, oral, BID  pantoprazole, 40 mg, intravenous, BID   Followed by  [START ON 6/13/2025] pantoprazole, 40 mg, oral, BID AC  pravastatin, 80 mg, oral, Nightly  QUEtiapine, 50 mg, oral, Nightly  sodium chloride, 2 spray, Each Nostril, BID     [5]

## 2025-06-10 NOTE — PROGRESS NOTES
Patient is admitted to worsening anemia and GIB. Plan for capsule endoscopy today. He has a PPM, I have talked to him about risk and benefits of this test, even though the  has PPm as a contra indication.   I have also discussed that there is a lot of published data to show its safety in this sub group.   He is agreeable to proceed , he is on telemetry for safety.

## 2025-06-10 NOTE — PROGRESS NOTES
06/10/25 1517   Discharge Planning   Living Arrangements Friends   Support Systems Friends/neighbors   Assistance Needed assistance with medications and shower   Type of Residence Other (Comment);Assisted living   Care Facility Name [facility]   Home or Post Acute Services Post acute facilities (Rehab/SNF/etc)   Expected Discharge Disposition Home   Does the patient need discharge transport arranged? Yes   RoundTrip coordination needed? Yes   Has discharge transport been arranged? No   Financial Resource Strain   How hard is it for you to pay for the very basics like food, housing, medical care, and heating? Not very   Housing Stability   In the last 12 months, was there a time when you were not able to pay the mortgage or rent on time? N   In the past 12 months, how many times have you moved where you were living? 1   At any time in the past 12 months, were you homeless or living in a shelter (including now)? N   Transportation Needs   In the past 12 months, has lack of transportation kept you from medical appointments or from getting medications? no   In the past 12 months, has lack of transportation kept you from meetings, work, or from getting things needed for daily living? No   Stroke Family Assessment   Stroke Family Assessment Needed No   Intensity of Service   Intensity of Service 0-30 min     Met with the patient in the room, he states that he lives at [facility] in [place]. He states that he has been there for about 2 months, moved from [place]. He states that he uses a walker as needed, receives assistance with meals, medications, and with showering.  He has a friend who assists him with transportation as needed. He plans to return to [facility] when discharged.   3:26 pm Note per face sheet, patient is from the Memory Care unit at [facility].

## 2025-06-10 NOTE — CONSULTS
Inpatient GI Consult     Patient Wayne Burkitt PCP Olvin Morales     MRN [id number]  Admission Date 6/10/2025      Reason For Consult: Blood loss anemia concern for GI bleed.      Subjective    Subjective   History of Presenting Illness  Mr. Wayne Burkitt is a 79 y.o. male with PMHx of gastric AVM and diverticulosis, HTN, HLD, epilepsy on Keppra, moderate vascular dementia with hallucination who presented to the ED with chest heaviness and difficulty breathing, 9/10 in severity, started all of a sudden this morning when he was driving.  He had similar episode in the past and goes by itself.  His symptoms associated with nosebleed (from left nostril) and soaked 1 gauze.  He reported that he is having this almost daily nosebleed for the last few months.    Patient seen and examined this morning at bedside.  He reported feeling fine at.  Bleeding was stopped by itself before he arrived to the emergency department.  He reported that sometimes he got nosebleed from the left side but it is always in the left side.      Past Medical History  Active Ambulatory Problems     Diagnosis Date Noted    Moderate vascular dementia with anxiety 09/28/2024    Auditory hallucinations 09/28/2024    Coronary artery disease involving native coronary artery of native heart without angina pectoris 09/28/2024    Epistaxis 09/28/2024    Unstable angina pectoris (Multi) 06/07/2023    Moderate vascular dementia with psychotic disturbance 09/22/2024    Episodes of formed visual hallucinations 09/21/2024    Frequent falls 09/21/2024    Essential hypertension 06/07/2023    Obstructive sleep apnea syndrome 09/21/2024    Orthostatic hypotension 09/21/2024    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 09/21/2024    Seizure (Multi) 07/13/2023    Angina pectoris, unstable (Multi) 12/12/2024    Congestive heart failure 12/12/2024    3-vessel coronary artery disease 12/20/2024    Scrotal hematoma 12/26/2024    Iron deficiency anemia due to chronic blood loss  12/25/2024    Upper GI bleed 02/18/2025    Physical debility 02/18/2025    Long term (current) use of antithrombotics/antiplatelets 02/18/2025    Hyperlipidemia 12/25/2024    Subdural hematoma (Multi) 04/20/2025    V tach (Multi) 04/21/2025     Resolved Ambulatory Problems     Diagnosis Date Noted    Generalized ischemic myocardial dysfunction 09/28/2024     Past Medical History:   Diagnosis Date    Conversion reaction 09/28/2024    Epistaxis, recurrent 09/28/2024    Ischemic cardiomyopathy 09/28/2024       Past Surgical History   Surgical History[1]      Home Medication:  Prior to Admission medications    Medication Sig Start Date End Date Taking? Authorizing Provider   acetaminophen (Tylenol) 325 mg tablet Take 2 tablets (650 mg) by mouth every 4 hours if needed for mild pain (1 - 3) or fever (temp greater than 38.0 C).   Yes Historical Provider, MD   amiodarone (Pacerone) 200 mg tablet Take 1 tablet (200 mg) by mouth once daily. Do not fill before May 24, 2025. 5/24/25 6/23/25 Yes ERICK Petty   aspirin 81 mg EC tablet Take 1 tablet (81 mg) by mouth once daily. Do not fill before May 5, 2025. 5/5/25  Yes ERICK Petty   brimonidine (AlphaGAN) 0.2 % ophthalmic solution Administer 1 drop into both eyes 2 times a day.   Yes Historical Provider, MD   cholecalciferol (Vitamin D-3) 50 mcg (2,000 units) tablet Take 1 tablet (50 mcg) by mouth once daily.   Yes Historical Provider, MD   docusate sodium (Colace) 100 mg capsule Take 1 capsule (100 mg) by mouth 2 times a day as needed for constipation.   Yes Historical Provider, MD   donepezil (Aricept) 5 mg tablet Take 1 tablet (5 mg) by mouth once daily at bedtime.   Yes Historical Provider, MD   levETIRAcetam (Keppra) 1,000 mg tablet Take 1 tablet (1,000 mg) by mouth 2 times a day.   Yes Historical Provider, MD   metoprolol succinate XL (Toprol-XL) 25 mg 24 hr tablet Take 1 tablet (25 mg) by mouth 2 times a day. Do not crush or chew. 4/25/25  Yes  "ERICK Petty   nitroglycerin (Nitrostat) 0.4 mg SL tablet Place 1 tablet (0.4 mg) under the tongue every 5 minutes if needed for chest pain. 12/24/24  Yes ERICK Barcenas   oxymetazoline (Afrin) 0.05 % nasal spray Administer 2 sprays into each nostril every 12 hours if needed (For nose bleeds.). Do not use for more than 3 days consecutively. Use as needed to aid with stopping nose bleeds. 4/5/25  Yes Olvin Morales, DO   pantoprazole (ProtoNix) 40 mg EC tablet Take 1 tablet (40 mg) by mouth 2 times a day. Do not crush, chew, or split. 2/12/25  Yes Jose M Salazar, DO   pravastatin (Pravachol) 80 mg tablet Take 1 tablet (80 mg) by mouth once daily at bedtime.   Yes Historical Provider, MD   QUEtiapine (SEROquel) 50 mg tablet Take 1 tablet (50 mg) by mouth once daily at bedtime.   Yes Historical Provider, MD   sodium chloride (Saline Nasal Mist) 3 % mist Administer 2 Squirts into affected nostril(s) 2 times a day. 4/5/25  Yes Olvin Morales DO   amiodarone (Pacerone) 200 mg tablet Take 1 tablet (200 mg) by mouth 2 times a day.  Patient not taking: Reported on 6/10/2025 4/24/25   ERICK Petty   dexAMETHasone (Decadron) 2 mg tablet Take 2 tablets (4 mg) by mouth every 6 hours for 1 day, THEN 1.5 tablets (3 mg) every 8 hours for 2 days, THEN 1 tablet (2 mg) every 8 hours for 2 days, THEN 1 tablet (2 mg) once daily for 2 days.  Patient not taking: Reported on 6/10/2025 4/25/25   ERICK Petty   magnesium hydroxide (Milk of Magnesia) 400 mg/5 mL suspension Take 30 mL by mouth once daily as needed for constipation.    Historical Provider, MD        Allergies:  RX Allergies[2]     Review of System:  10 point review of system was negative except what mentioned HPI.    Objective    Objective   Vital Signs:  Visit Vitals  /77   Pulse 60   Temp 36.2 °C (97.2 °F)   Resp 16   Ht 1.803 m (5' 11\")   Wt 81.6 kg (180 lb)   SpO2 98%   BMI 25.10 kg/m²   Smoking Status Former   BSA 2.02 m²    "     Physical Examination:  Constitutional: A&Ox4, NAD, resting comfortable   Head and Face: Atraumatic, normocephalic   Eyes: Normal external exam, EOMI, pale conjunctiva  ENT: Normal external inspection of ears and nose. Oropharynx normal.  Cardiovascular: RRR, S1/S2, no murmurs, rubs, or gallops, radial pulses +2  Pulmonary: CTAB, no respiratory distress, no wheezing, rales or rhonchi, on RA  Abdomen: +BS, soft, non-tender, nondistended, no guarding rigidity or rebound tenderness, no masses noted  MSK: +2 pitting edema bilateral up to the knees. No joint swelling, normal movements of all extremities.   Neuro: No focal deficits, normal motor function, normal sensation, follows all commands  Skin- No lesions, contusions, or erythema.  Psychiatric: Judgment intact. Appropriate mood, affect and behavior    Laboratory Data:    Results from last 7 days   Lab Units 06/10/25  0750   WBC AUTO x10*3/uL 5.0   RBC AUTO x10*6/uL 2.64*   HEMOGLOBIN g/dL 6.3*     Results from last 7 days   Lab Units 06/10/25  0750   SODIUM mmol/L 141   POTASSIUM mmol/L 4.1   CHLORIDE mmol/L 108*   CO2 mmol/L 24   BUN mg/dL 21   CREATININE mg/dL 1.23   CALCIUM mg/dL 8.5*   BILIRUBIN TOTAL mg/dL 0.6   ALT U/L 32   AST U/L 40*       Imaging:  Imaging  XR chest 1 view  Result Date: 6/10/2025  No acute cardiopulmonary process radiographically.   MACRO: None.   Signed by: Gerda Rainey 6/10/2025 8:24 AM Dictation workstation:   [id number]      Cardiology, Vascular, and Other Imaging  ECG 12 lead  Result Date: 6/10/2025  AV dual-paced rhythm with prolonged AV conduction Abnormal ECG When compared with ECG of 10-GREG-2025 07:13, (unconfirmed) Electronic ventricular pacemaker has replaced Electronic atrial pacemaker    ECG 12 lead  Result Date: 6/10/2025  Electronic atrial pacemaker Inferior infarct (cited on or before 01-APR-2025) ST & T wave abnormality, consider anterolateral ischemia Abnormal ECG When compared with ECG of 21-APR-2025 17:38, No  "significant change was found         Medications:  Scheduled medications  Scheduled Medications[3]  Continuous medications  Continuous Medications[4]  PRN medications  PRN Medications[5]    Assessment    Assessment & Plan   Mr. Wayne Burkitt is a 79 y.o. male who presents with chest discomfort and difficulty breathing associated with left nosebleed.  Found to have acute blood loss anemia.  GI consulted due to concern for GI bleed.  Assessment & Plan  Anemia, unspecified type       # Acute blood loss anemia; likely 2/2 epistaxis  # Iron deficiency anemia 2/2 chronic blood loss 2/2 epistaxis  - Patient complained of daily \"funky\" left nostril bleed with occasional right-sided  -Hemoglobin admission 6.3 with baseline 8-9  - BUN/creatinine 21/1.23  -Iron study consistent with severe iron deficiency anemia.  -S/p 1 unit blood transfusion  - His acute blood loss anemia is likely secondary to nosebleed given negative blood on their examination and the and within normal range as opposed to elevated when he had GI bleed 2 months ago.    Plan:  -Will proceed with video capsule enteroscopy.    -Clear liquids for now.  -N.p.o. after midnight  - Trend CBC  - Blood transfusion if hemoglobin less than 7.0 or becomes hemodynamically unstable  -Monitor s/s of bleeding      Other active problems  # Hypertension      GI will follow-up while inpatient.     Rest per the primary team      Emergency Contact: Extended Emergency Contact Information  Primary Emergency Contact: BlackSam  Address: [address] [place] [address] [facility]  Home Phone: [phone]  Mobile Phone: [phone]  Relation: Friend  Preferred language: English   needed? No    Patient seen and discussed with the attending, Dr. May   Signature: Zenaida Jacob MD        Internal Medicine, PGY-III  Date: Barbara 10, 2025  Please excuse any misspellings or unintended errors related to the Dragon speech recognition " software used to dictate this note.       [1]   Past Surgical History:  Procedure Laterality Date    CARDIAC CATHETERIZATION N/A 12/20/2024    Procedure: Left Heart Cath, With LV;  Surgeon: Gabino Giron MD;  Location: [facility] Cardiac Cath Lab;  Service: Cardiovascular;  Laterality: N/A;    CARDIAC CATHETERIZATION N/A 12/20/2024    Procedure: PCI TATYANA Stent- Coronary;  Surgeon: Gabino Giron MD;  Location: [facility] Cardiac Cath Lab;  Service: Cardiovascular;  Laterality: N/A;    CORONARY ANGIOPLASTY WITH STENT PLACEMENT      states he has 16 stents after the CABG    CORONARY ARTERY BYPASS GRAFT  2010    PACEMAKER PLACEMENT      TOTAL HIP ARTHROPLASTY     [2]   Allergies  Allergen Reactions    Alprazolam Anaphylaxis    Bepotastine Besilate Swelling    Clopidogrel Swelling    Doxazosin Swelling    Lisinopril Swelling    Metronidazole Swelling    Amlodipine Hives    Atorvastatin Hives    Cephalexin Hives    Ciprofloxacin Itching    Diphenhydramine Hcl Hives    Guaifenesin Hives    Hydrochlorothiazide Unknown    Hydrocodone Hives    Methylprednisolone Unknown    Phenytoin Other    Sertraline Unknown    Telmisartan Hives    Topiramate Rash   [3] [4] [5]

## 2025-06-11 ENCOUNTER — APPOINTMENT (OUTPATIENT)
Dept: GASTROENTEROLOGY | Facility: HOSPITAL | Age: 80
DRG: 378 | End: 2025-06-11
Payer: MEDICARE

## 2025-06-11 ENCOUNTER — APPOINTMENT (OUTPATIENT)
Dept: CARDIOLOGY | Facility: HOSPITAL | Age: 80
DRG: 378 | End: 2025-06-11
Payer: MEDICARE

## 2025-06-11 ENCOUNTER — ANESTHESIA (OUTPATIENT)
Dept: GASTROENTEROLOGY | Facility: HOSPITAL | Age: 80
End: 2025-06-11
Payer: MEDICARE

## 2025-06-11 ENCOUNTER — ANESTHESIA EVENT (OUTPATIENT)
Dept: GASTROENTEROLOGY | Facility: HOSPITAL | Age: 80
End: 2025-06-11
Payer: MEDICARE

## 2025-06-11 LAB
ALBUMIN SERPL BCP-MCNC: 3.8 G/DL (ref 3.4–5)
ALP SERPL-CCNC: 97 U/L (ref 33–136)
ALT SERPL W P-5'-P-CCNC: 24 U/L (ref 10–52)
ANION GAP SERPL CALC-SCNC: 12 MMOL/L (ref 10–20)
APPEARANCE UR: CLEAR
AST SERPL W P-5'-P-CCNC: 24 U/L (ref 9–39)
ATRIAL RATE: 60 BPM
ATRIAL RATE: 60 BPM
BILIRUB DIRECT SERPL-MCNC: 0.2 MG/DL (ref 0–0.3)
BILIRUB SERPL-MCNC: 0.9 MG/DL (ref 0–1.2)
BILIRUB UR STRIP.AUTO-MCNC: NEGATIVE MG/DL
BUN SERPL-MCNC: 18 MG/DL (ref 6–23)
CALCIUM SERPL-MCNC: 8.4 MG/DL (ref 8.6–10.3)
CHLORIDE SERPL-SCNC: 108 MMOL/L (ref 98–107)
CO2 SERPL-SCNC: 24 MMOL/L (ref 21–32)
COLOR UR: COLORLESS
CREAT SERPL-MCNC: 1.13 MG/DL (ref 0.5–1.3)
EGFRCR SERPLBLD CKD-EPI 2021: 67 ML/MIN/1.73M*2
ERYTHROCYTE [DISTWIDTH] IN BLOOD BY AUTOMATED COUNT: 19.8 % (ref 11.5–14.5)
ERYTHROCYTE [DISTWIDTH] IN BLOOD BY AUTOMATED COUNT: 19.8 % (ref 11.5–14.5)
ERYTHROCYTE [DISTWIDTH] IN BLOOD BY AUTOMATED COUNT: 19.9 % (ref 11.5–14.5)
GLUCOSE BLD MANUAL STRIP-MCNC: 61 MG/DL (ref 74–99)
GLUCOSE BLD MANUAL STRIP-MCNC: 92 MG/DL (ref 74–99)
GLUCOSE BLD MANUAL STRIP-MCNC: 94 MG/DL (ref 74–99)
GLUCOSE BLD MANUAL STRIP-MCNC: 99 MG/DL (ref 74–99)
GLUCOSE BLD MANUAL STRIP-MCNC: 99 MG/DL (ref 74–99)
GLUCOSE SERPL-MCNC: 98 MG/DL (ref 74–99)
GLUCOSE UR STRIP.AUTO-MCNC: NORMAL MG/DL
HCT VFR BLD AUTO: 24.6 % (ref 41–52)
HCT VFR BLD AUTO: 27.2 % (ref 41–52)
HCT VFR BLD AUTO: 30.1 % (ref 41–52)
HGB BLD-MCNC: 7.5 G/DL (ref 13.5–17.5)
HGB BLD-MCNC: 8 G/DL (ref 13.5–17.5)
HGB BLD-MCNC: 8.7 G/DL (ref 13.5–17.5)
HOLD SPECIMEN: NORMAL
KETONES UR STRIP.AUTO-MCNC: NEGATIVE MG/DL
LEUKOCYTE ESTERASE UR QL STRIP.AUTO: NEGATIVE
MCH RBC QN AUTO: 25 PG (ref 26–34)
MCH RBC QN AUTO: 25.4 PG (ref 26–34)
MCH RBC QN AUTO: 25.4 PG (ref 26–34)
MCHC RBC AUTO-ENTMCNC: 28.9 G/DL (ref 32–36)
MCHC RBC AUTO-ENTMCNC: 29.4 G/DL (ref 32–36)
MCHC RBC AUTO-ENTMCNC: 30.5 G/DL (ref 32–36)
MCV RBC AUTO: 83 FL (ref 80–100)
MCV RBC AUTO: 85 FL (ref 80–100)
MCV RBC AUTO: 88 FL (ref 80–100)
NITRITE UR QL STRIP.AUTO: NEGATIVE
NRBC BLD-RTO: 0 /100 WBCS (ref 0–0)
NRBC BLD-RTO: 0 /100 WBCS (ref 0–0)
NRBC BLD-RTO: 0.3 /100 WBCS (ref 0–0)
PH UR STRIP.AUTO: 6.5 [PH]
PHOSPHATE SERPL-MCNC: 3 MG/DL (ref 2.5–4.9)
PLATELET # BLD AUTO: 211 X10*3/UL (ref 150–450)
PLATELET # BLD AUTO: 238 X10*3/UL (ref 150–450)
PLATELET # BLD AUTO: 257 X10*3/UL (ref 150–450)
POTASSIUM SERPL-SCNC: 4 MMOL/L (ref 3.5–5.3)
PR INTERVAL: 298 MS
PROT SERPL-MCNC: 6.3 G/DL (ref 6.4–8.2)
PROT UR STRIP.AUTO-MCNC: NEGATIVE MG/DL
Q ONSET: 182 MS
Q ONSET: 212 MS
QRS COUNT: 10 BEATS
QRS COUNT: 10 BEATS
QRS DURATION: 104 MS
QRS DURATION: 170 MS
QT INTERVAL: 464 MS
QT INTERVAL: 540 MS
QTC CALCULATION(BAZETT): 464 MS
QTC CALCULATION(BAZETT): 540 MS
QTC FREDERICIA: 464 MS
QTC FREDERICIA: 540 MS
R AXIS: -75 DEGREES
R AXIS: 37 DEGREES
RBC # BLD AUTO: 2.95 X10*6/UL (ref 4.5–5.9)
RBC # BLD AUTO: 3.2 X10*6/UL (ref 4.5–5.9)
RBC # BLD AUTO: 3.43 X10*6/UL (ref 4.5–5.9)
RBC # UR STRIP.AUTO: ABNORMAL MG/DL
RBC #/AREA URNS AUTO: >20 /HPF
SODIUM SERPL-SCNC: 140 MMOL/L (ref 136–145)
SP GR UR STRIP.AUTO: 1.01
T AXIS: 226 DEGREES
T AXIS: 95 DEGREES
T OFFSET: 444 MS
T OFFSET: 452 MS
UROBILINOGEN UR STRIP.AUTO-MCNC: NORMAL MG/DL
VENTRICULAR RATE: 60 BPM
VENTRICULAR RATE: 60 BPM
WBC # BLD AUTO: 5.4 X10*3/UL (ref 4.4–11.3)
WBC # BLD AUTO: 6.2 X10*3/UL (ref 4.4–11.3)
WBC # BLD AUTO: 7 X10*3/UL (ref 4.4–11.3)
WBC #/AREA URNS AUTO: ABNORMAL /HPF

## 2025-06-11 PROCEDURE — 2500000004 HC RX 250 GENERAL PHARMACY W/ HCPCS (ALT 636 FOR OP/ED): Performed by: PHYSICIAN ASSISTANT

## 2025-06-11 PROCEDURE — 7100000002 HC RECOVERY ROOM TIME - EACH INCREMENTAL 1 MINUTE

## 2025-06-11 PROCEDURE — 2500000005 HC RX 250 GENERAL PHARMACY W/O HCPCS

## 2025-06-11 PROCEDURE — 80076 HEPATIC FUNCTION PANEL: CPT | Performed by: PHYSICIAN ASSISTANT

## 2025-06-11 PROCEDURE — 85027 COMPLETE CBC AUTOMATED: CPT | Performed by: PHYSICIAN ASSISTANT

## 2025-06-11 PROCEDURE — 2500000004 HC RX 250 GENERAL PHARMACY W/ HCPCS (ALT 636 FOR OP/ED): Performed by: ANESTHESIOLOGY

## 2025-06-11 PROCEDURE — 3700000002 HC GENERAL ANESTHESIA TIME - EACH INCREMENTAL 1 MINUTE

## 2025-06-11 PROCEDURE — 2020000001 HC ICU ROOM DAILY

## 2025-06-11 PROCEDURE — 44366 SMALL BOWEL ENDOSCOPY: CPT | Performed by: INTERNAL MEDICINE

## 2025-06-11 PROCEDURE — 93970 EXTREMITY STUDY: CPT | Performed by: SURGERY

## 2025-06-11 PROCEDURE — 2500000002 HC RX 250 W HCPCS SELF ADMINISTERED DRUGS (ALT 637 FOR MEDICARE OP, ALT 636 FOR OP/ED): Performed by: PHYSICIAN ASSISTANT

## 2025-06-11 PROCEDURE — 99223 1ST HOSP IP/OBS HIGH 75: CPT | Performed by: STUDENT IN AN ORGANIZED HEALTH CARE EDUCATION/TRAINING PROGRAM

## 2025-06-11 PROCEDURE — 82947 ASSAY GLUCOSE BLOOD QUANT: CPT

## 2025-06-11 PROCEDURE — 99100 ANES PT EXTEME AGE<1 YR&>70: CPT | Performed by: ANESTHESIOLOGY

## 2025-06-11 PROCEDURE — 36415 COLL VENOUS BLD VENIPUNCTURE: CPT | Performed by: PHYSICIAN ASSISTANT

## 2025-06-11 PROCEDURE — 2500000001 HC RX 250 WO HCPCS SELF ADMINISTERED DRUGS (ALT 637 FOR MEDICARE OP): Performed by: INTERNAL MEDICINE

## 2025-06-11 PROCEDURE — A44360 PR SMALL BOWEL ENDOSCOPY,PAST 2ND DUOD: Performed by: ANESTHESIOLOGIST ASSISTANT

## 2025-06-11 PROCEDURE — A44360 PR SMALL BOWEL ENDOSCOPY,PAST 2ND DUOD: Performed by: ANESTHESIOLOGY

## 2025-06-11 PROCEDURE — 2720000007 HC OR 272 NO HCPCS

## 2025-06-11 PROCEDURE — 3700000001 HC GENERAL ANESTHESIA TIME - INITIAL BASE CHARGE

## 2025-06-11 PROCEDURE — 81001 URINALYSIS AUTO W/SCOPE: CPT | Performed by: NURSE PRACTITIONER

## 2025-06-11 PROCEDURE — 93970 EXTREMITY STUDY: CPT

## 2025-06-11 PROCEDURE — 2500000001 HC RX 250 WO HCPCS SELF ADMINISTERED DRUGS (ALT 637 FOR MEDICARE OP): Performed by: PHYSICIAN ASSISTANT

## 2025-06-11 PROCEDURE — 84100 ASSAY OF PHOSPHORUS: CPT | Performed by: PHYSICIAN ASSISTANT

## 2025-06-11 PROCEDURE — 84132 ASSAY OF SERUM POTASSIUM: CPT | Performed by: PHYSICIAN ASSISTANT

## 2025-06-11 PROCEDURE — 2500000002 HC RX 250 W HCPCS SELF ADMINISTERED DRUGS (ALT 637 FOR MEDICARE OP, ALT 636 FOR OP/ED): Performed by: NURSE PRACTITIONER

## 2025-06-11 PROCEDURE — 7100000001 HC RECOVERY ROOM TIME - INITIAL BASE CHARGE

## 2025-06-11 PROCEDURE — 2500000005 HC RX 250 GENERAL PHARMACY W/O HCPCS: Performed by: ANESTHESIOLOGY

## 2025-06-11 PROCEDURE — 2500000001 HC RX 250 WO HCPCS SELF ADMINISTERED DRUGS (ALT 637 FOR MEDICARE OP): Performed by: ANESTHESIOLOGY

## 2025-06-11 PROCEDURE — 2500000004 HC RX 250 GENERAL PHARMACY W/ HCPCS (ALT 636 FOR OP/ED): Performed by: ANESTHESIOLOGIST ASSISTANT

## 2025-06-11 PROCEDURE — 99222 1ST HOSP IP/OBS MODERATE 55: CPT | Performed by: INTERNAL MEDICINE

## 2025-06-11 PROCEDURE — 91110 GI TRC IMG INTRAL ESOPH-ILE: CPT | Performed by: STUDENT IN AN ORGANIZED HEALTH CARE EDUCATION/TRAINING PROGRAM

## 2025-06-11 RX ORDER — AMIODARONE HYDROCHLORIDE 200 MG/1
200 TABLET ORAL 2 TIMES DAILY
Status: DISCONTINUED | OUTPATIENT
Start: 2025-06-11 | End: 2025-06-16 | Stop reason: HOSPADM

## 2025-06-11 RX ORDER — FENTANYL CITRATE 50 UG/ML
25 INJECTION, SOLUTION INTRAMUSCULAR; INTRAVENOUS EVERY 5 MIN PRN
Status: DISCONTINUED | OUTPATIENT
Start: 2025-06-11 | End: 2025-06-13

## 2025-06-11 RX ORDER — LIDOCAINE HYDROCHLORIDE 10 MG/ML
0.1 INJECTION, SOLUTION EPIDURAL; INFILTRATION; INTRACAUDAL; PERINEURAL ONCE
Status: DISCONTINUED | OUTPATIENT
Start: 2025-06-11 | End: 2025-06-13

## 2025-06-11 RX ORDER — ISOSORBIDE MONONITRATE 30 MG/1
30 TABLET, EXTENDED RELEASE ORAL DAILY
Status: DISCONTINUED | OUTPATIENT
Start: 2025-06-11 | End: 2025-06-16 | Stop reason: HOSPADM

## 2025-06-11 RX ORDER — HYDRALAZINE HYDROCHLORIDE 20 MG/ML
5 INJECTION INTRAMUSCULAR; INTRAVENOUS EVERY 30 MIN PRN
Status: DISCONTINUED | OUTPATIENT
Start: 2025-06-11 | End: 2025-06-13

## 2025-06-11 RX ORDER — ALBUTEROL SULFATE 0.83 MG/ML
2.5 SOLUTION RESPIRATORY (INHALATION) ONCE AS NEEDED
Status: DISCONTINUED | OUTPATIENT
Start: 2025-06-11 | End: 2025-06-13

## 2025-06-11 RX ORDER — SODIUM CHLORIDE, SODIUM LACTATE, POTASSIUM CHLORIDE, CALCIUM CHLORIDE 600; 310; 30; 20 MG/100ML; MG/100ML; MG/100ML; MG/100ML
100 INJECTION, SOLUTION INTRAVENOUS CONTINUOUS
Status: DISCONTINUED | OUTPATIENT
Start: 2025-06-11 | End: 2025-06-12

## 2025-06-11 RX ORDER — OXYCODONE AND ACETAMINOPHEN 5; 325 MG/1; MG/1
1 TABLET ORAL EVERY 4 HOURS PRN
Status: DISCONTINUED | OUTPATIENT
Start: 2025-06-11 | End: 2025-06-13

## 2025-06-11 RX ORDER — FERROUS SULFATE 325(65) MG
65 TABLET ORAL EVERY OTHER DAY
Status: DISCONTINUED | OUTPATIENT
Start: 2025-06-12 | End: 2025-06-16 | Stop reason: HOSPADM

## 2025-06-11 RX ORDER — HYDROMORPHONE HYDROCHLORIDE 0.2 MG/ML
0.2 INJECTION INTRAMUSCULAR; INTRAVENOUS; SUBCUTANEOUS EVERY 5 MIN PRN
Status: DISCONTINUED | OUTPATIENT
Start: 2025-06-11 | End: 2025-06-13

## 2025-06-11 RX ORDER — SODIUM CHLORIDE 9 MG/ML
INJECTION, SOLUTION INTRAMUSCULAR; INTRAVENOUS; SUBCUTANEOUS
Status: COMPLETED
Start: 2025-06-11 | End: 2025-06-11

## 2025-06-11 RX ORDER — SODIUM CHLORIDE, SODIUM LACTATE, POTASSIUM CHLORIDE, CALCIUM CHLORIDE 600; 310; 30; 20 MG/100ML; MG/100ML; MG/100ML; MG/100ML
125 INJECTION, SOLUTION INTRAVENOUS CONTINUOUS
Status: ACTIVE | OUTPATIENT
Start: 2025-06-11 | End: 2025-06-11

## 2025-06-11 RX ORDER — LABETALOL HYDROCHLORIDE 5 MG/ML
5 INJECTION, SOLUTION INTRAVENOUS ONCE AS NEEDED
Status: DISCONTINUED | OUTPATIENT
Start: 2025-06-11 | End: 2025-06-13

## 2025-06-11 RX ORDER — FAMOTIDINE 10 MG/ML
20 INJECTION, SOLUTION INTRAVENOUS ONCE
Status: COMPLETED | OUTPATIENT
Start: 2025-06-11 | End: 2025-06-11

## 2025-06-11 RX ORDER — LIDOCAINE HYDROCHLORIDE 20 MG/ML
INJECTION, SOLUTION EPIDURAL; INFILTRATION; INTRACAUDAL; PERINEURAL AS NEEDED
Status: DISCONTINUED | OUTPATIENT
Start: 2025-06-11 | End: 2025-06-11

## 2025-06-11 RX ORDER — FENTANYL CITRATE 50 UG/ML
12.5 INJECTION, SOLUTION INTRAMUSCULAR; INTRAVENOUS EVERY 5 MIN PRN
Status: DISCONTINUED | OUTPATIENT
Start: 2025-06-11 | End: 2025-06-13

## 2025-06-11 RX ORDER — ACETAMINOPHEN 325 MG/1
975 TABLET ORAL ONCE
Status: COMPLETED | OUTPATIENT
Start: 2025-06-11 | End: 2025-06-11

## 2025-06-11 RX ORDER — OXYCODONE HYDROCHLORIDE 5 MG/1
5 TABLET ORAL EVERY 4 HOURS PRN
Status: DISCONTINUED | OUTPATIENT
Start: 2025-06-11 | End: 2025-06-13

## 2025-06-11 RX ORDER — ONDANSETRON HYDROCHLORIDE 2 MG/ML
4 INJECTION, SOLUTION INTRAVENOUS ONCE AS NEEDED
Status: DISCONTINUED | OUTPATIENT
Start: 2025-06-11 | End: 2025-06-13

## 2025-06-11 RX ORDER — PROPOFOL 10 MG/ML
INJECTION, EMULSION INTRAVENOUS AS NEEDED
Status: DISCONTINUED | OUTPATIENT
Start: 2025-06-11 | End: 2025-06-11

## 2025-06-11 RX ORDER — SODIUM CHLORIDE, SODIUM LACTATE, POTASSIUM CHLORIDE, CALCIUM CHLORIDE 600; 310; 30; 20 MG/100ML; MG/100ML; MG/100ML; MG/100ML
INJECTION, SOLUTION INTRAVENOUS CONTINUOUS PRN
Status: DISCONTINUED | OUTPATIENT
Start: 2025-06-11 | End: 2025-06-11

## 2025-06-11 RX ADMIN — METOPROLOL SUCCINATE 25 MG: 25 TABLET, EXTENDED RELEASE ORAL at 21:25

## 2025-06-11 RX ADMIN — BRIMONIDINE TARTRATE 1 DROP: 2 SOLUTION/ DROPS OPHTHALMIC at 21:34

## 2025-06-11 RX ADMIN — SODIUM CHLORIDE, SODIUM LACTATE, POTASSIUM CHLORIDE, AND CALCIUM CHLORIDE: .6; .31; .03; .02 INJECTION, SOLUTION INTRAVENOUS at 15:51

## 2025-06-11 RX ADMIN — BRIMONIDINE TARTRATE 1 DROP: 2 SOLUTION/ DROPS OPHTHALMIC at 10:25

## 2025-06-11 RX ADMIN — LEVETIRACETAM 1000 MG: 500 TABLET, FILM COATED ORAL at 08:18

## 2025-06-11 RX ADMIN — AMIODARONE HYDROCHLORIDE 200 MG: 200 TABLET ORAL at 21:25

## 2025-06-11 RX ADMIN — PRAVASTATIN SODIUM 80 MG: 20 TABLET ORAL at 21:24

## 2025-06-11 RX ADMIN — FAMOTIDINE 20 MG: 10 INJECTION, SOLUTION INTRAVENOUS at 17:14

## 2025-06-11 RX ADMIN — PROPOFOL 150 MCG/KG/MIN: 10 INJECTION, EMULSION INTRAVENOUS at 15:55

## 2025-06-11 RX ADMIN — QUETIAPINE FUMARATE 50 MG: 50 TABLET ORAL at 21:26

## 2025-06-11 RX ADMIN — SALINE NASAL SPRAY 2 SPRAY: 1.5 SOLUTION NASAL at 08:21

## 2025-06-11 RX ADMIN — PANTOPRAZOLE SODIUM 40 MG: 40 INJECTION, POWDER, FOR SOLUTION INTRAVENOUS at 08:18

## 2025-06-11 RX ADMIN — ISOSORBIDE MONONITRATE 30 MG: 30 TABLET, EXTENDED RELEASE ORAL at 17:16

## 2025-06-11 RX ADMIN — LEVETIRACETAM 1000 MG: 500 TABLET, FILM COATED ORAL at 21:25

## 2025-06-11 RX ADMIN — METOPROLOL SUCCINATE 25 MG: 25 TABLET, EXTENDED RELEASE ORAL at 08:18

## 2025-06-11 RX ADMIN — SALINE NASAL SPRAY 2 SPRAY: 1.5 SOLUTION NASAL at 22:10

## 2025-06-11 RX ADMIN — Medication 2 L/MIN: at 16:15

## 2025-06-11 RX ADMIN — CHOLECALCIFEROL TAB 125 MCG (5000 UNIT) 50 MCG: 125 TAB at 08:18

## 2025-06-11 RX ADMIN — SODIUM CHLORIDE 10 ML: 9 INJECTION, SOLUTION INTRAMUSCULAR; INTRAVENOUS; SUBCUTANEOUS at 08:20

## 2025-06-11 RX ADMIN — DONEPEZIL HYDROCHLORIDE 5 MG: 5 TABLET ORAL at 21:33

## 2025-06-11 RX ADMIN — PANTOPRAZOLE SODIUM 40 MG: 40 INJECTION, POWDER, FOR SOLUTION INTRAVENOUS at 21:31

## 2025-06-11 RX ADMIN — LIDOCAINE HYDROCHLORIDE 100 MG: 20 INJECTION, SOLUTION EPIDURAL; INFILTRATION; INTRACAUDAL; PERINEURAL at 15:54

## 2025-06-11 RX ADMIN — DOCUSATE SODIUM 100 MG: 100 CAPSULE, LIQUID FILLED ORAL at 08:18

## 2025-06-11 RX ADMIN — PROPOFOL 50 MG: 10 INJECTION, EMULSION INTRAVENOUS at 15:54

## 2025-06-11 RX ADMIN — ACETAMINOPHEN 975 MG: 325 TABLET ORAL at 17:14

## 2025-06-11 SDOH — HEALTH STABILITY: MENTAL HEALTH: CURRENT SMOKER: 0

## 2025-06-11 ASSESSMENT — COGNITIVE AND FUNCTIONAL STATUS - GENERAL
DRESSING REGULAR UPPER BODY CLOTHING: A LITTLE
STANDING UP FROM CHAIR USING ARMS: A LITTLE
CLIMB 3 TO 5 STEPS WITH RAILING: A LITTLE
EATING MEALS: A LITTLE
DAILY ACTIVITIY SCORE: 18
HELP NEEDED FOR BATHING: A LITTLE
MOVING FROM LYING ON BACK TO SITTING ON SIDE OF FLAT BED WITH BEDRAILS: A LITTLE
TURNING FROM BACK TO SIDE WHILE IN FLAT BAD: A LITTLE
MOBILITY SCORE: 18
DRESSING REGULAR LOWER BODY CLOTHING: A LITTLE
MOVING TO AND FROM BED TO CHAIR: A LITTLE
PERSONAL GROOMING: A LITTLE
WALKING IN HOSPITAL ROOM: A LITTLE
TOILETING: A LITTLE

## 2025-06-11 ASSESSMENT — ENCOUNTER SYMPTOMS
NAUSEA: 1
PALPITATIONS: 0
DIZZINESS: 0
ARTHRALGIAS: 1
DIFFICULTY URINATING: 1
WEAKNESS: 1
SHORTNESS OF BREATH: 0
LIGHT-HEADEDNESS: 0
DYSURIA: 0

## 2025-06-11 ASSESSMENT — PAIN - FUNCTIONAL ASSESSMENT
PAIN_FUNCTIONAL_ASSESSMENT: 0-10

## 2025-06-11 ASSESSMENT — PAIN SCALES - GENERAL
PAINLEVEL_OUTOF10: 0 - NO PAIN

## 2025-06-11 NOTE — NURSING NOTE
Pt with small amount of bleeding coming from his meatus. No hematuria. Pt without complaints. LINH Anglin NP notified.  UA ordered.

## 2025-06-11 NOTE — SIGNIFICANT EVENT
"I messaged Dr Negron regarding clarification on amiodarone drip and bolus as this was not originally started. He replied that we\"can hold off the amiodarone at this time\" as the patient is on oral amiodarone. Therefore, the patient's home oral amiodarone was resumed.  He also recommended a repeat echo and then if LV function is reduced then likely will need ischemic evaluation given his history of CAD.  Echo is pending completion at this time.   "

## 2025-06-11 NOTE — CARE PLAN
Problem: Pain - Adult  Goal: Verbalizes/displays adequate comfort level or baseline comfort level  6/11/2025 0401 by Isabel Hughes RN  Outcome: Progressing  6/11/2025 0401 by Isabel Hughes RN  Outcome: Progressing     Problem: Safety - Adult  Goal: Free from fall injury  6/11/2025 0401 by Isabel Hughes RN  Outcome: Progressing  6/11/2025 0401 by Isabel Hughes RN  Outcome: Progressing     Problem: Discharge Planning  Goal: Discharge to home or other facility with appropriate resources  6/11/2025 0401 by Isabel Hughes RN  Outcome: Progressing  6/11/2025 0401 by Isabel Hughes RN  Outcome: Progressing     Problem: Chronic Conditions and Co-morbidities  Goal: Patient's chronic conditions and co-morbidity symptoms are monitored and maintained or improved  6/11/2025 0401 by Isabel Hughes RN  Outcome: Progressing  6/11/2025 0401 by Isabel Hughes RN  Outcome: Progressing     Problem: Fall/Injury  Goal: Not fall by end of shift  6/11/2025 0401 by Isabel Hughes RN  Outcome: Progressing  6/11/2025 0401 by Isabel Hughes RN  Outcome: Progressing  Goal: Be free from injury by end of the shift  6/11/2025 0401 by Isabel Hughes RN  Outcome: Progressing  6/11/2025 0401 by Isabel Hughes RN  Outcome: Progressing  Goal: Verbalize understanding of personal risk factors for fall in the hospital  6/11/2025 0401 by Isabel Hughes RN  Outcome: Progressing  6/11/2025 0401 by Isabel Hughes RN  Outcome: Progressing  Goal: Verbalize understanding of risk factor reduction measures to prevent injury from fall in the home  6/11/2025 0401 by Isabel Hughes RN  Outcome: Progressing  6/11/2025 0401 by Isabel Hughes RN  Outcome: Progressing  Goal: Use assistive devices by end of the shift  6/11/2025 0401 by Isabel Hughes RN  Outcome: Progressing  6/11/2025 0401 by Isabel Hughes RN  Outcome: Progressing  Goal: Pace activities to  prevent fatigue by end of the shift  6/11/2025 0401 by Isabel Hughes RN  Outcome: Progressing  6/11/2025 0401 by Isabel Hughes RN  Outcome: Progressing     Problem: Skin  Goal: Participates in plan/prevention/treatment measures  Outcome: Progressing  Goal: Prevent/manage excess moisture  6/11/2025 0401 by Isabel Hughes RN  Outcome: Progressing  6/11/2025 0401 by Isabel Hughes RN  Outcome: Progressing  Goal: Prevent/minimize sheer/friction injuries  6/11/2025 0401 by Isabel Hughes RN  Outcome: Progressing  6/11/2025 0401 by Isabel Hughes RN  Outcome: Progressing  Goal: Promote/optimize nutrition  6/11/2025 0401 by Isabel Hughes RN  Outcome: Progressing  6/11/2025 0401 by Isabel Hughes RN  Outcome: Progressing  Goal: Promote skin healing  Outcome: Progressing   The patient's goals for the shift include      The clinical goals for the shift include stable hemodynamics and cardiac rhythm.

## 2025-06-11 NOTE — PROGRESS NOTES
"Wayne Burkitt is a 79 y.o. male on day 1 of admission presenting with Anemia, unspecified type, developed Vt ach, was transferred to ICU.    Subjective   No co       Objective   Lying in bed, no acute distress    Current Medications[1]       Physical Exam  HENT:      Head: Normocephalic.   Eyes:      Conjunctiva/sclera: Conjunctivae normal.   Cardiovascular:      Rate and Rhythm: Regular rhythm.      Heart sounds: Normal heart sounds.   Pulmonary:      Breath sounds: Normal breath sounds.   Abdominal:      General: Bowel sounds are normal.      Palpations: Abdomen is soft.   Musculoskeletal:      Comments: No edema   Skin:     General: Skin is warm and dry.   Neurological:      General: No focal deficit present.      Mental Status: He is alert.           Last Recorded Vitals  Blood pressure (!) 157/108, pulse 60, temperature 36.4 °C (97.5 °F), temperature source Temporal, resp. rate 15, height 1.803 m (5' 11\"), weight 82.7 kg (182 lb 4.8 oz), SpO2 94%.  Intake/Output last 3 Shifts:  I/O last 3 completed shifts:  In: 1240 (15 mL/kg) [P.O.:540; Blood:700]  Out: 900 (10.9 mL/kg) [Urine:900 (0.3 mL/kg/hr)]  Weight: 82.7 kg           Labs:       Results for orders placed or performed during the hospital encounter of 06/10/25 (from the past 24 hours)   Troponin I, High Sensitivity   Result Value Ref Range    Troponin I, High Sensitivity 12 0 - 20 ng/L   Hemoglobin and Hematocrit, Blood   Result Value Ref Range    Hemoglobin 7.7 (L) 13.5 - 17.5 g/dL    Hematocrit 26.1 (L) 41.0 - 52.0 %   CBC   Result Value Ref Range    WBC 7.9 4.4 - 11.3 x10*3/uL    nRBC 0.0 0.0 - 0.0 /100 WBCs    RBC 3.26 (L) 4.50 - 5.90 x10*6/uL    Hemoglobin 8.1 (L) 13.5 - 17.5 g/dL    Hematocrit 27.8 (L) 41.0 - 52.0 %    MCV 85 80 - 100 fL    MCH 24.8 (L) 26.0 - 34.0 pg    MCHC 29.1 (L) 32.0 - 36.0 g/dL    RDW 19.4 (H) 11.5 - 14.5 %    Platelets 287 150 - 450 x10*3/uL   Renal Function Panel   Result Value Ref Range    Glucose 129 (H) 74 - 99 mg/dL    " Sodium 139 136 - 145 mmol/L    Potassium 4.5 3.5 - 5.3 mmol/L    Chloride 108 (H) 98 - 107 mmol/L    Bicarbonate 24 21 - 32 mmol/L    Anion Gap 12 10 - 20 mmol/L    Urea Nitrogen 20 6 - 23 mg/dL    Creatinine 1.14 0.50 - 1.30 mg/dL    eGFR 66 >60 mL/min/1.73m*2    Calcium 8.4 (L) 8.6 - 10.3 mg/dL    Phosphorus 3.0 2.5 - 4.9 mg/dL    Albumin 3.9 3.4 - 5.0 g/dL   Magnesium   Result Value Ref Range    Magnesium 2.15 1.60 - 2.40 mg/dL   POCT GLUCOSE   Result Value Ref Range    POCT Glucose 93 74 - 99 mg/dL   POCT GLUCOSE   Result Value Ref Range    POCT Glucose 99 74 - 99 mg/dL   POCT GLUCOSE   Result Value Ref Range    POCT Glucose 99 74 - 99 mg/dL   CBC   Result Value Ref Range    WBC 5.4 4.4 - 11.3 x10*3/uL    nRBC 0.0 0.0 - 0.0 /100 WBCs    RBC 2.95 (L) 4.50 - 5.90 x10*6/uL    Hemoglobin 7.5 (L) 13.5 - 17.5 g/dL    Hematocrit 24.6 (L) 41.0 - 52.0 %    MCV 83 80 - 100 fL    MCH 25.4 (L) 26.0 - 34.0 pg    MCHC 30.5 (L) 32.0 - 36.0 g/dL    RDW 19.8 (H) 11.5 - 14.5 %    Platelets 238 150 - 450 x10*3/uL   Hepatic Function Panel   Result Value Ref Range    Albumin 3.8 3.4 - 5.0 g/dL    Bilirubin, Total 0.9 0.0 - 1.2 mg/dL    Bilirubin, Direct 0.2 0.0 - 0.3 mg/dL    Alkaline Phosphatase 97 33 - 136 U/L    ALT 24 10 - 52 U/L    AST 24 9 - 39 U/L    Total Protein 6.3 (L) 6.4 - 8.2 g/dL   Phosphorus   Result Value Ref Range    Phosphorus 3.0 2.5 - 4.9 mg/dL   Basic Metabolic Panel   Result Value Ref Range    Glucose 98 74 - 99 mg/dL    Sodium 140 136 - 145 mmol/L    Potassium 4.0 3.5 - 5.3 mmol/L    Chloride 108 (H) 98 - 107 mmol/L    Bicarbonate 24 21 - 32 mmol/L    Anion Gap 12 10 - 20 mmol/L    Urea Nitrogen 18 6 - 23 mg/dL    Creatinine 1.13 0.50 - 1.30 mg/dL    eGFR 67 >60 mL/min/1.73m*2    Calcium 8.4 (L) 8.6 - 10.3 mg/dL   Urinalysis with Reflex Culture and Microscopic   Result Value Ref Range    Color, Urine Colorless (N) Light-Yellow, Yellow, Dark-Yellow    Appearance, Urine Clear Clear    Specific Gravity, Urine  1.008 1.005 - 1.035    pH, Urine 6.5 5.0, 5.5, 6.0, 6.5, 7.0, 7.5, 8.0    Protein, Urine NEGATIVE NEGATIVE, 10 (TRACE), 20 (TRACE) mg/dL    Glucose, Urine Normal Normal mg/dL    Blood, Urine 1.0 (3+) (A) NEGATIVE mg/dL    Ketones, Urine NEGATIVE NEGATIVE mg/dL    Bilirubin, Urine NEGATIVE NEGATIVE mg/dL    Urobilinogen, Urine Normal Normal mg/dL    Nitrite, Urine NEGATIVE NEGATIVE    Leukocyte Esterase, Urine NEGATIVE NEGATIVE   Urinalysis Microscopic   Result Value Ref Range    WBC, Urine 6-10 (A) 1-5, NONE /HPF    RBC, Urine >20 (A) NONE, 1-2, 3-5 /HPF   POCT GLUCOSE   Result Value Ref Range    POCT Glucose 92 74 - 99 mg/dL        Radiology  XR chest 1 view  Result Date: 6/10/2025  No acute cardiopulmonary process radiographically.   MACRO: None.   Signed by: Gerda Rainey 6/10/2025 8:24 AM Dictation workstation:   [id number]        Assessment/Plan   Assessment & Plan  Anemia, unspecified type    Bilateral lower extremity edema  Chest pain   Acute on chronic anemia   Iron deficiency   Bilateral lower extremity edema   History of Gastric AVMs  History of Sigmoid diverticulosis  HTN  V. tach      PLAN: Patient developed V. tach, was transferred to ICU, cardiology and EP consult was obtained, monitor H&H, transfuse if needed, c/w IV Protonix, med list and labs reviewed, discussed with nursing and CNP, follow closely.          Bradley Morel MD           [1]   Current Facility-Administered Medications:     acetaminophen (Tylenol) tablet 650 mg, 650 mg, oral, q4h PRN **OR** acetaminophen (Tylenol) suppository 650 mg, 650 mg, rectal, q4h PRN, Corey Feliz PA-C    amiodarone (Pacerone) tablet 200 mg, 200 mg, oral, Daily, RACHID Vazquez-CNP    [Held by provider] aspirin EC tablet 81 mg, 81 mg, oral, Daily, Corey Feliz PA-C    brimonidine (AlphaGAN) 0.2 % ophthalmic solution 1 drop, 1 drop, Both Eyes, BID, Corey Feliz PA-C, 1 drop at 06/11/25 1025    cholecalciferol (Vitamin D-3) tablet 50 mcg,  50 mcg, oral, Daily, Corey Feliz PA-C, 50 mcg at 06/11/25 0818    docusate sodium (Colace) capsule 100 mg, 100 mg, oral, BID, Corey Feliz PA-C, 100 mg at 06/11/25 0818    donepezil (Aricept) tablet 5 mg, 5 mg, oral, Nightly, Corey Feliz PA-C, 5 mg at 06/10/25 2011    [Transfer Hold] enalaprilat (Vasotec) injection 0.625 mg, 0.625 mg, intravenous, Once, Corey Feliz PA-C    [Transfer Hold] ferrous sulfate 325 mg (65 mg elemental) tablet 1 tablet, 65 mg of elemental iron, oral, Every other day, Corey Feliz PA-C, 1 tablet at 06/10/25 1546    levETIRAcetam (Keppra) tablet 1,000 mg, 1,000 mg, oral, BID, Corey Feliz PA-C, 1,000 mg at 06/11/25 0818    magnesium hydroxide (Milk of Magnesia) 400 mg/5 mL suspension 30 mL, 30 mL, oral, Daily PRN, Corey Feliz PA-C    melatonin tablet 3 mg, 3 mg, oral, Nightly PRN, Corey Feliz PA-C    metoprolol succinate XL (Toprol-XL) 24 hr tablet 25 mg, 25 mg, oral, BID, Corey Feliz PA-C, 25 mg at 06/11/25 0818    nitroglycerin (Nitrostat) SL tablet 0.4 mg, 0.4 mg, sublingual, q5 min PRN, Corey Feliz PA-C    ondansetron ODT (Zofran-ODT) disintegrating tablet 4 mg, 4 mg, oral, q8h PRN **OR** ondansetron (Zofran) injection 4 mg, 4 mg, intravenous, q8h PRN, Corey Feliz PA-C    pantoprazole (Protonix) injection 40 mg, 40 mg, intravenous, BID, 40 mg at 06/11/25 0818 **FOLLOWED BY** [START ON 6/13/2025] pantoprazole (ProtoNix) EC tablet 40 mg, 40 mg, oral, BID AC, Corey Feliz PA-C    pravastatin (Pravachol) tablet 80 mg, 80 mg, oral, Nightly, Corey Feliz PA-C, 80 mg at 06/10/25 2012    QUEtiapine (SEROquel) tablet 50 mg, 50 mg, oral, Nightly, Corey Feliz PA-C, 50 mg at 06/10/25 2012    sodium chloride (Ocean) 0.65 % nasal spray 2 spray, 2 spray, Each Nostril, BID, Corey Feliz PA-C, 2 spray at 06/11/25 0821

## 2025-06-11 NOTE — ASSESSMENT & PLAN NOTE
Chest pain   Acute on chronic anemia   Iron deficiency   Bilateral lower extremity edema   History of Gastric AVMs  History of Sigmoid diverticulosis  HTN  V. tach

## 2025-06-11 NOTE — ANESTHESIA POSTPROCEDURE EVALUATION
Patient: Wayne Burkitt    Procedure Summary       Date: 06/11/25 Room / Location: [facility]    Anesthesia Start: 1551 Anesthesia Stop: 1616    Procedure: ENTEROSCOPY Diagnosis: Upper GI bleed    Scheduled Providers: Duran Garcia MD Responsible Provider: Raúl Maza MD    Anesthesia Type: MAC ASA Status: 2            Anesthesia Type: MAC    Vitals Value Taken Time   /78 06/11/25 17:00   Temp 36.4 °C (97.5 °F) 06/11/25 16:45   Pulse 60 06/11/25 17:01   Resp 11 06/11/25 17:01   SpO2 95 % 06/11/25 17:01   Vitals shown include unfiled device data.    Anesthesia Post Evaluation    Patient location during evaluation: PACU  Patient participation: complete - patient participated  Level of consciousness: awake and alert  Pain management: satisfactory to patient  Airway patency: patent  Cardiovascular status: acceptable  Respiratory status: acceptable  Hydration status: acceptable  Postoperative Nausea and Vomiting: none        No notable events documented.

## 2025-06-11 NOTE — H&P
History Of Present Illness  Wayne Burkitt is a 79 y.o. male  from [facility] with PMH of CAD,  s/p PCI with multiple stents, s/p CABG, HFpEF, V tach, s/p pacemaker, HTN, HLD, recurrent Epistaxis, gastric AVMs presented to ER due to chest pain. Pt apparently was going to [facility] for capsule endoscopy when he developed chest pain. Pain was severe in intensity, felt like heaviness, in lower sternum with radiation to lt arm and associated with SOB. Pain lasted for about 30 mts. Pt came to ER from where he is being  admitted on step down unit for further care.       Past Medical History  He has a past medical history of Acute blood loss anemia, Anemia, Angina pectoris, Auditory hallucinations (09/28/2024), CAD (coronary artery disease), CKD (chronic kidney disease), stage II, Conversion reaction (09/28/2024), Coronary artery disease involving native coronary artery of native heart without angina pectoris (09/28/2024), Epilepsy, Epistaxis, recurrent (09/28/2024), Frequent falls, Gastric AVM, Generalized ischemic myocardial dysfunction (09/28/2024), Glaucoma, Hallucinations, HFrEF (heart failure with reduced ejection fraction), HLD (hyperlipidemia), HTN (hypertension), Iron deficiency, Ischemic cardiomyopathy (09/28/2024), Melena, 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    Surgical History  He has a past surgical history that includes Coronary artery bypass graft (2010); Coronary angioplasty with stent; pacemaker placement; Total hip arthroplasty (Right); Cardiac catheterization (N/A, 12/20/2024); Cardiac catheterization (N/A, 12/20/2024); Esophagogastroduodenoscopy; and Colonoscopy.     Social  "History  He reports that he quit smoking about 35 years ago. His smoking use included cigarettes. He started smoking about 41 years ago. He has a 0.9 pack-year smoking history. He has never used smokeless tobacco. He reports that he does not currently use alcohol. He reports that he does not use drugs.    Family History  Family History[1]     Allergies  Alprazolam, Bepotastine besilate, Clopidogrel, Doxazosin, Lisinopril, Metronidazole, Amlodipine, Atorvastatin, Cephalexin, Ciprofloxacin, Diphenhydramine hcl, Guaifenesin, Hydrochlorothiazide, Hydrocodone, Methylprednisolone, Phenytoin, Sertraline, Telmisartan, and Topiramate    Current medications:    Current Medications[2]       Review of Systems    10 organ ROS is pertinent for history mentioned above, otherwise (-)ve       Physical Exam  HENT:      Head: Normocephalic.   Eyes:      Conjunctiva/sclera: Conjunctivae normal.   Cardiovascular:      Rate and Rhythm: Regular rhythm.      Heart sounds: Normal heart sounds.   Pulmonary:      Breath sounds: Normal breath sounds.   Abdominal:      General: Bowel sounds are normal.      Palpations: Abdomen is soft.   Musculoskeletal:      Comments: No edema    Skin:     General: Skin is warm and dry.   Neurological:      Mental Status: He is alert.      Comments: Awake and alert, no acute distress   Psychiatric:         Mood and Affect: Mood normal.              Last Recorded Vitals      Blood pressure 139/79, pulse 59, temperature 36.3 °C (97.3 °F), temperature source Temporal, resp. rate 18, height 1.803 m (5' 11\"), weight 81.6 kg (180 lb), SpO2 98%.    Relevant Results     Results for orders placed or performed during the hospital encounter of 06/10/25 (from the past 24 hours)   ECG 12 lead   Result Value Ref Range    Ventricular Rate 60 BPM    Atrial Rate 60 BPM    QRS Duration 104 ms    QT Interval 464 ms    QTC Calculation(Bazett) 464 ms    R Axis 37 degrees    T Axis 226 degrees    QRS Count 10 beats    Q Onset 212 " ms    T Offset 444 ms    QTC Fredericia 464 ms   Protime-INR   Result Value Ref Range    Protime 12.9 (H) 9.8 - 12.4 seconds    INR 1.2 (H) 0.9 - 1.1   CBC and Auto Differential   Result Value Ref Range    WBC 5.0 4.4 - 11.3 x10*3/uL    nRBC 0.0 0.0 - 0.0 /100 WBCs    RBC 2.64 (L) 4.50 - 5.90 x10*6/uL    Hemoglobin 6.3 (LL) 13.5 - 17.5 g/dL    Hematocrit 22.1 (L) 41.0 - 52.0 %    MCV 84 80 - 100 fL    MCH 23.9 (L) 26.0 - 34.0 pg    MCHC 28.5 (L) 32.0 - 36.0 g/dL    RDW 21.3 (H) 11.5 - 14.5 %    Platelets 273 150 - 450 x10*3/uL    Immature Granulocytes %, Automated 0.4 0.0 - 0.9 %    Immature Granulocytes Absolute, Automated 0.02 0.00 - 0.50 x10*3/uL   Comprehensive Metabolic Panel   Result Value Ref Range    Glucose 107 (H) 74 - 99 mg/dL    Sodium 141 136 - 145 mmol/L    Potassium 4.1 3.5 - 5.3 mmol/L    Chloride 108 (H) 98 - 107 mmol/L    Bicarbonate 24 21 - 32 mmol/L    Anion Gap 13 10 - 20 mmol/L    Urea Nitrogen 21 6 - 23 mg/dL    Creatinine 1.23 0.50 - 1.30 mg/dL    eGFR 60 (L) >60 mL/min/1.73m*2    Calcium 8.5 (L) 8.6 - 10.3 mg/dL    Albumin 4.2 3.4 - 5.0 g/dL    Alkaline Phosphatase 106 33 - 136 U/L    Total Protein 6.8 6.4 - 8.2 g/dL    AST 40 (H) 9 - 39 U/L    Bilirubin, Total 0.6 0.0 - 1.2 mg/dL    ALT 32 10 - 52 U/L   Troponin I, High Sensitivity, Initial   Result Value Ref Range    Troponin I, High Sensitivity 9 0 - 20 ng/L   Manual Differential   Result Value Ref Range    Neutrophils %, Manual 78.0 40.0 - 80.0 %    Bands %, Manual 1.0 0.0 - 5.0 %    Lymphocytes %, Manual 15.0 13.0 - 44.0 %    Monocytes %, Manual 3.0 2.0 - 10.0 %    Eosinophils %, Manual 2.0 0.0 - 6.0 %    Basophils %, Manual 1.0 0.0 - 2.0 %    Seg Neutrophils Absolute, Manual 3.90 1.60 - 5.00 x10*3/uL    Bands Absolute, Manual 0.05 0.00 - 0.50 x10*3/uL    Lymphocytes Absolute, Manual 0.75 (L) 0.80 - 3.00 x10*3/uL    Monocytes Absolute, Manual 0.15 0.05 - 0.80 x10*3/uL    Eosinophils Absolute, Manual 0.10 0.00 - 0.40 x10*3/uL     Basophils Absolute, Manual 0.05 0.00 - 0.10 x10*3/uL    Total Cells Counted 100     Neutrophils Absolute, Manual 3.95 1.60 - 5.50 x10*3/uL    RBC Morphology See Below     RBC Fragments Few     Target Cells Few     Ovalocytes Few     Teardrop Cells Few    Ferritin   Result Value Ref Range    Ferritin 10 (L) 20 - 300 ng/mL   Iron and TIBC   Result Value Ref Range    Iron 13 (L) 35 - 150 ug/dL    UIBC 413 (H) 110 - 370 ug/dL    TIBC 426 240 - 445 ug/dL    % Saturation 3 (L) 25 - 45 %   B-type natriuretic peptide   Result Value Ref Range     (H) 0 - 99 pg/mL   Prepare RBC: 1 Units   Result Value Ref Range    PRODUCT CODE C0516F70     Unit Number [id number]     Unit ABO B     Unit RH NEG     XM INTEP COMP     Dispense Status TR     Blood Expiration Date 7/5/2025 11:59:00 PM EDT     PRODUCT BLOOD TYPE 1700     UNIT VOLUME 350    Troponin, High Sensitivity, 1 Hour   Result Value Ref Range    Troponin I, High Sensitivity 11 0 - 20 ng/L   Type And Screen   Result Value Ref Range    ABO TYPE B     Rh TYPE POS     ANTIBODY SCREEN NEG    ECG 12 lead   Result Value Ref Range    Ventricular Rate 60 BPM    Atrial Rate 60 BPM    AR Interval 298 ms    QRS Duration 170 ms    QT Interval 540 ms    QTC Calculation(Bazett) 540 ms    R Axis -75 degrees    T Axis 95 degrees    QRS Count 10 beats    Q Onset 182 ms    T Offset 452 ms    QTC Fredericia 540 ms   Troponin I, High Sensitivity   Result Value Ref Range    Troponin I, High Sensitivity 12 0 - 20 ng/L   Hemoglobin and Hematocrit, Blood   Result Value Ref Range    Hemoglobin 7.7 (L) 13.5 - 17.5 g/dL    Hematocrit 26.1 (L) 41.0 - 52.0 %   CBC   Result Value Ref Range    WBC 7.9 4.4 - 11.3 x10*3/uL    nRBC 0.0 0.0 - 0.0 /100 WBCs    RBC 3.26 (L) 4.50 - 5.90 x10*6/uL    Hemoglobin 8.1 (L) 13.5 - 17.5 g/dL    Hematocrit 27.8 (L) 41.0 - 52.0 %    MCV 85 80 - 100 fL    MCH 24.8 (L) 26.0 - 34.0 pg    MCHC 29.1 (L) 32.0 - 36.0 g/dL    RDW 19.4 (H) 11.5 - 14.5 %    Platelets 287  150 - 450 x10*3/uL   Renal Function Panel   Result Value Ref Range    Glucose 129 (H) 74 - 99 mg/dL    Sodium 139 136 - 145 mmol/L    Potassium 4.5 3.5 - 5.3 mmol/L    Chloride 108 (H) 98 - 107 mmol/L    Bicarbonate 24 21 - 32 mmol/L    Anion Gap 12 10 - 20 mmol/L    Urea Nitrogen 20 6 - 23 mg/dL    Creatinine 1.14 0.50 - 1.30 mg/dL    eGFR 66 >60 mL/min/1.73m*2    Calcium 8.4 (L) 8.6 - 10.3 mg/dL    Phosphorus 3.0 2.5 - 4.9 mg/dL    Albumin 3.9 3.4 - 5.0 g/dL   Magnesium   Result Value Ref Range    Magnesium 2.15 1.60 - 2.40 mg/dL          Radiology     XR chest 1 view  Result Date: 6/10/2025  No acute cardiopulmonary process radiographically.   MACRO: None.   Signed by: Gerda Rainey 6/10/2025 8:24 AM Dictation workstation:   [id number]       Assessment/Plan   Assessment & Plan  Anemia, unspecified type    Bilateral lower extremity edema  Chest pain   Acute on chronic anemia   Iron deficiency   Bilateral lower extremity edema   History of Gastric AVMs  History of Sigmoid diverticulosis  HTN          PLAN: Pt was admitted on step down unit, monitor H&H, Protonix, GI consult, monitor iron studies, replace as needed, trend troponin, may need cardiology consult, c/w other home meds, d/w GI, DVT prophylaxis, follow closely.            Bradley Morel MD        [1]   Family History  Problem Relation Name Age of Onset    Other (open heart surgery) Mother      Multiple sclerosis Father      Asthma Sister      Brain Aneurysm Sister     [2]   Current Facility-Administered Medications:     acetaminophen (Tylenol) tablet 650 mg, 650 mg, oral, q4h PRN **OR** acetaminophen (Tylenol) suppository 650 mg, 650 mg, rectal, q4h PRN, Corey Feliz PA-C    [START ON 6/11/2025] amiodarone (Pacerone) tablet 200 mg, 200 mg, oral, Daily, Corey Feliz PA-C    [Held by provider] aspirin EC tablet 81 mg, 81 mg, oral, Daily, Corey Feliz PA-C    brimonidine (AlphaGAN) 0.2 % ophthalmic solution 1 drop, 1 drop, Both Eyes, BID,  Corey Feliz PA-C, 1 drop at 06/10/25 2053    [START ON 6/11/2025] cholecalciferol (Vitamin D-3) tablet 50 mcg, 50 mcg, oral, Daily, Corey Feliz PA-C    docusate sodium (Colace) capsule 100 mg, 100 mg, oral, BID, Corey Feliz PA-C, 100 mg at 06/10/25 2012    donepezil (Aricept) tablet 5 mg, 5 mg, oral, Nightly, Corey Feliz PA-C, 5 mg at 06/10/25 2011    enalaprilat (Vasotec) injection 0.625 mg, 0.625 mg, intravenous, Once, Corey Feliz PA-C    ferrous sulfate 325 mg (65 mg elemental) tablet 1 tablet, 65 mg of elemental iron, oral, Every other day, Corey Feliz PA-C, 1 tablet at 06/10/25 1546    levETIRAcetam (Keppra) tablet 1,000 mg, 1,000 mg, oral, BID, Corey Feliz PA-C, 1,000 mg at 06/10/25 2012    magnesium hydroxide (Milk of Magnesia) 400 mg/5 mL suspension 30 mL, 30 mL, oral, Daily PRN, Corey Feliz PA-C    melatonin tablet 3 mg, 3 mg, oral, Nightly PRN, Corey Feliz PA-C    metoprolol succinate XL (Toprol-XL) 24 hr tablet 25 mg, 25 mg, oral, BID, Corey Feliz PA-C, 25 mg at 06/10/25 2012    nitroglycerin (Nitrostat) SL tablet 0.4 mg, 0.4 mg, sublingual, q5 min PRN, Corey Feliz PA-C    ondansetron ODT (Zofran-ODT) disintegrating tablet 4 mg, 4 mg, oral, q8h PRN **OR** ondansetron (Zofran) injection 4 mg, 4 mg, intravenous, q8h PRN, Corey Feliz PA-C    pantoprazole (Protonix) injection 40 mg, 40 mg, intravenous, BID, 40 mg at 06/10/25 2102 **FOLLOWED BY** [START ON 6/13/2025] pantoprazole (ProtoNix) EC tablet 40 mg, 40 mg, oral, BID AC, Corey Feliz PA-C    pravastatin (Pravachol) tablet 80 mg, 80 mg, oral, Nightly, Corey Feliz PA-C, 80 mg at 06/10/25 2012    QUEtiapine (SEROquel) tablet 50 mg, 50 mg, oral, Nightly, Corey Feliz PA-C, 50 mg at 06/10/25 2012    sodium chloride (Ocean) 0.65 % nasal spray 2 spray, 2 spray, Each Nostril, BID, Corey Feliz PA-C, 2 spray at 06/10/25 2052

## 2025-06-11 NOTE — CARE PLAN
Pt. Will remain safe this shift.    Pt. Will tolerate q2hr turns to prevent skin breakdown this shift.      Pt. Will remain hemodynamically stable this shift.    1829:  Pt. Has remained safe this shift.  Pt. Has tolerated q2hr turns to prevent skin breakdown this shift.  Pt. Has remained hemodynamically stable this shift.

## 2025-06-11 NOTE — NURSING NOTE
Vasotec IV delivered from pharmacy. Per NORMA Nicole give po meds including metoprolol and wait to give vasotec. Patient calm in bed resting with no pain

## 2025-06-11 NOTE — NURSING NOTE
Patient still having runs of [facility]. NORMA Nicole on unit to access patient and in contact with MD Varner and request patient to moved to step down for closer monitoring and possible amnio drip. Charge nurse aware & Supervisor aware and working to get a bed available. Patient up to side of bed using urinal. Patient calm and denies pain. Patient still wearing capsul belt from capsul study. BP improving s/p HS metop and orders given verbal by Corey to not give vasotec at this time

## 2025-06-11 NOTE — NURSING NOTE
0715:  Report received from previous shift RN.  See shift assessment, vital signs, care plan and education.  Chart check done.    1520:  Pt. To endo via bed, and endo team.    1705:  Pt back from PACU s/p Endoscopy procedure.

## 2025-06-11 NOTE — CONSULTS
Consults    Cardiology Consult Note      Date:   6/11/2025  Patient name:  Wayne Burkitt  Date of admission:  6/10/2025  7:32 AM  MRN:   [id number]  YOB: 1945  Time of Consult:  3:23 PM    Consulting Cardiologist: Dr. Kavita Varner       Primary Cardiologist:  Dr. Gabino Giron    Referring Provider: Dr. Bradley Morel      Admission Diagnosis:     Anemia, unspecified type    History of Present Illness:      Wayne Burkitt is a 79 y.o.  male patient who is being at the request of Dr. Morel for inpatient consultation of angina. He was admitted on 6/10/2025.  Previous electronic health records have been reviewed in detail.      This patient came to the emergency room due to the chest discomfort.  He has been having chest discomfort intermittently over the past week or 2 and has some chronic episodic chest discomfort.  While he was with his friend in the car after having a capsule endoscopy he complained of some chest discomfort and some radiation to his left arm and shortness of breath.  By the time he got to the emergency room and it resolved.  He told them that he had been having this pain for several years and that is worsened when he drives over bumpy roads and movement of the car aggravates his discomfort.  Sometimes it feels like a weight sometimes as a pressure sometimes there is radiation to the arm sometimes there is a knot.    He was admitted to the hospital because of this chest discomfort and his history of coronary artery disease.  While he was on telemetry overnight he developed a slow wide-complex rhythm at 100 bpm that is consistent with either SVT with aberrancy or slow ventricular tachycardia.  He was transferred to the ICU overnight and given some IV amiodarone and seen by electrophysiology this morning.  Because of his pacemaker there are no electrical histograms so we cannot discriminate between the SVT.  His rate is below that which would store the  hysterogram for further evaluation.  A twelve-lead was never obtained during the episode.    At this time the patient has no chest pain or discomfort.  He has been having problems with recurrent epistaxis and undergoing evaluation for GI due to anemia.    Cardiac history: His last heart catheterization was in December 2024 at which time his left main had mild irregularities, LAD had a 50 to 70% diagonal branch stenosis in the LAD was 100% occluded in the mid vessel and fills distally by a patent LIMA to the LAD.  The left circumflex is 100% occluded mid vessel in the [place] right coronary artery has patent stents and had a 90% mid vessel obstruction that was stented at that time and the RPDA was 100% occluded but filled by collaterals.  There is a saphenous vein graft to the obtuse marginal 2 with a 70% stenosis and LVEF at that time was 55%.  He has a history of heart failure preserved ejection fraction, hypertension, hyperlipidemia and Biotronik's pacemaker.    Allergies:     Allergies[1]    Past Medical History:     Medical History[2]    Past Surgical History:     Surgical History[3]    Family History:     Family History[4]    Social History:     Social History[5]    Home Medications:     Prior to Admission medications    Medication Sig Start Date End Date Taking? Authorizing Provider   acetaminophen (Tylenol) 325 mg tablet Take 2 tablets (650 mg) by mouth every 4 hours if needed for mild pain (1 - 3) or fever (temp greater than 38.0 C).   Yes Historical Provider, MD   amiodarone (Pacerone) 200 mg tablet Take 1 tablet (200 mg) by mouth once daily. Do not fill before May 24, 2025. 5/24/25 6/23/25 Yes ERICK Petty   aspirin 81 mg EC tablet Take 1 tablet (81 mg) by mouth once daily. Do not fill before May 5, 2025. 5/5/25  Yes ERICK Petty   brimonidine (AlphaGAN) 0.2 % ophthalmic solution Administer 1 drop into both eyes 2 times a day.   Yes Historical Provider, MD   cholecalciferol  (Vitamin D-3) 50 mcg (2,000 units) tablet Take 1 tablet (50 mcg) by mouth once daily.   Yes Historical Provider, MD   docusate sodium (Colace) 100 mg capsule Take 1 capsule (100 mg) by mouth 2 times a day as needed for constipation.   Yes Historical Provider, MD   donepezil (Aricept) 5 mg tablet Take 1 tablet (5 mg) by mouth once daily at bedtime.   Yes Historical Provider, MD   levETIRAcetam (Keppra) 1,000 mg tablet Take 1 tablet (1,000 mg) by mouth 2 times a day.   Yes Historical Provider, MD   metoprolol succinate XL (Toprol-XL) 25 mg 24 hr tablet Take 1 tablet (25 mg) by mouth 2 times a day. Do not crush or chew. 4/25/25  Yes ERICK Petty   nitroglycerin (Nitrostat) 0.4 mg SL tablet Place 1 tablet (0.4 mg) under the tongue every 5 minutes if needed for chest pain. 12/24/24  Yes ERICK Barcenas   oxymetazoline (Afrin) 0.05 % nasal spray Administer 2 sprays into each nostril every 12 hours if needed (For nose bleeds.). Do not use for more than 3 days consecutively. Use as needed to aid with stopping nose bleeds. 4/5/25  Yes Olvin Morales, DO   pantoprazole (ProtoNix) 40 mg EC tablet Take 1 tablet (40 mg) by mouth 2 times a day. Do not crush, chew, or split. 2/12/25  Yes Jose M Salazar, DO   pravastatin (Pravachol) 80 mg tablet Take 1 tablet (80 mg) by mouth once daily at bedtime.   Yes Historical Provider, MD   QUEtiapine (SEROquel) 50 mg tablet Take 1 tablet (50 mg) by mouth once daily at bedtime.   Yes Historical Provider, MD   sodium chloride (Saline Nasal Mist) 3 % mist Administer 2 Squirts into affected nostril(s) 2 times a day. 4/5/25  Yes Olvin Morales, DO   amiodarone (Pacerone) 200 mg tablet Take 1 tablet (200 mg) by mouth 2 times a day.  Patient not taking: Reported on 6/10/2025 4/24/25   Dot M Bartos, APRN-CNP   dexAMETHasone (Decadron) 2 mg tablet Take 2 tablets (4 mg) by mouth every 6 hours for 1 day, THEN 1.5 tablets (3 mg) every 8 hours for 2 days, THEN 1 tablet (2 mg) every 8  hours for 2 days, THEN 1 tablet (2 mg) once daily for 2 days.  Patient not taking: Reported on 6/10/2025 4/25/25   RACHID Petty-CNP   magnesium hydroxide (Milk of Magnesia) 400 mg/5 mL suspension Take 30 mL by mouth once daily as needed for constipation.    Historical Provider, MD       Current Medications:     Current Outpatient Medications   Medication Instructions    acetaminophen (TYLENOL) 650 mg, Every 4 hours PRN    amiodarone (PACERONE) 200 mg, oral, 2 times daily    amiodarone (PACERONE) 200 mg, oral, Daily    aspirin 81 mg, oral, Daily    brimonidine (AlphaGAN) 0.2 % ophthalmic solution 1 drop, 2 times daily    cholecalciferol (VITAMIN D-3) 50 mcg, Daily    dexAMETHasone (Decadron) 2 mg tablet Take 2 tablets (4 mg) by mouth every 6 hours for 1 day, THEN 1.5 tablets (3 mg) every 8 hours for 2 days, THEN 1 tablet (2 mg) every 8 hours for 2 days, THEN 1 tablet (2 mg) once daily for 2 days.    docusate sodium (COLACE) 100 mg, 2 times daily PRN    donepezil (ARICEPT) 5 mg, Nightly    levETIRAcetam (KEPPRA) 1,000 mg, 2 times daily    magnesium hydroxide (Milk of Magnesia) 400 mg/5 mL suspension 30 mL, oral, Daily PRN    metoprolol succinate XL (TOPROL-XL) 25 mg, oral, 2 times daily, Do not crush or chew.    nitroglycerin (NITROSTAT) 0.4 mg, sublingual, Every 5 min PRN    oxymetazoline (Afrin) 0.05 % nasal spray 2 sprays, Each Nostril, Every 12 hours PRN, Do not use for more than 3 days consecutively. Use as needed to aid with stopping nose bleeds.    pantoprazole (PROTONIX) 40 mg, oral, 2 times daily, Do not crush, chew, or split.    pravastatin (PRAVACHOL) 80 mg, Nightly    QUEtiapine (SEROQUEL) 50 mg, Nightly    sodium chloride (Saline Nasal Mist) 3 % mist 2 Squirts, nasal, 2 times daily        IV Medications:     Continuous Medications[6]    Review of Systems:      Review of Systems   HENT:  Positive for congestion, hearing loss and nosebleeds.    Respiratory:  Negative for shortness of breath.     Cardiovascular:  Positive for chest pain and leg swelling. Negative for palpitations.   Gastrointestinal:  Positive for nausea.   Genitourinary:  Positive for difficulty urinating. Negative for dysuria.   Musculoskeletal:  Positive for arthralgias.   Neurological:  Positive for weakness. Negative for dizziness, syncope and light-headedness.   All other systems reviewed and are negative.      Vital Signs:     Vitals:    06/11/25 1200 06/11/25 1300 06/11/25 1400 06/11/25 1500   BP: 142/65      BP Location: Left arm      Patient Position: Lying      Pulse: 60 60 60 60   Resp: 15 13 12 16   Temp: 36.7 °C (98.1 °F)      TempSrc: Temporal      SpO2: 94%      Weight:       Height:           Intake/Output Summary (Last 24 hours) at 6/11/2025 1523  Last data filed at 6/11/2025 1200  Gross per 24 hour   Intake 480 ml   Output 1600 ml   Net -1120 ml     Vitals:    06/10/25 2322   Weight: 82.7 kg (182 lb 4.8 oz)       Physical Examination:     GENERAL APPEARANCE: Well developed, well nourished, in no acute distress.  Pale and chronically ill-appearing man  HEENT: No gross abnormalities of conjunctiva, teeth, gums, oral mucosa  NECK: Supple, no JVD, no bruit. Thyroid not palpable. Carotid upstrokes normal.  CHEST: Symmetric and non-tender.  Pacemaker site without erythema or erosion  LUNGS: Clear to auscultation bilaterally; normal respiratory effort.  HEART: PMI is nondisplaced.  There is a regular rhythm with a normal S1 and S2 without S3.  Mild peripheral edema.  Normal distal perfusion.    ABDOMEN: Soft, nontender, no palpable hepatosplenomegaly, no mases, no bruits. Abdominal aorta not noted to be enlarged.  MUSCULOSKELETAL: Osteoarthritic changes  EXTREMITIES: Warm with good color, no clubbing or cyanois. There is mild edema noted.  PERIPHERAL VASCULAR: Pulses present and equally palpable  NEURO/PSHCY: Alert and oriented x3; appropriate behavior and responses and responses, grossly normal cerebellar function with normal  "balance and coordination  INTEGUMENT: Skin warm and dry, without gross excoriationis or lesions.  LYMPH: No significant palpable lymphadenopathy      Lab:     CBC:   Lab Results   Component Value Date    WBC 6.2 06/11/2025    RBC 3.43 (L) 06/11/2025    HGB 8.7 (L) 06/11/2025    HCT 30.1 (L) 06/11/2025     06/11/2025        CMP:    Lab Results   Component Value Date     06/11/2025    K 4.0 06/11/2025     (H) 06/11/2025    CO2 24 06/11/2025    BUN 18 06/11/2025    CREATININE 1.13 06/11/2025    GLUCOSE 98 06/11/2025    CALCIUM 8.4 (L) 06/11/2025       Magnesium:    Lab Results   Component Value Date    MG 2.15 06/10/2025       Lipid Profile:    No results found for: \"CHLPL\", \"TRIG\", \"HDL\", \"LDLCALC\", \"LDLDIRECT\"    TSH:    No results found for: \"TSH\"    BNP:   Lab Results   Component Value Date     (H) 06/10/2025        PT/INR:    Lab Results   Component Value Date    PROTIME 12.9 (H) 06/10/2025    INR 1.2 (H) 06/10/2025       HgBA1c:    Lab Results   Component Value Date    HGBA1C 4.9 04/20/2025       BMP:  Lab Results   Component Value Date     06/11/2025     06/10/2025     06/10/2025    K 4.0 06/11/2025    K 4.5 06/10/2025    K 4.1 06/10/2025     (H) 06/11/2025     (H) 06/10/2025     (H) 06/10/2025    CO2 24 06/11/2025    CO2 24 06/10/2025    CO2 24 06/10/2025    BUN 18 06/11/2025    BUN 20 06/10/2025    BUN 21 06/10/2025    CREATININE 1.13 06/11/2025    CREATININE 1.14 06/10/2025    CREATININE 1.23 06/10/2025       CBC:  Lab Results   Component Value Date    WBC 6.2 06/11/2025    WBC 5.4 06/11/2025    WBC 7.9 06/10/2025    RBC 3.43 (L) 06/11/2025    RBC 2.95 (L) 06/11/2025    RBC 3.26 (L) 06/10/2025    HGB 8.7 (L) 06/11/2025    HGB 7.5 (L) 06/11/2025    HGB 8.1 (L) 06/10/2025    HCT 30.1 (L) 06/11/2025    HCT 24.6 (L) 06/11/2025    HCT 27.8 (L) 06/10/2025    MCV 88 06/11/2025    MCV 83 06/11/2025    MCV 85 06/10/2025    MCH 25.4 (L) 06/11/2025    MCH " 25.4 (L) 06/11/2025    MCH 24.8 (L) 06/10/2025    MCHC 28.9 (L) 06/11/2025    MCHC 30.5 (L) 06/11/2025    MCHC 29.1 (L) 06/10/2025    RDW 19.9 (H) 06/11/2025    RDW 19.8 (H) 06/11/2025    RDW 19.4 (H) 06/10/2025     06/11/2025     06/11/2025     06/10/2025       Cardiac Enzymes:    Lab Results   Component Value Date    TROPHS 12 06/10/2025    TROPHS 11 06/10/2025    TROPHS 9 06/10/2025       Hepatic Function Panel:    Lab Results   Component Value Date    ALKPHOS 97 06/11/2025    ALT 24 06/11/2025    AST 24 06/11/2025    PROT 6.3 (L) 06/11/2025    BILITOT 0.9 06/11/2025    BILIDIR 0.2 06/11/2025     Labs reviewed    Diagnostic Studies:     Capsule Endoscopy Small Intestine  Result Date: 6/11/2025  Images from the original result were not included. Several AVMs in the proximal small bowel. Consider push enteroscopy     Vascular US lower extremity venous duplex bilateral  Result Date: 6/11/2025  Preliminary Cardiology Report            [facility] [place] [address] [place] [address]     Tel [phone] Fax [phone]          [facility] Report  [facility]  Patient Name:     WAYNE BURKITT Reading Physician:  [id number] Dante Cartwright MD Study Date:       6/11/2025     Ordering Provider:  [id number] SHANNAN NICHOLE MRN/PID:          [id number]      Fellow: Accession#:       [id number]  Technologist:       Lucía Delgado RVUSHA YOB: 1945    Technologist 2: Gender:           M             Encounter#:         [id number] Admission Status: Inpatient     Location Performed: [facility]  Diagnosis/ICD: Localized (leg) edema-R60.0 Indication:    Limb edema CPT Codes:     50920 Peripheral venous duplex scan for DVT complete  Pertinent History: CAD, HTN and Hyperlipidemia. CHF.  PRELIMINARY CONCLUSIONS:  Right Lower Venous: No evidence of acute deep vein thrombus visualized in the right lower extremity. Calf veins visualized  in segments with color doppler. Left Lower Venous: No evidence of acute deep vein thrombus visualized in the left lower extremity. Calf veins visualized in segments with color doppler. Additional Findings: Technically difficult exam due to edema.  Imaging & Doppler Findings:  Right                 Compressible Thrombus        Flow Distal External Iliac     Yes        None CFV                       Yes        None   Spontaneous/Phasic PFV                       Yes        None FV Proximal               Yes        None   Spontaneous/Phasic FV Mid                    Yes        None FV Distal                 Yes        None Popliteal                 Yes        None   Spontaneous/Phasic Peroneal                  Yes        None PTV                       Yes        None  Left                  Compress Thrombus        Flow Distal External Iliac   Yes      None   Spontaneous/Phasic CFV                     Yes      None   Spontaneous/Phasic PFV                     Yes      None FV Proximal             Yes      None   Spontaneous/Phasic FV Mid                  Yes      None FV Distal               Yes      None Popliteal               Yes      None   Spontaneous/Phasic Peroneal                Yes      None PTV                     Yes      None  VASCULAR PRELIMINARY REPORT completed by Lucía Delgado RVT on 6/11/2025 at 10:04:26 AM  ** Final **     ECG 12 lead  Result Date: 6/11/2025  AV dual-paced rhythm with prolonged AV conduction Abnormal ECG When compared with ECG of 10-GREG-2025 07:13, (unconfirmed) Electronic ventricular pacemaker has replaced Electronic atrial pacemaker Confirmed by Ashkan Gaspar ([id number]) on 6/11/2025 9:37:52 AM    ECG 12 lead  Result Date: 6/11/2025  Electronic atrial pacemaker 1st degree AV block Inferior infarct (cited on or before 01-APR-2025) ST & T wave abnormality, consider anterolateral ischemia Abnormal ECG When compared with ECG of 21-APR-2025 17:38, No significant change was found Confirmed by  Ashkan Gaspar ([id number]) on 6/11/2025 9:37:07 AM    XR chest 1 view  Result Date: 6/10/2025  Interpreted By:  Gerda Rainey, STUDY: XR CHEST 1 VIEW;  6/10/2025 7:53 am   INDICATION: Signs/Symptoms:Chest Pain.     COMPARISON: 04/01/2025   ACCESSION NUMBER(S): [id number]   ORDERING CLINICIAN: CHARLETTE DONOVAN   FINDINGS: Artifact from overlying monitoring leads noted. Bipolar left subclavian pacer wires remain in place. Associated port partially obscures the lateral left mid chest. No focal infiltrate, pleural effusion or pneumothorax as visualized. The cardiac silhouette is within normal limits for size with overlying sternotomy wires and surgical clips again seen. Curvilinear presumed coronary stent near the AP window again seen.       No acute cardiopulmonary process radiographically.   MACRO: None.   Signed by: Gerda Rainey 6/10/2025 8:24 AM Dictation workstation:   [id number]  Imaging reviewed  EKGs reviewed.  The first day EKG showed sinus or atrial pacing with intrinsic beat V waves there is a diffuse nonspecific ST and T wave abnormality more consistent with early repull than ischemia.  Repeat EKG shows AV sequential pacing    Radiology:     Vascular US lower extremity venous duplex bilateral         XR chest 1 view   Final Result   No acute cardiopulmonary process radiographically.        MACRO:   None.        Signed by: Gerda Rainey 6/10/2025 8:24 AM   Dictation workstation:   [id number]      Cardiac device check - Inpatient    (Results Pending)   Cardiac device check - Inpatient    (Results Pending)   Transthoracic Echo (TTE) Limited    (Results Pending)       Assessment:     Problem List Items Addressed This Visit       Upper GI bleed    Relevant Orders    Enteroscopy    V tach (Multi)    Relevant Medications    nitroglycerin (Nitrostat) SL tablet 0.4 mg    metoprolol succinate XL (Toprol-XL) 24 hr tablet 25 mg    isosorbide mononitrate ER (Imdur) 24 hr tablet 30 mg (Start on 6/11/2025  3:45 PM)    Other  Relevant Orders    Cardiac device check - Inpatient    Cardiac device check - Inpatient    Transthoracic Echo (TTE) Limited    * (Principal) Anemia, unspecified type - Primary    Relevant Orders    Critical Care (Completed)    Bilateral lower extremity edema    Chest pain   Acute on chronic anemia   Iron deficiency   Bilateral lower extremity edema   History of Gastric AVMs  History of Sigmoid diverticulosis  HTN               Relevant Orders    Vascular US lower extremity venous duplex bilateral (Completed)     Other Visit Diagnoses         Coronary artery disease of native artery of native heart with stable angina pectoris        Relevant Medications    nitroglycerin (Nitrostat) SL tablet 0.4 mg    metoprolol succinate XL (Toprol-XL) 24 hr tablet 25 mg    isosorbide mononitrate ER (Imdur) 24 hr tablet 30 mg (Start on 6/11/2025  3:45 PM)    Other Relevant Orders    Transthoracic Echo (TTE) Limited            Problem List[7]    Plan:     1.  Chest pain.  Hx of CAD with CABG and PCI in 12/2024. There is no evidence of myocardial injury and his symptoms are mixture of possible angina and noncardiac chest pain.  Given this and the results of his last heart catheterization I think he is a reasonable candidate for trial of medical therapy.  Will start him on oral nitrate therapy and continue his current beta-blockers.  Outside of any evaluation for his arrhythmia no other cardiac testing is planned.  Due to his arrhythmia a limited echo has been requested to make sure there is been no change in his LV function.  2.  Wide-complex tachycardia.  To be slow ventricular tachycardia but could not exclude SVT with aberrancy given review of the EP's notes.  As they directed we will continue on amiodarone 200 mg twice daily and will eventually need to be reduced.  Should follow-up with electrophysiology on an outpatient basis.  3.  Chronic heart failure with preserved ejection fraction.  Mild edema but no pulmonary edema or  significant shortness of breath.  Continue volume control, mild gentle diuresis.  4.  Recurrent epistaxis, anemia, history of gastric AVMs.  Management per ENT as well as GI.    Will review the patient's limited echo.  If no significant change in LV function can be discharged home to follow-up with electrophysiology and general cardiology on an outpatient basis.  May downgrade to telemetry or stepdown.    Thank you for the opportunity to participate in the care of your patient.  Please do not hesitate to call if you have any questions.    Electronically signed by Kavita Varner MD, on 6/11/2025 at 3:23 PM        [1]   Allergies  Allergen Reactions    Alprazolam Anaphylaxis    Bepotastine Besilate Swelling    Clopidogrel Swelling    Doxazosin Swelling    Lisinopril Swelling    Metronidazole Swelling    Amlodipine Hives    Atorvastatin Hives    Cephalexin Hives    Ciprofloxacin Itching    Diphenhydramine Hcl Hives    Guaifenesin Hives    Hydrochlorothiazide Unknown    Hydrocodone Hives    Methylprednisolone Unknown    Phenytoin Other    Sertraline Unknown    Telmisartan Hives    Topiramate Rash   [2]   Past Medical History:  Diagnosis Date    Acute blood loss anemia     Anemia     Angina pectoris     Auditory hallucinations 09/28/2024    CAD (coronary artery disease)     CKD (chronic kidney disease), stage II     Conversion reaction 09/28/2024    Coronary artery disease involving native coronary artery of native heart without angina pectoris 09/28/2024    Epilepsy     Epistaxis, recurrent 09/28/2024    Frequent falls     Gastric AVM     3 treated with APC    Generalized ischemic myocardial dysfunction 09/28/2024    Glaucoma     Hallucinations     auditory and visual    HFrEF (heart failure with reduced ejection fraction)     HLD (hyperlipidemia)     HTN (hypertension)     Iron deficiency     Ischemic cardiomyopathy 09/28/2024    Melena     Moderate vascular dementia with anxiety 09/28/2024    Patient denies     Myocardial infarct (Multi)     in 2000    Nonsustained ventricular tachycardia (Multi)     Orthostatic hypotension     Osteoarthritis of right hip     Panic disorder     Scrotal edema     Seizure (Multi)     Sigmoid diverticulosis     Skin cancer of nose     Subdural hematoma (Multi)     Suspected sleep apnea     Upper GI bleed     Walker as ambulation aid    [3]   Past Surgical History:  Procedure Laterality Date    CARDIAC CATHETERIZATION N/A 2024    Procedure: Left Heart Cath, With LV;  Surgeon: Gabino Giron MD;  Location: [facility] Cardiac Cath Lab;  Service: Cardiovascular;  Laterality: N/A;    CARDIAC CATHETERIZATION N/A 2024    Procedure: PCI TATYANA Stent- Coronary;  Surgeon: Gabino Giron MD;  Location: [facility] Cardiac Cath Lab;  Service: Cardiovascular;  Laterality: N/A;    COLONOSCOPY      CORONARY ANGIOPLASTY WITH STENT PLACEMENT      states he has 16 stents after the CABG    CORONARY ARTERY BYPASS GRAFT      Triple Bypass    ESOPHAGOGASTRODUODENOSCOPY      PACEMAKER PLACEMENT      TOTAL HIP ARTHROPLASTY Right    [4]   Family History  Problem Relation Name Age of Onset    Other (open heart surgery) Mother      Multiple sclerosis Father      Asthma Sister      Brain Aneurysm Sister     [5]   Social History  Socioeconomic History    Marital status: Single   Tobacco Use    Smoking status: Former     Current packs/day: 0.00     Average packs/day: 0.2 packs/day for 6.0 years (0.9 ttl pk-yrs)     Types: Cigarettes     Start date: 10/30/1983     Quit date: 10/30/1989     Years since quittin.6    Smokeless tobacco: Never   Vaping Use    Vaping status: Never Used   Substance and Sexual Activity    Alcohol use: Not Currently     Comment: Stopped into .    Drug use: Never    Sexual activity: Defer   Social History Narrative    [place]     He was in the [employer] on [employer] for 2 years until his seizures.  He then worked for a company as an  for 37 years.  He is retired.      Social Drivers of Health     Financial Resource Strain: Low Risk  (6/10/2025)    Overall Financial Resource Strain (CARDIA)     Difficulty of Paying Living Expenses: Not very hard   Food Insecurity: No Food Insecurity (6/10/2025)    Hunger Vital Sign     Worried About Running Out of Food in the Last Year: Never true     Ran Out of Food in the Last Year: Never true   Transportation Needs: No Transportation Needs (6/10/2025)    PRAPARE - Transportation     Lack of Transportation (Medical): No     Lack of Transportation (Non-Medical): No   Intimate Partner Violence: Not At Risk (6/10/2025)    Humiliation, Afraid, Rape, and Kick questionnaire     Fear of Current or Ex-Partner: No     Emotionally Abused: No     Physically Abused: No     Sexually Abused: No   Housing Stability: Low Risk  (6/10/2025)    Housing Stability Vital Sign     Unable to Pay for Housing in the Last Year: No     Number of Times Moved in the Last Year: 1     Homeless in the Last Year: No   [6]    [7]   Patient Active Problem List  Diagnosis    Moderate vascular dementia with anxiety    Auditory hallucinations    Coronary artery disease involving native coronary artery of native heart without angina pectoris    Epistaxis    Unstable angina pectoris (Multi)    Moderate vascular dementia with psychotic disturbance    Episodes of formed visual hallucinations    Frequent falls    Essential hypertension    Obstructive sleep apnea syndrome    Orthostatic hypotension    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

## 2025-06-11 NOTE — ANESTHESIA PREPROCEDURE EVALUATION
Patient: Wayne Burkitt    Procedure Information       Anesthesia Start Date/Time: 06/11/25 1551    Scheduled providers: Duran Garcia MD    Procedure: ENTEROSCOPY    Location: [facility] [place]            Relevant Problems   Cardiac   (+) 3-vessel coronary artery disease   (+) Angina pectoris, unstable (Multi)   (+) Congestive heart failure   (+) Coronary artery disease involving native coronary artery of native heart without angina pectoris   (+) Essential hypertension   (+) Hyperlipidemia   (+) Unstable angina pectoris (Multi)      Neuro   (+) Moderate vascular dementia with anxiety   (+) Moderate vascular dementia with psychotic disturbance   (+) 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   (+) Seizure (Multi)      GI   (+) Upper GI bleed      Hematology   (+) Anemia, unspecified type   (+) Iron deficiency anemia due to chronic blood loss       Clinical information reviewed:   Tobacco  Allergies  Meds   Med Hx  Surg Hx   Fam Hx          NPO Detail:  NPO/Void Status  Date of Last Liquid: 06/10/25  Time of Last Liquid: 1615  Date of Last Solid: 06/09/25  Time of Last Solid: 1800  Last Intake Type: Clear fluids  Time of Last Void: 1500         Physical Exam    Airway  Mallampati: II  TM distance: >3 FB  Neck ROM: full  Mouth opening: 3 or more finger widths     Cardiovascular   Rhythm: regular  Rate: normal     Dental - normal exam     Pulmonary Breath sounds clear to auscultation     Abdominal            Anesthesia Plan    History of general anesthesia?: yes  History of complications of general anesthesia?: no    ASA 2     general     The patient is not a current smoker.    intravenous induction   Anesthetic plan and risks discussed with patient.    Plan discussed with CAA.

## 2025-06-11 NOTE — CONSULTS
Cardiac Electrophysiology Consult     Chief complaint: Wide complex tachycardia  Referred by: Dr. Varner    HPI  Wayne Burkitt is a 79 y.o. year old male patient with h/o CAD s/p PCI s/p CABG, HFpEF, recurrent epistaxis, gastric AVMs, who presented with chest pain.  Patient reports he developed severe intensity heaviness in his lower sternum radiating to his arm associate with shortness of breath.  EP being consulted for evaluation of Wide Complex tachycardia.    family history includes Asthma in his sister; Brain Aneurysm in his sister; Multiple sclerosis in his father; open heart surgery in his mother.      Allergies:  RX Allergies[1]     Medications:  Current Outpatient Medications   Medication Instructions    acetaminophen (TYLENOL) 650 mg, Every 4 hours PRN    amiodarone (PACERONE) 200 mg, oral, 2 times daily    amiodarone (PACERONE) 200 mg, oral, Daily    aspirin 81 mg, oral, Daily    brimonidine (AlphaGAN) 0.2 % ophthalmic solution 1 drop, 2 times daily    cholecalciferol (VITAMIN D-3) 50 mcg, Daily    dexAMETHasone (Decadron) 2 mg tablet Take 2 tablets (4 mg) by mouth every 6 hours for 1 day, THEN 1.5 tablets (3 mg) every 8 hours for 2 days, THEN 1 tablet (2 mg) every 8 hours for 2 days, THEN 1 tablet (2 mg) once daily for 2 days.    docusate sodium (COLACE) 100 mg, 2 times daily PRN    donepezil (ARICEPT) 5 mg, Nightly    levETIRAcetam (KEPPRA) 1,000 mg, 2 times daily    magnesium hydroxide (Milk of Magnesia) 400 mg/5 mL suspension 30 mL, oral, Daily PRN    metoprolol succinate XL (TOPROL-XL) 25 mg, oral, 2 times daily, Do not crush or chew.    nitroglycerin (NITROSTAT) 0.4 mg, sublingual, Every 5 min PRN    oxymetazoline (Afrin) 0.05 % nasal spray 2 sprays, Each Nostril, Every 12 hours PRN, Do not use for more than 3 days consecutively. Use as needed to aid with stopping nose bleeds.    pantoprazole (PROTONIX) 40 mg, oral, 2 times daily, Do not crush, chew, or split.    pravastatin (PRAVACHOL) 80  mg, Nightly    QUEtiapine (SEROQUEL) 50 mg, Nightly    sodium chloride (Saline Nasal Mist) 3 % mist 2 Squirts, nasal, 2 times daily      Scheduled Medications[2]     Last Recorded Vitals:      6/11/2025     3:00 AM 6/11/2025     4:00 AM 6/11/2025     5:00 AM 6/11/2025     6:00 AM 6/11/2025     7:00 AM 6/11/2025     8:00 AM 6/11/2025     9:00 AM   Vitals   Systolic 137 143 151 157 161 156 157   Diastolic 72 68 95 69 83 87 108   BP Location  Left arm        Heart Rate 60 60 60 60 60 60 60   Temp  36.4 °C (97.5 °F)        Resp 21 11 16 11 16 19 12       Physical Exam  Constitutional:       Appearance: Normal appearance.   HENT:      Head: Normocephalic.   Cardiovascular:      Rate and Rhythm: Normal rate and regular rhythm.      Pulses: Normal pulses.      Heart sounds: No murmur heard.     Comments: left sided implant healed well, no ecchymosis, hematoma or drainage noted  Pulmonary:      Effort: Pulmonary effort is normal. No respiratory distress.   Musculoskeletal:         General: No swelling.   Skin:     General: Skin is warm and dry.   Neurological:      Mental Status: He is alert.   Psychiatric:         Mood and Affect: Mood normal.           My Interpretation of Reviewed Study(s):  Echo (April 2025): Low normal LV function EF of 4550% global hypokinesis left ventricular with minor regional wall motion abnormalities.  Normal LA normal RA.    Assessment/Plan   Wide Complex Tachycardia    Device interrogated unfortunately since the tachycardia was in the lower 100s device detection did not record an event.  Ventricular pacing from the device showed no VA conduction therefore PMT is unlikely.  Given patient's history of coronary disease and mildly reduced LV function back in April it is reasonable this was slow VT since patient is already on amiodarone.  Wide-complex tachycardia likely SVT with aberrancy or slow VT  Recommend continuing with p.o. amiodarone - increase dose to 200mg BID for now  Due to underlying  pathologies that could increase catecholamine surge like the underlying anemia  Recommend reevaluation of LV function with echo if LV function decrease may need ischemic evaluation given history of significant CAD and multiple PCI  If has recurrent wide-complex tachycardia recommend getting a twelve-lead ECG as long as patient hemodynamically tolerating  If LV function preserved and patient continues to have wide-complex tachycardia may require EP study plus minus ablation    Code status: Full Code    I spent 35 minutes in the professional and overall care of this patient.    Tree Negron MD [facility]  Cardiac Electrophysiology    Thank you very much for allowing me to participate in the care of this pleasant patient. Please do not hesitate to contact me with any further questions or concerns regarding their care.    **Disclaimer: This note was dictated by speech recognition, and every effort has been made to prevent any error in transcription, however minor errors may be present**          [1]   Allergies  Allergen Reactions    Alprazolam Anaphylaxis    Bepotastine Besilate Swelling    Clopidogrel Swelling    Doxazosin Swelling    Lisinopril Swelling    Metronidazole Swelling    Amlodipine Hives    Atorvastatin Hives    Cephalexin Hives    Ciprofloxacin Itching    Diphenhydramine Hcl Hives    Guaifenesin Hives    Hydrochlorothiazide Unknown    Hydrocodone Hives    Methylprednisolone Unknown    Phenytoin Other    Sertraline Unknown    Telmisartan Hives    Topiramate Rash   [2]   Scheduled medications   Medication Dose Route Frequency    [Transfer Hold] amiodarone  150 mg intravenous Once    [Held by provider] amiodarone  200 mg oral Daily    [Held by provider] aspirin  81 mg oral Daily    brimonidine  1 drop Both Eyes BID    cholecalciferol  50 mcg oral Daily    docusate sodium  100 mg oral BID    donepezil  5 mg oral Nightly    [Transfer Hold] enalaprilat  0.625 mg intravenous Once    [Transfer Hold] ferrous sulfate   65 mg of elemental iron oral Every other day    levETIRAcetam  1,000 mg oral BID    metoprolol succinate XL  25 mg oral BID    pantoprazole  40 mg intravenous BID    Followed by    [START ON 6/13/2025] pantoprazole  40 mg oral BID AC    pravastatin  80 mg oral Nightly    QUEtiapine  50 mg oral Nightly    sodium chloride  2 spray Each Nostril BID

## 2025-06-12 ENCOUNTER — APPOINTMENT (OUTPATIENT)
Dept: CARDIOLOGY | Facility: HOSPITAL | Age: 80
DRG: 378 | End: 2025-06-12
Payer: MEDICARE

## 2025-06-12 LAB
ALBUMIN SERPL BCP-MCNC: 3.6 G/DL (ref 3.4–5)
ALP SERPL-CCNC: 92 U/L (ref 33–136)
ALT SERPL W P-5'-P-CCNC: 18 U/L (ref 10–52)
ANION GAP SERPL CALC-SCNC: 14 MMOL/L (ref 10–20)
AST SERPL W P-5'-P-CCNC: 18 U/L (ref 9–39)
ATRIAL RATE: 234 BPM
ATRIAL RATE: 60 BPM
BILIRUB DIRECT SERPL-MCNC: 0.5 MG/DL (ref 0–0.3)
BILIRUB SERPL-MCNC: 2.2 MG/DL (ref 0–1.2)
BUN SERPL-MCNC: 17 MG/DL (ref 6–23)
CALCIUM SERPL-MCNC: 8.3 MG/DL (ref 8.6–10.3)
CHLORIDE SERPL-SCNC: 111 MMOL/L (ref 98–107)
CO2 SERPL-SCNC: 25 MMOL/L (ref 21–32)
CREAT SERPL-MCNC: 1.1 MG/DL (ref 0.5–1.3)
EGFRCR SERPLBLD CKD-EPI 2021: 69 ML/MIN/1.73M*2
EJECTION FRACTION APICAL 4 CHAMBER: 49.7
EJECTION FRACTION: 50 %
ERYTHROCYTE [DISTWIDTH] IN BLOOD BY AUTOMATED COUNT: 18.6 % (ref 11.5–14.5)
ERYTHROCYTE [DISTWIDTH] IN BLOOD BY AUTOMATED COUNT: 18.7 % (ref 11.5–14.5)
ERYTHROCYTE [DISTWIDTH] IN BLOOD BY AUTOMATED COUNT: 18.9 % (ref 11.5–14.5)
ERYTHROCYTE [DISTWIDTH] IN BLOOD BY AUTOMATED COUNT: 19.9 % (ref 11.5–14.5)
GLUCOSE BLD MANUAL STRIP-MCNC: 109 MG/DL (ref 74–99)
GLUCOSE BLD MANUAL STRIP-MCNC: 91 MG/DL (ref 74–99)
GLUCOSE BLD MANUAL STRIP-MCNC: 97 MG/DL (ref 74–99)
GLUCOSE SERPL-MCNC: 97 MG/DL (ref 74–99)
HCT VFR BLD AUTO: 21.6 % (ref 41–52)
HCT VFR BLD AUTO: 22 % (ref 41–52)
HCT VFR BLD AUTO: 27.5 % (ref 41–52)
HCT VFR BLD AUTO: 28.3 % (ref 41–52)
HCT VFR BLD AUTO: 29.9 % (ref 41–52)
HGB BLD-MCNC: 6.4 G/DL (ref 13.5–17.5)
HGB BLD-MCNC: 6.5 G/DL (ref 13.5–17.5)
HGB BLD-MCNC: 8.4 G/DL (ref 13.5–17.5)
HGB BLD-MCNC: 8.5 G/DL (ref 13.5–17.5)
HGB BLD-MCNC: 9.1 G/DL (ref 13.5–17.5)
LEFT ATRIUM VOLUME AREA LENGTH INDEX BSA: 34.8 ML/M2
LEFT VENTRICLE INTERNAL DIMENSION DIASTOLE: 4.23 CM (ref 3.5–6)
LEFT VENTRICULAR OUTFLOW TRACT DIAMETER: 1.92 CM
LV EJECTION FRACTION BIPLANE: 50 %
MCH RBC QN AUTO: 24.9 PG (ref 26–34)
MCH RBC QN AUTO: 25.4 PG (ref 26–34)
MCH RBC QN AUTO: 25.6 PG (ref 26–34)
MCH RBC QN AUTO: 25.7 PG (ref 26–34)
MCHC RBC AUTO-ENTMCNC: 29.5 G/DL (ref 32–36)
MCHC RBC AUTO-ENTMCNC: 30 G/DL (ref 32–36)
MCHC RBC AUTO-ENTMCNC: 30.4 G/DL (ref 32–36)
MCHC RBC AUTO-ENTMCNC: 30.5 G/DL (ref 32–36)
MCV RBC AUTO: 84 FL (ref 80–100)
MCV RBC AUTO: 84 FL (ref 80–100)
MCV RBC AUTO: 85 FL (ref 80–100)
MCV RBC AUTO: 85 FL (ref 80–100)
MITRAL VALVE E/A RATIO: 1.3
NRBC BLD-RTO: 0 /100 WBCS (ref 0–0)
PHOSPHATE SERPL-MCNC: 3.3 MG/DL (ref 2.5–4.9)
PLATELET # BLD AUTO: 214 X10*3/UL (ref 150–450)
PLATELET # BLD AUTO: 215 X10*3/UL (ref 150–450)
PLATELET # BLD AUTO: 225 X10*3/UL (ref 150–450)
PLATELET # BLD AUTO: 232 X10*3/UL (ref 150–450)
POTASSIUM SERPL-SCNC: 4.6 MMOL/L (ref 3.5–5.3)
PR INTERVAL: 216 MS
PROT SERPL-MCNC: 5.9 G/DL (ref 6.4–8.2)
Q ONSET: 212 MS
Q ONSET: 213 MS
QRS COUNT: 10 BEATS
QRS COUNT: 9 BEATS
QRS DURATION: 108 MS
QRS DURATION: 98 MS
QT INTERVAL: 426 MS
QT INTERVAL: 472 MS
QTC CALCULATION(BAZETT): 426 MS
QTC CALCULATION(BAZETT): 472 MS
QTC FREDERICIA: 426 MS
QTC FREDERICIA: 472 MS
R AXIS: 35 DEGREES
R AXIS: 41 DEGREES
RBC # BLD AUTO: 2.61 X10*6/UL (ref 4.5–5.9)
RBC # BLD AUTO: 3.28 X10*6/UL (ref 4.5–5.9)
RBC # BLD AUTO: 3.35 X10*6/UL (ref 4.5–5.9)
RBC # BLD AUTO: 3.54 X10*6/UL (ref 4.5–5.9)
SODIUM SERPL-SCNC: 145 MMOL/L (ref 136–145)
T AXIS: 214 DEGREES
T AXIS: 240 DEGREES
T OFFSET: 425 MS
T OFFSET: 449 MS
VENTRICULAR RATE: 60 BPM
VENTRICULAR RATE: 60 BPM
WBC # BLD AUTO: 3.7 X10*3/UL (ref 4.4–11.3)
WBC # BLD AUTO: 4.4 X10*3/UL (ref 4.4–11.3)
WBC # BLD AUTO: 5.1 X10*3/UL (ref 4.4–11.3)
WBC # BLD AUTO: 5.6 X10*3/UL (ref 4.4–11.3)

## 2025-06-12 PROCEDURE — 85027 COMPLETE CBC AUTOMATED: CPT | Performed by: PHYSICIAN ASSISTANT

## 2025-06-12 PROCEDURE — 2500000002 HC RX 250 W HCPCS SELF ADMINISTERED DRUGS (ALT 637 FOR MEDICARE OP, ALT 636 FOR OP/ED): Performed by: PHYSICIAN ASSISTANT

## 2025-06-12 PROCEDURE — 2020000001 HC ICU ROOM DAILY

## 2025-06-12 PROCEDURE — 82248 BILIRUBIN DIRECT: CPT | Performed by: PHYSICIAN ASSISTANT

## 2025-06-12 PROCEDURE — 2500000001 HC RX 250 WO HCPCS SELF ADMINISTERED DRUGS (ALT 637 FOR MEDICARE OP)

## 2025-06-12 PROCEDURE — 2500000002 HC RX 250 W HCPCS SELF ADMINISTERED DRUGS (ALT 637 FOR MEDICARE OP, ALT 636 FOR OP/ED): Performed by: NURSE PRACTITIONER

## 2025-06-12 PROCEDURE — 82374 ASSAY BLOOD CARBON DIOXIDE: CPT | Performed by: PHYSICIAN ASSISTANT

## 2025-06-12 PROCEDURE — 36430 TRANSFUSION BLD/BLD COMPNT: CPT

## 2025-06-12 PROCEDURE — 93325 DOPPLER ECHO COLOR FLOW MAPG: CPT

## 2025-06-12 PROCEDURE — 99232 SBSQ HOSP IP/OBS MODERATE 35: CPT | Performed by: INTERNAL MEDICINE

## 2025-06-12 PROCEDURE — 84100 ASSAY OF PHOSPHORUS: CPT | Performed by: PHYSICIAN ASSISTANT

## 2025-06-12 PROCEDURE — 2500000001 HC RX 250 WO HCPCS SELF ADMINISTERED DRUGS (ALT 637 FOR MEDICARE OP): Performed by: NURSE PRACTITIONER

## 2025-06-12 PROCEDURE — 99233 SBSQ HOSP IP/OBS HIGH 50: CPT | Performed by: NURSE PRACTITIONER

## 2025-06-12 PROCEDURE — 36415 COLL VENOUS BLD VENIPUNCTURE: CPT

## 2025-06-12 PROCEDURE — 82947 ASSAY GLUCOSE BLOOD QUANT: CPT

## 2025-06-12 PROCEDURE — 93321 DOPPLER ECHO F-UP/LMTD STD: CPT | Performed by: INTERNAL MEDICINE

## 2025-06-12 PROCEDURE — 36415 COLL VENOUS BLD VENIPUNCTURE: CPT | Performed by: PHYSICIAN ASSISTANT

## 2025-06-12 PROCEDURE — 93325 DOPPLER ECHO COLOR FLOW MAPG: CPT | Performed by: INTERNAL MEDICINE

## 2025-06-12 PROCEDURE — 85014 HEMATOCRIT: CPT

## 2025-06-12 PROCEDURE — 2500000004 HC RX 250 GENERAL PHARMACY W/ HCPCS (ALT 636 FOR OP/ED): Performed by: INTERNAL MEDICINE

## 2025-06-12 PROCEDURE — 93308 TTE F-UP OR LMTD: CPT | Performed by: INTERNAL MEDICINE

## 2025-06-12 PROCEDURE — P9016 RBC LEUKOCYTES REDUCED: HCPCS

## 2025-06-12 PROCEDURE — 2500000001 HC RX 250 WO HCPCS SELF ADMINISTERED DRUGS (ALT 637 FOR MEDICARE OP): Performed by: PHYSICIAN ASSISTANT

## 2025-06-12 PROCEDURE — 2500000004 HC RX 250 GENERAL PHARMACY W/ HCPCS (ALT 636 FOR OP/ED): Performed by: PHYSICIAN ASSISTANT

## 2025-06-12 RX ORDER — OXYMETAZOLINE HCL 0.05 %
2 SPRAY, NON-AEROSOL (ML) NASAL EVERY 12 HOURS PRN
Status: DISPENSED | OUTPATIENT
Start: 2025-06-12 | End: 2025-06-15

## 2025-06-12 RX ADMIN — DONEPEZIL HYDROCHLORIDE 5 MG: 5 TABLET ORAL at 20:18

## 2025-06-12 RX ADMIN — AMIODARONE HYDROCHLORIDE 200 MG: 200 TABLET ORAL at 20:17

## 2025-06-12 RX ADMIN — SALINE NASAL SPRAY 2 SPRAY: 1.5 SOLUTION NASAL at 20:18

## 2025-06-12 RX ADMIN — PANTOPRAZOLE SODIUM 40 MG: 40 INJECTION, POWDER, FOR SOLUTION INTRAVENOUS at 20:17

## 2025-06-12 RX ADMIN — DOCUSATE SODIUM 100 MG: 100 CAPSULE, LIQUID FILLED ORAL at 10:41

## 2025-06-12 RX ADMIN — PRAVASTATIN SODIUM 80 MG: 20 TABLET ORAL at 20:17

## 2025-06-12 RX ADMIN — QUETIAPINE FUMARATE 50 MG: 50 TABLET ORAL at 20:17

## 2025-06-12 RX ADMIN — METOPROLOL SUCCINATE 25 MG: 25 TABLET, EXTENDED RELEASE ORAL at 10:41

## 2025-06-12 RX ADMIN — FERROUS SULFATE TAB 325 MG (65 MG ELEMENTAL FE) 1 TABLET: 325 (65 FE) TAB at 10:41

## 2025-06-12 RX ADMIN — AMIODARONE HYDROCHLORIDE 200 MG: 200 TABLET ORAL at 10:41

## 2025-06-12 RX ADMIN — CHOLECALCIFEROL TAB 125 MCG (5000 UNIT) 50 MCG: 125 TAB at 10:41

## 2025-06-12 RX ADMIN — METOPROLOL SUCCINATE 25 MG: 25 TABLET, EXTENDED RELEASE ORAL at 20:17

## 2025-06-12 RX ADMIN — PANTOPRAZOLE SODIUM 40 MG: 40 INJECTION, POWDER, FOR SOLUTION INTRAVENOUS at 10:41

## 2025-06-12 RX ADMIN — LEVETIRACETAM 1000 MG: 500 TABLET, FILM COATED ORAL at 20:17

## 2025-06-12 RX ADMIN — BRIMONIDINE TARTRATE 1 DROP: 2 SOLUTION/ DROPS OPHTHALMIC at 10:41

## 2025-06-12 RX ADMIN — SALINE NASAL SPRAY 2 SPRAY: 1.5 SOLUTION NASAL at 10:41

## 2025-06-12 RX ADMIN — BRIMONIDINE TARTRATE 1 DROP: 2 SOLUTION/ DROPS OPHTHALMIC at 20:18

## 2025-06-12 RX ADMIN — PERFLUTREN 2 ML OF DILUTION: 6.52 INJECTION, SUSPENSION INTRAVENOUS at 11:52

## 2025-06-12 RX ADMIN — LEVETIRACETAM 1000 MG: 500 TABLET, FILM COATED ORAL at 10:41

## 2025-06-12 ASSESSMENT — COGNITIVE AND FUNCTIONAL STATUS - GENERAL
DAILY ACTIVITIY SCORE: 24
MOBILITY SCORE: 24

## 2025-06-12 ASSESSMENT — PAIN - FUNCTIONAL ASSESSMENT
PAIN_FUNCTIONAL_ASSESSMENT: 0-10

## 2025-06-12 ASSESSMENT — PAIN SCALES - GENERAL
PAINLEVEL_OUTOF10: 0 - NO PAIN

## 2025-06-12 NOTE — PROGRESS NOTES
06/12/25 1342   Discharge Planning   Living Arrangements Alone   Support Systems Friends/neighbors   Type of Residence Assisted living  ([facility])   Home or Post Acute Services Post acute facilities (Rehab/SNF/etc)   Expected Discharge Disposition Home   Does the patient need discharge transport arranged? Yes   RoundTrip coordination needed? Yes     Spoke to Naz (liaison) for [facility] and provided a medical update on the patient. Naz can be reached at [phone], and when discharges would need clinicals faxed to [phone].

## 2025-06-12 NOTE — PROGRESS NOTES
Spiritual Care Visit  Spiritual Care Request    Reason for Visit:  Routine Visit: Introduction     Request Received From:       Focus of Care:  Visited With: Patient         Refer to :          Spiritual Care Assessment    Spiritual Assessment:                      Care Provided:  Intended Effects: Promote sense of peace, Preserve dignity and respect, Meaning-making  Methods: Offer spiritual/Hinduism support  Interventions: Share words of hope and inspiration, [place]    Sense of Community and or Jew Affiliation:  Natasha         Addressed Needs/Concerns and/or Sina Through:          Outcome:        Advance Directives:         Spiritual Care Annotation    Annotation:  Patient shared his story and talked about his life.   was a supportive presence and prayed.

## 2025-06-12 NOTE — PROGRESS NOTES
"Wayne Burkitt is a 79 y.o. male on day 2 of admission presenting with Anemia, unspecified type went fro enteroscopy and capsule endoscopy, douglas a brief run of betotach, was evaluated by Cardiology and EP, Amiodarone dose was increased to bid. Noted to have Epistaxis    Subjective   No SOB        Objective     Lying in bed, no acute distress,     Current Medications[1]       Physical Exam  HENT:      Head: Normocephalic.   Eyes:      Conjunctiva/sclera: Conjunctivae normal.   Cardiovascular:      Rate and Rhythm: Regular rhythm.      Heart sounds: Normal heart sounds.   Pulmonary:      Breath sounds: Normal breath sounds.   Abdominal:      General: Abdomen is flat.      Palpations: Abdomen is soft.   Musculoskeletal:      Comments: No edema    Skin:     General: Skin is warm and dry.   Neurological:      General: No focal deficit present.      Mental Status: He is alert.           Last Recorded Vitals  Blood pressure 165/77, pulse 60, temperature 36.2 °C (97.2 °F), resp. rate 16, height 1.803 m (5' 11\"), weight 80.4 kg (177 lb 4 oz), SpO2 95%.  Intake/Output last 3 Shifts:  I/O last 3 completed shifts:  In: 700 (8.7 mL/kg) [P.O.:600; I.V.:100 (1.2 mL/kg)]  Out: 2500 (31.1 mL/kg) [Urine:2500 (0.9 mL/kg/hr)]  Weight: 80.4 kg           Labs:       Results for orders placed or performed during the hospital encounter of 06/10/25 (from the past 24 hours)   POCT GLUCOSE   Result Value Ref Range    POCT Glucose 92 74 - 99 mg/dL   CBC   Result Value Ref Range    WBC 6.2 4.4 - 11.3 x10*3/uL    nRBC 0.0 0.0 - 0.0 /100 WBCs    RBC 3.43 (L) 4.50 - 5.90 x10*6/uL    Hemoglobin 8.7 (L) 13.5 - 17.5 g/dL    Hematocrit 30.1 (L) 41.0 - 52.0 %    MCV 88 80 - 100 fL    MCH 25.4 (L) 26.0 - 34.0 pg    MCHC 28.9 (L) 32.0 - 36.0 g/dL    RDW 19.9 (H) 11.5 - 14.5 %    Platelets 211 150 - 450 x10*3/uL   POCT GLUCOSE   Result Value Ref Range    POCT Glucose 94 74 - 99 mg/dL   CBC   Result Value Ref Range    WBC 7.0 4.4 - 11.3 x10*3/uL    nRBC 0.3 " (H) 0.0 - 0.0 /100 WBCs    RBC 3.20 (L) 4.50 - 5.90 x10*6/uL    Hemoglobin 8.0 (L) 13.5 - 17.5 g/dL    Hematocrit 27.2 (L) 41.0 - 52.0 %    MCV 85 80 - 100 fL    MCH 25.0 (L) 26.0 - 34.0 pg    MCHC 29.4 (L) 32.0 - 36.0 g/dL    RDW 19.8 (H) 11.5 - 14.5 %    Platelets 257 150 - 450 x10*3/uL   POCT GLUCOSE   Result Value Ref Range    POCT Glucose 61 (L) 74 - 99 mg/dL   CBC   Result Value Ref Range    WBC 3.7 (L) 4.4 - 11.3 x10*3/uL    nRBC 0.0 0.0 - 0.0 /100 WBCs    RBC 2.61 (L) 4.50 - 5.90 x10*6/uL    Hemoglobin 6.5 (LL) 13.5 - 17.5 g/dL    Hematocrit 22.0 (L) 41.0 - 52.0 %    MCV 84 80 - 100 fL    MCH 24.9 (L) 26.0 - 34.0 pg    MCHC 29.5 (L) 32.0 - 36.0 g/dL    RDW 19.9 (H) 11.5 - 14.5 %    Platelets 225 150 - 450 x10*3/uL   Hemoglobin and hematocrit, blood   Result Value Ref Range    Hemoglobin 6.4 (LL) 13.5 - 17.5 g/dL    Hematocrit 21.6 (L) 41.0 - 52.0 %   Prepare RBC: 1 Units   Result Value Ref Range    PRODUCT CODE I2943Z44     Unit Number [id number]     Unit ABO O     Unit RH POS     XM INTEP COMP     Dispense Status IS     Blood Expiration Date 6/16/2025 11:59:00 PM EDT     PRODUCT BLOOD TYPE 5100     UNIT VOLUME 350    CBC   Result Value Ref Range    WBC 4.4 4.4 - 11.3 x10*3/uL    nRBC 0.0 0.0 - 0.0 /100 WBCs    RBC 3.35 (L) 4.50 - 5.90 x10*6/uL    Hemoglobin 8.5 (L) 13.5 - 17.5 g/dL    Hematocrit 28.3 (L) 41.0 - 52.0 %    MCV 85 80 - 100 fL    MCH 25.4 (L) 26.0 - 34.0 pg    MCHC 30.0 (L) 32.0 - 36.0 g/dL    RDW 18.6 (H) 11.5 - 14.5 %    Platelets 214 150 - 450 x10*3/uL   Hepatic Function Panel   Result Value Ref Range    Albumin 3.6 3.4 - 5.0 g/dL    Bilirubin, Total 2.2 (H) 0.0 - 1.2 mg/dL    Bilirubin, Direct 0.5 (H) 0.0 - 0.3 mg/dL    Alkaline Phosphatase 92 33 - 136 U/L    ALT 18 10 - 52 U/L    AST 18 9 - 39 U/L    Total Protein 5.9 (L) 6.4 - 8.2 g/dL   Phosphorus   Result Value Ref Range    Phosphorus 3.3 2.5 - 4.9 mg/dL   Basic Metabolic Panel   Result Value Ref Range    Glucose 97 74 - 99 mg/dL     Sodium 145 136 - 145 mmol/L    Potassium 4.6 3.5 - 5.3 mmol/L    Chloride 111 (H) 98 - 107 mmol/L    Bicarbonate 25 21 - 32 mmol/L    Anion Gap 14 10 - 20 mmol/L    Urea Nitrogen 17 6 - 23 mg/dL    Creatinine 1.10 0.50 - 1.30 mg/dL    eGFR 69 >60 mL/min/1.73m*2    Calcium 8.3 (L) 8.6 - 10.3 mg/dL   POCT GLUCOSE   Result Value Ref Range    POCT Glucose 109 (H) 74 - 99 mg/dL        Radiology  XR chest 1 view  Result Date: 6/10/2025  No acute cardiopulmonary process radiographically.   MACRO: None.   Signed by: Gerda Rainey 6/10/2025 8:24 AM Dictation workstation:   [id number]        Assessment/Plan   Assessment & Plan  Anemia, unspecified type    Bilateral lower extremity edema  Chest pain   Acute on chronic anemia   Iron deficiency   Bilateral lower extremity edema   History of Gastric AVMs  History of Sigmoid diverticulosis  HTN  V. tach      PLAN: Pt was noted to have Epistaxis, may need ENT consult, also noted to have  V tach, Amiodarone was increased to 200 mg bid, Cardiology on board, had Capsule endoscopy, was evaluated by GI, med list, labs and radiology reviewed for now c/w current Rx, follow closely.               Bradley Morel MD           [1]   Current Facility-Administered Medications:     acetaminophen (Tylenol) tablet 650 mg, 650 mg, oral, q4h PRN **OR** acetaminophen (Tylenol) suppository 650 mg, 650 mg, rectal, q4h PRN, Corey Feliz PA-C    albuterol 2.5 mg /3 mL (0.083 %) nebulizer solution 2.5 mg, 2.5 mg, nebulization, Once PRN, Raúl Maza MD    albuterol 2.5 mg /3 mL (0.083 %) nebulizer solution 2.5 mg, 2.5 mg, nebulization, Once PRN, Lulu Yap MD    amiodarone (Pacerone) tablet 200 mg, 200 mg, oral, BID, Amber Ball, APRN-CNP, 200 mg at 06/12/25 1041    [Held by provider] aspirin EC tablet 81 mg, 81 mg, oral, Daily, Corey Feliz PA-C    brimonidine (AlphaGAN) 0.2 % ophthalmic solution 1 drop, 1 drop, Both Eyes, BID, Corey Feliz PA-C, 1 drop at  06/12/25 1041    cholecalciferol (Vitamin D-3) tablet 50 mcg, 50 mcg, oral, Daily, Corey Feliz PA-C, 50 mcg at 06/12/25 1041    docusate sodium (Colace) capsule 100 mg, 100 mg, oral, BID, Corey Feliz PA-C, 100 mg at 06/12/25 1041    donepezil (Aricept) tablet 5 mg, 5 mg, oral, Nightly, Corey Feliz PA-C, 5 mg at 06/11/25 2133    fentaNYL PF (Sublimaze) injection 12.5 mcg, 12.5 mcg, intravenous, q5 min PRN, Lulu Yap MD    fentaNYL PF (Sublimaze) injection 25 mcg, 25 mcg, intravenous, q5 min PRN, Lulu Yap MD    ferrous sulfate 325 mg (65 mg elemental) tablet 1 tablet, 65 mg of elemental iron, oral, Every other day, RACHID Vazquez-CNP, 1 tablet at 06/12/25 1041    hydrALAZINE (Apresoline) injection 5 mg, 5 mg, intravenous, q30 min PRN, Raúl Maza MD    hydrALAZINE (Apresoline) injection 5 mg, 5 mg, intravenous, q30 min PRN, Lulu Yap MD    HYDROmorphone (Dilaudid) injection 0.5 mg, 0.5 mg, intravenous, q5 min PRN, Raúl Maza MD    HYDROmorphone PF (Dilaudid) injection 0.2 mg, 0.2 mg, intravenous, q5 min PRN, Raúl Maza MD    isosorbide mononitrate ER (Imdur) 24 hr tablet 30 mg, 30 mg, oral, Daily, Kavita Varner MD, 30 mg at 06/11/25 1716    labetaloL (Normodyne,Trandate) injection 5 mg, 5 mg, intravenous, Once PRN, Raúl Maza MD    levETIRAcetam (Keppra) tablet 1,000 mg, 1,000 mg, oral, BID, Corey Feliz PA-C, 1,000 mg at 06/12/25 1041    lidocaine PF (Xylocaine) 10 mg/mL (1 %) injection 1 mg, 0.1 mL, subcutaneous, Once, Raúl Maza MD    magnesium hydroxide (Milk of Magnesia) 400 mg/5 mL suspension 30 mL, 30 mL, oral, Daily PRN, Corey Feliz PA-C    melatonin tablet 3 mg, 3 mg, oral, Nightly PRN, Corey Feliz PA-C    metoprolol succinate XL (Toprol-XL) 24 hr tablet 25 mg, 25 mg, oral, BID, Corey Feliz PA-C, 25 mg at 06/12/25 1041    nitroglycerin (Nitrostat) SL tablet 0.4 mg, 0.4 mg, sublingual, q5 min  PRN, Corey Feliz PA-C    ondansetron ODT (Zofran-ODT) disintegrating tablet 4 mg, 4 mg, oral, q8h PRN **OR** ondansetron (Zofran) injection 4 mg, 4 mg, intravenous, q8h PRN, Corey Feliz PA-C    ondansetron (Zofran) injection 4 mg, 4 mg, intravenous, Once PRN, Raúl Maza MD    ondansetron (Zofran) injection 4 mg, 4 mg, intravenous, Once PRN, Lulu Yap MD    oxyCODONE (Roxicodone) immediate release tablet 5 mg, 5 mg, oral, q4h PRN, Raúl Maza MD    oxyCODONE-acetaminophen (Percocet) 5-325 mg per tablet 1 tablet, 1 tablet, oral, q4h PRN, Raúl Maza MD    oxygen (O2) therapy, , inhalation, Continuous PRN - O2/gases, Raúl Maza MD, 2 L/min at 06/11/25 1615    oxygen (O2) therapy, , inhalation, Continuous - 02/gases, Lulu Yap MD    oxymetazoline (Afrin) 0.05 % nasal spray 2 spray, 2 spray, Each Nostril, q12h PRN, Brynn Kinney, APRN-CNP    pantoprazole (Protonix) injection 40 mg, 40 mg, intravenous, BID, 40 mg at 06/12/25 1041 **FOLLOWED BY** [START ON 6/13/2025] pantoprazole (ProtoNix) EC tablet 40 mg, 40 mg, oral, BID AC, Corey Feliz PA-C    pravastatin (Pravachol) tablet 80 mg, 80 mg, oral, Nightly, Corey Feliz PA-C, 80 mg at 06/11/25 2124    QUEtiapine (SEROquel) tablet 50 mg, 50 mg, oral, Nightly, Corey Feliz PA-C, 50 mg at 06/11/25 2126    sodium chloride (Ocean) 0.65 % nasal spray 2 spray, 2 spray, Each Nostril, BID, Corey Feliz PA-C, 2 spray at 06/12/25 1686     nasal cannula

## 2025-06-12 NOTE — PROGRESS NOTES
This note was created using voice recognition transcription software. Despite proofreading, unintentional typographical errors may be present. Please contact the GI office with any questions or concerns.       Subjective  Patient resting in bed.  Had a major nose bleed early this morning.  Unfortunately this is a chronic issue (since 1990's).          Physical Exam:  General: Polite, calm, resting  Skin:  Warm and dry, no jaundice  HEENT: No scleral icterus, no conjunctival pallor, normocephalic, atraumatic, mucous membranes moist, has kleenex bunched up in the left nare of nose  Neck:  atraumatic, trachea midline, no JVD  Chest:  Clear to auscultation bilaterally. No wheezes, rales, or rhonchi  CV:  Regular rate and rhythm.  Positive S1/S2  Abdomen: no distension, +BS, soft, non-tender to palpation, no rebound tenderness, no guarding, no rigidity, no discernible ascites   Extremities: no lower extremity edema, chronic pigmentary changes, no cyanosis  Neurological:  A&Ox3  Psychiatric: cooperative     Investigations:  Labs, radiological imaging and cardiac work up were reviewed        Objective:         6/12/2025     4:15 AM 6/12/2025     5:00 AM 6/12/2025     6:00 AM 6/12/2025     7:00 AM 6/12/2025     8:00 AM 6/12/2025     9:00 AM 6/12/2025    10:00 AM   Vitals   Systolic  143 140  134 156 163   Diastolic  72 70  72 72 74   Heart Rate  60 60 60 60 60 60   Temp 36.1 °C (97 °F) 36.3 °C (97.3 °F) 36.2 °C (97.2 °F)       Resp  10 10 11 17 18 20   Weight (lb)  177.25        BMI  24.72 kg/m2        BSA (m2)  2.01 m2               Medications:  Scheduled Medications[1]     Recent Results (from the past 72 hours)   ECG 12 lead    Collection Time: 06/10/25  7:30 AM   Result Value Ref Range    Ventricular Rate 60 BPM    Atrial Rate 60 BPM    QRS Duration 104 ms    QT Interval 464 ms    QTC Calculation(Bazett) 464 ms    R Axis 37 degrees    T Axis 226 degrees    QRS Count 10 beats    Q Onset 212 ms    T Offset 444 ms    QTC  Shanna Brooks4 ms   Protime-INR    Collection Time: 06/10/25  7:50 AM   Result Value Ref Range    Protime 12.9 (H) 9.8 - 12.4 seconds    INR 1.2 (H) 0.9 - 1.1   CBC and Auto Differential    Collection Time: 06/10/25  7:50 AM   Result Value Ref Range    WBC 5.0 4.4 - 11.3 x10*3/uL    nRBC 0.0 0.0 - 0.0 /100 WBCs    RBC 2.64 (L) 4.50 - 5.90 x10*6/uL    Hemoglobin 6.3 (LL) 13.5 - 17.5 g/dL    Hematocrit 22.1 (L) 41.0 - 52.0 %    MCV 84 80 - 100 fL    MCH 23.9 (L) 26.0 - 34.0 pg    MCHC 28.5 (L) 32.0 - 36.0 g/dL    RDW 21.3 (H) 11.5 - 14.5 %    Platelets 273 150 - 450 x10*3/uL    Immature Granulocytes %, Automated 0.4 0.0 - 0.9 %    Immature Granulocytes Absolute, Automated 0.02 0.00 - 0.50 x10*3/uL   Comprehensive Metabolic Panel    Collection Time: 06/10/25  7:50 AM   Result Value Ref Range    Glucose 107 (H) 74 - 99 mg/dL    Sodium 141 136 - 145 mmol/L    Potassium 4.1 3.5 - 5.3 mmol/L    Chloride 108 (H) 98 - 107 mmol/L    Bicarbonate 24 21 - 32 mmol/L    Anion Gap 13 10 - 20 mmol/L    Urea Nitrogen 21 6 - 23 mg/dL    Creatinine 1.23 0.50 - 1.30 mg/dL    eGFR 60 (L) >60 mL/min/1.73m*2    Calcium 8.5 (L) 8.6 - 10.3 mg/dL    Albumin 4.2 3.4 - 5.0 g/dL    Alkaline Phosphatase 106 33 - 136 U/L    Total Protein 6.8 6.4 - 8.2 g/dL    AST 40 (H) 9 - 39 U/L    Bilirubin, Total 0.6 0.0 - 1.2 mg/dL    ALT 32 10 - 52 U/L   Troponin I, High Sensitivity, Initial    Collection Time: 06/10/25  7:50 AM   Result Value Ref Range    Troponin I, High Sensitivity 9 0 - 20 ng/L   Manual Differential    Collection Time: 06/10/25  7:50 AM   Result Value Ref Range    Neutrophils %, Manual 78.0 40.0 - 80.0 %    Bands %, Manual 1.0 0.0 - 5.0 %    Lymphocytes %, Manual 15.0 13.0 - 44.0 %    Monocytes %, Manual 3.0 2.0 - 10.0 %    Eosinophils %, Manual 2.0 0.0 - 6.0 %    Basophils %, Manual 1.0 0.0 - 2.0 %    Seg Neutrophils Absolute, Manual 3.90 1.60 - 5.00 x10*3/uL    Bands Absolute, Manual 0.05 0.00 - 0.50 x10*3/uL    Lymphocytes Absolute,  Manual 0.75 (L) 0.80 - 3.00 x10*3/uL    Monocytes Absolute, Manual 0.15 0.05 - 0.80 x10*3/uL    Eosinophils Absolute, Manual 0.10 0.00 - 0.40 x10*3/uL    Basophils Absolute, Manual 0.05 0.00 - 0.10 x10*3/uL    Total Cells Counted 100     Neutrophils Absolute, Manual 3.95 1.60 - 5.50 x10*3/uL    RBC Morphology See Below     RBC Fragments Few     Target Cells Few     Ovalocytes Few     Teardrop Cells Few    Ferritin    Collection Time: 06/10/25  7:50 AM   Result Value Ref Range    Ferritin 10 (L) 20 - 300 ng/mL   Iron and TIBC    Collection Time: 06/10/25  7:50 AM   Result Value Ref Range    Iron 13 (L) 35 - 150 ug/dL    UIBC 413 (H) 110 - 370 ug/dL    TIBC 426 240 - 445 ug/dL    % Saturation 3 (L) 25 - 45 %   B-type natriuretic peptide    Collection Time: 06/10/25  7:50 AM   Result Value Ref Range     (H) 0 - 99 pg/mL   Prepare RBC: 1 Units    Collection Time: 06/10/25  8:16 AM   Result Value Ref Range    PRODUCT CODE B8178Q07     Unit Number [id number]     Unit ABO B     Unit RH NEG     XM INTEP COMP     Dispense Status TR     Blood Expiration Date 7/5/2025 11:59:00 PM EDT     PRODUCT BLOOD TYPE 1700     UNIT VOLUME 350    Troponin, High Sensitivity, 1 Hour    Collection Time: 06/10/25  9:20 AM   Result Value Ref Range    Troponin I, High Sensitivity 11 0 - 20 ng/L   Type And Screen    Collection Time: 06/10/25  9:20 AM   Result Value Ref Range    ABO TYPE B     Rh TYPE POS     ANTIBODY SCREEN NEG    ECG 12 lead    Collection Time: 06/10/25 10:05 AM   Result Value Ref Range    Ventricular Rate 60 BPM    Atrial Rate 60 BPM    ME Interval 298 ms    QRS Duration 170 ms    QT Interval 540 ms    QTC Calculation(Bazett) 540 ms    R Axis -75 degrees    T Axis 95 degrees    QRS Count 10 beats    Q Onset 182 ms    T Offset 452 ms    QTC Fredericia 540 ms   Troponin I, High Sensitivity    Collection Time: 06/10/25  3:08 PM   Result Value Ref Range    Troponin I, High Sensitivity 12 0 - 20 ng/L   Hemoglobin and  Hematocrit, Blood    Collection Time: 06/10/25  3:08 PM   Result Value Ref Range    Hemoglobin 7.7 (L) 13.5 - 17.5 g/dL    Hematocrit 26.1 (L) 41.0 - 52.0 %   CBC    Collection Time: 06/10/25  6:08 PM   Result Value Ref Range    WBC 7.9 4.4 - 11.3 x10*3/uL    nRBC 0.0 0.0 - 0.0 /100 WBCs    RBC 3.26 (L) 4.50 - 5.90 x10*6/uL    Hemoglobin 8.1 (L) 13.5 - 17.5 g/dL    Hematocrit 27.8 (L) 41.0 - 52.0 %    MCV 85 80 - 100 fL    MCH 24.8 (L) 26.0 - 34.0 pg    MCHC 29.1 (L) 32.0 - 36.0 g/dL    RDW 19.4 (H) 11.5 - 14.5 %    Platelets 287 150 - 450 x10*3/uL   Renal Function Panel    Collection Time: 06/10/25  8:15 PM   Result Value Ref Range    Glucose 129 (H) 74 - 99 mg/dL    Sodium 139 136 - 145 mmol/L    Potassium 4.5 3.5 - 5.3 mmol/L    Chloride 108 (H) 98 - 107 mmol/L    Bicarbonate 24 21 - 32 mmol/L    Anion Gap 12 10 - 20 mmol/L    Urea Nitrogen 20 6 - 23 mg/dL    Creatinine 1.14 0.50 - 1.30 mg/dL    eGFR 66 >60 mL/min/1.73m*2    Calcium 8.4 (L) 8.6 - 10.3 mg/dL    Phosphorus 3.0 2.5 - 4.9 mg/dL    Albumin 3.9 3.4 - 5.0 g/dL   Magnesium    Collection Time: 06/10/25  8:15 PM   Result Value Ref Range    Magnesium 2.15 1.60 - 2.40 mg/dL   POCT GLUCOSE    Collection Time: 06/10/25 11:44 PM   Result Value Ref Range    POCT Glucose 93 74 - 99 mg/dL   POCT GLUCOSE    Collection Time: 06/11/25  4:23 AM   Result Value Ref Range    POCT Glucose 99 74 - 99 mg/dL   POCT GLUCOSE    Collection Time: 06/11/25  7:37 AM   Result Value Ref Range    POCT Glucose 99 74 - 99 mg/dL   CBC    Collection Time: 06/11/25  7:50 AM   Result Value Ref Range    WBC 5.4 4.4 - 11.3 x10*3/uL    nRBC 0.0 0.0 - 0.0 /100 WBCs    RBC 2.95 (L) 4.50 - 5.90 x10*6/uL    Hemoglobin 7.5 (L) 13.5 - 17.5 g/dL    Hematocrit 24.6 (L) 41.0 - 52.0 %    MCV 83 80 - 100 fL    MCH 25.4 (L) 26.0 - 34.0 pg    MCHC 30.5 (L) 32.0 - 36.0 g/dL    RDW 19.8 (H) 11.5 - 14.5 %    Platelets 238 150 - 450 x10*3/uL   Hepatic Function Panel    Collection Time: 06/11/25  7:50 AM    Result Value Ref Range    Albumin 3.8 3.4 - 5.0 g/dL    Bilirubin, Total 0.9 0.0 - 1.2 mg/dL    Bilirubin, Direct 0.2 0.0 - 0.3 mg/dL    Alkaline Phosphatase 97 33 - 136 U/L    ALT 24 10 - 52 U/L    AST 24 9 - 39 U/L    Total Protein 6.3 (L) 6.4 - 8.2 g/dL   Phosphorus    Collection Time: 06/11/25  7:50 AM   Result Value Ref Range    Phosphorus 3.0 2.5 - 4.9 mg/dL   Basic Metabolic Panel    Collection Time: 06/11/25  7:50 AM   Result Value Ref Range    Glucose 98 74 - 99 mg/dL    Sodium 140 136 - 145 mmol/L    Potassium 4.0 3.5 - 5.3 mmol/L    Chloride 108 (H) 98 - 107 mmol/L    Bicarbonate 24 21 - 32 mmol/L    Anion Gap 12 10 - 20 mmol/L    Urea Nitrogen 18 6 - 23 mg/dL    Creatinine 1.13 0.50 - 1.30 mg/dL    eGFR 67 >60 mL/min/1.73m*2    Calcium 8.4 (L) 8.6 - 10.3 mg/dL   Urinalysis with Reflex Culture and Microscopic    Collection Time: 06/11/25  8:41 AM   Result Value Ref Range    Color, Urine Colorless (N) Light-Yellow, Yellow, Dark-Yellow    Appearance, Urine Clear Clear    Specific Gravity, Urine 1.008 1.005 - 1.035    pH, Urine 6.5 5.0, 5.5, 6.0, 6.5, 7.0, 7.5, 8.0    Protein, Urine NEGATIVE NEGATIVE, 10 (TRACE), 20 (TRACE) mg/dL    Glucose, Urine Normal Normal mg/dL    Blood, Urine 1.0 (3+) (A) NEGATIVE mg/dL    Ketones, Urine NEGATIVE NEGATIVE mg/dL    Bilirubin, Urine NEGATIVE NEGATIVE mg/dL    Urobilinogen, Urine Normal Normal mg/dL    Nitrite, Urine NEGATIVE NEGATIVE    Leukocyte Esterase, Urine NEGATIVE NEGATIVE   Extra Urine Gray Tube    Collection Time: 06/11/25  8:41 AM   Result Value Ref Range    Extra Tube Hold for add-ons.    Urinalysis Microscopic    Collection Time: 06/11/25  8:41 AM   Result Value Ref Range    WBC, Urine 6-10 (A) 1-5, NONE /HPF    RBC, Urine >20 (A) NONE, 1-2, 3-5 /HPF   POCT GLUCOSE    Collection Time: 06/11/25 11:43 AM   Result Value Ref Range    POCT Glucose 92 74 - 99 mg/dL   CBC    Collection Time: 06/11/25 12:35 PM   Result Value Ref Range    WBC 6.2 4.4 - 11.3  x10*3/uL    nRBC 0.0 0.0 - 0.0 /100 WBCs    RBC 3.43 (L) 4.50 - 5.90 x10*6/uL    Hemoglobin 8.7 (L) 13.5 - 17.5 g/dL    Hematocrit 30.1 (L) 41.0 - 52.0 %    MCV 88 80 - 100 fL    MCH 25.4 (L) 26.0 - 34.0 pg    MCHC 28.9 (L) 32.0 - 36.0 g/dL    RDW 19.9 (H) 11.5 - 14.5 %    Platelets 211 150 - 450 x10*3/uL   POCT GLUCOSE    Collection Time: 06/11/25  5:44 PM   Result Value Ref Range    POCT Glucose 94 74 - 99 mg/dL   CBC    Collection Time: 06/11/25  5:58 PM   Result Value Ref Range    WBC 7.0 4.4 - 11.3 x10*3/uL    nRBC 0.3 (H) 0.0 - 0.0 /100 WBCs    RBC 3.20 (L) 4.50 - 5.90 x10*6/uL    Hemoglobin 8.0 (L) 13.5 - 17.5 g/dL    Hematocrit 27.2 (L) 41.0 - 52.0 %    MCV 85 80 - 100 fL    MCH 25.0 (L) 26.0 - 34.0 pg    MCHC 29.4 (L) 32.0 - 36.0 g/dL    RDW 19.8 (H) 11.5 - 14.5 %    Platelets 257 150 - 450 x10*3/uL   POCT GLUCOSE    Collection Time: 06/11/25  8:32 PM   Result Value Ref Range    POCT Glucose 61 (L) 74 - 99 mg/dL   CBC    Collection Time: 06/12/25 12:14 AM   Result Value Ref Range    WBC 3.7 (L) 4.4 - 11.3 x10*3/uL    nRBC 0.0 0.0 - 0.0 /100 WBCs    RBC 2.61 (L) 4.50 - 5.90 x10*6/uL    Hemoglobin 6.5 (LL) 13.5 - 17.5 g/dL    Hematocrit 22.0 (L) 41.0 - 52.0 %    MCV 84 80 - 100 fL    MCH 24.9 (L) 26.0 - 34.0 pg    MCHC 29.5 (L) 32.0 - 36.0 g/dL    RDW 19.9 (H) 11.5 - 14.5 %    Platelets 225 150 - 450 x10*3/uL   Hemoglobin and hematocrit, blood    Collection Time: 06/12/25  1:21 AM   Result Value Ref Range    Hemoglobin 6.4 (LL) 13.5 - 17.5 g/dL    Hematocrit 21.6 (L) 41.0 - 52.0 %   Prepare RBC: 1 Units    Collection Time: 06/12/25  2:04 AM   Result Value Ref Range    PRODUCT CODE A7226I85     Unit Number [id number]     Unit ABO O     Unit RH POS     XM INTEP COMP     Dispense Status IS     Blood Expiration Date 6/16/2025 11:59:00 PM EDT     PRODUCT BLOOD TYPE 5100     UNIT VOLUME 350    CBC    Collection Time: 06/12/25  7:35 AM   Result Value Ref Range    WBC 4.4 4.4 - 11.3 x10*3/uL    nRBC 0.0 0.0 -  0.0 /100 WBCs    RBC 3.35 (L) 4.50 - 5.90 x10*6/uL    Hemoglobin 8.5 (L) 13.5 - 17.5 g/dL    Hematocrit 28.3 (L) 41.0 - 52.0 %    MCV 85 80 - 100 fL    MCH 25.4 (L) 26.0 - 34.0 pg    MCHC 30.0 (L) 32.0 - 36.0 g/dL    RDW 18.6 (H) 11.5 - 14.5 %    Platelets 214 150 - 450 x10*3/uL   Hepatic Function Panel    Collection Time: 06/12/25  7:35 AM   Result Value Ref Range    Albumin 3.6 3.4 - 5.0 g/dL    Bilirubin, Total 2.2 (H) 0.0 - 1.2 mg/dL    Bilirubin, Direct 0.5 (H) 0.0 - 0.3 mg/dL    Alkaline Phosphatase 92 33 - 136 U/L    ALT 18 10 - 52 U/L    AST 18 9 - 39 U/L    Total Protein 5.9 (L) 6.4 - 8.2 g/dL   Phosphorus    Collection Time: 06/12/25  7:35 AM   Result Value Ref Range    Phosphorus 3.3 2.5 - 4.9 mg/dL   Basic Metabolic Panel    Collection Time: 06/12/25  7:35 AM   Result Value Ref Range    Glucose 97 74 - 99 mg/dL    Sodium 145 136 - 145 mmol/L    Potassium 4.6 3.5 - 5.3 mmol/L    Chloride 111 (H) 98 - 107 mmol/L    Bicarbonate 25 21 - 32 mmol/L    Anion Gap 14 10 - 20 mmol/L    Urea Nitrogen 17 6 - 23 mg/dL    Creatinine 1.10 0.50 - 1.30 mg/dL    eGFR 69 >60 mL/min/1.73m*2    Calcium 8.3 (L) 8.6 - 10.3 mg/dL   POCT GLUCOSE    Collection Time: 06/12/25  8:10 AM   Result Value Ref Range    POCT Glucose 109 (H) 74 - 99 mg/dL          Assessment:  This is a 78 yo Male with a PMH of  CAD, s/p PCI with multiple stents, s/p CABG, HFpEF, V tach, s/p pacemaker, HTN, HLD, recurrent Epistaxis, and gastric AVMs who presented to [facility] on 6/10/25 with reports of chest pain.  GI was consulted for capsule endoscopy. He was supposed to go to [place] to get a capsule endoscopy but came to the ED instead. Had a capsule endoscopy on 6/10/25 showing several AVM's in the proximal small bowel.  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  Patient has no overt gastrointestinal  bleeding however did have a significant nose bleed this morning.  He had 1 unit of blood this morning.  Hgb went from 6.4 to 8.5.  He mentions that the nose bleeds have been problematic since the 1990's and has had a blood transfusion and multiple procedures because of it.      Treated AVM's in GI tract.  Underlying etiology for continued anemia is likely the nosebleeds    Plan  1.) Capsule endoscopy-Trend Hgb, monitor for overt bleeding, transfuse as necessary.   Consider Octreotide as an outpatient.    Would recommend an ENT consult for the epistaxis.  Discussed with patient avoiding NSAIDs.        Discussed patient and above stated assessment and plan with Dr. Abdirashid May.  Will sign off.  I spent 45 minutes in the professional and overall care of this patient.      06/12/25 at 10:06 AM - RACHID Zavala-NORMA           [1] amiodarone, 200 mg, oral, BID  [Held by provider] aspirin, 81 mg, oral, Daily  brimonidine, 1 drop, Both Eyes, BID  cholecalciferol, 50 mcg, oral, Daily  docusate sodium, 100 mg, oral, BID  donepezil, 5 mg, oral, Nightly  ferrous sulfate, 65 mg of elemental iron, oral, Every other day  isosorbide mononitrate ER, 30 mg, oral, Daily  levETIRAcetam, 1,000 mg, oral, BID  lidocaine, 0.1 mL, subcutaneous, Once  metoprolol succinate XL, 25 mg, oral, BID  oxygen, , inhalation, Continuous - 02/gases  pantoprazole, 40 mg, intravenous, BID   Followed by  [START ON 6/13/2025] pantoprazole, 40 mg, oral, BID AC  pravastatin, 80 mg, oral, Nightly  QUEtiapine, 50 mg, oral, Nightly  sodium chloride, 2 spray, Each Nostril, BID

## 2025-06-12 NOTE — DOCUMENTATION CLARIFICATION NOTE
"    PATIENT:               BURKITT, WAYNE  ACCT #:                  [id number]  MRN:                       [id number]  :                       1945  ADMIT DATE:       6/10/2025 7:32 AM  DISCH DATE:  RESPONDING PROVIDER #:        [id number]          PROVIDER RESPONSE TEXT:    GI Bleed due to or enhanced by Aspirin use    CDI QUERY TEXT:    Clarification    Instruction:    Based on your assessment of the patient and the clinical information, please provide the requested documentation by clicking on the appropriate radio button and enter any additional information if prompted.    Question: Please further clarify if a relationship exists between GI Bleed and use of Aspirin    When answering this query, please exercise your independent professional judgment. The fact that a question is being asked, does not imply that any particular answer is desired or expected.    The patient's clinical indicators include:  Clinical Information:  Admitted for GIB, ABLA s/p Enteroscopy which showed AVMs Stomach w/recent Hemorrhage s/p ablation w/APC performed.    Clinical Indicators:  Hgb 6.3, 7.7, 8.1, 7.5, 8.7, 8.0, 6.5, 6.4, 8.5 from 6/10-.    HP by IM 6/10 1011 PM states \"Acute on Chronic anemia\", \"Iron deficiency\", \"[Held by provider] aspirin EC tablet 81 mg\", \"History of Gastric AVMs\".    Enteroscopy  \"Single 5 mm angioectasia in the body of the stomach; there was stigmata of recent hemorrhage; 1 lesion was completely ablated with 1 application of  argon plasma coagulation\", \"Multiple angioectasias measuring from 3 mm up to 5 mm in the 1st part of the duodenum, 2nd part of the duodenum, 3rd part of the duodenum, 4th part of the duodenum and proximal jejunum; there was stigmata of recent hemorrhage; 10 lesions were completely ablated with 10 applications of argon plasma coagulation\".    Treatment: Lab monitoring of H/H.  PT/INR.  PRBCs, Fe Sulfate.  Aspirin held.  Enteroscopy s/p intervention.    Risk Factors: Elderly " male w/hx of Gastric AVMs, on Aspirin in outpatient setting.  Options provided:  -- GI Bleed due to or enhanced by Aspirin use  -- GI Bleed not due to or enhanced by Aspirin use  -- Other - I will add my own diagnosis  -- Refer to Clinical Documentation Reviewer    Query created by: Saima Laughlin on 6/12/2025 10:59 AM      Electronically signed by:  BHARGAV GRACIA MD 6/12/2025 11:13 AM

## 2025-06-12 NOTE — NURSING NOTE
Patient tolerated infusion of 1 unit of packed red blood cells. Vss. Patient woke up from sleep around 0630 with a left nares nose bleed and mild oozing from rt nostril. Nose packed with gauze per patients request. Patient in no distress at time of incident; Khadra Kinney NP notified. Patient assisted with use of urinal; voiding cl yellow urine. Call bell in reach. Bed alarm engaged.

## 2025-06-12 NOTE — NURSING NOTE
Per GI note, GI to sign off and recommending ENT consult. Dr. Morel notified of nose bleed when he briefly came into patients room in the morning. Pt requesting a diet at this time. Dr. Morel notified via haiku, message read with no response.

## 2025-06-12 NOTE — CARE PLAN
The patient's goals for the shift include      The clinical goals for the shift include see poc    Patient remains hemodynamically stable and shows no s/s of bleeding.

## 2025-06-12 NOTE — CARE PLAN
The clinical goals for the shift include Pt will remain HDS this shift.    Pt repeat hgb stable. No nose bleeds or obvious signs of bleeding this shift.       Problem: Pain - Adult  Goal: Verbalizes/displays adequate comfort level or baseline comfort level  Outcome: Met     Problem: Safety - Adult  Goal: Free from fall injury  Outcome: Met     Problem: Discharge Planning  Goal: Discharge to home or other facility with appropriate resources  Outcome: Progressing     Problem: Chronic Conditions and Co-morbidities  Goal: Patient's chronic conditions and co-morbidity symptoms are monitored and maintained or improved  Outcome: Progressing     Problem: Nutrition  Goal: Nutrient intake appropriate for maintaining nutritional needs  Outcome: Progressing     Problem: Fall/Injury  Goal: Not fall by end of shift  Outcome: Met  Goal: Be free from injury by end of the shift  Outcome: Met  Goal: Verbalize understanding of personal risk factors for fall in the hospital  Outcome: Progressing  Goal: Verbalize understanding of risk factor reduction measures to prevent injury from fall in the home  Outcome: Progressing  Goal: Use assistive devices by end of the shift  Outcome: Progressing  Goal: Pace activities to prevent fatigue by end of the shift  Outcome: Progressing     Problem: Skin  Goal: Decreased wound size/increased tissue granulation at next dressing change  Outcome: Progressing  Flowsheets (Taken 6/12/2025 1852)  Decreased wound size/increased tissue granulation at next dressing change:   Utilize specialty bed per algorithm   Protective dressings over bony prominences   Promote sleep for wound healing  Goal: Participates in plan/prevention/treatment measures  Outcome: Progressing  Flowsheets (Taken 6/12/2025 2073)  Participates in plan/prevention/treatment measures:   Elevate heels   Increase activity/out of bed for meals   Discuss with provider PT/OT consult  Goal: Prevent/manage excess moisture  Outcome:  Progressing  Flowsheets (Taken 6/12/2025 1853)  Prevent/manage excess moisture: Cleanse incontinence/protect with barrier cream  Goal: Prevent/minimize sheer/friction injuries  Outcome: Progressing  Flowsheets (Taken 6/12/2025 1853)  Prevent/minimize sheer/friction injuries:   Increase activity/out of bed for meals   Turn/reposition every 2 hours/use positioning/transfer devices  Goal: Promote/optimize nutrition  Outcome: Progressing  Flowsheets (Taken 6/12/2025 1853)  Promote/optimize nutrition: Monitor/record intake including meals  Goal: Promote skin healing  Outcome: Progressing  Flowsheets (Taken 6/12/2025 1853)  Promote skin healing: Turn/reposition every 2 hours/use positioning/transfer devices

## 2025-06-12 NOTE — PROGRESS NOTES
Cardiology Progress Note           Rounding Cardiologist:  Dr. Kavita Varner  Primary Cardiologist: Dr. Gabino Giron    Date:  6/12/2025  Patient:  Wayne Burkitt  YOB: 1945  MRN:  [id number]   Admit Date:  6/10/2025      SUBJECTIVE    Wayne Burkitt was seen and examined today at bedside.  He was doing well.  He had had epistaxis overnight.  No chest pain and no shortness of breath earlier today.  No palpitations, dizziness or lightheadedness.    Review of Systems no new complaints    VITALS     Vitals:    06/12/25 1300 06/12/25 1400 06/12/25 1500 06/12/25 1600   BP: 115/58 137/67 138/71 157/81   BP Location:       Patient Position:       Pulse: 60 60 60 60   Resp: 11 18 16 24   Temp:       TempSrc:       SpO2:       Weight:       Height:           Intake/Output Summary (Last 24 hours) at 6/12/2025 1634  Last data filed at 6/12/2025 1000  Gross per 24 hour   Intake 120 ml   Output 1150 ml   Net -1030 ml       Vitals:    06/12/25 0500   Weight: 80.4 kg (177 lb 4 oz)       CURRENT MEDICATIONS    Scheduled Medications[1]  Continuous Medications[2]  Current Outpatient Medications   Medication Instructions    acetaminophen (TYLENOL) 650 mg, Every 4 hours PRN    amiodarone (PACERONE) 200 mg, oral, 2 times daily    amiodarone (PACERONE) 200 mg, oral, Daily    aspirin 81 mg, oral, Daily    brimonidine (AlphaGAN) 0.2 % ophthalmic solution 1 drop, 2 times daily    cholecalciferol (VITAMIN D-3) 50 mcg, Daily    dexAMETHasone (Decadron) 2 mg tablet Take 2 tablets (4 mg) by mouth every 6 hours for 1 day, THEN 1.5 tablets (3 mg) every 8 hours for 2 days, THEN 1 tablet (2 mg) every 8 hours for 2 days, THEN 1 tablet (2 mg) once daily for 2 days.    docusate sodium (COLACE) 100 mg, 2 times daily PRN    donepezil (ARICEPT) 5 mg, Nightly    levETIRAcetam (KEPPRA) 1,000 mg, 2 times daily    magnesium hydroxide (Milk of Magnesia) 400 mg/5 mL suspension 30 mL, oral, Daily PRN    metoprolol succinate XL  "(TOPROL-XL) 25 mg, oral, 2 times daily, Do not crush or chew.    nitroglycerin (NITROSTAT) 0.4 mg, sublingual, Every 5 min PRN    oxymetazoline (Afrin) 0.05 % nasal spray 2 sprays, Each Nostril, Every 12 hours PRN, Do not use for more than 3 days consecutively. Use as needed to aid with stopping nose bleeds.    pantoprazole (PROTONIX) 40 mg, oral, 2 times daily, Do not crush, chew, or split.    pravastatin (PRAVACHOL) 80 mg, Nightly    QUEtiapine (SEROQUEL) 50 mg, Nightly    sodium chloride (Saline Nasal Mist) 3 % mist 2 Squirts, nasal, 2 times daily        PHYSICAL EXAMINATION   GENERAL:  Well developed, well nourished, in no acute distress.  Very pale  CHEST:  Symmetric and nontender.  NECK:   no JVD, no bruit.  LUNGS: Nonlabored respirations diminished at the bases with a few scattered rhonchi.  No rales  HEART: Rhythm regular, normal S1 and S2 without S3.  ABDOMEN: Soft, NT, ND without palpable organomegaly, normoactive bowel sounds.   EXTREMITIES:  Warm with good color, no clubbing or cyanosis.  There is trace edema noted.  PERIPHERAL VASCULAR:  Pulses present and equally palpable  NEURO/PSYCH:  Alert and oriented times three with approppriate behavior and responses.    LAB DATA     CBC:   Results from last 7 days   Lab Units 06/12/25  1359   WBC AUTO x10*3/uL 5.1   RBC AUTO x10*6/uL 3.28*   HEMOGLOBIN g/dL 8.4*   HEMATOCRIT % 27.5*   PLATELETS AUTO x10*3/uL 215        CMP:    Results from last 7 days   Lab Units 06/12/25  0735   SODIUM mmol/L 145   POTASSIUM mmol/L 4.6   CHLORIDE mmol/L 111*   CO2 mmol/L 25   BUN mg/dL 17   CREATININE mg/dL 1.10   GLUCOSE mg/dL 97   CALCIUM mg/dL 8.3*       Cardiac Enzymes:    Lab Results   Component Value Date    TROPHS 12 06/10/2025    TROPHS 11 06/10/2025    TROPHS 9 06/10/2025       Magnesium:    Lab Results   Component Value Date    MG 2.15 06/10/2025       Lipid Profile:  No results found for: \"CHLPL\", \"TRIG\", \"HDL\", \"LDLCALC\", \"LDLDIRECT\"    TSH:  No results found for: " "\"TSH\"    BNP:   Lab Results   Component Value Date     (H) 06/10/2025        PT/INR:    Lab Results   Component Value Date    PROTIME 12.9 (H) 06/10/2025    INR 1.2 (H) 06/10/2025       HgBA1c:    Lab Results   Component Value Date    HGBA1C 4.9 04/20/2025       CBC:  Lab Results   Component Value Date    WBC 5.1 06/12/2025    WBC 4.4 06/12/2025    WBC 3.7 (L) 06/12/2025    RBC 3.28 (L) 06/12/2025    RBC 3.35 (L) 06/12/2025    RBC 2.61 (L) 06/12/2025    HGB 8.4 (L) 06/12/2025    HGB 8.5 (L) 06/12/2025    HGB 6.4 (LL) 06/12/2025    HCT 27.5 (L) 06/12/2025    HCT 28.3 (L) 06/12/2025    HCT 21.6 (L) 06/12/2025    MCV 84 06/12/2025    MCV 85 06/12/2025    MCV 84 06/12/2025    MCH 25.6 (L) 06/12/2025    MCH 25.4 (L) 06/12/2025    MCH 24.9 (L) 06/12/2025    MCHC 30.5 (L) 06/12/2025    MCHC 30.0 (L) 06/12/2025    MCHC 29.5 (L) 06/12/2025    RDW 18.7 (H) 06/12/2025    RDW 18.6 (H) 06/12/2025    RDW 19.9 (H) 06/12/2025     06/12/2025     06/12/2025     06/12/2025       BMP:  Lab Results   Component Value Date     06/12/2025     06/11/2025     06/10/2025    K 4.6 06/12/2025    K 4.0 06/11/2025    K 4.5 06/10/2025     (H) 06/12/2025     (H) 06/11/2025     (H) 06/10/2025    CO2 25 06/12/2025    CO2 24 06/11/2025    CO2 24 06/10/2025    BUN 17 06/12/2025    BUN 18 06/11/2025    BUN 20 06/10/2025    CREATININE 1.10 06/12/2025    CREATININE 1.13 06/11/2025    CREATININE 1.14 06/10/2025       Hepatic Function Panel:    Lab Results   Component Value Date    ALKPHOS 92 06/12/2025    ALT 18 06/12/2025    AST 18 06/12/2025    PROT 5.9 (L) 06/12/2025    BILITOT 2.2 (H) 06/12/2025    BILIDIR 0.5 (H) 06/12/2025     Labs reviewed    DIAGNOSTIC TESTING RESULTS     ECG 12 lead  Result Date: 6/12/2025  Electronic atrial pacemaker Inferior infarct , age undetermined ST & T wave abnormality, consider anterolateral ischemia Abnormal ECG When compared with ECG of 10-GREG-2025 13:50, " (unconfirmed) Electronic atrial pacemaker has replaced Electronic ventricular pacemaker    ECG 12 lead  Result Date: 6/12/2025  Electronic ventricular pacemaker When compared with ECG of 10-GREG-2025 09:57, No significant change was found    Vascular US lower extremity venous duplex bilateral  Result Date: 6/11/2025            [facility] [address] [place] [address]     Tel [phone] Fax [phone]  Vascular Lab Report  VASC US LOWER EXTREMITY VENOUS DUPLEX BILATERAL Patient Name:      WAYNE BURKITT Reading Physician:  [id number] Dante Cartwright MD Study Date:        6/11/2025           Ordering Provider:  [id number] SHANNAN NICHOLE MRN/PID:           [id number]            Fellow: Accession#:        [id number]        Technologist:       Lucía Delgado RVUSHA Date of Birth/Age: 1945 / 79     Technologist 2:                    years Gender:            M                   Encounter#:         [id number] Admission Status:  Inpatient           Location Performed: [facility]  Diagnosis/ICD: Localized (leg) edema-R60.0 Indication:    Limb edema CPT Codes:     19673 Peripheral venous duplex scan for DVT complete  Pertinent History: CAD, HTN and Hyperlipidemia. CHF.  CONCLUSIONS: Right Lower Venous: No evidence of acute deep vein thrombus visualized in the right lower extremity. Calf veins visualized in segments with color doppler. Left Lower Venous: No evidence of acute deep vein thrombus visualized in the left lower extremity. Calf veins visualized in segments with color doppler. Additional Findings: Technically difficult exam due to edema.  Imaging & Doppler Findings:  Right                 Compressible Thrombus        Flow Distal External Iliac     Yes        None CFV                       Yes        None   Spontaneous/Phasic PFV                       Yes        None FV Proximal               Yes        None    Spontaneous/Phasic FV Mid                    Yes        None FV Distal                 Yes        None Popliteal                 Yes        None   Spontaneous/Phasic Peroneal                  Yes        None PTV                       Yes        None  Left                  Compress Thrombus        Flow Distal External Iliac   Yes      None   Spontaneous/Phasic CFV                     Yes      None   Spontaneous/Phasic PFV                     Yes      None FV Proximal             Yes      None   Spontaneous/Phasic FV Mid                  Yes      None FV Distal               Yes      None Popliteal               Yes      None   Spontaneous/Phasic Peroneal                Yes      None PTV                     Yes      None  [id number] Dante Cartwright MD Electronically signed by [id number] Dante Cartwright MD on 6/11/2025 at 5:03:02 PM  ** Final **     Enteroscopy  Result Date: 6/11/2025  Table formatting from the original result was not included. Impression The cricopharynx, upper third of the esophagus, middle third of the esophagus and lower third of the esophagus appeared normal. Single 5 mm angioectasia in the body of the stomach; there was stigmata of recent hemorrhage; tissue was completely ablated with argon plasma coagulation Multiple angioectasias measuring from 3 mm up to 5 mm in the 1st part of the duodenum, 2nd part of the duodenum, 3rd part of the duodenum, 4th part of the duodenum and proximal jejunum; there was stigmata of recent hemorrhage; tissue was completely ablated with argon plasma coagulation Findings The cricopharynx, upper third of the esophagus, middle third of the esophagus and lower third of the esophagus appeared normal. Single 5 mm angioectasia in the body of the stomach; there was stigmata of recent hemorrhage; 1 lesion was completely ablated with 1 application of argon plasma coagulation using a straight fire probe (30 W and 0.9 mL/min flow rate), coagulum was removed after ablation was completed Multiple  angioectasias measuring from 3 mm up to 5 mm in the 1st part of the duodenum, 2nd part of the duodenum, 3rd part of the duodenum, 4th part of the duodenum and proximal jejunum; there was stigmata of recent hemorrhage; 10 lesions were completely ablated with 10 applications of argon plasma coagulation using a straight fire probe (20 W and 0.9 mL/min flow rate), coagulum was removed after ablation was completed Recommendation Ok to resume full liquid diet Pantoprazole 40 mg po dialy Octreotide 500 mcg subcutaneous bid  Indication Upper GI bleed Post-Op Diagnosis None Staff Staff Role Duran Garcia MD Proceduralist Medications ferrous sulfate 325 mg (65 mg elemental) tablet 1 tablet Cannot be calculated*  *Administration dose not documented enalaprilat (Vasotec) injection 0.625 mg Cannot be calculated*  *Administration dose not documented (Totals for administrations occurring from 1544 to 1607 on 06/11/25) Preprocedure A history and physical has been performed, and patient medication allergies have been reviewed. The patient's tolerance of previous anesthesia has been reviewed. The risks and benefits of the procedure and the sedation options and risks were discussed with the patient. All questions were answered and informed consent obtained. Details of the Procedure The patient underwent moderate sedation, which was administered by the procedural nurse. The patient's blood pressure, heart rate, level of consciousness, oxygen saturation, respirations, ECG and ETCO2 were monitored throughout the procedure. The scope was introduced through the mouth and advanced to the proximal part of the jejunum. Tattoo was not placed at the distal extent of the exam. The procedure was performed without an overtube. Fluoroscopy was used. Retroflexion was performed in the cardia. The patient's estimated blood loss was minimal. The procedure was not difficult. Fluoroscopy was used. The patient tolerated the procedure well. There were no  apparent adverse events. Events Procedure Events Event Event Time ENDO SCOPE IN TIME 6/11/2025  3:57 PM ENDO SCOPE OUT TIME 6/11/2025  4:06 PM Specimens No specimens collected Procedure Location [facility] [address] [facility] [place] [address] [phone] Referring Provider Jennifer Selby APRN-CNP Procedure Provider Duran Garcia MD     Capsule Endoscopy Small Intestine  Result Date: 6/11/2025  Images from the original result were not included. Several AVMs in the proximal small bowel. Consider push enteroscopy     ECG 12 lead  Result Date: 6/11/2025  AV dual-paced rhythm with prolonged AV conduction Abnormal ECG When compared with ECG of 10-GREG-2025 07:13, (unconfirmed) Electronic ventricular pacemaker has replaced Electronic atrial pacemaker Confirmed by Ashkan Gaspar ([id number]) on 6/11/2025 9:37:52 AM    ECG 12 lead  Result Date: 6/11/2025  Electronic atrial pacemaker 1st degree AV block Inferior infarct (cited on or before 01-APR-2025) ST & T wave abnormality, consider anterolateral ischemia Abnormal ECG When compared with ECG of 21-APR-2025 17:38, No significant change was found Confirmed by Ashkan Gaspar ([id number]) on 6/11/2025 9:37:07 AM    XR chest 1 view  Result Date: 6/10/2025  Interpreted By:  Gerda Rainey, STUDY: XR CHEST 1 VIEW;  6/10/2025 7:53 am   INDICATION: Signs/Symptoms:Chest Pain.     COMPARISON: 04/01/2025   ACCESSION NUMBER(S): [id number]   ORDERING CLINICIAN: CHARLETTE DONOVAN   FINDINGS: Artifact from overlying monitoring leads noted. Bipolar left subclavian pacer wires remain in place. Associated port partially obscures the lateral left mid chest. No focal infiltrate, pleural effusion or pneumothorax as visualized. The cardiac silhouette is within normal limits for size with overlying sternotomy wires and surgical clips again seen. Curvilinear presumed coronary stent near the AP window again seen.       No acute cardiopulmonary process  radiographically.   MACRO: None.   Signed by: Gerda Rainey 6/10/2025 8:24 AM Dictation workstation:   [id number]         RADIOLOGY     Transthoracic Echo (TTE) Limited   Final Result      Vascular US lower extremity venous duplex bilateral   Final Result      XR chest 1 view   Final Result   No acute cardiopulmonary process radiographically.        MACRO:   None.        Signed by: Gerda Rainey 6/10/2025 8:24 AM   Dictation workstation:   [id number]      Cardiac device check - Inpatient    (Results Pending)   Cardiac device check - Inpatient    (Results Pending)         ASSESSMENT     Problem List Items Addressed This Visit       Upper GI bleed    Relevant Orders    Enteroscopy (Completed)    V tach (Multi)    Relevant Medications    nitroglycerin (Nitrostat) SL tablet 0.4 mg    metoprolol succinate XL (Toprol-XL) 24 hr tablet 25 mg    isosorbide mononitrate ER (Imdur) 24 hr tablet 30 mg    labetaloL (Normodyne,Trandate) injection 5 mg    Other Relevant Orders    Cardiac device check - Inpatient    Cardiac device check - Inpatient    Transthoracic Echo (TTE) Limited (Completed)    * (Principal) Anemia, unspecified type - Primary    Relevant Orders    Critical Care (Completed)    Bilateral lower extremity edema    Chest pain   Acute on chronic anemia   Iron deficiency   Bilateral lower extremity edema   History of Gastric AVMs  History of Sigmoid diverticulosis  HTN  V. tach             Relevant Orders    Vascular US lower extremity venous duplex bilateral (Completed)     Other Visit Diagnoses         Coronary artery disease of native artery of native heart with stable angina pectoris        Relevant Medications    nitroglycerin (Nitrostat) SL tablet 0.4 mg    metoprolol succinate XL (Toprol-XL) 24 hr tablet 25 mg    isosorbide mononitrate ER (Imdur) 24 hr tablet 30 mg    labetaloL (Normodyne,Trandate) injection 5 mg    Other Relevant Orders    Transthoracic Echo (TTE) Limited (Completed)            Problem  List[3]    PLAN     1.  Chest pain.  History of coronary artery disease.  No further angina pectoris would continue conservative medical therapy.  2.  Wide-complex tachycardia.  As recommended by EP this is felt to be slow VT and will increase his amiodarone to twice daily on the short-term will need electrophysiology outpatient evaluation.  3.  Heart failure preserved ejection fraction.  Has had a mild diuresis and edema has improved as has his shortness of breath.  Continue to follow his fluid status.  4.  Recurrent epistaxis, gastric AVMs and anemia.  Per the primary service.    Patient is stable from a cardiac standpoint for discharged home in the next 24 hours.  He should follow-up with Dr. Lima in 7 to 10 days.  Please call with any additional questions or concerns.    Please do not hesitate to call with questions.  Electronically signed by Kavita Varner MD, on 6/12/2025 at 4:34 PM       [1] amiodarone, 200 mg, oral, BID  [Held by provider] aspirin, 81 mg, oral, Daily  brimonidine, 1 drop, Both Eyes, BID  cholecalciferol, 50 mcg, oral, Daily  docusate sodium, 100 mg, oral, BID  donepezil, 5 mg, oral, Nightly  ferrous sulfate, 65 mg of elemental iron, oral, Every other day  isosorbide mononitrate ER, 30 mg, oral, Daily  levETIRAcetam, 1,000 mg, oral, BID  lidocaine, 0.1 mL, subcutaneous, Once  metoprolol succinate XL, 25 mg, oral, BID  pantoprazole, 40 mg, intravenous, BID   Followed by  [START ON 6/13/2025] pantoprazole, 40 mg, oral, BID AC  pravastatin, 80 mg, oral, Nightly  QUEtiapine, 50 mg, oral, Nightly  sodium chloride, 2 spray, Each Nostril, BID    [2]    [3]   Patient Active Problem List  Diagnosis    Moderate vascular dementia with anxiety    Auditory hallucinations    Coronary artery disease involving native coronary artery of native heart without angina pectoris    Epistaxis    Unstable angina pectoris (Multi)    Moderate vascular dementia with psychotic disturbance    Episodes of  formed visual hallucinations    Frequent falls    Essential hypertension    Obstructive sleep apnea syndrome    Orthostatic hypotension    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

## 2025-06-13 LAB
ALBUMIN SERPL BCP-MCNC: 3.5 G/DL (ref 3.4–5)
ALP SERPL-CCNC: 91 U/L (ref 33–136)
ALT SERPL W P-5'-P-CCNC: 16 U/L (ref 10–52)
ANION GAP SERPL CALC-SCNC: 11 MMOL/L (ref 10–20)
AST SERPL W P-5'-P-CCNC: 17 U/L (ref 9–39)
BILIRUB DIRECT SERPL-MCNC: 0.2 MG/DL (ref 0–0.3)
BILIRUB SERPL-MCNC: 0.8 MG/DL (ref 0–1.2)
BLOOD EXPIRATION DATE: NORMAL
BUN SERPL-MCNC: 18 MG/DL (ref 6–23)
CALCIUM SERPL-MCNC: 8.3 MG/DL (ref 8.6–10.3)
CHLORIDE SERPL-SCNC: 108 MMOL/L (ref 98–107)
CO2 SERPL-SCNC: 25 MMOL/L (ref 21–32)
CREAT SERPL-MCNC: 1.18 MG/DL (ref 0.5–1.3)
DISPENSE STATUS: NORMAL
EGFRCR SERPLBLD CKD-EPI 2021: 64 ML/MIN/1.73M*2
ERYTHROCYTE [DISTWIDTH] IN BLOOD BY AUTOMATED COUNT: 19.1 % (ref 11.5–14.5)
ERYTHROCYTE [DISTWIDTH] IN BLOOD BY AUTOMATED COUNT: 19.3 % (ref 11.5–14.5)
ERYTHROCYTE [DISTWIDTH] IN BLOOD BY AUTOMATED COUNT: 19.6 % (ref 11.5–14.5)
ERYTHROCYTE [DISTWIDTH] IN BLOOD BY AUTOMATED COUNT: 19.8 % (ref 11.5–14.5)
GLUCOSE SERPL-MCNC: 95 MG/DL (ref 74–99)
HCT VFR BLD AUTO: 28.3 % (ref 41–52)
HCT VFR BLD AUTO: 29.3 % (ref 41–52)
HCT VFR BLD AUTO: 32.1 % (ref 41–52)
HCT VFR BLD AUTO: 33.4 % (ref 41–52)
HGB BLD-MCNC: 8.7 G/DL (ref 13.5–17.5)
HGB BLD-MCNC: 8.8 G/DL (ref 13.5–17.5)
HGB BLD-MCNC: 9.7 G/DL (ref 13.5–17.5)
HGB BLD-MCNC: 9.7 G/DL (ref 13.5–17.5)
MCH RBC QN AUTO: 25.6 PG (ref 26–34)
MCH RBC QN AUTO: 25.7 PG (ref 26–34)
MCH RBC QN AUTO: 25.7 PG (ref 26–34)
MCH RBC QN AUTO: 26 PG (ref 26–34)
MCHC RBC AUTO-ENTMCNC: 29 G/DL (ref 32–36)
MCHC RBC AUTO-ENTMCNC: 30 G/DL (ref 32–36)
MCHC RBC AUTO-ENTMCNC: 30.2 G/DL (ref 32–36)
MCHC RBC AUTO-ENTMCNC: 30.7 G/DL (ref 32–36)
MCV RBC AUTO: 85 FL (ref 80–100)
MCV RBC AUTO: 88 FL (ref 80–100)
NRBC BLD-RTO: 0 /100 WBCS (ref 0–0)
PHOSPHATE SERPL-MCNC: 3.3 MG/DL (ref 2.5–4.9)
PLATELET # BLD AUTO: 217 X10*3/UL (ref 150–450)
PLATELET # BLD AUTO: 220 X10*3/UL (ref 150–450)
PLATELET # BLD AUTO: 244 X10*3/UL (ref 150–450)
PLATELET # BLD AUTO: 248 X10*3/UL (ref 150–450)
POTASSIUM SERPL-SCNC: 4 MMOL/L (ref 3.5–5.3)
PRODUCT BLOOD TYPE: 5100
PRODUCT CODE: NORMAL
PROT SERPL-MCNC: 5.7 G/DL (ref 6.4–8.2)
RBC # BLD AUTO: 3.34 X10*6/UL (ref 4.5–5.9)
RBC # BLD AUTO: 3.44 X10*6/UL (ref 4.5–5.9)
RBC # BLD AUTO: 3.78 X10*6/UL (ref 4.5–5.9)
RBC # BLD AUTO: 3.78 X10*6/UL (ref 4.5–5.9)
SODIUM SERPL-SCNC: 140 MMOL/L (ref 136–145)
UNIT ABO: NORMAL
UNIT NUMBER: NORMAL
UNIT RH: NORMAL
UNIT VOLUME: 350
WBC # BLD AUTO: 4.4 X10*3/UL (ref 4.4–11.3)
WBC # BLD AUTO: 4.9 X10*3/UL (ref 4.4–11.3)
WBC # BLD AUTO: 5.1 X10*3/UL (ref 4.4–11.3)
WBC # BLD AUTO: 5.4 X10*3/UL (ref 4.4–11.3)
XM INTEP: NORMAL

## 2025-06-13 PROCEDURE — 85027 COMPLETE CBC AUTOMATED: CPT | Performed by: NURSE PRACTITIONER

## 2025-06-13 PROCEDURE — 2500000001 HC RX 250 WO HCPCS SELF ADMINISTERED DRUGS (ALT 637 FOR MEDICARE OP): Performed by: PHYSICIAN ASSISTANT

## 2025-06-13 PROCEDURE — 2500000001 HC RX 250 WO HCPCS SELF ADMINISTERED DRUGS (ALT 637 FOR MEDICARE OP)

## 2025-06-13 PROCEDURE — 2500000002 HC RX 250 W HCPCS SELF ADMINISTERED DRUGS (ALT 637 FOR MEDICARE OP, ALT 636 FOR OP/ED): Performed by: NURSE PRACTITIONER

## 2025-06-13 PROCEDURE — 84100 ASSAY OF PHOSPHORUS: CPT | Performed by: PHYSICIAN ASSISTANT

## 2025-06-13 PROCEDURE — 36415 COLL VENOUS BLD VENIPUNCTURE: CPT | Performed by: PHYSICIAN ASSISTANT

## 2025-06-13 PROCEDURE — 82248 BILIRUBIN DIRECT: CPT | Performed by: PHYSICIAN ASSISTANT

## 2025-06-13 PROCEDURE — 1200000002 HC GENERAL ROOM WITH TELEMETRY DAILY

## 2025-06-13 PROCEDURE — 80053 COMPREHEN METABOLIC PANEL: CPT | Performed by: PHYSICIAN ASSISTANT

## 2025-06-13 PROCEDURE — 2500000001 HC RX 250 WO HCPCS SELF ADMINISTERED DRUGS (ALT 637 FOR MEDICARE OP): Performed by: NURSE PRACTITIONER

## 2025-06-13 PROCEDURE — 85027 COMPLETE CBC AUTOMATED: CPT | Performed by: PHYSICIAN ASSISTANT

## 2025-06-13 RX ADMIN — METOPROLOL SUCCINATE 25 MG: 25 TABLET, EXTENDED RELEASE ORAL at 09:31

## 2025-06-13 RX ADMIN — PANTOPRAZOLE SODIUM 40 MG: 40 TABLET, DELAYED RELEASE ORAL at 16:51

## 2025-06-13 RX ADMIN — LEVETIRACETAM 1000 MG: 500 TABLET, FILM COATED ORAL at 21:12

## 2025-06-13 RX ADMIN — BRIMONIDINE TARTRATE 1 DROP: 2 SOLUTION/ DROPS OPHTHALMIC at 21:09

## 2025-06-13 RX ADMIN — AMIODARONE HYDROCHLORIDE 200 MG: 200 TABLET ORAL at 21:12

## 2025-06-13 RX ADMIN — AMIODARONE HYDROCHLORIDE 200 MG: 200 TABLET ORAL at 09:31

## 2025-06-13 RX ADMIN — DONEPEZIL HYDROCHLORIDE 5 MG: 5 TABLET ORAL at 21:11

## 2025-06-13 RX ADMIN — QUETIAPINE FUMARATE 50 MG: 50 TABLET ORAL at 21:12

## 2025-06-13 RX ADMIN — SALINE NASAL SPRAY 2 SPRAY: 1.5 SOLUTION NASAL at 09:33

## 2025-06-13 RX ADMIN — BRIMONIDINE TARTRATE 1 DROP: 2 SOLUTION/ DROPS OPHTHALMIC at 09:31

## 2025-06-13 RX ADMIN — DOCUSATE SODIUM 100 MG: 100 CAPSULE, LIQUID FILLED ORAL at 09:30

## 2025-06-13 RX ADMIN — SALINE NASAL SPRAY 2 SPRAY: 1.5 SOLUTION NASAL at 21:08

## 2025-06-13 RX ADMIN — PANTOPRAZOLE SODIUM 40 MG: 40 TABLET, DELAYED RELEASE ORAL at 10:49

## 2025-06-13 RX ADMIN — METOPROLOL SUCCINATE 25 MG: 25 TABLET, EXTENDED RELEASE ORAL at 21:11

## 2025-06-13 RX ADMIN — DOCUSATE SODIUM 100 MG: 100 CAPSULE, LIQUID FILLED ORAL at 21:11

## 2025-06-13 RX ADMIN — LEVETIRACETAM 1000 MG: 500 TABLET, FILM COATED ORAL at 09:31

## 2025-06-13 RX ADMIN — CHOLECALCIFEROL TAB 125 MCG (5000 UNIT) 50 MCG: 125 TAB at 09:31

## 2025-06-13 RX ADMIN — PRAVASTATIN SODIUM 80 MG: 20 TABLET ORAL at 21:11

## 2025-06-13 ASSESSMENT — COGNITIVE AND FUNCTIONAL STATUS - GENERAL
CLIMB 3 TO 5 STEPS WITH RAILING: A LITTLE
DAILY ACTIVITIY SCORE: 24
DAILY ACTIVITIY SCORE: 24
MOBILITY SCORE: 23
MOBILITY SCORE: 23
CLIMB 3 TO 5 STEPS WITH RAILING: A LITTLE

## 2025-06-13 ASSESSMENT — PAIN SCALES - GENERAL
PAINLEVEL_OUTOF10: 0 - NO PAIN

## 2025-06-13 ASSESSMENT — PAIN - FUNCTIONAL ASSESSMENT
PAIN_FUNCTIONAL_ASSESSMENT: 0-10

## 2025-06-13 NOTE — PROGRESS NOTES
"Wayne Burkitt is a 79 y.o. male on day 3 of admission presenting with Anemia, unspecified type, went for capsule study, also noted to have V. tach, was evaluated by cardiology and EP, noted to have acute episode of epistaxis, and ENT consult was requested, awaiting input, was transferred from ICU to telemetry floor.    Subjective   No CP or SOB       Objective   Lying in bed, no acute distress    Current Medications[1]       Physical Exam  HENT:      Head: Normocephalic.   Eyes:      Conjunctiva/sclera: Conjunctivae normal.   Cardiovascular:      Rate and Rhythm: Regular rhythm.      Heart sounds: Normal heart sounds.   Pulmonary:      Effort: Pulmonary effort is normal.      Breath sounds: Normal breath sounds.   Abdominal:      General: Bowel sounds are normal.      Palpations: Abdomen is soft.   Musculoskeletal:      Comments: No edema   Skin:     General: Skin is warm and dry.   Neurological:      Mental Status: He is alert. Mental status is at baseline.           Last Recorded Vitals  Blood pressure 144/74, pulse 60, temperature 36.1 °C (97 °F), resp. rate 16, height 1.803 m (5' 11\"), weight 78.3 kg (172 lb 11.2 oz), SpO2 98%.  Intake/Output last 3 Shifts:  I/O last 3 completed shifts:  In: 490 (6.1 mL/kg) [P.O.:240; Blood:250]  Out: 1300 (16.2 mL/kg) [Urine:1300 (0.4 mL/kg/hr)]  Weight: 80.4 kg           Labs:       Results for orders placed or performed during the hospital encounter of 06/10/25 (from the past 24 hours)   CBC   Result Value Ref Range    WBC 5.6 4.4 - 11.3 x10*3/uL    nRBC 0.0 0.0 - 0.0 /100 WBCs    RBC 3.54 (L) 4.50 - 5.90 x10*6/uL    Hemoglobin 9.1 (L) 13.5 - 17.5 g/dL    Hematocrit 29.9 (L) 41.0 - 52.0 %    MCV 85 80 - 100 fL    MCH 25.7 (L) 26.0 - 34.0 pg    MCHC 30.4 (L) 32.0 - 36.0 g/dL    RDW 18.9 (H) 11.5 - 14.5 %    Platelets 232 150 - 450 x10*3/uL   Hepatic Function Panel   Result Value Ref Range    Albumin 3.5 3.4 - 5.0 g/dL    Bilirubin, Total 0.8 0.0 - 1.2 mg/dL    Bilirubin, " Direct 0.2 0.0 - 0.3 mg/dL    Alkaline Phosphatase 91 33 - 136 U/L    ALT 16 10 - 52 U/L    AST 17 9 - 39 U/L    Total Protein 5.7 (L) 6.4 - 8.2 g/dL   Phosphorus   Result Value Ref Range    Phosphorus 3.3 2.5 - 4.9 mg/dL   Basic Metabolic Panel   Result Value Ref Range    Glucose 95 74 - 99 mg/dL    Sodium 140 136 - 145 mmol/L    Potassium 4.0 3.5 - 5.3 mmol/L    Chloride 108 (H) 98 - 107 mmol/L    Bicarbonate 25 21 - 32 mmol/L    Anion Gap 11 10 - 20 mmol/L    Urea Nitrogen 18 6 - 23 mg/dL    Creatinine 1.18 0.50 - 1.30 mg/dL    eGFR 64 >60 mL/min/1.73m*2    Calcium 8.3 (L) 8.6 - 10.3 mg/dL   CBC   Result Value Ref Range    WBC 5.1 4.4 - 11.3 x10*3/uL    nRBC 0.0 0.0 - 0.0 /100 WBCs    RBC 3.34 (L) 4.50 - 5.90 x10*6/uL    Hemoglobin 8.7 (L) 13.5 - 17.5 g/dL    Hematocrit 28.3 (L) 41.0 - 52.0 %    MCV 85 80 - 100 fL    MCH 26.0 26.0 - 34.0 pg    MCHC 30.7 (L) 32.0 - 36.0 g/dL    RDW 19.1 (H) 11.5 - 14.5 %    Platelets 220 150 - 450 x10*3/uL   CBC   Result Value Ref Range    WBC 4.4 4.4 - 11.3 x10*3/uL    nRBC 0.0 0.0 - 0.0 /100 WBCs    RBC 3.44 (L) 4.50 - 5.90 x10*6/uL    Hemoglobin 8.8 (L) 13.5 - 17.5 g/dL    Hematocrit 29.3 (L) 41.0 - 52.0 %    MCV 85 80 - 100 fL    MCH 25.6 (L) 26.0 - 34.0 pg    MCHC 30.0 (L) 32.0 - 36.0 g/dL    RDW 19.3 (H) 11.5 - 14.5 %    Platelets 217 150 - 450 x10*3/uL   CBC   Result Value Ref Range    WBC 4.9 4.4 - 11.3 x10*3/uL    nRBC 0.0 0.0 - 0.0 /100 WBCs    RBC 3.78 (L) 4.50 - 5.90 x10*6/uL    Hemoglobin 9.7 (L) 13.5 - 17.5 g/dL    Hematocrit 32.1 (L) 41.0 - 52.0 %    MCV 85 80 - 100 fL    MCH 25.7 (L) 26.0 - 34.0 pg    MCHC 30.2 (L) 32.0 - 36.0 g/dL    RDW 19.6 (H) 11.5 - 14.5 %    Platelets 248 150 - 450 x10*3/uL        Radiology  XR chest 1 view  Result Date: 6/10/2025  No acute cardiopulmonary process radiographically.   MACRO: None.   Signed by: Gerda Rainey 6/10/2025 8:24 AM Dictation workstation:   [id number]        Assessment/Plan   Assessment & Plan  Anemia, unspecified  type    Bilateral lower extremity edema  Chest pain   Acute on chronic anemia   Iron deficiency   Bilateral lower extremity edema   History of Gastric AVMs  History of Sigmoid diverticulosis  HTN  V. tach      PLAN: Patient was transferred from ICU to telemetry floor, monitor H&H, transfuse as needed,, c/w Protonix, noted to have an episode of epistaxis, awaiting input of ENT, no further episodes of V. tach, med list labs and radiology reviewed, discussed with nursing and CNP, for now continue with current Rx, if stable consider for DC in a day or 2, follow closely.            Bradley Morel MD           [1]   Current Facility-Administered Medications:     acetaminophen (Tylenol) tablet 650 mg, 650 mg, oral, q4h PRN **OR** acetaminophen (Tylenol) suppository 650 mg, 650 mg, rectal, q4h PRN, ERICK Vazquez    amiodarone (Pacerone) tablet 200 mg, 200 mg, oral, BID, ERICK Vazquez, 200 mg at 06/13/25 0931    [Held by provider] aspirin EC tablet 81 mg, 81 mg, oral, Daily, Corey Feliz PA-C    brimonidine (AlphaGAN) 0.2 % ophthalmic solution 1 drop, 1 drop, Both Eyes, BID, ERICK Vazquez, 1 drop at 06/13/25 0931    cholecalciferol (Vitamin D-3) tablet 50 mcg, 50 mcg, oral, Daily, RACHID Vazquez-CNP, 50 mcg at 06/13/25 0931    docusate sodium (Colace) capsule 100 mg, 100 mg, oral, BID, ERICK Vazquez, 100 mg at 06/13/25 0930    donepezil (Aricept) tablet 5 mg, 5 mg, oral, Nightly, RACHID Vazquez-CNP, 5 mg at 06/12/25 2018    ferrous sulfate 325 mg (65 mg elemental) tablet 1 tablet, 65 mg of elemental iron, oral, Every other day, RACHID Vazquez-CNP, 1 tablet at 06/12/25 1041    isosorbide mononitrate ER (Imdur) 24 hr tablet 30 mg, 30 mg, oral, Daily, RACHID Vazquez-CNP, 30 mg at 06/11/25 1716    levETIRAcetam (Keppra) tablet 1,000 mg, 1,000 mg, oral, BID, RACHID Vazquez-CNP, 1,000 mg at 06/13/25 0931    magnesium hydroxide (Milk of Magnesia)  400 mg/5 mL suspension 30 mL, 30 mL, oral, Daily PRN, ERICK Vazquez    melatonin tablet 3 mg, 3 mg, oral, Nightly PRN, ERICK Vazquez    metoprolol succinate XL (Toprol-XL) 24 hr tablet 25 mg, 25 mg, oral, BID, ERICK Vazquez, 25 mg at 06/13/25 0931    nitroglycerin (Nitrostat) SL tablet 0.4 mg, 0.4 mg, sublingual, q5 min PRN, ERICK Vazquez    ondansetron ODT (Zofran-ODT) disintegrating tablet 4 mg, 4 mg, oral, q8h PRN **OR** ondansetron (Zofran) injection 4 mg, 4 mg, intravenous, q8h PRN, ERICK Vazquez    oxymetazoline (Afrin) 0.05 % nasal spray 2 spray, 2 spray, Each Nostril, q12h PRN, ERICK Vazquez    [COMPLETED] pantoprazole (Protonix) injection 40 mg, 40 mg, intravenous, BID, 40 mg at 06/12/25 2017 **FOLLOWED BY** pantoprazole (ProtoNix) EC tablet 40 mg, 40 mg, oral, BID AC, ERICK Vazquez, 40 mg at 06/13/25 1651    pravastatin (Pravachol) tablet 80 mg, 80 mg, oral, Nightly, ERICK Vazquez, 80 mg at 06/12/25 2017    QUEtiapine (SEROquel) tablet 50 mg, 50 mg, oral, Nightly, ERICK Vazquez, 50 mg at 06/12/25 2017    sodium chloride (Ocean) 0.65 % nasal spray 2 spray, 2 spray, Each Nostril, BID, ERICK Vazquez, 2 spray at 06/13/25 0915

## 2025-06-13 NOTE — NURSING NOTE
UPDATE 1930: bedside report completed, patient resting in bed with no complaints at this time. Bed alarm on and active with call bell within reach, bed in lowest and locked position.     UPDATE 2348: Patient refusing Lab draws this evening, RN educated about importants of it for monitoring his health, patient continues to refused, will notify attending dr.     UPDATE 0200: Patient agreeable to am labs today. Lab at bedside to drawn blood

## 2025-06-13 NOTE — DISCHARGE INSTRUCTIONS
Avoid NSAIDs (example: Motrin/ibuprofen, Aleve/naproxen, aspirin, asprin containing products, Motrin containing products, naproxen tainting products)

## 2025-06-13 NOTE — NURSING NOTE
Patient refused morning dose of isosorbide, states it was stopped 10 years ago because he was told it interfered with his keppra. Told him it was restarted by cardiology consult and that he had a dose last night. He said they didn't tell him what they were giving and he will not take it this morning

## 2025-06-13 NOTE — CARE PLAN
Problem: Chronic Conditions and Co-morbidities  Goal: Patient's chronic conditions and co-morbidity symptoms are monitored and maintained or improved  Outcome: Progressing     Problem: Nutrition  Goal: Nutrient intake appropriate for maintaining nutritional needs  Outcome: Progressing     Problem: Fall/Injury  Goal: Verbalize understanding of personal risk factors for fall in the hospital  Outcome: Progressing  Goal: Use assistive devices by end of the shift  Outcome: Progressing     Problem: Skin  Goal: Promote/optimize nutrition  Outcome: Progressing  Flowsheets (Taken 6/13/2025 0811)  Promote/optimize nutrition: Monitor/record intake including meals   The patient's goals for the shift include      The clinical goals for the shift include Patient to remain free of falls throughout the shift    Over the shift, the patient did not make progress toward the following goals. Barriers to progression include need for continued monitoring and lab work. Recommendations to address these barriers include monitor, meds.

## 2025-06-13 NOTE — CARE PLAN
The patient's goals for the shift include      The clinical goals for the shift include Pt will remain HDS this shift.      Problem: Discharge Planning  Goal: Discharge to home or other facility with appropriate resources  Outcome: Progressing     Problem: Chronic Conditions and Co-morbidities  Goal: Patient's chronic conditions and co-morbidity symptoms are monitored and maintained or improved  Outcome: Progressing     Problem: Nutrition  Goal: Nutrient intake appropriate for maintaining nutritional needs  Outcome: Progressing     Problem: Fall/Injury  Goal: Verbalize understanding of personal risk factors for fall in the hospital  Outcome: Progressing  Goal: Verbalize understanding of risk factor reduction measures to prevent injury from fall in the home  Outcome: Progressing  Goal: Use assistive devices by end of the shift  Outcome: Progressing  Goal: Pace activities to prevent fatigue by end of the shift  Outcome: Progressing

## 2025-06-14 LAB
ABO GROUP (TYPE) IN BLOOD: NORMAL
ALBUMIN SERPL BCP-MCNC: 3.5 G/DL (ref 3.4–5)
ALP SERPL-CCNC: 92 U/L (ref 33–136)
ALT SERPL W P-5'-P-CCNC: 13 U/L (ref 10–52)
ANION GAP SERPL CALC-SCNC: 11 MMOL/L (ref 10–20)
ANTIBODY SCREEN: NORMAL
AST SERPL W P-5'-P-CCNC: 17 U/L (ref 9–39)
BILIRUB DIRECT SERPL-MCNC: 0.2 MG/DL (ref 0–0.3)
BILIRUB SERPL-MCNC: 0.7 MG/DL (ref 0–1.2)
BUN SERPL-MCNC: 20 MG/DL (ref 6–23)
CALCIUM SERPL-MCNC: 8.8 MG/DL (ref 8.6–10.3)
CHLORIDE SERPL-SCNC: 109 MMOL/L (ref 98–107)
CO2 SERPL-SCNC: 26 MMOL/L (ref 21–32)
CREAT SERPL-MCNC: 1.08 MG/DL (ref 0.5–1.3)
EGFRCR SERPLBLD CKD-EPI 2021: 71 ML/MIN/1.73M*2
ERYTHROCYTE [DISTWIDTH] IN BLOOD BY AUTOMATED COUNT: 20.2 % (ref 11.5–14.5)
GLUCOSE SERPL-MCNC: 105 MG/DL (ref 74–99)
HCT VFR BLD AUTO: 28.6 % (ref 41–52)
HCT VFR BLD AUTO: 30.7 % (ref 41–52)
HCT VFR BLD AUTO: 31.1 % (ref 41–52)
HCT VFR BLD AUTO: 33.3 % (ref 41–52)
HGB BLD-MCNC: 10 G/DL (ref 13.5–17.5)
HGB BLD-MCNC: 8.8 G/DL (ref 13.5–17.5)
HGB BLD-MCNC: 9.2 G/DL (ref 13.5–17.5)
HGB BLD-MCNC: 9.4 G/DL (ref 13.5–17.5)
MAGNESIUM SERPL-MCNC: 1.87 MG/DL (ref 1.6–2.4)
MCH RBC QN AUTO: 25.8 PG (ref 26–34)
MCHC RBC AUTO-ENTMCNC: 30.2 G/DL (ref 32–36)
MCV RBC AUTO: 85 FL (ref 80–100)
NRBC BLD-RTO: 0 /100 WBCS (ref 0–0)
PHOSPHATE SERPL-MCNC: 4 MG/DL (ref 2.5–4.9)
PLATELET # BLD AUTO: 243 X10*3/UL (ref 150–450)
POTASSIUM SERPL-SCNC: 3.8 MMOL/L (ref 3.5–5.3)
PROT SERPL-MCNC: 5.9 G/DL (ref 6.4–8.2)
RBC # BLD AUTO: 3.65 X10*6/UL (ref 4.5–5.9)
RH FACTOR (ANTIGEN D): NORMAL
SODIUM SERPL-SCNC: 142 MMOL/L (ref 136–145)
WBC # BLD AUTO: 5 X10*3/UL (ref 4.4–11.3)

## 2025-06-14 PROCEDURE — 2500000002 HC RX 250 W HCPCS SELF ADMINISTERED DRUGS (ALT 637 FOR MEDICARE OP, ALT 636 FOR OP/ED): Performed by: NURSE PRACTITIONER

## 2025-06-14 PROCEDURE — 85018 HEMOGLOBIN: CPT | Performed by: NURSE PRACTITIONER

## 2025-06-14 PROCEDURE — 86901 BLOOD TYPING SEROLOGIC RH(D): CPT | Performed by: NURSE PRACTITIONER

## 2025-06-14 PROCEDURE — 85014 HEMATOCRIT: CPT | Performed by: NURSE PRACTITIONER

## 2025-06-14 PROCEDURE — 36415 COLL VENOUS BLD VENIPUNCTURE: CPT | Performed by: NURSE PRACTITIONER

## 2025-06-14 PROCEDURE — 2500000001 HC RX 250 WO HCPCS SELF ADMINISTERED DRUGS (ALT 637 FOR MEDICARE OP): Performed by: NURSE PRACTITIONER

## 2025-06-14 PROCEDURE — 82248 BILIRUBIN DIRECT: CPT | Performed by: NURSE PRACTITIONER

## 2025-06-14 PROCEDURE — 85027 COMPLETE CBC AUTOMATED: CPT | Performed by: NURSE PRACTITIONER

## 2025-06-14 PROCEDURE — 83735 ASSAY OF MAGNESIUM: CPT | Performed by: NURSE PRACTITIONER

## 2025-06-14 PROCEDURE — 84100 ASSAY OF PHOSPHORUS: CPT | Performed by: NURSE PRACTITIONER

## 2025-06-14 PROCEDURE — 1200000002 HC GENERAL ROOM WITH TELEMETRY DAILY

## 2025-06-14 PROCEDURE — 84075 ASSAY ALKALINE PHOSPHATASE: CPT | Performed by: NURSE PRACTITIONER

## 2025-06-14 PROCEDURE — 2500000004 HC RX 250 GENERAL PHARMACY W/ HCPCS (ALT 636 FOR OP/ED): Performed by: NURSE PRACTITIONER

## 2025-06-14 RX ADMIN — SALINE NASAL SPRAY 2 SPRAY: 1.5 SOLUTION NASAL at 08:26

## 2025-06-14 RX ADMIN — FERROUS SULFATE TAB 325 MG (65 MG ELEMENTAL FE) 1 TABLET: 325 (65 FE) TAB at 08:25

## 2025-06-14 RX ADMIN — DONEPEZIL HYDROCHLORIDE 5 MG: 5 TABLET ORAL at 20:12

## 2025-06-14 RX ADMIN — METOPROLOL SUCCINATE 25 MG: 25 TABLET, EXTENDED RELEASE ORAL at 20:11

## 2025-06-14 RX ADMIN — DOCUSATE SODIUM 100 MG: 100 CAPSULE, LIQUID FILLED ORAL at 08:25

## 2025-06-14 RX ADMIN — LEVETIRACETAM 1000 MG: 500 TABLET, FILM COATED ORAL at 20:12

## 2025-06-14 RX ADMIN — DOCUSATE SODIUM 100 MG: 100 CAPSULE, LIQUID FILLED ORAL at 20:12

## 2025-06-14 RX ADMIN — SALINE NASAL SPRAY 2 SPRAY: 1.5 SOLUTION NASAL at 20:13

## 2025-06-14 RX ADMIN — PANTOPRAZOLE SODIUM 40 MG: 40 TABLET, DELAYED RELEASE ORAL at 06:24

## 2025-06-14 RX ADMIN — BRIMONIDINE TARTRATE 1 DROP: 2 SOLUTION/ DROPS OPHTHALMIC at 20:13

## 2025-06-14 RX ADMIN — PANTOPRAZOLE SODIUM 40 MG: 40 TABLET, DELAYED RELEASE ORAL at 15:28

## 2025-06-14 RX ADMIN — BRIMONIDINE TARTRATE 1 DROP: 2 SOLUTION/ DROPS OPHTHALMIC at 08:26

## 2025-06-14 RX ADMIN — IRON SUCROSE 300 MG: 20 INJECTION, SOLUTION INTRAVENOUS at 17:35

## 2025-06-14 RX ADMIN — AMIODARONE HYDROCHLORIDE 200 MG: 200 TABLET ORAL at 20:12

## 2025-06-14 RX ADMIN — AMIODARONE HYDROCHLORIDE 200 MG: 200 TABLET ORAL at 08:25

## 2025-06-14 RX ADMIN — CHOLECALCIFEROL TAB 125 MCG (5000 UNIT) 50 MCG: 125 TAB at 08:25

## 2025-06-14 RX ADMIN — LEVETIRACETAM 1000 MG: 500 TABLET, FILM COATED ORAL at 08:25

## 2025-06-14 RX ADMIN — PRAVASTATIN SODIUM 80 MG: 20 TABLET ORAL at 20:12

## 2025-06-14 RX ADMIN — QUETIAPINE FUMARATE 50 MG: 50 TABLET ORAL at 20:12

## 2025-06-14 ASSESSMENT — COGNITIVE AND FUNCTIONAL STATUS - GENERAL
MOBILITY SCORE: 23
DAILY ACTIVITIY SCORE: 24
CLIMB 3 TO 5 STEPS WITH RAILING: A LITTLE
CLIMB 3 TO 5 STEPS WITH RAILING: A LITTLE
MOBILITY SCORE: 23
DAILY ACTIVITIY SCORE: 24

## 2025-06-14 ASSESSMENT — PAIN SCALES - GENERAL
PAINLEVEL_OUTOF10: 0 - NO PAIN
PAINLEVEL_OUTOF10: 0 - NO PAIN

## 2025-06-14 ASSESSMENT — PAIN SCALES - WONG BAKER: WONGBAKER_NUMERICALRESPONSE: NO HURT

## 2025-06-14 NOTE — CARE PLAN
The patient's goals for the shift include      The clinical goals for the shift include see plan of care    The patient is alert and oriented to person, place, time, and situation (A/Ox4). Vital signs are stable. Lopressor was held this morning due to a heart rate of 59. The patient required standby assistance to ambulate to the bathroom and did not report any pain. This writer observed the patient take oral medications without issue, and they were well tolerated. Hemoglobin remains stable at 9.4.

## 2025-06-14 NOTE — PROGRESS NOTES
Note Type:  Inpatient Progress Note    Overnight events:  Patient remained hemodynamically stable, afebrile, per nursing overnight    This morning:  The patient reports not having a bowel movement in three days. They also mention that their nosebleed is still occurring but coagulating, unlike before when it wasn't coagulating and there was clear fluid coming down.    Review of Systems (ROS):  A comprehensive 14-point Review of Systems (ROS) was conducted, with all findings being negative except as detailed in the History of Present Illness (HPI) section.    ----------------------------------------    Physical Exams:          - General: Well appearing, Non-toxic, In no apparent distress, Following commands, Patient lays down in bed comfortable          - HEENT: Pupils Equal Round and Reactive to Light, Clear sclera, No rhinitis, Moist mucous membranes of oral cavity, Left-sided nasal bleeding noted, coagulating          - Neck: Supple, No masses, No thyromegaly, No bruits          - Lymph nodes: No lymphadenopathy          - Cardio: Regular Rate and Rhythm, S1, S2 normal, No murmurs, No gallop, No rub, No S3, S4          - Pulmonary: Clear to Auscultation Bilaterally, No wheezes, No rales, No crackles, Normal Respiratory Effort          - Abdomen: Soft, Non-tender, Non-distended, No masses, No rebound, No guarding, No hepatosplenomegaly, Positive Bowel Sounds          - Genitourinary: No suprapubic tenderness, Flank Tenderness Absent          - Extremities: No cyanosis, No clubbing, No extremity edema, Positive pedal pulses          - MSK: No decreased range of motion in joints, No Chest wall tenderness was elicited with movement of arms          - Skin: No rashes noted, No skin breakdown noted          - Psychiatry: Alert and oriented times 3, Responds appropriately to questions, Normal affect and mood          - Neuro: Cranial Nerves II-XII grossly intact, No decrease in strength, No decrease in sensation, Gait  examination deferred    ----------------------------------------    Assessment and Plan:  Patient is a 79y old Male, without past medical history, who presents with concerns regarding nasal bleeding and constipation. At this time, I believe that the patient is experiencing ongoing epistaxis of unknown origin, possibly causing severe anemia, and constipation. Further evaluation by ENT is necessary to determine the cause and appropriate treatment for the epistaxis. Iron supplementation is being considered but may exacerbate bleeding. Discharge is pending ENT consultation and resolution of bleeding.    Problem List:          - Epistaxis - ongoing          - Severe anemia          - Constipation    Plan Of Care:          - Vitals per units protocol          - Labs: No labs ordered at this time.          - Studies: At this time no further imaging studies needed, pending clinical course          - Consultant: Consulting ENT          - Diet Order: To be determined          - Medications:                   - IV Iron, route not specified          - Additional orders:                   - ENT evaluation before discharge                  - Monitor for continued epistaxis      ICD 10 Codes:          - R04.0 - Epistaxis          - K59.00 - Constipation, unspecified          - D50.9 - Iron deficiency anemia, unspecified    Level of Service:  61421    ========================================    Checklist:  DVT prophylaxis: SCD  Gi prophylaxis: none needed  Imaging reviewed: Yes as per above  Labs reviewed: Yes as per above  Diet: TO BE DETERMINED  Plan of care discussed with: Patient  Expected Length of Stay: At this time unable to determine based on further clinical workup and management needed    MDM (Medical Decision-Making and Justification):  Patient is a 79y old Male, [without past medical history], who presents with concerns regarding nosebleed and anemia. Findings suggest active epistaxis and severe anemia.   The patient's  acute issues of ongoing epistaxis and severe anemia are clinically significant and require further evaluation. The complexity of the case is increased due to the need for specialized ENT consultation to assess the source of bleeding, which has not been identified in the GI tract. The patient requires continued medical care for iron supplementation and monitoring of anemia. IV iron therapy was initiated but paused due to concerns about exacerbating bleeding. This treatment requires close monitoring of iron levels and potential side effects. The ongoing epistaxis necessitates careful observation and potential interventions by ENT. The combination of severe anemia, active bleeding, and the need for specialized consultation and treatment justifies the increased complexity of care and continued hospitalization.    Time Spent:  A total of 33 minutes were dedicated to the patient's care during this encounter. This time included both direct patient interaction and activities essential to ensuring comprehensive care. In addition to face-to-face care, efforts were made to review the patient's documentation, manage prescriptions, provide education, and coordinate necessary services. Over 50% of the time was focused on counseling and direct interaction with the patient. The plan of care was thoroughly discussed, with time allocated to explaining relevant health information, educating the patient on their condition, and engaging in counseling as appropriate. To confirm understanding, the patient or caregiver successfully utilized the teach-back method, reiterating key points to ensure clear comprehension and retention of the information discussed. This patient-centered approach ensures active involvement in their healthcare, prioritizing both treatment and understanding.

## 2025-06-14 NOTE — PROGRESS NOTES
Subjective Data:  I47.2 Wide-complex slow VT at 100  I50.3 HFpEF  R04.0 Epistaxis and iron deficiency anemia D50.9  Z95.0 Cardiac pacing device Z95.0    Overnight Events:    Complains of constipation and recent epistaxis  History of gastric AV malformations  Slow wide-complex tachycardia  Hypertension anemia  CAD status post multiple PCI's history of CABG  Past history AV malformation  Bilateral leg edema    Past Medical History  He has a past medical history of Acute blood loss anemia, Anemia, Angina pectoris, Auditory hallucinations (09/28/2024), CAD (coronary artery disease), CKD (chronic kidney disease), stage II, Conversion reaction (09/28/2024), Coronary artery disease involving native coronary artery of native heart without angina pectoris (09/28/2024), Epilepsy, Epistaxis, recurrent (09/28/2024), Frequent falls, Gastric AVM, Generalized ischemic myocardial dysfunction (09/28/2024), Glaucoma, Hallucinations, HFrEF (heart failure with reduced ejection fraction), HLD (hyperlipidemia), HTN (hypertension), Iron deficiency, Ischemic cardiomyopathy (09/28/2024), Melena, 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     Surgical History  He has a past surgical history that includes Coronary artery bypass graft (2010); Coronary angioplasty with stent; pacemaker placement; Total hip arthroplasty (Right); Cardiac catheterization (N/A, 12/20/2024); Cardiac catheterization (N/A, 12/20/2024); Esophagogastroduodenoscopy; and Colonoscopy.     Social History  He reports that he quit smoking about 35 years ago. His smoking use included cigarettes. He started smoking about 41 years ago. He has a 0.9  pack-year smoking history. He has never used smokeless tobacco. He reports that he does not currently use alcohol. He reports that he does not use drugs.      Allergies  Alprazolam, Bepotastine besilate, Clopidogrel, Doxazosin, Lisinopril, Metronidazole, Amlodipine, Atorvastatin, Cephalexin, Ciprofloxacin, Diphenhydramine hcl, Guaifenesin, Hydrochlorothiazide, Hydrocodone, Methylprednisolone, Phenytoin, Sertraline, Telmisartan, and Topiramate  Objective Data:  Last Recorded Vitals:  Vitals:    06/14/25 0600 06/14/25 0800 06/14/25 1200 06/14/25 1600   BP:  146/74 131/78 139/68   BP Location:  Left arm Left arm    Patient Position:  Lying Lying    Pulse:  60 60 60   Resp:  16 16 12   Temp:  36.2 °C (97.2 °F) 36.2 °C (97.2 °F) 36.3 °C (97.3 °F)   TempSrc:  Temporal Temporal Temporal   SpO2:  96% 96% 97%   Weight: 82.1 kg (181 lb)      Height:           Last Labs:  CBC - 6/14/2025: 11:59 AM  5.0 10.0 243    33.3      CMP - 6/14/2025:  6:25 AM  8.8 5.9 17 --- 0.7   4.0 3.5 13 92      PTT - 12/20/2024:  5:38 PM  1.2   12.9 35     TROPHS   Date/Time Value Ref Range Status   06/10/2025 03:08 PM 12 0 - 20 ng/L Final   06/10/2025 09:20 AM 11 0 - 20 ng/L Final   06/10/2025 07:50 AM 9 0 - 20 ng/L Final     BNP   Date/Time Value Ref Range Status   06/10/2025 07:50  0 - 99 pg/mL Final   04/01/2025 03:07  0 - 99 pg/mL Final     HGBA1C   Date/Time Value Ref Range Status   04/20/2025 01:46 PM 4.9 See comment % Final      Last I/O:  I/O last 3 completed shifts:  In: 100 (1.2 mL/kg) [P.O.:100]  Out: 410 (5 mL/kg) [Urine:410 (0.1 mL/kg/hr)]  Weight: 82.1 kg     Past Cardiology Tests (Last 3 Years):  EKG:  ECG 12 lead 06/10/2025 (Preliminary)      ECG 12 lead 06/10/2025 (Preliminary)      ECG 12 lead 06/10/2025      ECG 12 lead 06/10/2025      Electrocardiogram, 12-lead PRN ACS symptoms 04/21/2025      ECG 12 lead 04/01/2025      ECG 12 lead 04/01/2025      Electrocardiogram, 12-lead PRN ACS symptoms 12/22/2024      ECG 12  Lead       ECG 12 lead (Clinic Performed) 11/25/2024    Echo:  Transthoracic Echo (TTE) Limited 06/12/2025      Transthoracic Echo (TTE) Limited 04/02/2025      Transthoracic echo (TTE) complete 12/05/2024    Ejection Fractions:  EF   Date/Time Value Ref Range Status   06/12/2025 09:00 AM 50 %    04/02/2025 03:43 PM 48 %    12/05/2024 11:29 AM 58 %      Cath:  Cardiac Catheterization Procedure 12/20/2024    Stress Test:  Nuclear Stress Test 12/05/2024    Cardiac Imaging:  No results found for this or any previous visit from the past 1095 days.      Inpatient Medications:  Scheduled Medications[1]  PRN Medications[2]  Continuous Medications[3]    Results for orders placed or performed during the hospital encounter of 06/10/25 (from the past 24 hours)   Hemoglobin and Hematocrit, Blood   Result Value Ref Range    Hemoglobin 8.8 (L) 13.5 - 17.5 g/dL    Hematocrit 28.6 (L) 41.0 - 52.0 %   Phosphorus   Result Value Ref Range    Phosphorus 4.0 2.5 - 4.9 mg/dL   Comprehensive Metabolic Panel   Result Value Ref Range    Glucose 105 (H) 74 - 99 mg/dL    Sodium 142 136 - 145 mmol/L    Potassium 3.8 3.5 - 5.3 mmol/L    Chloride 109 (H) 98 - 107 mmol/L    Bicarbonate 26 21 - 32 mmol/L    Anion Gap 11 10 - 20 mmol/L    Urea Nitrogen 20 6 - 23 mg/dL    Creatinine 1.08 0.50 - 1.30 mg/dL    eGFR 71 >60 mL/min/1.73m*2    Calcium 8.8 8.6 - 10.3 mg/dL    Albumin 3.5 3.4 - 5.0 g/dL    Alkaline Phosphatase 92 33 - 136 U/L    Total Protein 5.9 (L) 6.4 - 8.2 g/dL    AST 17 9 - 39 U/L    Bilirubin, Total 0.7 0.0 - 1.2 mg/dL    ALT 13 10 - 52 U/L   Magnesium   Result Value Ref Range    Magnesium 1.87 1.60 - 2.40 mg/dL   CBC   Result Value Ref Range    WBC 5.0 4.4 - 11.3 x10*3/uL    nRBC 0.0 0.0 - 0.0 /100 WBCs    RBC 3.65 (L) 4.50 - 5.90 x10*6/uL    Hemoglobin 9.4 (L) 13.5 - 17.5 g/dL    Hematocrit 31.1 (L) 41.0 - 52.0 %    MCV 85 80 - 100 fL    MCH 25.8 (L) 26.0 - 34.0 pg    MCHC 30.2 (L) 32.0 - 36.0 g/dL    RDW 20.2 (H) 11.5 - 14.5 %     Platelets 243 150 - 450 x10*3/uL   Bilirubin, Direct   Result Value Ref Range    Bilirubin, Direct 0.2 0.0 - 0.3 mg/dL   Type And Screen   Result Value Ref Range    ABO TYPE B     Rh TYPE POS     ANTIBODY SCREEN NEG    Hemoglobin and Hematocrit, Blood   Result Value Ref Range    Hemoglobin 10.0 (L) 13.5 - 17.5 g/dL    Hematocrit 33.3 (L) 41.0 - 52.0 %       Physical Exam:  GENERAL APPEARANCE: Well developed, well nourished, in no acute distress.  Pale and chronically ill-appearing man  HEENT: No gross abnormalities of conjunctiva, teeth, gums, oral mucosa  NECK: Supple, no JVD, no bruit. Thyroid not palpable. Carotid upstrokes normal.  CHEST: Symmetric and non-tender.  Pacemaker site without erythema or erosion  LUNGS: Clear to auscultation bilaterally; normal respiratory effort.  HEART: PMI is nondisplaced.  There is a regular rhythm with a normal S1 and S2 without S3.  Mild peripheral edema.  Normal distal perfusion.    ABDOMEN: Soft, nontender, no palpable hepatosplenomegaly, no mases, no bruits. Abdominal aorta not noted to be enlarged.  MUSCULOSKELETAL: Osteoarthritic changes  EXTREMITIES: Warm with good color, no clubbing or cyanois. There is mild edema noted.  PERIPHERAL VASCULAR: Pulses present and equally palpable  NEURO/PSHCY: Alert and oriented x3; appropriate behavior and responses and responses, grossly normal cerebellar function with normal balance and coordination  INTEGUMENT: Skin warm and dry, without gross excoriationis or lesions.  LYMPH: No significant palpable lymphadenopathy     Assessment/Plan     I47.2 Wide-complex slow VT at 100  I50.3 HFpEF  R04.0 Epistaxis and iron deficiency anemia D50.9  Z95.0 Cardiac pacing device Z95.0    Code Status:  Full Code    I spent 38 minutes in the professional and overall care of this patient.        Forest Burns MD       [1]   Scheduled medications   Medication Dose Route Frequency    amiodarone  200 mg oral BID    [Held by provider] aspirin  81 mg oral  Daily    brimonidine  1 drop Both Eyes BID    cholecalciferol  50 mcg oral Daily    docusate sodium  100 mg oral BID    donepezil  5 mg oral Nightly    ferrous sulfate  65 mg of elemental iron oral Every other day    iron sucrose  300 mg intravenous Daily    isosorbide mononitrate ER  30 mg oral Daily    levETIRAcetam  1,000 mg oral BID    metoprolol succinate XL  25 mg oral BID    pantoprazole  40 mg oral BID AC    pravastatin  80 mg oral Nightly    QUEtiapine  50 mg oral Nightly    sodium chloride  2 spray Each Nostril BID   [2]   PRN medications   Medication    acetaminophen    Or    acetaminophen    magnesium hydroxide    melatonin    nitroglycerin    ondansetron ODT    Or    ondansetron    oxymetazoline   [3]   Continuous Medications   Medication Dose Last Rate

## 2025-06-15 VITALS
HEART RATE: 60 BPM | HEIGHT: 71 IN | RESPIRATION RATE: 18 BRPM | WEIGHT: 174.4 LBS | DIASTOLIC BLOOD PRESSURE: 87 MMHG | SYSTOLIC BLOOD PRESSURE: 177 MMHG | TEMPERATURE: 97.7 F | OXYGEN SATURATION: 97 % | BODY MASS INDEX: 24.42 KG/M2

## 2025-06-15 LAB
ALBUMIN SERPL BCP-MCNC: 3.5 G/DL (ref 3.4–5)
ALP SERPL-CCNC: 94 U/L (ref 33–136)
ALT SERPL W P-5'-P-CCNC: 15 U/L (ref 10–52)
ANION GAP SERPL CALC-SCNC: 11 MMOL/L (ref 10–20)
AST SERPL W P-5'-P-CCNC: 19 U/L (ref 9–39)
BILIRUB DIRECT SERPL-MCNC: 0.2 MG/DL (ref 0–0.3)
BILIRUB SERPL-MCNC: 0.6 MG/DL (ref 0–1.2)
BUN SERPL-MCNC: 18 MG/DL (ref 6–23)
CALCIUM SERPL-MCNC: 8.9 MG/DL (ref 8.6–10.3)
CHLORIDE SERPL-SCNC: 108 MMOL/L (ref 98–107)
CO2 SERPL-SCNC: 27 MMOL/L (ref 21–32)
CREAT SERPL-MCNC: 1.05 MG/DL (ref 0.5–1.3)
EGFRCR SERPLBLD CKD-EPI 2021: 73 ML/MIN/1.73M*2
ERYTHROCYTE [DISTWIDTH] IN BLOOD BY AUTOMATED COUNT: 20.8 % (ref 11.5–14.5)
GLUCOSE SERPL-MCNC: 91 MG/DL (ref 74–99)
HCT VFR BLD AUTO: 32.2 % (ref 41–52)
HGB BLD-MCNC: 9.6 G/DL (ref 13.5–17.5)
MAGNESIUM SERPL-MCNC: 1.89 MG/DL (ref 1.6–2.4)
MCH RBC QN AUTO: 25.6 PG (ref 26–34)
MCHC RBC AUTO-ENTMCNC: 29.8 G/DL (ref 32–36)
MCV RBC AUTO: 86 FL (ref 80–100)
NRBC BLD-RTO: 0 /100 WBCS (ref 0–0)
PLATELET # BLD AUTO: 248 X10*3/UL (ref 150–450)
POTASSIUM SERPL-SCNC: 3.9 MMOL/L (ref 3.5–5.3)
PROT SERPL-MCNC: 6 G/DL (ref 6.4–8.2)
RBC # BLD AUTO: 3.75 X10*6/UL (ref 4.5–5.9)
SODIUM SERPL-SCNC: 142 MMOL/L (ref 136–145)
WBC # BLD AUTO: 5.1 X10*3/UL (ref 4.4–11.3)

## 2025-06-15 PROCEDURE — 83735 ASSAY OF MAGNESIUM: CPT | Performed by: NURSE PRACTITIONER

## 2025-06-15 PROCEDURE — 99222 1ST HOSP IP/OBS MODERATE 55: CPT | Performed by: STUDENT IN AN ORGANIZED HEALTH CARE EDUCATION/TRAINING PROGRAM

## 2025-06-15 PROCEDURE — 2500000001 HC RX 250 WO HCPCS SELF ADMINISTERED DRUGS (ALT 637 FOR MEDICARE OP): Performed by: NURSE PRACTITIONER

## 2025-06-15 PROCEDURE — 82248 BILIRUBIN DIRECT: CPT | Performed by: NURSE PRACTITIONER

## 2025-06-15 PROCEDURE — 2500000005 HC RX 250 GENERAL PHARMACY W/O HCPCS: Performed by: NURSE PRACTITIONER

## 2025-06-15 PROCEDURE — 1200000002 HC GENERAL ROOM WITH TELEMETRY DAILY

## 2025-06-15 PROCEDURE — 2500000002 HC RX 250 W HCPCS SELF ADMINISTERED DRUGS (ALT 637 FOR MEDICARE OP, ALT 636 FOR OP/ED): Performed by: NURSE PRACTITIONER

## 2025-06-15 PROCEDURE — 2500000004 HC RX 250 GENERAL PHARMACY W/ HCPCS (ALT 636 FOR OP/ED): Performed by: NURSE PRACTITIONER

## 2025-06-15 PROCEDURE — 80053 COMPREHEN METABOLIC PANEL: CPT | Performed by: NURSE PRACTITIONER

## 2025-06-15 PROCEDURE — 36415 COLL VENOUS BLD VENIPUNCTURE: CPT | Performed by: NURSE PRACTITIONER

## 2025-06-15 PROCEDURE — 85027 COMPLETE CBC AUTOMATED: CPT | Performed by: NURSE PRACTITIONER

## 2025-06-15 RX ORDER — AMIODARONE HYDROCHLORIDE 200 MG/1
200 TABLET ORAL 2 TIMES DAILY
Start: 2025-06-15

## 2025-06-15 RX ORDER — ISOSORBIDE MONONITRATE 30 MG/1
30 TABLET, EXTENDED RELEASE ORAL DAILY
Start: 2025-06-16

## 2025-06-15 RX ORDER — PETROLATUM 420 MG/G
OINTMENT TOPICAL EVERY 12 HOURS
Status: DISCONTINUED | OUTPATIENT
Start: 2025-06-22 | End: 2025-06-16 | Stop reason: HOSPADM

## 2025-06-15 RX ORDER — TALC
3 POWDER (GRAM) TOPICAL NIGHTLY PRN
Start: 2025-06-15

## 2025-06-15 RX ORDER — MUPIROCIN 20 MG/G
OINTMENT TOPICAL 2 TIMES DAILY
Start: 2025-06-15 | End: 2025-06-22

## 2025-06-15 RX ORDER — MUPIROCIN 20 MG/G
OINTMENT TOPICAL 2 TIMES DAILY
Status: DISCONTINUED | OUTPATIENT
Start: 2025-06-15 | End: 2025-06-16 | Stop reason: HOSPADM

## 2025-06-15 RX ORDER — PETROLATUM 420 MG/G
1 OINTMENT TOPICAL EVERY 12 HOURS
Start: 2025-06-22 | End: 2025-06-29

## 2025-06-15 RX ORDER — FERROUS SULFATE 325(65) MG
65 TABLET ORAL EVERY OTHER DAY
Start: 2025-06-16

## 2025-06-15 RX ADMIN — DOCUSATE SODIUM 100 MG: 100 CAPSULE, LIQUID FILLED ORAL at 09:42

## 2025-06-15 RX ADMIN — DOCUSATE SODIUM 100 MG: 100 CAPSULE, LIQUID FILLED ORAL at 20:05

## 2025-06-15 RX ADMIN — SALINE NASAL SPRAY 2 SPRAY: 1.5 SOLUTION NASAL at 09:43

## 2025-06-15 RX ADMIN — METOPROLOL SUCCINATE 25 MG: 25 TABLET, EXTENDED RELEASE ORAL at 20:05

## 2025-06-15 RX ADMIN — QUETIAPINE FUMARATE 50 MG: 50 TABLET ORAL at 20:05

## 2025-06-15 RX ADMIN — IRON SUCROSE 300 MG: 20 INJECTION, SOLUTION INTRAVENOUS at 06:23

## 2025-06-15 RX ADMIN — PANTOPRAZOLE SODIUM 40 MG: 40 TABLET, DELAYED RELEASE ORAL at 16:07

## 2025-06-15 RX ADMIN — BRIMONIDINE TARTRATE 1 DROP: 2 SOLUTION/ DROPS OPHTHALMIC at 20:04

## 2025-06-15 RX ADMIN — CHOLECALCIFEROL TAB 125 MCG (5000 UNIT) 50 MCG: 125 TAB at 09:41

## 2025-06-15 RX ADMIN — DONEPEZIL HYDROCHLORIDE 5 MG: 5 TABLET ORAL at 20:05

## 2025-06-15 RX ADMIN — LEVETIRACETAM 1000 MG: 500 TABLET, FILM COATED ORAL at 20:05

## 2025-06-15 RX ADMIN — PRAVASTATIN SODIUM 80 MG: 20 TABLET ORAL at 20:05

## 2025-06-15 RX ADMIN — BRIMONIDINE TARTRATE 1 DROP: 2 SOLUTION/ DROPS OPHTHALMIC at 09:42

## 2025-06-15 RX ADMIN — MUPIROCIN: 20 OINTMENT TOPICAL at 12:30

## 2025-06-15 RX ADMIN — AMIODARONE HYDROCHLORIDE 200 MG: 200 TABLET ORAL at 20:05

## 2025-06-15 RX ADMIN — PANTOPRAZOLE SODIUM 40 MG: 40 TABLET, DELAYED RELEASE ORAL at 06:23

## 2025-06-15 RX ADMIN — SALINE NASAL SPRAY 2 SPRAY: 1.5 SOLUTION NASAL at 20:03

## 2025-06-15 RX ADMIN — AMIODARONE HYDROCHLORIDE 200 MG: 200 TABLET ORAL at 09:42

## 2025-06-15 RX ADMIN — METOPROLOL SUCCINATE 25 MG: 25 TABLET, EXTENDED RELEASE ORAL at 09:42

## 2025-06-15 RX ADMIN — MUPIROCIN 1 APPLICATION: 20 OINTMENT TOPICAL at 20:03

## 2025-06-15 RX ADMIN — LEVETIRACETAM 1000 MG: 500 TABLET, FILM COATED ORAL at 09:41

## 2025-06-15 ASSESSMENT — PAIN SCALES - WONG BAKER: WONGBAKER_NUMERICALRESPONSE: NO HURT

## 2025-06-15 ASSESSMENT — COGNITIVE AND FUNCTIONAL STATUS - GENERAL
MOBILITY SCORE: 23
MOBILITY SCORE: 23
DAILY ACTIVITIY SCORE: 24
DAILY ACTIVITIY SCORE: 24
CLIMB 3 TO 5 STEPS WITH RAILING: A LITTLE
CLIMB 3 TO 5 STEPS WITH RAILING: A LITTLE

## 2025-06-15 ASSESSMENT — PAIN SCALES - GENERAL
PAINLEVEL_OUTOF10: 0 - NO PAIN
PAINLEVEL_OUTOF10: 0 - NO PAIN

## 2025-06-15 NOTE — CONSULTS
"Reason For Consult  Intermittent epistaxis    History Of Present Illness  Wayne Burkitt is a 79 y.o. male presenting with hx of gastric AVM, HTN, HLD, epilepsy, vascular dementia who presented to the ED on 6/10 due to SOB and chest pressure. He also had a concurrent self-limited nosebleed that stopped prior to arrival to the ED. He has intermittent epistaxis since the 1990s mostly from the left side of the nose. No history of nasal or sinus surgery. He reports that these occur on a daily basis and vary in length from a minute to 10 minutes. He manages these with rolled up gauze in his nose. He uses saline sprays daily. No current use of ointments. He was seen as an OP by ENT in 11/2024 and was told to start using ointments in the nose due to dry mucosa. He was not cauterized at that time. Bedside nursing reports that he had one episode of epistaxis yesterday, 6/14, lasting \"a couple seconds\". This stopped on its own without intervention. No further bleeding since then.    He reports that he has had nasal packing once in February when he presented to the ED for chest pain and anemia. EGD showed GI bleeding from gastric AVMs. Rhinorocket was removed prior to discharge, although he reports that \"I swallowed it\" because he doesn't remember the packing being removed.    In the ED, he was found to have acute blood loss anemia and GI was consulted d/t concern for GI bleed contributing to his current picture. Hgb on admission 6.3, baseline is 8-9. He received 1u pRBCs for this. He had an EGD in April 2025 showing angioectasias and evidence of recent hemorrhage in the stomach. He had a capsule endoscopy this admission showing several AVMs in the small bowel. Subsequent enteroscopy on 6/11 showed: \"Single 5 mm angioectasia in the body of the stomach; there was stigmata of recent hemorrhage; tissue was completely ablated with argon plasma coagulation    Multiple angioectasias measuring from 3 mm up to 5 mm in the 1st part of " "the duodenum, 2nd part of the duodenum, 3rd part of the duodenum, 4th part of the duodenum and proximal jejunum; there was stigmata of recent hemorrhage; tissue was completely ablated with argon plasma coagulation\"      Past Medical History  He has a past medical history of Acute blood loss anemia, Anemia, Angina pectoris, Auditory hallucinations (09/28/2024), CAD (coronary artery disease), CKD (chronic kidney disease), stage II, Conversion reaction (09/28/2024), Coronary artery disease involving native coronary artery of native heart without angina pectoris (09/28/2024), Epilepsy, Epistaxis, recurrent (09/28/2024), Frequent falls, Gastric AVM, Generalized ischemic myocardial dysfunction (09/28/2024), Glaucoma, Hallucinations, HFrEF (heart failure with reduced ejection fraction), HLD (hyperlipidemia), HTN (hypertension), Iron deficiency, Ischemic cardiomyopathy (09/28/2024), Melena, 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    Surgical History  He has a past surgical history that includes Coronary artery bypass graft (2010); Coronary angioplasty with stent; pacemaker placement; Total hip arthroplasty (Right); Cardiac catheterization (N/A, 12/20/2024); Cardiac catheterization (N/A, 12/20/2024); Esophagogastroduodenoscopy; and Colonoscopy.     Social History  He reports that he quit smoking about 35 years ago. His smoking use included cigarettes. He started smoking about 41 years ago. He has a 0.9 pack-year smoking history. He has never used smokeless tobacco. He reports that he does not currently use alcohol. He reports that he does not use drugs.    Family History  Family History[1]   " "  Allergies  Alprazolam, Bepotastine besilate, Clopidogrel, Doxazosin, Lisinopril, Metronidazole, Amlodipine, Atorvastatin, Cephalexin, Ciprofloxacin, Diphenhydramine hcl, Guaifenesin, Hydrochlorothiazide, Hydrocodone, Methylprednisolone, Phenytoin, Sertraline, Telmisartan, and Topiramate    Review of Systems  See HPI     Physical Exam  Physical Exam  GENERAL: No distress, sitting up in chair eating breakfast  SKIN: No rashes or lesions.  EYES: PERRLA, EOMI  Ears: cerumen impaction bilaterally, left TM partially visualized and wnl  Nose: dry septal mucosa with dried blood, no active bleeding; small septal perforation noted with surrounding crusting, no prominent vessels to cauterize  Oral/OP: normal appearing mucosa, no masses or lesions noted, no blood in posterior pharynx  Neck: supple and no adenopathy  NEURO: No focal deficit.      Last Recorded Vitals  Blood pressure 162/85, pulse 60, temperature 35.9 °C (96.6 °F), temperature source Temporal, resp. rate 12, height 1.803 m (5' 11\"), weight 79.1 kg (174 lb 6.4 oz), SpO2 98%.    Relevant Results         Assessment/Plan     Mr Burkitt is a 78 yo M with history of chronic intermittent epistaxis since the 1990s. On exam he has no active bleeding but a septal perforation is noted, which is the most likely source of epistaxis. I had a lengthy discussion with him regarding the need for chronic management of nasal crusting d/t the septal perforation. I would like him to stop using gauze in his nose as each time he removes the gauze, he is also removing any blood clot or scabbing and predisposes to continued bleeding. With active bleeding, he should apply pressure to the lower nose for 15 minutes continuously without stopping. I demonstrated this technique.     Although his recurrent epistaxis is clearly contributing to his chronic anemia, I do not believe that the epistaxis is responsible for the acute drop in hemoglobin seen at admission given his report and " description of the bleeding.     -Start bactroban ointment BID to the nasal septum, can apply this with a q-tip to either side of the septum  -After 1 week, can switch to BID (minimum) use of vaseline, aquaphor, or Ayr gel to the nasal septum after use of nasal saline  -Avoid picking of nasal crusting  -For acute bleeding, I provided a nasal clamp to the patient  -Follow up in clinic in 1-2 months for repeat exam, I will arrange this     I spent 60 minutes in the professional and overall care of this patient.      Josi Lira MD         [1]   Family History  Problem Relation Name Age of Onset    Other (open heart surgery) Mother      Multiple sclerosis Father      Asthma Sister      Brain Aneurysm Sister

## 2025-06-15 NOTE — PROGRESS NOTES
Note Type:  Inpatient Progress Note    Overnight events:  Patient remained hemodynamically stable, afebrile, per nursing overnight. The patient was seen by ENT in the morning, and no intervention was recommended.    This morning:  The patient reports having a little pain, which he thinks could be due to the clump of pills he needed to take. He denies any nausea, vomiting, or current nose bleeding.    Review of Systems (ROS):  A comprehensive 14-point Review of Systems (ROS) was conducted, with all findings being negative except as detailed in the History of Present Illness (HPI) section.    ----------------------------------------    Physical Exams:          - General: Well appearing, Non-toxic, In no apparent distress, Following commands, Patient sitting in the chair at bedside          - HEENT: Pupils Equal Round and Reactive to Light, Clear sclera, No rhinitis, Moist mucous membranes of oral cavity, No bleeding from the nose          - Neck: Supple, No masses, No thyromegaly, No bruits          - Lymph nodes: No lymphadenopathy          - Cardio: Regular Rate and Rhythm          - Pulmonary: Clear to Auscultation Bilaterally, No wheezes, No rales, No crackles, Normal Respiratory Effort          - Abdomen: Soft, Non-tender, Non-distended, No masses, No rebound, No guarding, No hepatosplenomegaly, Positive Bowel Sounds, Left upper quadrant normal          - Genitourinary: No suprapubic tenderness, Flank Tenderness Absent          - Extremities: No cyanosis, No clubbing, No edema in lower extremities, Positive pedal pulses          - MSK: No decreased range of motion in joints, No Chest wall tenderness was elicited with movement of arms          - Skin: No rashes noted, No skin breakdown noted          - Psychiatry: Alert and oriented times 3, Responds appropriately to questions, Normal affect and mood          - Neuro: Cranial Nerves II-XII grossly intact, No decrease in strength, No decrease in sensation, Gait  examination deferred    ----------------------------------------    Assessment and Plan:  Patient is a 79y old Male, without past medical history, who presents with concerns regarding epistaxis and anemia. At this time, I believe that the patient has acute on chronic anemia due to epistaxis. ENT evaluation has been completed with no intervention recommended. The patient's blood count is stable, and he is being prepared for discharge with instructions for home care and follow-up.    Problem List:          - Epistaxis - improving          - Acute on chronic anemia - stable    Plan Of Care:          - Vitals per units protocol          - Labs: No labs ordered at this time.          - Studies: At this time no further imaging studies needed, pending clinical course          - Diet Order: To be determined          - Medications:                   - Topical bacitracin, nasal application                  - Aquaphor, topical application          - Additional orders:                   - Discharge patient                  - Provide nasal clip for potential bleeding                  - Patient to avoid NSAIDs (Motrin, aspirin, Aleve)                  - Follow up with ENT clinic in [place]                  - Return to hospital if bleeding recurs      ICD 10 Codes:          - Z51.89 - Encounter for other specified aftercare          - R04.0 - Epistaxis          - D50.0 - Iron deficiency anemia secondary to blood loss (chronic)    Level of Service:  [id number]    ========================================    Checklist:  DVT prophylaxis: SCD  Gi prophylaxis: none needed  Imaging reviewed: Yes as per above  Labs reviewed: Yes as per above  Diet: TO BE DETERMINED  Plan of care discussed with: Patient  Expected Length of Stay: At this time unable to determine based on further clinical workup and management needed    MDM (Medical Decision-Making and Justification):  Patient is a 79y old Male, without past medical history, who presents with  concerns regarding nasal bleeding and constipation. Findings suggest ongoing epistaxis of unknown origin, severe anemia, and constipation.   The patient's acute issues include ongoing epistaxis and severe anemia, which are clinically significant due to the potential for hemodynamic instability and the need for close monitoring. The chronic condition of constipation may be impacting the current presentation by potentially exacerbating the anemia through occult blood loss. The complexity of the required diagnostic workup is moderate, involving ENT consultation and monitoring of blood counts. Necessary interventions include topical application of Bacitracin and Aquaphor, provision of a nasal clip for potential bleeding episodes, and continued monitoring of blood counts. The treatment plan involves discharge with close follow-up and instructions for return if bleeding recurs. Justification for continued medical care is based on the need to monitor for recurrent epistaxis and to manage the anemia. No high-risk medications were identified in the current treatment plan. The increased complexity is justified by the need for ongoing monitoring of blood counts and potential need for iron supplementation, which was previously considered but may exacerbate bleeding.    Time Spent:  A total of 33 minutes were dedicated to the patient's care during this encounter. This time included both direct patient interaction and activities essential to ensuring comprehensive care. In addition to face-to-face care, efforts were made to review the patient's documentation, manage prescriptions, provide education, and coordinate necessary services. Over 50% of the time was focused on counseling and direct interaction with the patient. The plan of care was thoroughly discussed, with time allocated to explaining relevant health information, educating the patient on their condition, and engaging in counseling as appropriate. To confirm  understanding, the patient or caregiver successfully utilized the teach-back method, reiterating key points to ensure clear comprehension and retention of the information discussed. This patient-centered approach ensures active involvement in their healthcare, prioritizing both treatment and understanding.

## 2025-06-15 NOTE — PROGRESS NOTES
Subjective Data:  I47.2 Wide-complex slow VT at 100  I50.3 HFpEF  R04.0 Epistaxis and iron deficiency anemia D50.9  Z95.0 Cardiac pacing device Z95.0    Overnight Events:    Complains of constipation and recent epistaxis  History of gastric AV malformations  Slow wide-complex tachycardia  Hypertension anemia  CAD status post multiple PCI's history of CABG  Past history AV malformation  Bilateral leg edema    Patient's blood pressure and rhythm are stable  ENT evaluated epistaxis no active bleeding  Patient is medically cleared ready for transfer to [facility]    Past Medical History  He has a past medical history of Acute blood loss anemia, Anemia, Angina pectoris, Auditory hallucinations (09/28/2024), CAD (coronary artery disease), CKD (chronic kidney disease), stage II, Conversion reaction (09/28/2024), Coronary artery disease involving native coronary artery of native heart without angina pectoris (09/28/2024), Epilepsy, Epistaxis, recurrent (09/28/2024), Frequent falls, Gastric AVM, Generalized ischemic myocardial dysfunction (09/28/2024), Glaucoma, Hallucinations, HFrEF (heart failure with reduced ejection fraction), HLD (hyperlipidemia), HTN (hypertension), Iron deficiency, Ischemic cardiomyopathy (09/28/2024), Melena, 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     Surgical History  He has a past surgical history that includes Coronary artery bypass graft (2010); Coronary angioplasty with stent; pacemaker placement; Total hip arthroplasty (Right); Cardiac catheterization (N/A, 12/20/2024); Cardiac catheterization (N/A, 12/20/2024); Esophagogastroduodenoscopy; and Colonoscopy.      Social History  He reports that he quit smoking about 35 years ago. His smoking use included cigarettes. He started smoking about 41 years ago. He has a 0.9 pack-year smoking history. He has never used smokeless tobacco. He reports that he does not currently use alcohol. He reports that he does not use drugs.      Allergies  Alprazolam, Bepotastine besilate, Clopidogrel, Doxazosin, Lisinopril, Metronidazole, Amlodipine, Atorvastatin, Cephalexin, Ciprofloxacin, Diphenhydramine hcl, Guaifenesin, Hydrochlorothiazide, Hydrocodone, Methylprednisolone, Phenytoin, Sertraline, Telmisartan, and Topiramate  Objective Data:  Last Recorded Vitals:  Vitals:    06/15/25 0000 06/15/25 0400 06/15/25 0600 06/15/25 0800   BP: 124/68 123/76  162/85   BP Location:       Patient Position:       Pulse: 59 59  60   Resp: 17 17  12   Temp: 36.1 °C (97 °F) 36 °C (96.8 °F)  35.9 °C (96.6 °F)   TempSrc: Temporal Temporal  Temporal   SpO2: 94% 95%  98%   Weight:   79.1 kg (174 lb 6.4 oz)    Height:           Last Labs:  CBC - 6/15/2025:  7:10 AM  5.1 9.6 248    32.2      CMP - 6/15/2025:  7:10 AM  8.9 6.0 19 --- 0.6   4.0 3.5 15 94      PTT - 12/20/2024:  5:38 PM  1.2   12.9 35     TROPHS   Date/Time Value Ref Range Status   06/10/2025 03:08 PM 12 0 - 20 ng/L Final   06/10/2025 09:20 AM 11 0 - 20 ng/L Final   06/10/2025 07:50 AM 9 0 - 20 ng/L Final     BNP   Date/Time Value Ref Range Status   06/10/2025 07:50  0 - 99 pg/mL Final   04/01/2025 03:07  0 - 99 pg/mL Final     HGBA1C   Date/Time Value Ref Range Status   04/20/2025 01:46 PM 4.9 See comment % Final      Last I/O:  I/O last 3 completed shifts:  In: 265 (3.3 mL/kg) [IV Piggyback:265]  Out: - (0 mL/kg)   Weight: 79.1 kg     Past Cardiology Tests (Last 3 Years):  EKG:  ECG 12 lead 06/10/2025 (Preliminary)      ECG 12 lead 06/10/2025 (Preliminary)      ECG 12 lead 06/10/2025      ECG 12 lead 06/10/2025      Electrocardiogram, 12-lead PRN ACS symptoms 04/21/2025      ECG 12  lead 04/01/2025      ECG 12 lead 04/01/2025      Electrocardiogram, 12-lead PRN ACS symptoms 12/22/2024      ECG 12 Lead       ECG 12 lead (Clinic Performed) 11/25/2024    Echo:  Transthoracic Echo (TTE) Limited 06/12/2025      Transthoracic Echo (TTE) Limited 04/02/2025      Transthoracic echo (TTE) complete 12/05/2024    Ejection Fractions:  EF   Date/Time Value Ref Range Status   06/12/2025 09:00 AM 50 %    04/02/2025 03:43 PM 48 %    12/05/2024 11:29 AM 58 %      Cath:  Cardiac Catheterization Procedure 12/20/2024    Stress Test:  Nuclear Stress Test 12/05/2024    Cardiac Imaging:  No results found for this or any previous visit from the past 1095 days.      Inpatient Medications:  Scheduled Medications[1]  PRN Medications[2]  Continuous Medications[3]    Results for orders placed or performed during the hospital encounter of 06/10/25 (from the past 24 hours)   Hemoglobin and Hematocrit, Blood   Result Value Ref Range    Hemoglobin 9.2 (L) 13.5 - 17.5 g/dL    Hematocrit 30.7 (L) 41.0 - 52.0 %   Comprehensive Metabolic Panel   Result Value Ref Range    Glucose 91 74 - 99 mg/dL    Sodium 142 136 - 145 mmol/L    Potassium 3.9 3.5 - 5.3 mmol/L    Chloride 108 (H) 98 - 107 mmol/L    Bicarbonate 27 21 - 32 mmol/L    Anion Gap 11 10 - 20 mmol/L    Urea Nitrogen 18 6 - 23 mg/dL    Creatinine 1.05 0.50 - 1.30 mg/dL    eGFR 73 >60 mL/min/1.73m*2    Calcium 8.9 8.6 - 10.3 mg/dL    Albumin 3.5 3.4 - 5.0 g/dL    Alkaline Phosphatase 94 33 - 136 U/L    Total Protein 6.0 (L) 6.4 - 8.2 g/dL    AST 19 9 - 39 U/L    Bilirubin, Total 0.6 0.0 - 1.2 mg/dL    ALT 15 10 - 52 U/L   Magnesium   Result Value Ref Range    Magnesium 1.89 1.60 - 2.40 mg/dL   CBC   Result Value Ref Range    WBC 5.1 4.4 - 11.3 x10*3/uL    nRBC 0.0 0.0 - 0.0 /100 WBCs    RBC 3.75 (L) 4.50 - 5.90 x10*6/uL    Hemoglobin 9.6 (L) 13.5 - 17.5 g/dL    Hematocrit 32.2 (L) 41.0 - 52.0 %    MCV 86 80 - 100 fL    MCH 25.6 (L) 26.0 - 34.0 pg    MCHC 29.8 (L) 32.0 - 36.0  g/dL    RDW 20.8 (H) 11.5 - 14.5 %    Platelets 248 150 - 450 x10*3/uL   Bilirubin, Direct   Result Value Ref Range    Bilirubin, Direct 0.2 0.0 - 0.3 mg/dL       Physical Exam:  GENERAL APPEARANCE: Well developed, well nourished, in no acute distress.  Pale and chronically ill-appearing man  HEENT: No gross abnormalities of conjunctiva, teeth, gums, oral mucosa  NECK: Supple, no JVD, no bruit. Thyroid not palpable. Carotid upstrokes normal.  CHEST: Symmetric and non-tender.  Pacemaker site without erythema or erosion  LUNGS: Clear to auscultation bilaterally; normal respiratory effort.  HEART: PMI is nondisplaced.  There is a regular rhythm with a normal S1 and S2 without S3.  Mild peripheral edema.  Normal distal perfusion.    ABDOMEN: Soft, nontender, no palpable hepatosplenomegaly, no mases, no bruits. Abdominal aorta not noted to be enlarged.  MUSCULOSKELETAL: Osteoarthritic changes  EXTREMITIES: Warm with good color, no clubbing or cyanois. There is mild edema noted.  PERIPHERAL VASCULAR: Pulses present and equally palpable  NEURO/PSHCY: Alert and oriented x3; appropriate behavior and responses and responses, grossly normal cerebellar function with normal balance and coordination  INTEGUMENT: Skin warm and dry, without gross excoriationis or lesions.  LYMPH: No significant palpable lymphadenopathy     Assessment/Plan     I47.2 Wide-complex slow VT at 100  I50.3 HFpEF  R04.0 Epistaxis and iron deficiency anemia D50.9  Z95.0 Cardiac pacing device Z95.0    Code Status:  Full Code    I spent 38 minutes in the professional and overall care of this patient.        Forest Burns MD       [1]   Scheduled medications   Medication Dose Route Frequency    amiodarone  200 mg oral BID    [Held by provider] aspirin  81 mg oral Daily    brimonidine  1 drop Both Eyes BID    cholecalciferol  50 mcg oral Daily    docusate sodium  100 mg oral BID    donepezil  5 mg oral Nightly    ferrous sulfate  65 mg of elemental iron  oral Every other day    iron sucrose  300 mg intravenous Daily    isosorbide mononitrate ER  30 mg oral Daily    levETIRAcetam  1,000 mg oral BID    metoprolol succinate XL  25 mg oral BID    mupirocin   Topical BID    pantoprazole  40 mg oral BID AC    pravastatin  80 mg oral Nightly    QUEtiapine  50 mg oral Nightly    sodium chloride  2 spray Each Nostril BID    [START ON 6/22/2025] white petrolatum   Topical q12h   [2]   PRN medications   Medication    acetaminophen    Or    acetaminophen    magnesium hydroxide    melatonin    nitroglycerin    ondansetron ODT    Or    ondansetron   [3]   Continuous Medications   Medication Dose Last Rate

## 2025-06-15 NOTE — PROGRESS NOTES
06/15/25 1247   Discharge Planning   Living Arrangements Alone   Support Systems Family members   Type of Residence Assisted living   Care Facility Name [facility]   Type of Post Acute Facility Services Assisted living   Expected Discharge Disposition Home     Pt is medically ready for discharge. Clinicals faxed to [facility] at [phone]. Awaiting acceptance.

## 2025-06-15 NOTE — CARE PLAN
The patient's goals for the shift include sleep    The clinical goals for the shift include see poc

## 2025-06-15 NOTE — NURSING NOTE
EOS:  Slept well, no complaints.  Ambulated to bathroom with standby assist.  Monitor continues to show paced rhythm, VSS.  Remains oriented x4.

## 2025-06-15 NOTE — CARE PLAN
The patient's goals for the shift include      The clinical goals for the shift include see POC    Patient is alert and oriented x4 with no complaints of pain. ENT evaluated the patient at the bedside this morning and discussed findings related to his nasal septum. Although his recurrent epistaxis is contributing to chronic anemia, it is not believed to be the cause of the acute drop in hemoglobin seen at admission based on the patient’s report and description of the bleeding. No active bleeding was observed. Vital signs are stable. Medications were administered as ordered and well tolerated. Per ENT, Bactroban ointment should be applied to the nasal septum twice daily using a Q-tip on both sides. After one week, the patient may switch to twice-daily use of Vaseline, Aquaphor, or Ayr gel following nasal saline. The patient was advised to avoid picking nasal crusting. A nasal clamp was provided for episodes of acute bleeding. ENT follow-up is planned in 1-2 months for repeat evaluation, and arrangements will be made.

## 2025-06-16 VITALS
DIASTOLIC BLOOD PRESSURE: 74 MMHG | HEART RATE: 60 BPM | TEMPERATURE: 97.2 F | OXYGEN SATURATION: 97 % | HEIGHT: 71 IN | BODY MASS INDEX: 24.07 KG/M2 | WEIGHT: 171.96 LBS | RESPIRATION RATE: 18 BRPM | SYSTOLIC BLOOD PRESSURE: 118 MMHG

## 2025-06-16 LAB
HOLD SPECIMEN: NORMAL

## 2025-06-16 PROCEDURE — 2500000001 HC RX 250 WO HCPCS SELF ADMINISTERED DRUGS (ALT 637 FOR MEDICARE OP): Performed by: NURSE PRACTITIONER

## 2025-06-16 PROCEDURE — 2500000004 HC RX 250 GENERAL PHARMACY W/ HCPCS (ALT 636 FOR OP/ED): Performed by: NURSE PRACTITIONER

## 2025-06-16 RX ADMIN — LEVETIRACETAM 1000 MG: 500 TABLET, FILM COATED ORAL at 09:33

## 2025-06-16 RX ADMIN — BRIMONIDINE TARTRATE 1 DROP: 2 SOLUTION/ DROPS OPHTHALMIC at 09:34

## 2025-06-16 RX ADMIN — PANTOPRAZOLE SODIUM 40 MG: 40 TABLET, DELAYED RELEASE ORAL at 06:02

## 2025-06-16 RX ADMIN — MUPIROCIN: 20 OINTMENT TOPICAL at 09:33

## 2025-06-16 RX ADMIN — METOPROLOL SUCCINATE 25 MG: 25 TABLET, EXTENDED RELEASE ORAL at 09:33

## 2025-06-16 RX ADMIN — FERROUS SULFATE TAB 325 MG (65 MG ELEMENTAL FE) 1 TABLET: 325 (65 FE) TAB at 09:33

## 2025-06-16 RX ADMIN — IRON SUCROSE 300 MG: 20 INJECTION, SOLUTION INTRAVENOUS at 05:50

## 2025-06-16 RX ADMIN — SALINE NASAL SPRAY 2 SPRAY: 1.5 SOLUTION NASAL at 09:29

## 2025-06-16 RX ADMIN — CHOLECALCIFEROL TAB 125 MCG (5000 UNIT) 50 MCG: 125 TAB at 09:33

## 2025-06-16 ASSESSMENT — COGNITIVE AND FUNCTIONAL STATUS - GENERAL
MOVING TO AND FROM BED TO CHAIR: A LITTLE
TOILETING: A LITTLE
HELP NEEDED FOR BATHING: A LITTLE
DRESSING REGULAR LOWER BODY CLOTHING: A LITTLE
MOBILITY SCORE: 19
CLIMB 3 TO 5 STEPS WITH RAILING: A LOT
DRESSING REGULAR UPPER BODY CLOTHING: A LITTLE
DAILY ACTIVITIY SCORE: 20
STANDING UP FROM CHAIR USING ARMS: A LITTLE
WALKING IN HOSPITAL ROOM: A LITTLE

## 2025-06-16 ASSESSMENT — PAIN - FUNCTIONAL ASSESSMENT: PAIN_FUNCTIONAL_ASSESSMENT: 0-10

## 2025-06-16 ASSESSMENT — PAIN SCALES - GENERAL: PAINLEVEL_OUTOF10: 2

## 2025-06-16 NOTE — DISCHARGE SUMMARY
Discharge Diagnosis  Anemia, unspecified type           Issues Requiring Follow-Up  ***    Discharge Meds     Medication List      START taking these medications    • ferrous sulfate 325 mg (65 mg elemental) tablet; Take 1 tablet by mouth   every other day.  • isosorbide mononitrate ER 30 mg 24 hr tablet; Commonly known as: Imdur;   Take 1 tablet (30 mg) by mouth once daily. Do not crush or chew.  • melatonin 3 mg tablet; Take 1 tablet (3 mg) by mouth as needed at   bedtime for sleep.  • mupirocin 2 % ointment; Commonly known as: Bactroban; Apply topically 2   times a day for 14 doses. Apply to nasal septum, can apply this with a   q-tip to either side of the septum  • white petrolatum 41 % ointment; Commonly known as: Aquaphor; Apply 1   Application topically every 12 hours for 14 doses. Apply to nasal septum,   can apply this with a q-tip to either side of the septum Do not fill   before June 22, 2025.; Start taking on: June 22, 2025     CHANGE how you take these medications    • amiodarone 200 mg tablet; Commonly known as: Pacerone; Take 1 tablet   (200 mg) by mouth 2 times a day.; What changed: when to take this, Another   medication with the same name was removed. Continue taking this   medication, and follow the directions you see here.     CONTINUE taking these medications    • acetaminophen 325 mg tablet; Commonly known as: Tylenol  • brimonidine 0.2 % ophthalmic solution; Commonly known as: AlphaGAN  • cholecalciferol 50 mcg (2,000 units) tablet; Commonly known as: Vitamin   D-3  • docusate sodium 100 mg capsule; Commonly known as: Colace  • donepezil 5 mg tablet; Commonly known as: Aricept  • levETIRAcetam 1,000 mg tablet; Commonly known as: Keppra  • magnesium hydroxide 400 mg/5 mL suspension; Commonly known as: Milk of   Magnesia  • metoprolol succinate XL 25 mg 24 hr tablet; Commonly known as:   Toprol-XL; Take 1 tablet (25 mg) by mouth 2 times a day. Do not crush or   chew.  • nitroglycerin 0.4 mg SL  tablet; Commonly known as: Nitrostat; Place 1   tablet (0.4 mg) under the tongue every 5 minutes if needed for chest pain.  • oxymetazoline 0.05 % nasal spray; Commonly known as: Afrin; Administer 2   sprays into each nostril every 12 hours if needed (For nose bleeds.). Do   not use for more than 3 days consecutively. Use as needed to aid with   stopping nose bleeds.  • pantoprazole 40 mg EC tablet; Commonly known as: ProtoNix; Take 1 tablet   (40 mg) by mouth 2 times a day. Do not crush, chew, or split.  • pravastatin 80 mg tablet; Commonly known as: Pravachol  • QUEtiapine 50 mg tablet; Commonly known as: SEROquel  • Saline Nasal Mist 3 % mist; Generic drug: sodium chloride; Administer 2   Squirts into affected nostril(s) 2 times a day.     STOP taking these medications    • aspirin 81 mg EC tablet  • dexAMETHasone 2 mg tablet; Commonly known as: Decadron       Test Results Pending At Discharge  Pending Labs       No current pending labs.            Hospital Course   ***    Pertinent Physical Exam At Time of Discharge  Physical Exam    Outpatient Follow-Up  Future Appointments   Date Time Provider Department Center   7/16/2025  1:15 PM Gabino Giron MD [id number] [place]         Bradley Morel MD

## 2025-06-16 NOTE — NURSING NOTE
ADOD: 6/16.   Destination: [facility]  Transportation Provided by: Friend  Patient feels updated by provider(s) and involved in POC.   Patient has been advised to defer to [facility] for new medications and follow-up visits.   Patient is active with MyChart.  Patient's Pharmacy at facility.  Appointments will be made by caregiver(s).  Patient has been notified about a survey and call back is to be expected 1-2 weeks post discharge.   Notified patient that DC Navigator is available everyday throughout their stay if any assistance is needed.     Patients friend, NOÉ, will transport back to [facility]

## 2025-06-16 NOTE — CARE PLAN
The patient's goals for the shift include      The clinical goals for the shift include see care plan      Problem: Discharge Planning  Goal: Discharge to home or other facility with appropriate resources  Outcome: Met     Problem: Chronic Conditions and Co-morbidities  Goal: Patient's chronic conditions and co-morbidity symptoms are monitored and maintained or improved  Outcome: Met     Problem: Nutrition  Goal: Nutrient intake appropriate for maintaining nutritional needs  Outcome: Met     Problem: Fall/Injury  Goal: Verbalize understanding of personal risk factors for fall in the hospital  Outcome: Met  Goal: Verbalize understanding of risk factor reduction measures to prevent injury from fall in the home  Outcome: Met  Goal: Use assistive devices by end of the shift  Outcome: Met  Goal: Pace activities to prevent fatigue by end of the shift  Outcome: Met     Problem: Skin  Goal: Decreased wound size/increased tissue granulation at next dressing change  Outcome: Met  Flowsheets (Taken 6/16/2025 1125)  Decreased wound size/increased tissue granulation at next dressing change: Promote sleep for wound healing  Goal: Participates in plan/prevention/treatment measures  Outcome: Met  Flowsheets (Taken 6/16/2025 1125)  Participates in plan/prevention/treatment measures:   Elevate heels   Increase activity/out of bed for meals  Goal: Prevent/manage excess moisture  Outcome: Met  Flowsheets (Taken 6/16/2025 1125)  Prevent/manage excess moisture: Follow provider orders for dressing changes  Goal: Prevent/minimize sheer/friction injuries  Outcome: Met  Flowsheets (Taken 6/16/2025 1125)  Prevent/minimize sheer/friction injuries: Increase activity/out of bed for meals  Goal: Promote/optimize nutrition  Outcome: Met  Flowsheets (Taken 6/16/2025 1125)  Promote/optimize nutrition: Consume > 50% meals/supplements  Goal: Promote skin healing  Outcome: Met  Flowsheets (Taken 6/16/2025 1125)  Promote skin healing: Assess skin/pad  under line(s)/device(s)       1120 - Attempted to call report to [facility]. [phone] - Had to leave . Provided callback name/number    EOS note: Pt discharging at this time to return to [place]. Friend AJ is driving. Patient  has no reports of pain. Remains paced on tele. Took all medications as ordered. Ambulated in room using the walker. Had BM today. No further concerns prior to discharge. Educated on medication changed and new ointment for nose to prevent bleeds

## 2025-06-16 NOTE — DISCHARGE SUMMARY
Discharge Diagnosis  Anemia, unspecified type           Issues Requiring Follow-Up  ***    Discharge Meds     Medication List      START taking these medications     ferrous sulfate 325 mg (65 mg elemental) tablet; Take 1 tablet by mouth   every other day.   isosorbide mononitrate ER 30 mg 24 hr tablet; Commonly known as: Imdur;   Take 1 tablet (30 mg) by mouth once daily. Do not crush or chew.   melatonin 3 mg tablet; Take 1 tablet (3 mg) by mouth as needed at   bedtime for sleep.   mupirocin 2 % ointment; Commonly known as: Bactroban; Apply topically 2   times a day for 14 doses. Apply to nasal septum, can apply this with a   q-tip to either side of the septum   white petrolatum 41 % ointment; Commonly known as: Aquaphor; Apply 1   Application topically every 12 hours for 14 doses. Apply to nasal septum,   can apply this with a q-tip to either side of the septum Do not fill   before June 22, 2025.; Start taking on: June 22, 2025     CHANGE how you take these medications     amiodarone 200 mg tablet; Commonly known as: Pacerone; Take 1 tablet   (200 mg) by mouth 2 times a day.; What changed: when to take this, Another   medication with the same name was removed. Continue taking this   medication, and follow the directions you see here.     CONTINUE taking these medications     acetaminophen 325 mg tablet; Commonly known as: Tylenol   brimonidine 0.2 % ophthalmic solution; Commonly known as: AlphaGAN   cholecalciferol 50 mcg (2,000 units) tablet; Commonly known as: Vitamin   D-3   docusate sodium 100 mg capsule; Commonly known as: Colace   donepezil 5 mg tablet; Commonly known as: Aricept   levETIRAcetam 1,000 mg tablet; Commonly known as: Keppra   magnesium hydroxide 400 mg/5 mL suspension; Commonly known as: Milk of   Magnesia   metoprolol succinate XL 25 mg 24 hr tablet; Commonly known as:   Toprol-XL; Take 1 tablet (25 mg) by mouth 2 times a day. Do not crush or   chew.   nitroglycerin 0.4 mg SL tablet; Commonly  known as: Nitrostat; Place 1   tablet (0.4 mg) under the tongue every 5 minutes if needed for chest pain.   oxymetazoline 0.05 % nasal spray; Commonly known as: Afrin; Administer 2   sprays into each nostril every 12 hours if needed (For nose bleeds.). Do   not use for more than 3 days consecutively. Use as needed to aid with   stopping nose bleeds.   pantoprazole 40 mg EC tablet; Commonly known as: ProtoNix; Take 1 tablet   (40 mg) by mouth 2 times a day. Do not crush, chew, or split.   pravastatin 80 mg tablet; Commonly known as: Pravachol   QUEtiapine 50 mg tablet; Commonly known as: SEROquel   Saline Nasal Mist 3 % mist; Generic drug: sodium chloride; Administer 2   Squirts into affected nostril(s) 2 times a day.     STOP taking these medications     aspirin 81 mg EC tablet   dexAMETHasone 2 mg tablet; Commonly known as: Decadron       Test Results Pending At Discharge  Pending Labs       No current pending labs.            Hospital Course   ***    Pertinent Physical Exam At Time of Discharge  Physical Exam  HENT:      Head: Normocephalic.   Eyes:      Conjunctiva/sclera: Conjunctivae normal.   Cardiovascular:      Rate and Rhythm: Regular rhythm.      Heart sounds: Normal heart sounds.   Pulmonary:      Breath sounds: Normal breath sounds.   Abdominal:      Palpations: Abdomen is soft.   Musculoskeletal:         General: Normal range of motion.   Skin:     General: Skin is warm and dry.   Neurological:      Mental Status: He is alert. Mental status is at baseline.         Outpatient Follow-Up  Future Appointments   Date Time Provider Department Center   7/16/2025  1:15 PM Gabino Giron MD [id number] [place]         Bradley Morel MD

## 2025-06-16 NOTE — PROGRESS NOTES
06/16/25 0820   Discharge Planning   Type of Post Acute Facility Services Assisted living   Expected Discharge Disposition Home   Intensity of Service   Intensity of Service >30 min     Per Jacky at [facility], they have not received any clinicals yet and DON needs to review clinicals before patient can return. Clinicals faxed to Jacky at [phone]. Updated nursing. Awaiting response from aJcky once DON reviews.     1769- Jacky confirmed that patient can return to [facility] today. Updated nursing who will call patient's friend AJ to transport. Updated patient at bedside.

## 2025-07-07 ENCOUNTER — APPOINTMENT (OUTPATIENT)
Dept: GASTROENTEROLOGY | Facility: HOSPITAL | Age: 80
End: 2025-07-07
Payer: MEDICARE

## 2025-07-09 ENCOUNTER — APPOINTMENT (OUTPATIENT)
Dept: PRIMARY CARE | Facility: CLINIC | Age: 80
End: 2025-07-09
Payer: MEDICARE

## 2025-07-09 VITALS
RESPIRATION RATE: 16 BRPM | BODY MASS INDEX: 24.02 KG/M2 | SYSTOLIC BLOOD PRESSURE: 128 MMHG | TEMPERATURE: 97.9 F | HEART RATE: 60 BPM | DIASTOLIC BLOOD PRESSURE: 74 MMHG | OXYGEN SATURATION: 96 % | WEIGHT: 172.25 LBS

## 2025-07-09 DIAGNOSIS — Z09 HOSPITAL DISCHARGE FOLLOW-UP: Primary | ICD-10-CM

## 2025-07-09 DIAGNOSIS — D64.9 ANEMIA, UNSPECIFIED TYPE: ICD-10-CM

## 2025-07-09 DIAGNOSIS — I47.20 VENTRICULAR TACHYCARDIA (MULTI): ICD-10-CM

## 2025-07-09 DIAGNOSIS — R53.81 PHYSICAL DEBILITY: ICD-10-CM

## 2025-07-09 DIAGNOSIS — I25.9 CHEST PAIN DUE TO MYOCARDIAL ISCHEMIA, UNSPECIFIED ISCHEMIC CHEST PAIN TYPE: ICD-10-CM

## 2025-07-09 DIAGNOSIS — K31.811 GASTROINTESTINAL HEMORRHAGE ASSOCIATED WITH ANGIODYSPLASIA OF STOMACH AND DUODENUM: ICD-10-CM

## 2025-07-09 PROBLEM — G60.3 IDIOPATHIC PROGRESSIVE POLYNEUROPATHY: Status: ACTIVE | Noted: 2025-07-09

## 2025-07-09 PROCEDURE — 3078F DIAST BP <80 MM HG: CPT

## 2025-07-09 PROCEDURE — 3074F SYST BP LT 130 MM HG: CPT

## 2025-07-09 PROCEDURE — 99215 OFFICE O/P EST HI 40 MIN: CPT

## 2025-07-09 PROCEDURE — 1111F DSCHRG MED/CURRENT MED MERGE: CPT

## 2025-07-09 PROCEDURE — 1036F TOBACCO NON-USER: CPT

## 2025-07-09 PROCEDURE — 1159F MED LIST DOCD IN RCRD: CPT

## 2025-07-09 PROCEDURE — G2211 COMPLEX E/M VISIT ADD ON: HCPCS

## 2025-07-09 NOTE — PROGRESS NOTES
Subjective   Wayne Burkitt is a 79 y.o. male who presents for Epistaxis (Nose Bleed) (Pt here to follow up from hospital admission. ) and Follow-up (Pt following up from bleeding in small bowel. ).  Hospital discharge fu. Was admitted to [facility] from 6/10/2025 to 6/16/2025.  His initial chief complaint was chest pain.  However, he was discovered to be persistently anemic again with a hemoglobin of 6.3.  When I last saw him, I saw him for a hospital discharge follow-up for which she was post to have an outpatient capsule endoscopy.  He was recommended for inpatient admission during this hospitalization to have this done.  He received 1 packed unit of red blood cells while in the emergency room.  He ultimately was admitted.  He underwent enteroscopy during this admission was found to have numerous angioectasias which were ablated.  Additionally, his hospitalization was complicated by ventricular tachycardia.  Cardiology was consulted and recommended: Increasing his amiodarone dose to 200 mg twice daily.  He does have a permanent pacemaker in place.  He has a follow-up visit scheduled with cardiology, Dr. Lima on 7/16/2025.  Additionally, he has follow-up with gastroenterology as scheduled on 8/21/2025.  He was recommended to discontinue all NSAID use.  He also notably had some episodes of epistaxis during his hospitalization similar to the previous hospitalization I was involved in his care personally he also had epistaxis.  During my last visit with him, was advised to begin using nasal saline spray every day.  He has been seen by ENT for this and that was also the recommendation.    Today: Denies any further episodes of chest pain.  States that his chest pain resolved during hospitalization with the administration of packed red blood cells.  He has no shortness of breath.  He states he has had no further melena or BRBPR.  Denies any abdominal pain.  He states he is only had 1 episode of epistaxis since being  discharged.  He states that he would like to have a disability placard prescription.  He has been using walker for about 1-1/2 years.  He otherwise feels well and has no other significant concerns.        Objective     /74 (BP Location: Right arm, Patient Position: Sitting, BP Cuff Size: Adult)   Pulse 60   Temp 36.6 °C (97.9 °F) (Temporal)   Resp 16   Wt 78.1 kg (172 lb 4 oz)   SpO2 96%   BMI 24.02 kg/m²   Physical Exam  Constitutional:       General: He is not in acute distress.     Appearance: Normal appearance. He is not ill-appearing or toxic-appearing.   HENT:      Head: Normocephalic and atraumatic.   Eyes:      Extraocular Movements: Extraocular movements intact.      Conjunctiva/sclera: Conjunctivae normal.   Cardiovascular:      Rate and Rhythm: Normal rate and regular rhythm. No extrasystoles are present.     Pulses:           Radial pulses are 2+ on the right side and 2+ on the left side.        Dorsalis pedis pulses are 2+ on the left side.        Posterior tibial pulses are 2+ on the right side and 2+ on the left side.      Heart sounds: Normal heart sounds.   Pulmonary:      Effort: Pulmonary effort is normal. No respiratory distress.      Breath sounds: Normal breath sounds.   Abdominal:      General: Abdomen is flat. There is no distension.      Palpations: Abdomen is soft. There is no mass.      Tenderness: There is no abdominal tenderness. There is no guarding or rebound.      Hernia: No hernia is present.   Musculoskeletal:         General: Normal range of motion.      Right lower leg: No edema.      Left lower leg: No edema.   Skin:     General: Skin is warm and dry.      Capillary Refill: Capillary refill takes less than 2 seconds.      Coloration: Skin is not pale.   Neurological:      General: No focal deficit present.      Mental Status: He is alert. Mental status is at baseline.   Psychiatric:         Mood and Affect: Mood normal.         Thought Content: Thought content normal.          Assessment & Plan  Hospital discharge follow-up  - History as above.  I personally reviewed significant portions of the documentation, labs, imaging, and EKG findings during the above-mentioned hospitalization       Ventricular tachycardia (Multi)  - HPI as above, patient has known history of ventricular tachycardia  - Has a permanent pacemaker placed  - EP was consulted during admission, recommended: Increasing amiodarone to 200 mg twice daily  -Has follow-up with cardiology, Dr. Lima on 7/16/2025.  No recurrent CP today since admission.  No SOB  Orders:    Follow Up In [facility] - PCP - Established; Future    Anemia, unspecified type  - Likely secondary to diffuse gastrointestinal angioectasias and possibly also due to epistaxis.  Patient has had no further melena, and has only had 1 episode of epistaxis since hospitalization  -Continue with refraining from use of all NSAIDs at this time  -Will recheck CBC now.  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  Has scheduled follow-up in 8/21/2025  Orders:    Follow Up In [facility] - PCP - Established; Future    Chest pain due to myocardial ischemia, unspecified ischemic chest pain type  - Completely resolved.  Likely secondary to demand in the setting of severe anemia       Physical debility  - Patient has walker use x 1.5 years.  Patient requested Rx for disability plaque  Orders:    Disability Placard             Follow up 6 months.     Olvin Morales D.O.   Family Medicine PGY-2, [facility]  07/09/25      Please excuse any errors in grammar or translation related to this dictation. Voice recognition software may have been utilized to prepare this document.

## 2025-07-09 NOTE — ASSESSMENT & PLAN NOTE
- Likely secondary to diffuse gastrointestinal angioectasias and possibly also due to epistaxis.  Patient has had no further melena, and has only had 1 episode of epistaxis since hospitalization  -Continue with refraining from use of all NSAIDs at this time  -Will recheck CBC now.  Last on day of discharge, 9.6, stable  Orders:    CBC; Future    Follow Up In [facility] - PCP - Established; Future

## 2025-07-09 NOTE — ASSESSMENT & PLAN NOTE
- Patient has walker use x 1.5 years.  Patient requested Rx for disability plaque  Orders:    Disability Placard

## 2025-07-10 LAB
ERYTHROCYTE [DISTWIDTH] IN BLOOD BY AUTOMATED COUNT: 21.2 % (ref 11–15)
HCT VFR BLD AUTO: 38.9 % (ref 38.5–50)
HGB BLD-MCNC: 12.5 G/DL (ref 13.2–17.1)
MCH RBC QN AUTO: 29.3 PG (ref 27–33)
MCHC RBC AUTO-ENTMCNC: 32.1 G/DL (ref 32–36)
MCV RBC AUTO: 91.3 FL (ref 80–100)
PLATELET # BLD AUTO: 183 THOUSAND/UL (ref 140–400)
PMV BLD REES-ECKER: 10 FL (ref 7.5–12.5)
RBC # BLD AUTO: 4.26 MILLION/UL (ref 4.2–5.8)
WBC # BLD AUTO: 4.1 THOUSAND/UL (ref 3.8–10.8)

## 2025-07-10 NOTE — PROGRESS NOTES
I saw and evaluated the patient. I personally obtained the key and critical portions of the history and physical exam or was physically present for key and critical portions performed by the resident/fellow. I reviewed the resident/fellow's documentation and discussed the patient with the resident/fellow. I agree with the resident/fellow's medical decision making as documented in the note.    Brennan Romero, DO

## 2025-07-16 ENCOUNTER — APPOINTMENT (OUTPATIENT)
Dept: CARDIOLOGY | Facility: CLINIC | Age: 80
End: 2025-07-16
Payer: MEDICARE

## 2025-07-16 VITALS
BODY MASS INDEX: 24.22 KG/M2 | HEIGHT: 71 IN | DIASTOLIC BLOOD PRESSURE: 64 MMHG | SYSTOLIC BLOOD PRESSURE: 128 MMHG | RESPIRATION RATE: 18 BRPM | WEIGHT: 173 LBS | HEART RATE: 68 BPM | OXYGEN SATURATION: 96 %

## 2025-07-16 DIAGNOSIS — I25.10 CORONARY ARTERY DISEASE INVOLVING NATIVE CORONARY ARTERY OF NATIVE HEART WITHOUT ANGINA PECTORIS: ICD-10-CM

## 2025-07-16 PROCEDURE — 3078F DIAST BP <80 MM HG: CPT | Performed by: INTERNAL MEDICINE

## 2025-07-16 PROCEDURE — 1111F DSCHRG MED/CURRENT MED MERGE: CPT | Performed by: INTERNAL MEDICINE

## 2025-07-16 PROCEDURE — 99213 OFFICE O/P EST LOW 20 MIN: CPT | Performed by: INTERNAL MEDICINE

## 2025-07-16 PROCEDURE — 1159F MED LIST DOCD IN RCRD: CPT | Performed by: INTERNAL MEDICINE

## 2025-07-16 PROCEDURE — 3074F SYST BP LT 130 MM HG: CPT | Performed by: INTERNAL MEDICINE

## 2025-07-16 NOTE — PROGRESS NOTES
"[facility]    Cardiology Office Follow-up: Coronary Artery Disease, Hypertension, Hyperlipidemia, and Hospital Follow-up    Patient previously seen for CAD, prior CABG, prior PCI, significant groin bleed, HTN, HPL.    At the previous encounter, the patient was seen in routine followup.    After the encounter the patient completed the following testing: none    There were many significant interval changes in medical history before this appointment.  Has had GI bleeding and chronic AVM, subdural hematoma.    Today the patient reports: No chest pain or shortness of breath    Additional recent non-cardiac testing includes: none    ROS: Remainder of 12 review of systems is negative aside from chief complaint.    PHYSICAL EXAM  Vitals:    07/16/25 1324   BP: 128/64   BP Location: Left arm   Patient Position: Sitting   Pulse: 68   Resp: 18   SpO2: 96%   Weight: 78.5 kg (173 lb)   Height: 1.803 m (5' 11\")     General: No acute distress, appears comfortable  Cardiac: Regular rate and rhythm with no murmurs rubs or gallops, normal S1-S2  Pulmonary: Clear to auscultation bilaterally with no rales or rhonchi, normal respiratory effort  Gastrointestinal: Soft abdomen with no tenderness or guarding, no rebound  Musculoskeletal: No lower extremity edema, normal  Neurological: Alert and oriented x 4, appropriate, moving all extremities well, normal affect  Psychiatric: Normal affect, mood appropriate to occasion  Skin: No rashes, hives or jaundice  HEENT: Normocephalic, atraumatic.  Pupils equal round and reactive.    Assessment/Plan   Diagnoses and all orders for this visit:  Coronary artery disease involving native coronary artery of native heart without angina pectoris  -     Follow Up In Cardiology  -     Follow Up In Cardiology; Future      Assessment and plan (narrative):    Wayne Burkitt is a 79 y.o. male as above.  Not a good candidate for any anticoagulation due to multiple life-threatening bleeds including subdural. "  Will hold off on any antiplatelet or anticoagulation.  Rhythm control with amiodarone.  No angina at this time.  Normal LV.    Followup: 3 months    Gabino Giron MD    Director of Interventional Cardiology  [facility] [place] [facility]

## 2025-07-18 ENCOUNTER — TELEPHONE (OUTPATIENT)
Dept: PRIMARY CARE | Facility: CLINIC | Age: 80
End: 2025-07-18
Payer: MEDICARE

## 2025-07-18 NOTE — TELEPHONE ENCOUNTER
Notified patient emergency contact and power of  of test results. Friend acknowledged results and had no further questions. Mentioned he would have patient follow up with Dr. Morales.

## 2025-08-12 ENCOUNTER — TELEPHONE (OUTPATIENT)
Dept: GASTROENTEROLOGY | Facility: CLINIC | Age: 80
End: 2025-08-12
Payer: MEDICARE

## 2025-08-14 LAB
ATRIAL RATE: 234 BPM
ATRIAL RATE: 60 BPM
PR INTERVAL: 216 MS
Q ONSET: 212 MS
Q ONSET: 213 MS
QRS COUNT: 10 BEATS
QRS COUNT: 9 BEATS
QRS DURATION: 108 MS
QRS DURATION: 98 MS
QT INTERVAL: 426 MS
QT INTERVAL: 472 MS
QTC CALCULATION(BAZETT): 426 MS
QTC CALCULATION(BAZETT): 472 MS
QTC FREDERICIA: 426 MS
QTC FREDERICIA: 472 MS
R AXIS: 35 DEGREES
R AXIS: 41 DEGREES
T AXIS: 214 DEGREES
T AXIS: 240 DEGREES
T OFFSET: 425 MS
T OFFSET: 449 MS
VENTRICULAR RATE: 60 BPM
VENTRICULAR RATE: 60 BPM

## 2025-08-21 ENCOUNTER — APPOINTMENT (OUTPATIENT)
Dept: GASTROENTEROLOGY | Facility: CLINIC | Age: 80
End: 2025-08-21
Payer: MEDICARE

## 2025-09-08 ENCOUNTER — APPOINTMENT (OUTPATIENT)
Dept: GASTROENTEROLOGY | Facility: CLINIC | Age: 80
End: 2025-09-08
Payer: MEDICARE

## 2025-10-30 ENCOUNTER — APPOINTMENT (OUTPATIENT)
Dept: CARDIOLOGY | Facility: CLINIC | Age: 80
End: 2025-10-30
Payer: MEDICARE

## (undated) DEVICE — ACCESS KIT, S-MAK MINI, 4FR 10CM 0.018IN 40CM, NT/PT, ECHO ENHANCE NEEDLE

## (undated) DEVICE — MANIFOLD KIT, CUSTOM (SJM)

## (undated) DEVICE — CATHETER, DIAGNOSTIC, AMPLATZ, 5 FR, AR MOD

## (undated) DEVICE — SHEATH, PINNACLE, W/.038 GW 10CM, 5FR INTRODUCER, 2.5 CM DIALATOR

## (undated) DEVICE — CATHETER, DIAGNOSTIC, 5FR, IM

## (undated) DEVICE — SHEATH, PINNACLE, W/.038 GUIDEWIRE, 10 CM,  6FR INTRODUCER, 6FR DIA, 2.5 CM DIALATOR

## (undated) DEVICE — INFLATION DEVICE, BASIXCOMPAX, 30 ATM/BAR, 20ML, MAP152, 12IN TUBING

## (undated) DEVICE — CATHETER, VISTA BRIGHT TIP, 6F, .070, 3 DRC, 100CM

## (undated) DEVICE — VALVE, CONTROL BLEEDBACK

## (undated) DEVICE — CATHETER, BALLOON, EMERGE, PTCA, 12 X 2.0MM

## (undated) DEVICE — CATHETER, DIAGNOSTIC, JUDKINS, LEFT, 5 FR-JL 4.0

## (undated) DEVICE — ACCESS KIT, MINI MAK, 4 FR X 10CM, 0.018 X 40CM, NITINOL/PLATINUM, STD NEEDLE

## (undated) DEVICE — SYRINGE, VACLOK, 20ML, FIXED MALE LUER

## (undated) DEVICE — GUIDEWIRE, HI-TORQUE WHISPER MS, .014IN/3CM STRAIGHT TIP/190CM

## (undated) DEVICE — GUIDEWIRE, AMPLATZ, SUPER STIFF, 035 STRAIGHT

## (undated) DEVICE — Device

## (undated) DEVICE — CATHETER, DIAGNOSTIC, 5FR,  PIG-145, 110CM, 6SH ANGLED

## (undated) DEVICE — MBRACE, WRIST SUPPORT WITH HOOK

## (undated) DEVICE — CATHETER, BALLOON, NC EUPHORA NONCOMPLIANT 2.5 X 15 X 142CM